# Patient Record
Sex: MALE | Race: WHITE | NOT HISPANIC OR LATINO | Employment: FULL TIME | URBAN - METROPOLITAN AREA
[De-identification: names, ages, dates, MRNs, and addresses within clinical notes are randomized per-mention and may not be internally consistent; named-entity substitution may affect disease eponyms.]

---

## 2018-12-12 ENCOUNTER — HOSPITAL ENCOUNTER (INPATIENT)
Facility: HOSPITAL | Age: 43
LOS: 2 days | Discharge: HOME | DRG: 418 | End: 2018-12-15
Attending: EMERGENCY MEDICINE | Admitting: INTERNAL MEDICINE
Payer: COMMERCIAL

## 2018-12-12 DIAGNOSIS — K85.90 ACUTE PANCREATITIS: ICD-10-CM

## 2018-12-12 DIAGNOSIS — K85.10 ACUTE BILIARY PANCREATITIS WITHOUT INFECTION OR NECROSIS: ICD-10-CM

## 2018-12-12 DIAGNOSIS — R10.13 ACUTE EPIGASTRIC PAIN: ICD-10-CM

## 2018-12-12 LAB
ALBUMIN SERPL BCP-MCNC: 3.8 G/DL
ALP SERPL-CCNC: 87 U/L
ALT SERPL W/O P-5'-P-CCNC: 177 U/L
ANION GAP SERPL CALC-SCNC: 7 MMOL/L
AST SERPL-CCNC: 117 U/L
BASOPHILS # BLD AUTO: 0 K/UL
BASOPHILS NFR BLD: 0.1 %
BILIRUB SERPL-MCNC: 0.9 MG/DL
BILIRUB UR QL STRIP: NEGATIVE
BUN SERPL-MCNC: 14 MG/DL
CALCIUM SERPL-MCNC: 9.5 MG/DL
CHLORIDE SERPL-SCNC: 107 MMOL/L
CLARITY UR: CLEAR
CO2 SERPL-SCNC: 27 MMOL/L
COLOR UR: YELLOW
CREAT SERPL-MCNC: 1 MG/DL
DIFFERENTIAL METHOD: ABNORMAL
EOSINOPHIL # BLD AUTO: 0.1 K/UL
EOSINOPHIL NFR BLD: 0.8 %
ERYTHROCYTE [DISTWIDTH] IN BLOOD BY AUTOMATED COUNT: 13.8 %
EST. GFR  (AFRICAN AMERICAN): >60 ML/MIN/1.73 M^2
EST. GFR  (NON AFRICAN AMERICAN): >60 ML/MIN/1.73 M^2
GLUCOSE SERPL-MCNC: 105 MG/DL
GLUCOSE UR QL STRIP: NEGATIVE
HCT VFR BLD AUTO: 48.7 %
HGB BLD-MCNC: 16.2 G/DL
HGB UR QL STRIP: NEGATIVE
KETONES UR QL STRIP: NEGATIVE
LEUKOCYTE ESTERASE UR QL STRIP: NEGATIVE
LIPASE SERPL-CCNC: 782 U/L
LYMPHOCYTES # BLD AUTO: 0.9 K/UL
LYMPHOCYTES NFR BLD: 5.5 %
MCH RBC QN AUTO: 26.8 PG
MCHC RBC AUTO-ENTMCNC: 33.2 G/DL
MCV RBC AUTO: 81 FL
MONOCYTES # BLD AUTO: 0.3 K/UL
MONOCYTES NFR BLD: 1.7 %
NEUTROPHILS # BLD AUTO: 15.1 K/UL
NEUTROPHILS NFR BLD: 91.9 %
NITRITE UR QL STRIP: NEGATIVE
PH UR STRIP: 6 [PH] (ref 5–8)
PLATELET # BLD AUTO: 377 K/UL
PMV BLD AUTO: 7.9 FL
POTASSIUM SERPL-SCNC: 3.5 MMOL/L
PROT SERPL-MCNC: 7.1 G/DL
PROT UR QL STRIP: NEGATIVE
RBC # BLD AUTO: 6.02 M/UL
SODIUM SERPL-SCNC: 141 MMOL/L
SP GR UR STRIP: >=1.03 (ref 1–1.03)
URN SPEC COLLECT METH UR: ABNORMAL
UROBILINOGEN UR STRIP-ACNC: NEGATIVE EU/DL
WBC # BLD AUTO: 16.5 K/UL

## 2018-12-12 PROCEDURE — 85025 COMPLETE CBC W/AUTO DIFF WBC: CPT

## 2018-12-12 PROCEDURE — 81003 URINALYSIS AUTO W/O SCOPE: CPT

## 2018-12-12 PROCEDURE — 25000003 PHARM REV CODE 250: Performed by: EMERGENCY MEDICINE

## 2018-12-12 PROCEDURE — 80053 COMPREHEN METABOLIC PANEL: CPT

## 2018-12-12 PROCEDURE — 93005 ELECTROCARDIOGRAM TRACING: CPT

## 2018-12-12 PROCEDURE — 96365 THER/PROPH/DIAG IV INF INIT: CPT

## 2018-12-12 PROCEDURE — 99285 EMERGENCY DEPT VISIT HI MDM: CPT | Mod: 25

## 2018-12-12 PROCEDURE — 36415 COLL VENOUS BLD VENIPUNCTURE: CPT

## 2018-12-12 PROCEDURE — 83690 ASSAY OF LIPASE: CPT

## 2018-12-12 PROCEDURE — 93010 ELECTROCARDIOGRAM REPORT: CPT | Mod: ,,, | Performed by: INTERNAL MEDICINE

## 2018-12-12 PROCEDURE — 63600175 PHARM REV CODE 636 W HCPCS: Performed by: EMERGENCY MEDICINE

## 2018-12-12 PROCEDURE — 96375 TX/PRO/DX INJ NEW DRUG ADDON: CPT

## 2018-12-12 RX ORDER — MORPHINE SULFATE 8 MG/ML
8 INJECTION INTRAMUSCULAR; INTRAVENOUS; SUBCUTANEOUS
Status: COMPLETED | OUTPATIENT
Start: 2018-12-12 | End: 2018-12-12

## 2018-12-12 RX ORDER — ONDANSETRON 2 MG/ML
4 INJECTION INTRAMUSCULAR; INTRAVENOUS
Status: COMPLETED | OUTPATIENT
Start: 2018-12-12 | End: 2018-12-12

## 2018-12-12 RX ORDER — IBUPROFEN 400 MG/1
800 TABLET ORAL
Status: DISCONTINUED | OUTPATIENT
Start: 2018-12-12 | End: 2018-12-12

## 2018-12-12 RX ORDER — LISINOPRIL 40 MG/1
40 TABLET ORAL DAILY
COMMUNITY
End: 2022-09-27 | Stop reason: SDUPTHER

## 2018-12-12 RX ORDER — DICYCLOMINE HYDROCHLORIDE 10 MG/1
20 CAPSULE ORAL
Status: COMPLETED | OUTPATIENT
Start: 2018-12-12 | End: 2018-12-12

## 2018-12-12 RX ORDER — ATORVASTATIN CALCIUM 40 MG/1
40 TABLET, FILM COATED ORAL NIGHTLY
COMMUNITY
End: 2020-03-16

## 2018-12-12 RX ADMIN — SODIUM CHLORIDE 1000 ML: 0.9 INJECTION, SOLUTION INTRAVENOUS at 08:12

## 2018-12-12 RX ADMIN — DICYCLOMINE HYDROCHLORIDE 20 MG: 10 CAPSULE ORAL at 08:12

## 2018-12-12 RX ADMIN — MORPHINE SULFATE 8 MG: 8 INJECTION INTRAVENOUS at 10:12

## 2018-12-12 RX ADMIN — PIPERACILLIN SODIUM AND TAZOBACTAM SODIUM 4.5 G: 4; .5 INJECTION, POWDER, LYOPHILIZED, FOR SOLUTION INTRAVENOUS at 10:12

## 2018-12-12 RX ADMIN — ONDANSETRON 4 MG: 2 INJECTION INTRAMUSCULAR; INTRAVENOUS at 08:12

## 2018-12-12 NOTE — LETTER
December 15, 2018         [address] [place] [id number]  Phone: [phone]       Patient: Josue Mir   YOB: 1975  Date of Visit: 12/15/2018    To Whom It May Concern:    Jaden Mir  was at [facility] from 12/12/2018 to 12/15/2018.  He may return to work on 12/17/2018. With light restrictions. (no heavy lifting).  If you have any questions or concerns, or if I can be of further assistance, please do not hesitate to contact me.    Sincerely,    Dr. Zamora (general surgery)  Sandra Quintero LPN

## 2018-12-13 PROBLEM — I48.0 PAROXYSMAL ATRIAL FIBRILLATION: Status: ACTIVE | Noted: 2018-12-13

## 2018-12-13 PROBLEM — I10 ESSENTIAL HYPERTENSION: Status: ACTIVE | Noted: 2018-12-13

## 2018-12-13 PROBLEM — K85.90 ACUTE PANCREATITIS: Status: ACTIVE | Noted: 2018-12-13

## 2018-12-13 PROBLEM — G47.33 OSA (OBSTRUCTIVE SLEEP APNEA): Status: ACTIVE | Noted: 2018-12-13

## 2018-12-13 LAB
ALBUMIN SERPL BCP-MCNC: 3.2 G/DL
ALP SERPL-CCNC: 69 U/L
ALT SERPL W/O P-5'-P-CCNC: 118 U/L
ANION GAP SERPL CALC-SCNC: 7 MMOL/L
AST SERPL-CCNC: 52 U/L
BASOPHILS # BLD AUTO: 0 K/UL
BASOPHILS NFR BLD: 0.1 %
BILIRUB SERPL-MCNC: 1.1 MG/DL
BUN SERPL-MCNC: 12 MG/DL
CALCIUM SERPL-MCNC: 8.9 MG/DL
CHLORIDE SERPL-SCNC: 105 MMOL/L
CHOLEST SERPL-MCNC: 100 MG/DL
CHOLEST/HDLC SERPL: 2.4 {RATIO}
CO2 SERPL-SCNC: 28 MMOL/L
CREAT SERPL-MCNC: 1 MG/DL
DIFFERENTIAL METHOD: ABNORMAL
EOSINOPHIL # BLD AUTO: 0.1 K/UL
EOSINOPHIL NFR BLD: 1 %
ERYTHROCYTE [DISTWIDTH] IN BLOOD BY AUTOMATED COUNT: 13.8 %
EST. GFR  (AFRICAN AMERICAN): >60 ML/MIN/1.73 M^2
EST. GFR  (NON AFRICAN AMERICAN): >60 ML/MIN/1.73 M^2
ESTIMATED AVG GLUCOSE: 94 MG/DL
GLUCOSE SERPL-MCNC: 100 MG/DL
HBA1C MFR BLD HPLC: 4.9 %
HCT VFR BLD AUTO: 43.7 %
HDLC SERPL-MCNC: 42 MG/DL
HDLC SERPL: 42 %
HGB BLD-MCNC: 14.7 G/DL
INR PPP: 1.1
LDLC SERPL CALC-MCNC: 47.2 MG/DL
LIPASE SERPL-CCNC: 355 U/L
LIPASE SERPL-CCNC: 412 U/L
LYMPHOCYTES # BLD AUTO: 1 K/UL
LYMPHOCYTES NFR BLD: 6.6 %
MAGNESIUM SERPL-MCNC: 1.8 MG/DL
MCH RBC QN AUTO: 27.2 PG
MCHC RBC AUTO-ENTMCNC: 33.7 G/DL
MCV RBC AUTO: 81 FL
MONOCYTES # BLD AUTO: 0.9 K/UL
MONOCYTES NFR BLD: 6.2 %
NEUTROPHILS # BLD AUTO: 13 K/UL
NEUTROPHILS NFR BLD: 86.1 %
NONHDLC SERPL-MCNC: 58 MG/DL
PHOSPHATE SERPL-MCNC: 2.6 MG/DL
PLATELET # BLD AUTO: 306 K/UL
PMV BLD AUTO: 8 FL
POTASSIUM SERPL-SCNC: 3.7 MMOL/L
PROT SERPL-MCNC: 6.3 G/DL
PROTHROMBIN TIME: 11.6 SEC
RBC # BLD AUTO: 5.41 M/UL
SODIUM SERPL-SCNC: 140 MMOL/L
TRIGL SERPL-MCNC: 54 MG/DL
WBC # BLD AUTO: 15.1 K/UL

## 2018-12-13 PROCEDURE — 80053 COMPREHEN METABOLIC PANEL: CPT

## 2018-12-13 PROCEDURE — 83690 ASSAY OF LIPASE: CPT | Mod: 91

## 2018-12-13 PROCEDURE — 80061 LIPID PANEL: CPT

## 2018-12-13 PROCEDURE — 84100 ASSAY OF PHOSPHORUS: CPT

## 2018-12-13 PROCEDURE — 99254 IP/OBS CNSLTJ NEW/EST MOD 60: CPT | Mod: ,,, | Performed by: SURGERY

## 2018-12-13 PROCEDURE — 94660 CPAP INITIATION&MGMT: CPT

## 2018-12-13 PROCEDURE — 63600175 PHARM REV CODE 636 W HCPCS: Performed by: NURSE PRACTITIONER

## 2018-12-13 PROCEDURE — 85025 COMPLETE CBC W/AUTO DIFF WBC: CPT

## 2018-12-13 PROCEDURE — 12000002 HC ACUTE/MED SURGE SEMI-PRIVATE ROOM

## 2018-12-13 PROCEDURE — 85610 PROTHROMBIN TIME: CPT

## 2018-12-13 PROCEDURE — 27000190 HC CPAP FULL FACE MASK W/VALVE

## 2018-12-13 PROCEDURE — 99223 1ST HOSP IP/OBS HIGH 75: CPT | Mod: ,,, | Performed by: INTERNAL MEDICINE

## 2018-12-13 PROCEDURE — 83735 ASSAY OF MAGNESIUM: CPT

## 2018-12-13 PROCEDURE — 25000003 PHARM REV CODE 250: Performed by: NURSE PRACTITIONER

## 2018-12-13 PROCEDURE — 83690 ASSAY OF LIPASE: CPT

## 2018-12-13 PROCEDURE — 94761 N-INVAS EAR/PLS OXIMETRY MLT: CPT

## 2018-12-13 PROCEDURE — 36415 COLL VENOUS BLD VENIPUNCTURE: CPT

## 2018-12-13 PROCEDURE — 83036 HEMOGLOBIN GLYCOSYLATED A1C: CPT

## 2018-12-13 RX ORDER — ATORVASTATIN CALCIUM 40 MG/1
40 TABLET, FILM COATED ORAL NIGHTLY
Status: DISCONTINUED | OUTPATIENT
Start: 2018-12-13 | End: 2018-12-13

## 2018-12-13 RX ORDER — MORPHINE SULFATE 8 MG/ML
4 INJECTION INTRAMUSCULAR; INTRAVENOUS; SUBCUTANEOUS EVERY 4 HOURS PRN
Status: DISCONTINUED | OUTPATIENT
Start: 2018-12-13 | End: 2018-12-15 | Stop reason: HOSPADM

## 2018-12-13 RX ORDER — SODIUM CHLORIDE 0.9 % (FLUSH) 0.9 %
5 SYRINGE (ML) INJECTION
Status: DISCONTINUED | OUTPATIENT
Start: 2018-12-13 | End: 2018-12-15 | Stop reason: HOSPADM

## 2018-12-13 RX ORDER — IBUPROFEN 200 MG
24 TABLET ORAL
Status: DISCONTINUED | OUTPATIENT
Start: 2018-12-13 | End: 2018-12-15 | Stop reason: HOSPADM

## 2018-12-13 RX ORDER — LISINOPRIL 40 MG/1
40 TABLET ORAL DAILY
Status: DISCONTINUED | OUTPATIENT
Start: 2018-12-13 | End: 2018-12-15

## 2018-12-13 RX ORDER — IBUPROFEN 200 MG
16 TABLET ORAL
Status: DISCONTINUED | OUTPATIENT
Start: 2018-12-13 | End: 2018-12-15 | Stop reason: HOSPADM

## 2018-12-13 RX ORDER — RAMELTEON 8 MG/1
8 TABLET ORAL NIGHTLY PRN
Status: DISCONTINUED | OUTPATIENT
Start: 2018-12-13 | End: 2018-12-15 | Stop reason: HOSPADM

## 2018-12-13 RX ORDER — FLUOXETINE 10 MG/1
10 CAPSULE ORAL DAILY
Status: DISCONTINUED | OUTPATIENT
Start: 2018-12-13 | End: 2018-12-15 | Stop reason: HOSPADM

## 2018-12-13 RX ORDER — ACETAMINOPHEN 325 MG/1
650 TABLET ORAL EVERY 8 HOURS PRN
Status: DISCONTINUED | OUTPATIENT
Start: 2018-12-13 | End: 2018-12-15 | Stop reason: HOSPADM

## 2018-12-13 RX ORDER — SODIUM CHLORIDE, SODIUM LACTATE, POTASSIUM CHLORIDE, CALCIUM CHLORIDE 600; 310; 30; 20 MG/100ML; MG/100ML; MG/100ML; MG/100ML
INJECTION, SOLUTION INTRAVENOUS CONTINUOUS
Status: DISCONTINUED | OUTPATIENT
Start: 2018-12-13 | End: 2018-12-15

## 2018-12-13 RX ORDER — AMLODIPINE BESYLATE 5 MG/1
10 TABLET ORAL DAILY
Status: DISCONTINUED | OUTPATIENT
Start: 2018-12-13 | End: 2018-12-15 | Stop reason: HOSPADM

## 2018-12-13 RX ORDER — GLUCAGON 1 MG
1 KIT INJECTION
Status: DISCONTINUED | OUTPATIENT
Start: 2018-12-13 | End: 2018-12-15 | Stop reason: HOSPADM

## 2018-12-13 RX ORDER — CARVEDILOL 6.25 MG/1
6.25 TABLET ORAL 2 TIMES DAILY WITH MEALS
Status: DISCONTINUED | OUTPATIENT
Start: 2018-12-13 | End: 2018-12-15

## 2018-12-13 RX ADMIN — PIPERACILLIN AND TAZOBACTAM 4.5 G: 4; .5 INJECTION, POWDER, LYOPHILIZED, FOR SOLUTION INTRAVENOUS; PARENTERAL at 10:12

## 2018-12-13 RX ADMIN — CARVEDILOL 6.25 MG: 6.25 TABLET, FILM COATED ORAL at 08:12

## 2018-12-13 RX ADMIN — DRONEDARONE 400 MG: 400 TABLET, FILM COATED ORAL at 05:12

## 2018-12-13 RX ADMIN — FLUOXETINE 10 MG: 10 CAPSULE ORAL at 08:12

## 2018-12-13 RX ADMIN — SODIUM CHLORIDE, SODIUM LACTATE, POTASSIUM CHLORIDE, AND CALCIUM CHLORIDE: .6; .31; .03; .02 INJECTION, SOLUTION INTRAVENOUS at 03:12

## 2018-12-13 RX ADMIN — AMLODIPINE BESYLATE 10 MG: 5 TABLET ORAL at 08:12

## 2018-12-13 RX ADMIN — PIPERACILLIN AND TAZOBACTAM 4.5 G: 4; .5 INJECTION, POWDER, LYOPHILIZED, FOR SOLUTION INTRAVENOUS; PARENTERAL at 03:12

## 2018-12-13 RX ADMIN — ATORVASTATIN CALCIUM 40 MG: 40 TABLET, FILM COATED ORAL at 04:12

## 2018-12-13 RX ADMIN — PIPERACILLIN AND TAZOBACTAM 4.5 G: 4; .5 INJECTION, POWDER, LYOPHILIZED, FOR SOLUTION INTRAVENOUS; PARENTERAL at 04:12

## 2018-12-13 RX ADMIN — DRONEDARONE 400 MG: 400 TABLET, FILM COATED ORAL at 08:12

## 2018-12-13 RX ADMIN — CARVEDILOL 6.25 MG: 6.25 TABLET, FILM COATED ORAL at 05:12

## 2018-12-13 RX ADMIN — SODIUM CHLORIDE, SODIUM LACTATE, POTASSIUM CHLORIDE, AND CALCIUM CHLORIDE: .6; .31; .03; .02 INJECTION, SOLUTION INTRAVENOUS at 04:12

## 2018-12-13 RX ADMIN — LISINOPRIL 40 MG: 40 TABLET ORAL at 08:12

## 2018-12-13 NOTE — ASSESSMENT & PLAN NOTE
Per MRCP, Cholelithiasis with peripancreatic fat stranding suggesting acute pancreatitis.  Gallbladder wall thickening. Lipase 782.  Consult GI, General surgery  Continue IV zosyn for now.  IV fluid hydration  Lipase levels daily  Follow electrolytes  NPO for now  Check triglyceride level  Antiemetic medication and pain control for comfort

## 2018-12-13 NOTE — PLAN OF CARE
Met with pt to complete his assessment.  Provided pt with the blue discharge folder and left the folder in the room.  Pt, who is employed, is independent with his self care, has a cpap, denies home health services and lives with his wife and 4 children.  Pt's PCP is Dr. West and insurance is [facility].  Pt's discharge disposition is home with no anticipated needs.       12/13/18 1410   Discharge Assessment   Assessment Type Discharge Planning Assessment   Confirmed/corrected address and phone number on facesheet? Yes   Assessment information obtained from? Patient   Prior to hospitilization cognitive status: Alert/Oriented   Prior to hospitalization functional status: Independent;Assistive Equipment   Current cognitive status: Alert/Oriented   Current Functional Status: Independent   Lives With spouse;child(silvio), dependent   Able to Return to Prior Arrangements yes   Is patient able to care for self after discharge? Yes   Who are your caregiver(s) and their phone number(s)? (spouse Mallorie Mir, [phone])   Patient's perception of discharge disposition home or selfcare   Readmission Within the Last 30 Days no previous admission in last 30 days   Patient currently being followed by outpatient case management? No   Patient currently receives any other outside agency services? No   Equipment Currently Used at Home CPAP   Do you have any problems affording any of your prescribed medications? No  (pharmacy is [facility] on [address])   Is the patient taking medications as prescribed? yes   Does the patient have transportation home? Yes   Transportation Anticipated family or friend will provide   Does the patient receive services at the [facility]? No   Discharge Plan A Home with family   Patient/Family in Agreement with Plan yes

## 2018-12-13 NOTE — HPI
43 yo male admitted with pancreatitis. US and MRCP confirm pancreatitis with non specific edema around the gallbladder as well. Lipase and transaminases were elevated. Pt feel a little better since admission. Denies significant alcohol use. Has history of CVA and atrial fibrillation. On Pradaxa which he took yesterday morning. No bleeding issues. Developed pain and N/V last night. Denies fever or chills WBC was elevated. No previous history of pancreatitis.

## 2018-12-13 NOTE — PROGRESS NOTES
12/13/18 0800   Patient Assessment/Suction   Level of Consciousness (AVPU) alert   PRE-TX-O2-ETCO2   O2 Device (Oxygen Therapy) room air   SpO2 (!) 94 %   Pulse Oximetry Type Intermittent   $ Pulse Oximetry - Multiple Charge Pulse Oximetry - Multiple   Pulse 78   Resp 16   Preset CPAP/BiPAP Settings   Mode Of Delivery other (see comments)  (patient states will have his home cpap if he stays overnight)

## 2018-12-13 NOTE — NURSING
Call placed for GI consult. nurse Camron said that it will be either Dr. Lenz or Dr. Carrington and she will get the consult to them in [place].     Call placed to Dr. Love's phone as well for General surgery consult.

## 2018-12-13 NOTE — ASSESSMENT & PLAN NOTE
1. Biliary pancreatitis.  2. On Pradaxa.  3. Continue NPO with IVF.  4. Pt will need cholecystectomy when pancreatitis stabilizes on this admission.  5. Hold Pradaxa. Potentially could do lap poornima tomorrow.  6. Discussed at length with patient.

## 2018-12-13 NOTE — ED PROVIDER NOTES
Encounter Date: 12/12/2018    SCRIBE #1 NOTE: Camille YBARRA am scribing for, and in the presence of, Dr. Palafox.       History     Chief Complaint   Patient presents with    Abdominal Pain     awoke with pain at 0200 and 1 episode of emesis at 10am; no diarrhea     12/12/2018  8:17 PM     The patient is a 42 y.o. male  who presents with abdominal pain. Patient c/o gradual onset of moderate sharp pain to the right upper abdomen which started at 2 AM. Pt's pain is non radiating and exacerbated when laying on the stomach. He has a decreased appetite due to nausea. Pt also reports episode of vomiting at 10 AM. His last normal bowel movement was this morning. He denies any diarrhea, fever, burning with urination or blood in urine. No hx of pancreas problems. No hx of reflux. Patient reports he had lasagne at a restaurant at 8 pm last night. Pt reports he consumes very little alcohol. PMHx of HTN, gout, A Fib, stroke, sleep apnea, pneumonia and memory deficit following cerebral infarction. SHx of nasal polyp excision, insertion of implantable loop recorder.        The history is provided by the patient.     Review of patient's allergies indicates:  No Known Allergies  Past Medical History:   Diagnosis Date    Atrial fibrillation     Gout     Gout, joint     Hypertension     Memory deficit following cerebral infarction     Pneumonia 04/2018    Sleep apnea with use of continuous positive airway pressure (CPAP)     Stroke 04/2018    Syncope and collapse      Past Surgical History:   Procedure Laterality Date    CARDIOVERSION OR DEFIBRILLATION N/A 10/25/2018    Performed by [facility] CATH    ECHOCARDIOGRAM,TRANSESOPHAGEAL N/A 10/25/2018    Per[facility] by [facility] at [facility] CATH    INSERTION OF IMPLANTABLE LOOP RECORDER  10/25/2018    Procedure: Insertion, Implantable Loop Recorder;  Surgeon: Justin Colby MD;  Location: [facility];  Service: Cardiology;;    Insertion, Implantable Loop  Recorder  10/25/2018    Performed by [facility] at [facility]    NASAL POLYP EXCISION      TREATMENT OF CARDIAC ARRHYTHMIA N/A 10/25/2018    Procedure: CARDIOVERSION OR DEFIBRILLATION;  Surgeon: Justin Colby MD;  Location: [facility];  Service: Cardiology;  Laterality: N/A;     Family History   Problem Relation Age of Onset    Sleep apnea Mother     Hypertension Mother     No Known Problems Brother     No Known Problems Brother     No Known Problems Brother      Social History     Tobacco Use    Smoking status: Never Smoker    Smokeless tobacco: Never Used   Substance Use Topics    Alcohol use: Yes     Comment: socially    Drug use: No     Review of Systems   Constitutional: Positive for appetite change (decreased). Negative for chills and fever.   HENT: Negative for congestion, rhinorrhea and sore throat.    Respiratory: Negative for cough and shortness of breath.    Cardiovascular: Negative for chest pain.   Gastrointestinal: Positive for abdominal pain, nausea and vomiting. Negative for diarrhea.   Genitourinary: Negative for dysuria.   Musculoskeletal: Negative for back pain and myalgias.   Skin: Negative for rash.   Neurological: Negative for weakness and numbness.   Hematological: Does not bruise/bleed easily.       Physical Exam     Initial Vitals [12/12/18 2001]   BP Pulse Resp Temp SpO2   (!) 170/114 89 16 99.1 °F (37.3 °C) 95 %      MAP       --         Physical Exam    Nursing note and vitals reviewed.  Constitutional: No distress.   HENT:   Head: Normocephalic and atraumatic.   Mouth/Throat: Mucous membranes are normal.   Eyes: EOM are normal. Pupils are equal, round, and reactive to light.   Neck: Normal range of motion.   Cardiovascular: Normal rate, regular rhythm, normal heart sounds and intact distal pulses. Exam reveals no gallop and no friction rub.    No murmur heard.  Pulmonary/Chest: Breath sounds normal. He has no wheezes. He has no rhonchi. He has no rales.   Abdominal:  Soft. He exhibits no distension. There is tenderness in the right upper quadrant and epigastric area. There is no rebound, no guarding, no CVA tenderness and negative Marcos's sign.   Non rigid. Non distended.   Musculoskeletal: Normal range of motion. He exhibits no edema.   Neurological: He is alert and oriented to person, place, and time. He has normal strength.   Skin: Skin is dry. No rash noted.   Psychiatric: He has a normal mood and affect.         ED Course   Procedures  Labs Reviewed   CBC W/ AUTO DIFFERENTIAL - Abnormal; Notable for the following components:       Result Value    WBC 16.50 (*)     MCV 81 (*)     MCH 26.8 (*)     Platelets 377 (*)     MPV 7.9 (*)     Gran # (ANC) 15.1 (*)     Lymph # 0.9 (*)     Gran% 91.9 (*)     Lymph% 5.5 (*)     Mono% 1.7 (*)     All other components within normal limits   COMPREHENSIVE METABOLIC PANEL - Abnormal; Notable for the following components:     (*)      (*)     Anion Gap 7 (*)     All other components within normal limits   LIPASE - Abnormal; Notable for the following components:    Lipase 782 (*)     All other components within normal limits   URINALYSIS     EKG Readings: (Independently Interpreted)   Normal sinus rhythm at a rate of 98. Normal axis. Normal intervals. No STEMI. Low voltage QRS.       Imaging Results          US Abdomen Limited (In process)                  Medical Decision Making:   History:   Old Medical Records: I decided to obtain old medical records.  Clinical Tests:   Lab Tests: Reviewed and Ordered  Radiological Study: Reviewed and Ordered  Medical Tests: Reviewed and Ordered  ED Management:  This patient was emergently received and assessed upon arrival.  Initial vital signs are stable.  He exhibits mild epigastric tenderness. Labs significant for evidence of lipase elevation to 800.  Ultrasound indicates stones and sludge in the gallbladder without overt findings consistent with acute cholecystitis.  These findings were  discussed with the on-call gastroenterologist and surgeon from [facility], as we do not have surgical coverage.  Recommendations are to receive an MRCP which was obtained and additionally does not indicate evidence of biliary obstruction or acute cholecystitis.  The patient had approximately 7 hrs of stable observation in the ER.  He was provided Zosyn for empiric coverage of potential infectious abdominal source.  Case was discussed with the accepting nurse practitioner who agreed to admit the patient.  The patient was in agreement with his need for further observation and was transferred to a telemetry bed in guarded condition.            Scribe Attestation:   Scribe #1: I performed the above scribed service and the documentation accurately describes the services I performed. I attest to the accuracy of the note.    Attending Attestation:         Attending Critical Care:   Critical Care Times:   Direct Patient Care (initial evaluation, reassessments, and time considering the case)................................................................25 minutes.   Additional History from reviewing old medical records or taking additional history from the family, EMS, PCP, etc.......................10 minutes.   Ordering, Reviewing, and Interpreting Diagnostic Studies...............................................................................................................5 minutes.   Documentation..................................................................................................................................................................................5 minutes.   Consultation with other Physicians. .................................................................................................................................................10 minutes.   Consultation with the patient's family directly relating to the patient's condition, care, and DNR status (when patient unable)......0  minutes.   Other..................................................................................................................................................................................................10 minutes.   ==============================================================  · Total Critical Care Time - exclusive of procedural time: 65 minutes.  ==============================================================  Critical care reasons: Acute pancreatitis.   The following critical care procedures were done by me (see procedure notes): pulse oximetry.   Critical care was time spent personally by me on the following activities: obtaining history from patient or relative, examination of patient, ordering lab, x-rays, and/or EKG, review of old charts, development of treatment plan with patient or relative, ordering and performing treatments and interventions, evaluation of patient's response to treatment, discussion with consultants and re-evaluation of patient's conition.   Critical Care Condition: potentially life-threatening       I, Dr. Anderson Palafox, personally performed the services described in this documentation. All medical record entries made by the scribe were at my direction and in my presence.  I have reviewed the chart and agree that the record reflects my personal performance and is accurate and complete. Anderson Palafox MD.  7:36 AM 12/13/2018             Clinical Impression:   Diagnoses of Acute epigastric pain, Acute pancreatitis, and Acute pancreatitis were pertinent to this visit.      Disposition:   Disposition: Admitted  Condition: Fair                        Anderson Palafox MD  12/13/18 0736       Anderson Palafox MD  12/13/18 0737

## 2018-12-13 NOTE — ASSESSMENT & PLAN NOTE
Atrial Fibrillation controlled currently with Multaq and Beta Blocker. [id number] score 3. Anticoagulation indicated currently. Anticoagulation done with Pradaxa.  Holding pradaxa at this time until further evaluation by GI and general surgery.  Last dose of Pradaxa 12/12/18 at 7am.   Monitor telemetry.

## 2018-12-13 NOTE — SUBJECTIVE & OBJECTIVE
"No current facility-administered medications on file prior to encounter.      Current Outpatient Medications on File Prior to Encounter   Medication Sig    allopurinol (ZYLOPRIM) 100 MG tablet Take 100 mg by mouth once daily.    amlodipine (NORVASC) 10 MG tablet Take 10 mg by mouth once daily.    atorvastatin (LIPITOR) 40 MG tablet Take 40 mg by mouth every evening.    carvedilol (COREG) 6.25 MG tablet Take 6.25 mg by mouth 2 (two) times daily with meals.    dabigatran etexilate (PRADAXA) 150 mg Cap Take 150 mg by mouth 2 (two) times daily. "Do NOT break, chew, or open capsules."    dronedarone (MULTAQ) 400 mg Tab Take 400 mg by mouth 2 (two) times daily with meals.    fenofibrate (TRICOR) 145 MG tablet Take 160 mg by mouth once daily.    FLUoxetine (PROZAC) 10 MG capsule Take 10 mg by mouth once daily.    lisinopril (PRINIVIL,ZESTRIL) 40 MG tablet Take 40 mg by mouth once daily.       Review of patient's allergies indicates:  No Known Allergies    Past Medical History:   Diagnosis Date    Anticoagulant long-term use     Atrial fibrillation     Gout     Gout, joint     Hypertension     Memory deficit following cerebral infarction     Pneumonia 04/2018    Sleep apnea with use of continuous positive airway pressure (CPAP)     Stroke 04/2018    Syncope and collapse      Past Surgical History:   Procedure Laterality Date    CARDIOVERSION OR DEFIBRILLATION N/A 10/25/2018    [facility]    ECHOCARDIOGRAM,TRANSESOPHAGEAL N/A 10/25/2018    [facility] at [facility]    INSERTION OF IMPLANTABLE LOOP RECORDER  10/25/2018    Procedure: Insertion, Implantable Loop Recorder;  Surgeon: Justin Colby MD;  Location: [facility];  Service: Cardiology;;    Insertion, Implantable Loop Recorder  10/25/2018    [facility] at [facility]    NASAL POLYP EXCISION      TREATMENT OF CARDIAC ARRHYTHMIA N/A 10/25/2018    Procedure: CARDIOVERSION OR " DEFIBRILLATION;  Surgeon: Justin Colby MD;  Location: [facility];  Service: Cardiology;  Laterality: N/A;     Family History     Problem Relation (Age of Onset)    Hypertension Mother    No Known Problems Brother, Brother, Brother    Sleep apnea Mother        Tobacco Use    Smoking status: Never Smoker    Smokeless tobacco: Never Used   Substance and Sexual Activity    Alcohol use: Yes     Comment: socially    Drug use: No    Sexual activity: Yes     Partners: Female     Review of Systems   Constitutional: Negative for chills, fatigue, fever and unexpected weight change.   HENT: Negative for congestion, sore throat, trouble swallowing and voice change.    Eyes: Negative for redness and visual disturbance.   Respiratory: Negative for cough, shortness of breath and wheezing.    Cardiovascular: Negative for chest pain and palpitations.   Gastrointestinal: Positive for abdominal pain, nausea and vomiting. Negative for blood in stool.   Genitourinary: Negative for dysuria, frequency, hematuria and urgency.   Musculoskeletal: Negative for arthralgias, myalgias and neck pain.   Skin: Negative for rash and wound.   Allergic/Immunologic: Negative.    Neurological: Negative for tremors, seizures, weakness and headaches.   Hematological: Does not bruise/bleed easily.   Psychiatric/Behavioral: Negative for confusion and dysphoric mood. The patient is not nervous/anxious.      Objective:     Vital Signs (Most Recent):  Temp: 98.6 °F (37 °C) (12/13/18 0735)  Pulse: 78 (12/13/18 0800)  Resp: 16 (12/13/18 0800)  BP: (!) 152/93 (12/13/18 0735)  SpO2: (!) 94 % (12/13/18 0800) Vital Signs (24h Range):  Temp:  [97.9 °F (36.6 °C)-99.1 °F (37.3 °C)] 98.6 °F (37 °C)  Pulse:  [61-89] 78  Resp:  [16] 16  SpO2:  [91 %-95 %] 94 %  BP: (125-170)/() 152/93     Weight: 106.6 kg (235 lb)  Body mass index is 34.7 kg/m².    Physical Exam   Constitutional: He is oriented to person, place, and time. He appears well-developed and  well-nourished. No distress.   HENT:   Head: Normocephalic and atraumatic.   Mouth/Throat: Oropharynx is clear and moist. No oropharyngeal exudate.   Eyes: Conjunctivae and EOM are normal. Pupils are equal, round, and reactive to light. No scleral icterus.   Neck: Normal range of motion. Neck supple. No thyromegaly present.   Cardiovascular: Normal rate, regular rhythm, normal heart sounds and intact distal pulses.   No murmur heard.  Pulmonary/Chest: Effort normal and breath sounds normal. No respiratory distress. He has no wheezes. He has no rales.   Abdominal: Soft. Bowel sounds are normal. He exhibits no distension. There is tenderness (Epigatsric  tenderness). There is no rebound and no guarding. No hernia.   Musculoskeletal: Normal range of motion. He exhibits no edema or tenderness.   Lymphadenopathy:     He has no cervical adenopathy.   Neurological: He is alert and oriented to person, place, and time. No cranial nerve deficit.   Skin: Skin is warm and dry. No rash noted. No erythema.   Psychiatric: He has a normal mood and affect. His behavior is normal. Judgment normal.       Significant Labs:  CBC:   Recent Labs   Lab 12/13/18  0920   WBC 15.10*   RBC 5.41   HGB 14.7   HCT 43.7      MCV 81*   MCH 27.2   MCHC 33.7     CMP:   Recent Labs   Lab 12/13/18  0920      CALCIUM 8.9   ALBUMIN 3.2*   PROT 6.3      K 3.7   CO2 28      BUN 12   CREATININE 1.0   ALKPHOS 69   *   AST 52*   BILITOT 1.1*       Significant Diagnostics:  MRI and US reports reviewed.

## 2018-12-13 NOTE — CONSULTS
"[facility]  General Surgery  Consult Note    Patient Name: Josue Mir  MRN: [id number]  Code Status: Full Code  Admission Date: 12/12/2018  Hospital Length of Stay: 0 days  Attending Physician: Marci Lemus MD  Primary Care Provider: Manan Roberts MD    Patient information was obtained from patient and ER records.     Inpatient consult to General Surgery  Consult performed by: Beck Love MD  Consult ordered by: KATIE Lassiter        Subjective:     Principal Problem: Acute pancreatitis    History of Present Illness: 43 yo male admitted with pancreatitis. US and MRCP confirm pancreatitis with non specific edema around the gallbladder as well. Lipase and transaminases were elevated. Pt feel a little better since admission. Denies significant alcohol use. Has history of CVA and atrial fibrillation. On Pradaxa which he took yesterday morning. No bleeding issues. Developed pain and N/V last night. Denies fever or chills WBC was elevated. No previous history of pancreatitis.    No current facility-administered medications on file prior to encounter.      Current Outpatient Medications on File Prior to Encounter   Medication Sig    allopurinol (ZYLOPRIM) 100 MG tablet Take 100 mg by mouth once daily.    amlodipine (NORVASC) 10 MG tablet Take 10 mg by mouth once daily.    atorvastatin (LIPITOR) 40 MG tablet Take 40 mg by mouth every evening.    carvedilol (COREG) 6.25 MG tablet Take 6.25 mg by mouth 2 (two) times daily with meals.    dabigatran etexilate (PRADAXA) 150 mg Cap Take 150 mg by mouth 2 (two) times daily. "Do NOT break, chew, or open capsules."    dronedarone (MULTAQ) 400 mg Tab Take 400 mg by mouth 2 (two) times daily with meals.    fenofibrate (TRICOR) 145 MG tablet Take 160 mg by mouth once daily.    FLUoxetine (PROZAC) 10 MG capsule Take 10 mg by mouth once daily.    lisinopril (PRINIVIL,ZESTRIL) 40 MG tablet Take 40 mg by mouth once daily.       Review " of patient's allergies indicates:  No Known Allergies    Past Medical History:   Diagnosis Date    Anticoagulant long-term use     Atrial fibrillation     Gout     Gout, joint     Hypertension     Memory deficit following cerebral infarction     Pneumonia 04/2018    Sleep apnea with use of continuous positive airway pressure (CPAP)     Stroke 04/2018    Syncope and collapse      Past Surgical History:   Procedure Laterality Date    CARDIOVERSION OR DEFIBRILLATION N/A 10/25/2018    Performed by [facility] CATH    ECHOCARDIOGRAM,TRANSESOPHAGEAL N/A 10/25/2018    Performed by [facility] at [facility] CATH    INSERTION OF IMPLANTABLE LOOP RECORDER  10/25/2018    Procedure: Insertion, Implantable Loop Recorder;  Surgeon: Justin Colby MD;  Location: [facility];  Service: Cardiology;;    Insertion, Implantable Loop Recorder  10/25/2018    Performed by [facility] at [facility]    NASAL POLYP EXCISION      TREATMENT OF CARDIAC ARRHYTHMIA N/A 10/25/2018    Procedure: CARDIOVERSION OR DEFIBRILLATION;  Surgeon: Justin Colby MD;  Location: [facility];  Service: Cardiology;  Laterality: N/A;     Family History     Problem Relation (Age of Onset)    Hypertension Mother    No Known Problems Brother, Brother, Brother    Sleep apnea Mother        Tobacco Use    Smoking status: Never Smoker    Smokeless tobacco: Never Used   Substance and Sexual Activity    Alcohol use: Yes     Comment: socially    Drug use: No    Sexual activity: Yes     Partners: Female     Review of Systems   Constitutional: Negative for chills, fatigue, fever and unexpected weight change.   HENT: Negative for congestion, sore throat, trouble swallowing and voice change.    Eyes: Negative for redness and visual disturbance.   Respiratory: Negative for cough, shortness of breath and wheezing.    Cardiovascular: Negative for chest pain and palpitations.   Gastrointestinal: Positive for abdominal pain, nausea and vomiting.  Negative for blood in stool.   Genitourinary: Negative for dysuria, frequency, hematuria and urgency.   Musculoskeletal: Negative for arthralgias, myalgias and neck pain.   Skin: Negative for rash and wound.   Allergic/Immunologic: Negative.    Neurological: Negative for tremors, seizures, weakness and headaches.   Hematological: Does not bruise/bleed easily.   Psychiatric/Behavioral: Negative for confusion and dysphoric mood. The patient is not nervous/anxious.      Objective:     Vital Signs (Most Recent):  Temp: 98.6 °F (37 °C) (12/13/18 0735)  Pulse: 78 (12/13/18 0800)  Resp: 16 (12/13/18 0800)  BP: (!) 152/93 (12/13/18 0735)  SpO2: (!) 94 % (12/13/18 0800) Vital Signs (24h Range):  Temp:  [97.9 °F (36.6 °C)-99.1 °F (37.3 °C)] 98.6 °F (37 °C)  Pulse:  [61-89] 78  Resp:  [16] 16  SpO2:  [91 %-95 %] 94 %  BP: (125-170)/() 152/93     Weight: 106.6 kg (235 lb)  Body mass index is 34.7 kg/m².    Physical Exam   Constitutional: He is oriented to person, place, and time. He appears well-developed and well-nourished. No distress.   HENT:   Head: Normocephalic and atraumatic.   Mouth/Throat: Oropharynx is clear and moist. No oropharyngeal exudate.   Eyes: Conjunctivae and EOM are normal. Pupils are equal, round, and reactive to light. No scleral icterus.   Neck: Normal range of motion. Neck supple. No thyromegaly present.   Cardiovascular: Normal rate, regular rhythm, normal heart sounds and intact distal pulses.   No murmur heard.  Pulmonary/Chest: Effort normal and breath sounds normal. No respiratory distress. He has no wheezes. He has no rales.   Abdominal: Soft. Bowel sounds are normal. He exhibits no distension. There is tenderness (Epigatsric  tenderness). There is no rebound and no guarding. No hernia.   Musculoskeletal: Normal range of motion. He exhibits no edema or tenderness.   Lymphadenopathy:     He has no cervical adenopathy.   Neurological: He is alert and oriented to person, place, and time. No  cranial nerve deficit.   Skin: Skin is warm and dry. No rash noted. No erythema.   Psychiatric: He has a normal mood and affect. His behavior is normal. Judgment normal.       Significant Labs:  CBC:   Recent Labs   Lab 12/13/18  0920   WBC 15.10*   RBC 5.41   HGB 14.7   HCT 43.7      MCV 81*   MCH 27.2   MCHC 33.7     CMP:   Recent Labs   Lab 12/13/18  0920      CALCIUM 8.9   ALBUMIN 3.2*   PROT 6.3      K 3.7   CO2 28      BUN 12   CREATININE 1.0   ALKPHOS 69   *   AST 52*   BILITOT 1.1*       Significant Diagnostics:  MRI and US reports reviewed.    Assessment/Plan:     * Acute pancreatitis    1. Biliary pancreatitis.  2. On Pradaxa.  3. Continue NPO with IVF.  4. Pt will need cholecystectomy when pancreatitis stabilizes on this admission.  5. Hold Pradaxa. Potentially could do lap poornima tomorrow.  6. Discussed at length with patient.       VTE Risk Mitigation (From admission, onward)        Ordered     IP VTE HIGH RISK PATIENT  Once      12/13/18 0347     Reason for No Pharmacological VTE Prophylaxis  Once      12/13/18 0347     Place ALEX hose  Until discontinued      12/13/18 0347     Place sequential compression device  Until discontinued      12/13/18 0347          Thank you for your consult. I will follow-up with patient. Please contact us if you have any additional questions.    Beck Love MD  General Surgery  [facility]

## 2018-12-13 NOTE — H&P
[facility] Hospital Medicine  History & Physical    Patient Name: Josue Mir  MRN: [id number]  Admission Date: 12/12/2018  Attending Physician: Marci Lemus MD   Primary Care Provider: Manan Roberts MD         Patient information was obtained from patient, past medical records and ER records.     Subjective:     Principal Problem:Acute pancreatitis    Chief Complaint:   Chief Complaint   Patient presents with    Abdominal Pain     awoke with pain at 0200 and 1 episode of emesis at 10am; no diarrhea        HPI: Josue Mir is a 42 y.o. Male with a PMHx of PAF, HTN, gout, JASPAL, and stroke who presents with abdominal pain. Patient c/o gradual onset of moderate sharp pain to the right upper abdomen which started at 2 AM on 2/12/18. Pt's pain is non radiating and exacerbated when laying on the stomach. He has a decreased appetite due to nausea. Pt also reports episode of vomiting at 10 AM. His last normal bowel movement was this morning. He denies chest pain, SOB, diarrhea, fever, burning with urination or blood in urine.  ED evaluation revealed an elevated lipase level and imaging studies which are consistent with acute pancreatitis.  Pt will be admitted to the service of hospital medicine for further treatment.        Past Medical History:   Diagnosis Date    Anticoagulant long-term use     Atrial fibrillation     Gout     Gout, joint     Hypertension     Memory deficit following cerebral infarction     Pneumonia 04/2018    Sleep apnea with use of continuous positive airway pressure (CPAP)     Stroke 04/2018    Syncope and collapse        Past Surgical History:   Procedure Laterality Date    CARDIOVERSION OR DEFIBRILLATION N/A 10/25/2018    Performed by [facility] at [facility] CATH    ECHOCARDIOGRAM,TRANSESOPHAGEAL N/A 10/25/2018    Performed by [facility] at [facility] CATH    INSERTION OF IMPLANTABLE LOOP RECORDER  10/25/2018    Procedure: Insertion, Implantable Loop  "Recorder;  Surgeon: Justin Colby MD;  Location: [facility];  Service: Cardiology;;    Insertion, Implantable Loop Recorder  10/25/2018    Performed by [facility] at [facility]    NASAL POLYP EXCISION      TREATMENT OF CARDIAC ARRHYTHMIA N/A 10/25/2018    Procedure: CARDIOVERSION OR DEFIBRILLATION;  Surgeon: Justin Colby MD;  Location: [facility];  Service: Cardiology;  Laterality: N/A;       Review of patient's allergies indicates:  No Known Allergies    No current facility-administered medications on file prior to encounter.      Current Outpatient Medications on File Prior to Encounter   Medication Sig    allopurinol (ZYLOPRIM) 100 MG tablet Take 100 mg by mouth once daily.    amlodipine (NORVASC) 10 MG tablet Take 10 mg by mouth once daily.    atorvastatin (LIPITOR) 40 MG tablet Take 40 mg by mouth every evening.    carvedilol (COREG) 6.25 MG tablet Take 6.25 mg by mouth 2 (two) times daily with meals.    dabigatran etexilate (PRADAXA) 150 mg Cap Take 150 mg by mouth 2 (two) times daily. "Do NOT break, chew, or open capsules."    dronedarone (MULTAQ) 400 mg Tab Take 400 mg by mouth 2 (two) times daily with meals.    fenofibrate (TRICOR) 145 MG tablet Take 160 mg by mouth once daily.    FLUoxetine (PROZAC) 10 MG capsule Take 10 mg by mouth once daily.    lisinopril (PRINIVIL,ZESTRIL) 40 MG tablet Take 40 mg by mouth once daily.     Family History     Problem Relation (Age of Onset)    Hypertension Mother    No Known Problems Brother, Brother, Brother    Sleep apnea Mother        Tobacco Use    Smoking status: Never Smoker    Smokeless tobacco: Never Used   Substance and Sexual Activity    Alcohol use: Yes     Comment: socially    Drug use: No    Sexual activity: Yes     Partners: Female     Review of Systems   Constitutional: Positive for appetite change. Negative for chills and fever.   HENT: Negative for sore throat and trouble swallowing.    Eyes: Negative for photophobia and visual " disturbance.   Respiratory: Negative for cough, shortness of breath and wheezing.    Cardiovascular: Negative for chest pain and palpitations.   Gastrointestinal: Positive for abdominal pain and nausea. Negative for diarrhea.   Endocrine: Negative for polyphagia and polyuria.   Genitourinary: Negative for dysuria and frequency.   Musculoskeletal: Negative for arthralgias and gait problem.   Skin: Negative for rash and wound.   Neurological: Negative for dizziness and weakness.   Hematological: Bruises/bleeds easily.   Psychiatric/Behavioral: Negative for agitation and confusion.   All other systems reviewed and are negative.    Objective:     Vital Signs (Most Recent):  Temp: 97.9 °F (36.6 °C) (12/13/18 0342)  Pulse: 64 (12/13/18 0342)  Resp: 16 (12/13/18 0342)  BP: 125/75 (12/13/18 0342)  SpO2: (!) 93 % (12/13/18 0349) Vital Signs (24h Range):  Temp:  [97.9 °F (36.6 °C)-99.1 °F (37.3 °C)] 97.9 °F (36.6 °C)  Pulse:  [64-89] 64  Resp:  [16] 16  SpO2:  [91 %-95 %] 93 %  BP: (125-170)/() 125/75     Weight: 106.6 kg (235 lb)  Body mass index is 34.7 kg/m².    Physical Exam   Constitutional: He is oriented to person, place, and time. He appears well-developed and well-nourished.   HENT:   Head: Normocephalic and atraumatic.   Eyes: Conjunctivae and EOM are normal. Pupils are equal, round, and reactive to light.   Neck: Normal range of motion. Neck supple. No JVD present.   Cardiovascular: Normal rate, regular rhythm, normal heart sounds and intact distal pulses.   Loop recorder L chest wall   Pulmonary/Chest: Effort normal and breath sounds normal. No respiratory distress.   Abdominal: Soft. Bowel sounds are normal. He exhibits no distension. There is tenderness (RUQ).   Genitourinary:   Genitourinary Comments: deferred   Musculoskeletal: Normal range of motion. He exhibits no edema.   Neurological: He is alert and oriented to person, place, and time.   Skin: Skin is warm and dry. Capillary refill takes less than 2  seconds.   Psychiatric: He has a normal mood and affect. His behavior is normal. Judgment and thought content normal.         CRANIAL NERVES     CN III, IV, VI   Pupils are equal, round, and reactive to light.  Extraocular motions are normal.        Significant Labs:   CBC:   Recent Labs   Lab 12/12/18 2039   WBC 16.50*   HGB 16.2   HCT 48.7   *     CMP:   Recent Labs   Lab 12/12/18 2039      K 3.5      CO2 27      BUN 14   CREATININE 1.0   CALCIUM 9.5   PROT 7.1   ALBUMIN 3.8   BILITOT 0.9   ALKPHOS 87   *   *   ANIONGAP 7*   EGFRNONAA >60     Lipase:   Recent Labs   Lab 12/12/18 2039   LIPASE 782*     Urine Studies:   Recent Labs   Lab 12/12/18 2210   COLORU Yellow   APPEARANCEUA Clear   PHUR 6.0   SPECGRAV >=1.030*   PROTEINUA Negative   GLUCUA Negative   KETONESU Negative   BILIRUBINUA Negative   OCCULTUA Negative   NITRITE Negative   UROBILINOGEN Negative   LEUKOCYTESUR Negative       Significant Imaging: US abdomen ([facility]):   Abnormal appearing thickwalled gallbladder with sludge and probable stones nearly filling the lumen.  No sonographic Marcos's sign to indicate acute cholecystitis. No biliary ductal dilatation. If clinically  indicated, nuclear medicine hepatobiliary scan may be beneficial in further evaluation.     MR Abdomen ([facility]):    1. Cholelithiasis with peripancreatic fat stranding suggesting acute pancreatitis. Gallstone pancreatitis  should be considered. Correlation with serum amylase and lipase is suggested.  2. No evidence of common duct dilatation at this time to suggest choledocholithiasis.  3. Small amount of ascites.     Assessment/Plan:     * Acute pancreatitis    Per MRCP, Cholelithiasis with peripancreatic fat stranding suggesting acute pancreatitis.  Gallbladder wall thickening. Lipase 782.  Consult GI, General surgery  Continue IV zosyn for now.  IV fluid hydration  Lipase levels daily  Follow electrolytes  NPO for now  Check triglyceride  level  Antiemetic medication and pain control for comfort         JASPAL (obstructive sleep apnea)    Chronic; controlled by CPAP.  Continue CPAP at home settings.       Essential hypertension    Chronic; stable for now.  Continue chronic antihypertensives..  Hypertension Medications             amlodipine (NORVASC) 10 MG tablet Take 10 mg by mouth once daily.    carvedilol (COREG) 6.25 MG tablet Take 6.25 mg by mouth 2 (two) times daily with meals.    lisinopril (PRINIVIL,ZESTRIL) 40 MG tablet Take 40 mg by mouth once daily.      Hospital Medications             amLODIPine tablet 10 mg Take 2 tablets (10 mg total) by mouth once daily.    carvedilol tablet 6.25 mg Take 1 tablet (6.25 mg total) by mouth 2 (two) times daily with meals.    lisinopril tablet 40 mg Take 1 tablet (40 mg total) by mouth once daily.             Paroxysmal atrial fibrillation    Atrial Fibrillation controlled currently with Multaq and Beta Blocker. [id number] score 3. Anticoagulation indicated currently. Anticoagulation done with Pradaxa.  Holding pradaxa at this time until further evaluation by GI and general surgery.  Last dose of Pradaxa 12/12/18 at 7am.   Monitor telemetry.       VTE Risk Mitigation (From admission, onward)        Ordered     IP VTE HIGH RISK PATIENT  Once      12/13/18 0347     Reason for No Pharmacological VTE Prophylaxis  Once      12/13/18 0347     Place ALEX hose  Until discontinued      12/13/18 0347     Place sequential compression device  Until discontinued      12/13/18 0347      Time spent seeing patient( greater than 1/2 spent in direct contact) : 60 minutes       KATIE Allen  Department of Hospital Medicine   [facility]

## 2018-12-13 NOTE — PLAN OF CARE
Problem: Adult Inpatient Plan of Care  Goal: Plan of Care Review  Outcome: Ongoing (interventions implemented as appropriate)  Vitals stable. POC/Meds reviewed, pt verbalized understanding. IV fluids intiated, IVPB abx administered per order. Tele monitored Sinus ledy. Consult for G.I. This Am. NPO maintained. SCDs on. Denies nausea at this time, pain tolerable. Urinal at bedside if needed or instructed to call to go to BR. Call light in easy reach.

## 2018-12-13 NOTE — HPI
Josue Mir is a 42 y.o. Male with a PMHx of PAF, HTN, gout, JASPAL, and stroke who presents with abdominal pain. Patient c/o gradual onset of moderate sharp pain to the right upper abdomen which started at 2 AM on 2/12/18. Pt's pain is non radiating and exacerbated when laying on the stomach. He has a decreased appetite due to nausea. Pt also reports episode of vomiting at 10 AM. His last normal bowel movement was this morning. He denies chest pain, SOB, diarrhea, fever, burning with urination or blood in urine.  ED evaluation revealed an elevated lipase level and imaging studies which are consistent with acute pancreatitis.  Pt will be admitted to the service of hospital medicine for further treatment.

## 2018-12-13 NOTE — SUBJECTIVE & OBJECTIVE
"Past Medical History:   Diagnosis Date    Anticoagulant long-term use     Atrial fibrillation     Gout     Gout, joint     Hypertension     Memory deficit following cerebral infarction     Pneumonia 04/2018    Sleep apnea with use of continuous positive airway pressure (CPAP)     Stroke 04/2018    Syncope and collapse        Past Surgical History:   Procedure Laterality Date    CARDIOVERSION OR DEFIBRILLATION N/A 10/25/2018    Performed by Justin Colby MD at [facility]    ECHOCARDIOGRAM,TRANSESOPHAGEAL N/A 10/25/2018    Performed by Justin [facility] at [facility]    INSERTION OF IMPLANTABLE LOOP RECORDER  10/25/2018    Procedure: Insertion, Implantable Loop Recorder;  Surgeon: Justin Colby MD;  Location: [facility];  Service: Cardiology;;    Insertion, Implantable Loop Recorder  10/25/2018    Performed by Justin [facility]by MD at [facility]    NASAL POLYP EXCISION      TREATMENT OF CARDIAC ARRHYTHMIA N/A 10/25/2018    Procedure: CARDIOVERSION OR DEFIBRILLATION;  Surgeon: Justin Colby MD;  Location: [facility];  Service: Cardiology;  Laterality: N/A;       Review of patient's allergies indicates:  No Known Allergies    No current facility-administered medications on file prior to encounter.      Current Outpatient Medications on File Prior to Encounter   Medication Sig    allopurinol (ZYLOPRIM) 100 MG tablet Take 100 mg by mouth once daily.    amlodipine (NORVASC) 10 MG tablet Take 10 mg by mouth once daily.    atorvastatin (LIPITOR) 40 MG tablet Take 40 mg by mouth every evening.    carvedilol (COREG) 6.25 MG tablet Take 6.25 mg by mouth 2 (two) times daily with meals.    dabigatran etexilate (PRADAXA) 150 mg Cap Take 150 mg by mouth 2 (two) times daily. "Do NOT break, chew, or open capsules."    dronedarone (MULTAQ) 400 mg Tab Take 400 mg by mouth 2 (two) times daily with meals.    fenofibrate (TRICOR) 145 MG tablet Take 160 mg by mouth once daily.    FLUoxetine (PROZAC) 10 MG capsule Take 10 " mg by mouth once daily.    lisinopril (PRINIVIL,ZESTRIL) 40 MG tablet Take 40 mg by mouth once daily.     Family History     Problem Relation (Age of Onset)    Hypertension Mother    No Known Problems Brother, Brother, Brother    Sleep apnea Mother        Tobacco Use    Smoking status: Never Smoker    Smokeless tobacco: Never Used   Substance and Sexual Activity    Alcohol use: Yes     Comment: socially    Drug use: No    Sexual activity: Yes     Partners: Female     Review of Systems   Constitutional: Positive for appetite change. Negative for chills and fever.   HENT: Negative for sore throat and trouble swallowing.    Eyes: Negative for photophobia and visual disturbance.   Respiratory: Negative for cough, shortness of breath and wheezing.    Cardiovascular: Negative for chest pain and palpitations.   Gastrointestinal: Positive for abdominal pain and nausea. Negative for diarrhea.   Endocrine: Negative for polyphagia and polyuria.   Genitourinary: Negative for dysuria and frequency.   Musculoskeletal: Negative for arthralgias and gait problem.   Skin: Negative for rash and wound.   Neurological: Negative for dizziness and weakness.   Hematological: Bruises/bleeds easily.   Psychiatric/Behavioral: Negative for agitation and confusion.   All other systems reviewed and are negative.    Objective:     Vital Signs (Most Recent):  Temp: 97.9 °F (36.6 °C) (12/13/18 0342)  Pulse: 64 (12/13/18 0342)  Resp: 16 (12/13/18 0342)  BP: 125/75 (12/13/18 0342)  SpO2: (!) 93 % (12/13/18 0349) Vital Signs (24h Range):  Temp:  [97.9 °F (36.6 °C)-99.1 °F (37.3 °C)] 97.9 °F (36.6 °C)  Pulse:  [64-89] 64  Resp:  [16] 16  SpO2:  [91 %-95 %] 93 %  BP: (125-170)/() 125/75     Weight: 106.6 kg (235 lb)  Body mass index is 34.7 kg/m².    Physical Exam   Constitutional: He is oriented to person, place, and time. He appears well-developed and well-nourished.   HENT:   Head: Normocephalic and atraumatic.   Eyes: Conjunctivae and  EOM are normal. Pupils are equal, round, and reactive to light.   Neck: Normal range of motion. Neck supple. No JVD present.   Cardiovascular: Normal rate, regular rhythm, normal heart sounds and intact distal pulses.   Loop recorder L chest wall   Pulmonary/Chest: Effort normal and breath sounds normal. No respiratory distress.   Abdominal: Soft. Bowel sounds are normal. He exhibits no distension. There is tenderness (RUQ).   Genitourinary:   Genitourinary Comments: deferred   Musculoskeletal: Normal range of motion. He exhibits no edema.   Neurological: He is alert and oriented to person, place, and time.   Skin: Skin is warm and dry. Capillary refill takes less than 2 seconds.   Psychiatric: He has a normal mood and affect. His behavior is normal. Judgment and thought content normal.         CRANIAL NERVES     CN III, IV, VI   Pupils are equal, round, and reactive to light.  Extraocular motions are normal.        Significant Labs:   CBC:   Recent Labs   Lab 12/12/18 2039   WBC 16.50*   HGB 16.2   HCT 48.7   *     CMP:   Recent Labs   Lab 12/12/18 2039      K 3.5      CO2 27      BUN 14   CREATININE 1.0   CALCIUM 9.5   PROT 7.1   ALBUMIN 3.8   BILITOT 0.9   ALKPHOS 87   *   *   ANIONGAP 7*   EGFRNONAA >60     Lipase:   Recent Labs   Lab 12/12/18 2039   LIPASE 782*     Urine Studies:   Recent Labs   Lab 12/12/18 2210   COLORU Yellow   APPEARANCEUA Clear   PHUR 6.0   SPECGRAV >=1.030*   PROTEINUA Negative   GLUCUA Negative   KETONESU Negative   BILIRUBINUA Negative   OCCULTUA Negative   NITRITE Negative   UROBILINOGEN Negative   LEUKOCYTESUR Negative       Significant Imaging: US abdomen ([facility]):   Abnormal appearing thickwalled gallbladder with sludge and probable stones nearly filling the lumen.  No sonographic Macros's sign to indicate acute cholecystitis. No biliary ductal dilatation. If clinically  indicated, nuclear medicine hepatobiliary scan may be beneficial in  further evaluation.    MR Abdomen ([facility]):    1. Cholelithiasis with peripancreatic fat stranding suggesting acute pancreatitis. Gallstone pancreatitis  should be considered. Correlation with serum amylase and lipase is suggested.  2. No evidence of common duct dilatation at this time to suggest choledocholithiasis.  3. Small amount of ascites.

## 2018-12-13 NOTE — ASSESSMENT & PLAN NOTE
Chronic; stable for now.  Continue chronic antihypertensives..  Hypertension Medications             amlodipine (NORVASC) 10 MG tablet Take 10 mg by mouth once daily.    carvedilol (COREG) 6.25 MG tablet Take 6.25 mg by mouth 2 (two) times daily with meals.    lisinopril (PRINIVIL,ZESTRIL) 40 MG tablet Take 40 mg by mouth once daily.      Hospital Medications             amLODIPine tablet 10 mg Take 2 tablets (10 mg total) by mouth once daily.    carvedilol tablet 6.25 mg Take 1 tablet (6.25 mg total) by mouth 2 (two) times daily with meals.    lisinopril tablet 40 mg Take 1 tablet (40 mg total) by mouth once daily.

## 2018-12-14 ENCOUNTER — ANESTHESIA (OUTPATIENT)
Dept: SURGERY | Facility: HOSPITAL | Age: 43
DRG: 418 | End: 2018-12-14
Payer: COMMERCIAL

## 2018-12-14 ENCOUNTER — ANESTHESIA EVENT (OUTPATIENT)
Dept: SURGERY | Facility: HOSPITAL | Age: 43
DRG: 418 | End: 2018-12-14
Payer: COMMERCIAL

## 2018-12-14 PROBLEM — R10.13 ACUTE EPIGASTRIC PAIN: Status: ACTIVE | Noted: 2018-12-14

## 2018-12-14 LAB
ALBUMIN SERPL BCP-MCNC: 2.8 G/DL
ALP SERPL-CCNC: 61 U/L
ALT SERPL W/O P-5'-P-CCNC: 70 U/L
ANION GAP SERPL CALC-SCNC: 8 MMOL/L
AST SERPL-CCNC: 22 U/L
BASOPHILS # BLD AUTO: 0.1 K/UL
BASOPHILS NFR BLD: 0.5 %
BILIRUB SERPL-MCNC: 1.1 MG/DL
BUN SERPL-MCNC: 9 MG/DL
CALCIUM SERPL-MCNC: 8.7 MG/DL
CHLORIDE SERPL-SCNC: 103 MMOL/L
CO2 SERPL-SCNC: 27 MMOL/L
CREAT SERPL-MCNC: 0.9 MG/DL
DIFFERENTIAL METHOD: ABNORMAL
EOSINOPHIL # BLD AUTO: 0.4 K/UL
EOSINOPHIL NFR BLD: 3.2 %
ERYTHROCYTE [DISTWIDTH] IN BLOOD BY AUTOMATED COUNT: 13.6 %
EST. GFR  (AFRICAN AMERICAN): >60 ML/MIN/1.73 M^2
EST. GFR  (NON AFRICAN AMERICAN): >60 ML/MIN/1.73 M^2
GLUCOSE SERPL-MCNC: 99 MG/DL
HCT VFR BLD AUTO: 39.7 %
HGB BLD-MCNC: 13.3 G/DL
LIPASE SERPL-CCNC: 81 U/L
LYMPHOCYTES # BLD AUTO: 1.1 K/UL
LYMPHOCYTES NFR BLD: 8.9 %
MAGNESIUM SERPL-MCNC: 1.7 MG/DL
MCH RBC QN AUTO: 27.1 PG
MCHC RBC AUTO-ENTMCNC: 33.5 G/DL
MCV RBC AUTO: 81 FL
MONOCYTES # BLD AUTO: 1 K/UL
MONOCYTES NFR BLD: 7.8 %
NEUTROPHILS # BLD AUTO: 10.1 K/UL
NEUTROPHILS NFR BLD: 79.6 %
PHOSPHATE SERPL-MCNC: 2.2 MG/DL
PLATELET # BLD AUTO: 273 K/UL
PMV BLD AUTO: 7.7 FL
POTASSIUM SERPL-SCNC: 3.1 MMOL/L
PROT SERPL-MCNC: 6 G/DL
RBC # BLD AUTO: 4.91 M/UL
SODIUM SERPL-SCNC: 138 MMOL/L
WBC # BLD AUTO: 12.8 K/UL

## 2018-12-14 PROCEDURE — 94660 CPAP INITIATION&MGMT: CPT

## 2018-12-14 PROCEDURE — 25000003 PHARM REV CODE 250: Performed by: ANESTHESIOLOGY

## 2018-12-14 PROCEDURE — 83735 ASSAY OF MAGNESIUM: CPT

## 2018-12-14 PROCEDURE — 0FT44ZZ RESECTION OF GALLBLADDER, PERCUTANEOUS ENDOSCOPIC APPROACH: ICD-10-PCS | Performed by: SURGERY

## 2018-12-14 PROCEDURE — 63600175 PHARM REV CODE 636 W HCPCS: Performed by: NURSE PRACTITIONER

## 2018-12-14 PROCEDURE — 36415 COLL VENOUS BLD VENIPUNCTURE: CPT

## 2018-12-14 PROCEDURE — 25000003 PHARM REV CODE 250: Performed by: SURGERY

## 2018-12-14 PROCEDURE — 71000039 HC RECOVERY, EACH ADD'L HOUR: Performed by: SURGERY

## 2018-12-14 PROCEDURE — 25000003 PHARM REV CODE 250: Performed by: NURSE PRACTITIONER

## 2018-12-14 PROCEDURE — 36000709 HC OR TIME LEV III EA ADD 15 MIN: Performed by: SURGERY

## 2018-12-14 PROCEDURE — 25000003 PHARM REV CODE 250: Performed by: NURSE ANESTHETIST, CERTIFIED REGISTERED

## 2018-12-14 PROCEDURE — 27000221 HC OXYGEN, UP TO 24 HOURS

## 2018-12-14 PROCEDURE — 27201423 OPTIME MED/SURG SUP & DEVICES STERILE SUPPLY: Performed by: SURGERY

## 2018-12-14 PROCEDURE — 88304 TISSUE EXAM BY PATHOLOGIST: CPT | Performed by: PATHOLOGY

## 2018-12-14 PROCEDURE — 63600175 PHARM REV CODE 636 W HCPCS: Performed by: NURSE ANESTHETIST, CERTIFIED REGISTERED

## 2018-12-14 PROCEDURE — 12000002 HC ACUTE/MED SURGE SEMI-PRIVATE ROOM

## 2018-12-14 PROCEDURE — 37000008 HC ANESTHESIA 1ST 15 MINUTES: Performed by: SURGERY

## 2018-12-14 PROCEDURE — 85025 COMPLETE CBC W/AUTO DIFF WBC: CPT

## 2018-12-14 PROCEDURE — 36000708 HC OR TIME LEV III 1ST 15 MIN: Performed by: SURGERY

## 2018-12-14 PROCEDURE — D9220A PRA ANESTHESIA: Mod: ANES,,, | Performed by: ANESTHESIOLOGY

## 2018-12-14 PROCEDURE — 83690 ASSAY OF LIPASE: CPT

## 2018-12-14 PROCEDURE — 47562 LAPAROSCOPIC CHOLECYSTECTOMY: CPT | Mod: ,,, | Performed by: SURGERY

## 2018-12-14 PROCEDURE — 71000033 HC RECOVERY, INTIAL HOUR: Performed by: SURGERY

## 2018-12-14 PROCEDURE — 80053 COMPREHEN METABOLIC PANEL: CPT

## 2018-12-14 PROCEDURE — 99900035 HC TECH TIME PER 15 MIN (STAT)

## 2018-12-14 PROCEDURE — 84100 ASSAY OF PHOSPHORUS: CPT

## 2018-12-14 PROCEDURE — 99900103 DSU ONLY-NO CHARGE-INITIAL HR (STAT): Performed by: SURGERY

## 2018-12-14 PROCEDURE — D9220A PRA ANESTHESIA: Mod: CRNA,,, | Performed by: NURSE ANESTHETIST, CERTIFIED REGISTERED

## 2018-12-14 PROCEDURE — 37000009 HC ANESTHESIA EA ADD 15 MINS: Performed by: SURGERY

## 2018-12-14 RX ORDER — DEXAMETHASONE SODIUM PHOSPHATE 4 MG/ML
INJECTION, SOLUTION INTRA-ARTICULAR; INTRALESIONAL; INTRAMUSCULAR; INTRAVENOUS; SOFT TISSUE
Status: DISCONTINUED | OUTPATIENT
Start: 2018-12-14 | End: 2018-12-14

## 2018-12-14 RX ORDER — FENTANYL CITRATE 50 UG/ML
25 INJECTION, SOLUTION INTRAMUSCULAR; INTRAVENOUS EVERY 5 MIN PRN
Status: DISCONTINUED | OUTPATIENT
Start: 2018-12-14 | End: 2018-12-14 | Stop reason: HOSPADM

## 2018-12-14 RX ORDER — ROCURONIUM BROMIDE 10 MG/ML
INJECTION, SOLUTION INTRAVENOUS
Status: DISCONTINUED | OUTPATIENT
Start: 2018-12-14 | End: 2018-12-14

## 2018-12-14 RX ORDER — PROPOFOL 10 MG/ML
VIAL (ML) INTRAVENOUS
Status: DISCONTINUED | OUTPATIENT
Start: 2018-12-14 | End: 2018-12-14

## 2018-12-14 RX ORDER — SUCCINYLCHOLINE CHLORIDE 20 MG/ML
INJECTION INTRAMUSCULAR; INTRAVENOUS
Status: DISCONTINUED | OUTPATIENT
Start: 2018-12-14 | End: 2018-12-14

## 2018-12-14 RX ORDER — OXYCODONE HYDROCHLORIDE 5 MG/1
5 TABLET ORAL ONCE AS NEEDED
Status: COMPLETED | OUTPATIENT
Start: 2018-12-14 | End: 2018-12-14

## 2018-12-14 RX ORDER — KETOROLAC TROMETHAMINE 30 MG/ML
INJECTION, SOLUTION INTRAMUSCULAR; INTRAVENOUS
Status: DISCONTINUED | OUTPATIENT
Start: 2018-12-14 | End: 2018-12-14

## 2018-12-14 RX ORDER — GLYCOPYRROLATE 0.2 MG/ML
INJECTION INTRAMUSCULAR; INTRAVENOUS
Status: DISCONTINUED | OUTPATIENT
Start: 2018-12-14 | End: 2018-12-14

## 2018-12-14 RX ORDER — FENTANYL CITRATE 50 UG/ML
INJECTION, SOLUTION INTRAMUSCULAR; INTRAVENOUS
Status: DISCONTINUED | OUTPATIENT
Start: 2018-12-14 | End: 2018-12-14

## 2018-12-14 RX ORDER — ONDANSETRON 2 MG/ML
INJECTION INTRAMUSCULAR; INTRAVENOUS
Status: DISCONTINUED | OUTPATIENT
Start: 2018-12-14 | End: 2018-12-14

## 2018-12-14 RX ORDER — HYDROMORPHONE HYDROCHLORIDE 2 MG/ML
0.2 INJECTION, SOLUTION INTRAMUSCULAR; INTRAVENOUS; SUBCUTANEOUS EVERY 5 MIN PRN
Status: DISCONTINUED | OUTPATIENT
Start: 2018-12-14 | End: 2018-12-14 | Stop reason: HOSPADM

## 2018-12-14 RX ORDER — ENOXAPARIN SODIUM 100 MG/ML
40 INJECTION SUBCUTANEOUS EVERY 24 HOURS
Status: DISCONTINUED | OUTPATIENT
Start: 2018-12-15 | End: 2018-12-15

## 2018-12-14 RX ORDER — NEOSTIGMINE METHYLSULFATE 1 MG/ML
INJECTION, SOLUTION INTRAVENOUS
Status: DISCONTINUED | OUTPATIENT
Start: 2018-12-14 | End: 2018-12-14

## 2018-12-14 RX ORDER — BUPIVACAINE HYDROCHLORIDE AND EPINEPHRINE 2.5; 5 MG/ML; UG/ML
INJECTION, SOLUTION EPIDURAL; INFILTRATION; INTRACAUDAL; PERINEURAL
Status: DISCONTINUED | OUTPATIENT
Start: 2018-12-14 | End: 2018-12-14 | Stop reason: HOSPADM

## 2018-12-14 RX ORDER — MIDAZOLAM HYDROCHLORIDE 1 MG/ML
INJECTION, SOLUTION INTRAMUSCULAR; INTRAVENOUS
Status: DISCONTINUED | OUTPATIENT
Start: 2018-12-14 | End: 2018-12-14

## 2018-12-14 RX ADMIN — DEXAMETHASONE SODIUM PHOSPHATE 8 MG: 4 INJECTION, SOLUTION INTRAMUSCULAR; INTRAVENOUS at 01:12

## 2018-12-14 RX ADMIN — OXYCODONE HYDROCHLORIDE 5 MG: 5 TABLET ORAL at 03:12

## 2018-12-14 RX ADMIN — GLYCOPYRROLATE 0.4 MG: 0.2 INJECTION, SOLUTION INTRAMUSCULAR; INTRAVENOUS at 02:12

## 2018-12-14 RX ADMIN — PIPERACILLIN AND TAZOBACTAM 4.5 G: 4; .5 INJECTION, POWDER, LYOPHILIZED, FOR SOLUTION INTRAVENOUS; PARENTERAL at 10:12

## 2018-12-14 RX ADMIN — SODIUM CHLORIDE, SODIUM LACTATE, POTASSIUM CHLORIDE, AND CALCIUM CHLORIDE: .6; .31; .03; .02 INJECTION, SOLUTION INTRAVENOUS at 02:12

## 2018-12-14 RX ADMIN — NEOSTIGMINE METHYLSULFATE 5 MG: 1 INJECTION INTRAVENOUS at 02:12

## 2018-12-14 RX ADMIN — FENTANYL CITRATE 50 MCG: 50 INJECTION, SOLUTION INTRAMUSCULAR; INTRAVENOUS at 01:12

## 2018-12-14 RX ADMIN — CARVEDILOL 6.25 MG: 6.25 TABLET, FILM COATED ORAL at 08:12

## 2018-12-14 RX ADMIN — KETOROLAC TROMETHAMINE 30 MG: 30 INJECTION, SOLUTION INTRAMUSCULAR; INTRAVENOUS at 02:12

## 2018-12-14 RX ADMIN — PROPOFOL 200 MG: 10 INJECTION, EMULSION INTRAVENOUS at 01:12

## 2018-12-14 RX ADMIN — AMLODIPINE BESYLATE 10 MG: 5 TABLET ORAL at 08:12

## 2018-12-14 RX ADMIN — FLUOXETINE 10 MG: 10 CAPSULE ORAL at 08:12

## 2018-12-14 RX ADMIN — ROCURONIUM BROMIDE 10 MG: 10 INJECTION, SOLUTION INTRAVENOUS at 01:12

## 2018-12-14 RX ADMIN — PIPERACILLIN AND TAZOBACTAM 4.5 G: 4; .5 INJECTION, POWDER, LYOPHILIZED, FOR SOLUTION INTRAVENOUS; PARENTERAL at 05:12

## 2018-12-14 RX ADMIN — GLYCOPYRROLATE 0.2 MG: 0.2 INJECTION, SOLUTION INTRAMUSCULAR; INTRAVENOUS at 01:12

## 2018-12-14 RX ADMIN — ONDANSETRON 4 MG: 2 INJECTION, SOLUTION INTRAMUSCULAR; INTRAVENOUS at 01:12

## 2018-12-14 RX ADMIN — SUCCINYLCHOLINE CHLORIDE 140 MG: 20 INJECTION, SOLUTION INTRAMUSCULAR; INTRAVENOUS at 01:12

## 2018-12-14 RX ADMIN — DRONEDARONE 400 MG: 400 TABLET, FILM COATED ORAL at 08:12

## 2018-12-14 RX ADMIN — DRONEDARONE 400 MG: 400 TABLET, FILM COATED ORAL at 05:12

## 2018-12-14 RX ADMIN — CARVEDILOL 6.25 MG: 6.25 TABLET, FILM COATED ORAL at 05:12

## 2018-12-14 RX ADMIN — MIDAZOLAM 2 MG: 1 INJECTION INTRAMUSCULAR; INTRAVENOUS at 01:12

## 2018-12-14 RX ADMIN — SODIUM CHLORIDE, SODIUM LACTATE, POTASSIUM CHLORIDE, AND CALCIUM CHLORIDE: .6; .31; .03; .02 INJECTION, SOLUTION INTRAVENOUS at 04:12

## 2018-12-14 RX ADMIN — LISINOPRIL 40 MG: 40 TABLET ORAL at 08:12

## 2018-12-14 NOTE — PROGRESS NOTES
Bedside report received from Maranda. Patient denies any pain at this time. VS stable. Wife at bedside.

## 2018-12-14 NOTE — OP NOTE
PATIENT NAME:  Josue Mir     DATE OF PROCEDURE: 12/14/2018    PROCEDURE: Laparoscopic Cholecystectomy    PREOPERATIVE DIAGNOSIS: Cholelithiasis / Cholecystitis, Pancreatitis   POSTOPERATIVE DIAGNOSIS: Cholelithiasis / Cholecystitis, Pancreatitis     SURGEON: Beck Almodovar Jr., M.D.  ASSISTANT: None     DESCRIPTION OF PROCEDURE: After appropriate consent was obtained, consent forms   signed and questions answered, the patient was taken to the Operating Room and   placed on the operating room table where general endotracheal anesthesia was   induced without difficulty. Preoperative antibiotics were administered. SCDs were in   place. A Timeout procedure was performed. The abdomen was prepped and draped in the usual sterile fashion. A midline incision was made beneath the umbilicus. Dissection was performed down to the fascia, which was incised. Stay sutures were placed on the fascia and the Jaye trocar was inserted. The abdomen was insufflated. Under direct   vision and after injection with local anesthetic, a 5 mm trocar was placed in   the upper midline. A second 5 mm trocar was placed between these two. The   gallbladder was identified, grasped, and retracted. There was some mild to moderate  inflammation. The cystic duct was identified and carefully dissected. Three clips   were placed on the common duct side, 2 on the gallbladder side, and the duct was   transected. The artery was identified, dissected, clipped and cut as well. The   gallbladder was then dissected free of the liver bed with electrocautery. It was   placed in a retrieval bag and removed through the umbilical port site. No bile   was spilled. The gallbladder fossa was examined and found to be hemostatic. The trocars were then removed under direct vision.The abdomen was desufflated. The fascia at the umbilicus was closed with interrupted 0 Vicryl suture and additional local was injected. The skin was then closed with 4-0 Monocryl  subcuticular stitches. Steri-Strips were then applied to all incisions. The patient was awakened, extubated and transferred to the Recovery Room in good condition. The patient tolerated the procedure without complication. Lap and instrument counts were reported as correct at the termination of the procedure.    EBL: 30 cc  SPECIMEN: gallbladder  COMPLICATIONS: none  ANESTHESIA: General

## 2018-12-14 NOTE — PLAN OF CARE
"Problem: Adult Inpatient Plan of Care  Goal: Plan of Care Review  Outcome: Ongoing (interventions implemented as appropriate)  Plan of care review with pt, pt states "understanding," IVF infusing without difficulty, no redness/swelling noted to IV site, hypoactive BS to all 4 quads with tenderness voice to abdomen, will continue to monitor, observe and note any changes, safety maintain        "

## 2018-12-14 NOTE — CONSULTS
41 yo male admitted with acute pancreatitis. Pt presented with acute onset epigastric pain. No radiation. No vomiting. No fever or jaundice. Appetite and weight prior to illness stable. No ETOH. No family history pancreatitis. No bleeding. No diarrhea or constipation. Lipase elevated at 782, mild elevations in liver enzymes with normal bili and AP. U/S notable for gallstones, GB wall thickening, non-dilated CBD. MRCP notable for gallstones with GB wall edema, mild israel-cholecystic fluid, israel-pancreatic edema c/w pancreatitis, normal CBD without ductal stone. Pt on IVF and antibiotic. Pt feeling better, pain improved, lying comfortably in bed. Of note, pt on pradaxa for A fib/prior CVA, last dose Wed AM 12/12. Pt evaluated by gen surg Dr Love, recommended cholecystectomy, timing to be determined.    Past Medical History:   Diagnosis Date    Anticoagulant long-term use     Atrial fibrillation     Gout     Gout, joint     Hypertension     Memory deficit following cerebral infarction     Pneumonia 04/2018    Sleep apnea with use of continuous positive airway pressure (CPAP)     Stroke 04/2018    Syncope and collapse      Past Surgical History:   Procedure Laterality Date    CARDIOVERSION OR DEFIBRILLATION N/A 10/25/2018    Performed by [facility] at [facility]    ECHOCARDIOGRAM,TRANSESOPHAGEAL N/A 10/25/2018    Performed by Justin [facility] at [facility]    INSERTION OF IMPLANTABLE LOOP RECORDER  10/25/2018    Procedure: Insertion, Implantable Loop Recorder;  Surgeon: Justin Colby MD;  Location: [facility];  Service: Cardiology;;    Insertion, Implantable Loop Recorder  10/25/2018    Performed by Justin [facility]by MD at [facility]    NASAL POLYP EXCISION      TREATMENT OF CARDIAC ARRHYTHMIA N/A 10/25/2018    Procedure: CARDIOVERSION OR DEFIBRILLATION;  Surgeon: Justin Colby MD;  Location: [facility];  Service: Cardiology;  Laterality: N/A;     No current facility-administered medications on file  "prior to encounter.      Current Outpatient Medications on File Prior to Encounter   Medication Sig Dispense Refill    allopurinol (ZYLOPRIM) 100 MG tablet Take 100 mg by mouth once daily.      amlodipine (NORVASC) 10 MG tablet Take 10 mg by mouth once daily.      atorvastatin (LIPITOR) 40 MG tablet Take 40 mg by mouth every evening.      carvedilol (COREG) 6.25 MG tablet Take 6.25 mg by mouth 2 (two) times daily with meals.      dabigatran etexilate (PRADAXA) 150 mg Cap Take 150 mg by mouth 2 (two) times daily. "Do NOT break, chew, or open capsules."      dronedarone (MULTAQ) 400 mg Tab Take 400 mg by mouth 2 (two) times daily with meals.      fenofibrate (TRICOR) 145 MG tablet Take 160 mg by mouth once daily.      FLUoxetine (PROZAC) 10 MG capsule Take 10 mg by mouth once daily.      lisinopril (PRINIVIL,ZESTRIL) 40 MG tablet Take 40 mg by mouth once daily.       Review of patient's allergies indicates:  No Known Allergies  Social History     Socioeconomic History    Marital status:      Spouse name: Not on file    Number of children: Not on file    Years of education: Not on file    Highest education level: Not on file   Social Needs    Financial resource strain: Not on file    Food insecurity - worry: Not on file    Food insecurity - inability: Not on file    Transportation needs - medical: Not on file    Transportation needs - non-medical: Not on file   Occupational History    Not on file   Tobacco Use    Smoking status: Never Smoker    Smokeless tobacco: Never Used   Substance and Sexual Activity    Alcohol use: Yes     Comment: socially    Drug use: No    Sexual activity: Yes     Partners: Female   Other Topics Concern    Not on file   Social History Narrative    Not on file     Family History   Problem Relation Age of Onset    Sleep apnea Mother     Hypertension Mother     No Known Problems Brother     No Known Problems Brother     No Known Problems Brother      REVIEW " OF SYSTEMS:   Constitutional: Negative for fever, appetite change and unexpected weight change.  HENT: Negative for sore throat and trouble swallowing.  Eyes: Negative for visual disturbance.  Respiratory: Negative for chest tightness, shortness of breath and wheezing.  Cardiovascular: Negative for chest pain.  Gastrointestinal:  as per HPI  Genitourinary: Negative for dysuria, frequency and hematuria.  Musculoskeletal: Negative for myalgias, joint swelling and arthralgias.  Skin: Negative for color change and rash.   Neurological: Negative for syncope, weakness and headaches.  Psychiatric/Behavioral: Negative for confusion.    PHYSICAL EXAMINATION:                                                        GENERAL:  Comfortable, in no acute distress.      SKIN: Non-jaundiced.                             HEENT EXAM:  Nonicteric.  No adenopathy.  Oropharynx is clear.               NECK:  Supple.                                                               LUNGS:  Clear.                                                               CARDIAC:  Regular rate and rhythm.  S1, S2.  No murmur.                      ABDOMEN:  Soft, positive bowel sounds, nontender.  No hepatosplenomegaly or masses.  No rebound or guarding.                             EXTREMITIES:  No edema.     NEURO: CN II-XII intact; motor/sensory non-focal.    LABS: Reviewed by me (Bili 1.1, AP normal, AST 52, , Lipase 355, WBC 15, H/H 14/43, TG 54).    RADIOLOGY: Reviewed by me (U/S and MRCP as described above).    IMP: 1. Acute pancreatitis - suspect biliary etiology (passed stone).          2. Cholelithiasis          3. Abnormal gallbladder U/S and MRCP          4. Elevated LFT's - improved.          5. Anti-coagulation therapy (pradaxa on hold)          6. Hx a fib/CVA    REC: 1. IVF hydration and antibiotic            2. NPO except ice chips/meds            3. Daily labs            4. Empiric protonix            5. Lap poornima as per gen surg

## 2018-12-14 NOTE — ASSESSMENT & PLAN NOTE
Atrial Fibrillation controlled currently with Multaq and Beta Blocker. Holding pradaxa at this time.  Last dose of Pradaxa 12/12/18 at 7am.   Monitor on telemetry.

## 2018-12-14 NOTE — PLAN OF CARE
Problem: Adult Inpatient Plan of Care  Goal: Plan of Care Review  Outcome: Ongoing (interventions implemented as appropriate)  Patient AAO, VSS. IVF and abx infusing as ordered. Pt remains NPO.  No complaints of nausea or pain.  Up independently. Pt verbalized understanding of POC. Q2hr rounding done during shift to promote patient safety. Patient free from falls and injury during shift.  Bed in lowest position, brakes locked, and call light within reach.  Will continue to monitor.

## 2018-12-14 NOTE — TRANSFER OF CARE
"Anesthesia Transfer of Care Note    Patient: Josue Mir    Procedure(s) Performed: Procedure(s) (LRB):  CHOLECYSTECTOMY, LAPAROSCOPIC (N/A)    Patient location: PACU    Anesthesia Type: general    Transport from OR: Transported from OR on 2-3 L/min O2 by NC with adequate spontaneous ventilation    Post pain: adequate analgesia    Post assessment: tolerated procedure well and no apparent anesthetic complications    Post vital signs: stable    Level of consciousness: sedated    Nausea/Vomiting: no nausea/vomiting    Complications: none          Last vitals:   Visit Vitals  BP (!) 157/83 (BP Location: Left arm, Patient Position: Lying)   Pulse 70   Temp 37.2 °C (99 °F) (Temporal)   Resp 17   Ht 5' 9" (1.753 m)   Wt 114.7 kg (252 lb 13.9 oz)   SpO2 (!) 94%   BMI 37.34 kg/m²     "

## 2018-12-14 NOTE — TRANSFER OF CARE
"Anesthesia Transfer of Care Note    Patient: Josue Mir    Procedure(s) Performed: Procedure(s) (LRB):  CHOLECYSTECTOMY, LAPAROSCOPIC (N/A)    Patient location: PACU    Anesthesia Type: general    Transport from OR: Transported from OR on 2-3 L/min O2 by NC with adequate spontaneous ventilation    Post pain: adequate analgesia    Post assessment: tolerated procedure well and no apparent anesthetic complications    Post vital signs: stable    Level of consciousness: sedated    Nausea/Vomiting: no nausea/vomiting    Complications: none    Transfer of care protocol was followed      Last vitals:   Visit Vitals  BP (!) 157/83 (BP Location: Left arm, Patient Position: Lying)   Pulse 70   Temp 37.2 °C (99 °F) (Temporal)   Resp 17   Ht 5' 9" (1.753 m)   Wt 114.7 kg (252 lb 13.9 oz)   SpO2 (!) 94%   BMI 37.34 kg/m²     "

## 2018-12-14 NOTE — PROGRESS NOTES
[facility] Hospital Medicine  Progress Note    Patient Name: Josue Mir  MRN: [id number]  Patient Class: IP- Inpatient   Admission Date: 12/12/2018  Length of Stay: 1 days  Attending Physician: Marci Lemus MD  Primary Care Provider: Manan Roberts MD        Subjective:     Principal Problem:Acute pancreatitis    HPI:  Josue Mir is a 42 y.o. Male with a PMHx of PAF, HTN, gout, JASPAL, and stroke who presents with abdominal pain. Patient c/o gradual onset of moderate sharp pain to the right upper abdomen which started at 2 AM on 2/12/18. Pt's pain is non radiating and exacerbated when laying on the stomach. He has a decreased appetite due to nausea. Pt also reports episode of vomiting at 10 AM. His last normal bowel movement was this morning. He denies chest pain, SOB, diarrhea, fever, burning with urination or blood in urine.  ED evaluation revealed an elevated lipase level and imaging studies which are consistent with acute pancreatitis.  Pt will be admitted to the service of hospital medicine for further treatment.        Hospital Course:  43 y/o with gallstone pancreatitis is improving and will have sx today or tomorrow  No notes on file    Interval History: The pt feels much better. He denies abdominal pain or nausea    Review of Systems   Constitutional: Positive for appetite change. Negative for chills and fever.   HENT: Negative for sore throat and trouble swallowing.    Eyes: Negative for photophobia and visual disturbance.   Respiratory: Negative for cough, shortness of breath and wheezing.    Cardiovascular: Negative for chest pain and palpitations.   Gastrointestinal: Negative for abdominal pain, diarrhea and nausea.   Endocrine: Negative for polyphagia and polyuria.   Genitourinary: Negative for dysuria, frequency and hematuria.   Musculoskeletal: Negative for arthralgias and gait problem.   Skin: Negative for color change, pallor, rash and wound.   Neurological: Negative for  dizziness, tremors and weakness.   Hematological: Bruises/bleeds easily.   Psychiatric/Behavioral: Negative for agitation, behavioral problems and confusion.   All other systems reviewed and are negative.    Objective:     Vital Signs (Most Recent):  Temp: 96.6 °F (35.9 °C) (12/14/18 0748)  Pulse: 61 (12/14/18 0748)  Resp: 16 (12/14/18 0748)  BP: 134/84 (12/14/18 0748)  SpO2: 97 % (12/14/18 0748) Vital Signs (24h Range):  Temp:  [96.6 °F (35.9 °C)-100.3 °F (37.9 °C)] 96.6 °F (35.9 °C)  Pulse:  [61-84] 61  Resp:  [16-20] 16  SpO2:  [93 %-97 %] 97 %  BP: (134-153)/(82-87) 134/84     Weight: 114.7 kg (252 lb 12.8 oz)  Body mass index is 37.33 kg/m².    Intake/Output Summary (Last 24 hours) at 12/14/2018 1046  Last data filed at 12/14/2018 0600  Gross per 24 hour   Intake 1737.5 ml   Output 150 ml   Net 1587.5 ml      Physical Exam   Constitutional: He is oriented to person, place, and time.   Neck: Normal range of motion. Neck supple. No JVD present.   Cardiovascular: Normal rate, regular rhythm, normal heart sounds and intact distal pulses.   Loop recorder L chest wall   Pulmonary/Chest: Effort normal and breath sounds normal. No respiratory distress.   Abdominal: Soft. Bowel sounds are normal. He exhibits no distension. There is no tenderness (RUQ).   Genitourinary:   Genitourinary Comments: deferred   Musculoskeletal: Normal range of motion. He exhibits no tenderness.   Neurological: He is alert and oriented to person, place, and time. No cranial nerve deficit.   Skin: Skin is warm and dry. Capillary refill takes less than 2 seconds. No rash noted. No erythema.   Psychiatric: He has a normal mood and affect. His behavior is normal. Judgment and thought content normal.       Significant Labs:   BMP:   Recent Labs   Lab 12/14/18  0603   GLU 99      K 3.1*      CO2 27   BUN 9   CREATININE 0.9   CALCIUM 8.7   MG 1.7     CBC:   Recent Labs   Lab 12/12/18  2039 12/13/18  0920 12/14/18  0603   WBC 16.50* 15.10*  12.80*   HGB 16.2 14.7 13.3*   HCT 48.7 43.7 39.7*   * 306 273       Significant Imaging: I have reviewed all pertinent imaging results/findings within the past 24 hours.    Assessment/Plan:      * Acute pancreatitis    2/2 cholelithiasis  Clinically improving   Will have poornima today or tomorrow         JASPAL (obstructive sleep apnea)    Chronic; controlled by CPAP.  Continue CPAP at home settings.       Essential hypertension    Chronic; stable for now.  Continue chronic antihypertensives..  Hypertension Medications             amlodipine (NORVASC) 10 MG tablet Take 10 mg by mouth once daily.    carvedilol (COREG) 6.25 MG tablet Take 6.25 mg by mouth 2 (two) times daily with meals.    lisinopril (PRINIVIL,ZESTRIL) 40 MG tablet Take 40 mg by mouth once daily.      Hospital Medications             amLODIPine tablet 10 mg Take 2 tablets (10 mg total) by mouth once daily.    carvedilol tablet 6.25 mg Take 1 tablet (6.25 mg total) by mouth 2 (two) times daily with meals.    lisinopril tablet 40 mg Take 1 tablet (40 mg total) by mouth once daily.             Paroxysmal atrial fibrillation    Atrial Fibrillation controlled currently with Multaq and Beta Blocker. Holding pradaxa at this time.  Last dose of Pradaxa 12/12/18 at 7am.   Monitor on telemetry.       VTE Risk Mitigation (From admission, onward)        Ordered     IP VTE HIGH RISK PATIENT  Once      12/13/18 0347     Reason for No Pharmacological VTE Prophylaxis  Once      12/13/18 0347     Place ALEX hose  Until discontinued      12/13/18 0347     Place sequential compression device  Until discontinued      12/13/18 0347              Marci Lemus MD  Department of Hospital Medicine   [facility]

## 2018-12-14 NOTE — PLAN OF CARE
1610:  Patient transferred to room 421.  Report to GIOVANY Fry.  Released from Anesthesia. Pain tolerable at 1/10.  Dressings dry x 3.  Wife at bedside.  NAD noted.  Denies nausea.

## 2018-12-14 NOTE — PLAN OF CARE
Problem: Adult Inpatient Plan of Care  Goal: Plan of Care Review  Outcome: Ongoing (interventions implemented as appropriate)  PT AAO X4. PT able to verbalize needs/want.Plan of care reviewed with patient at the beginning of the shift. Patient verbalized understanding. IV fluids infusing as ordered. IV antibiotics infused as ordered. Lap sites X3 intact , scant drainage. No complaints of pain at this time. Patient ambulates with stand by assist.  socks worn when out of bed. Patient has remained free from fall/injury. Side rails up X2, bed in lowest, locked position, needs attended to ,  call light within reach will continue to monitor.

## 2018-12-14 NOTE — ANESTHESIA PREPROCEDURE EVALUATION
12/14/2018  Josue Mir is a 42 y.o., male.    Anesthesia Evaluation      I have reviewed the Medications.     Review of Systems  Anesthesia Hx:  No problems with previous Anesthesia   Social:  Non-Smoker, No Alcohol Use    Cardiovascular:   Hypertension Dysrhythmias atrial fibrillation Loop recorder   Pulmonary:   Sleep Apnea    Renal/:  Renal/ Normal     Hepatic/GI:  Hepatic/GI Normal    Neurological:   CVA    Endocrine:  Endocrine Normal        Physical Exam  General:  Obesity    Airway/Jaw/Neck:  Airway Findings: Mouth Opening: Normal Tongue: Normal  General Airway Assessment: Adult, Average  Mallampati: II  Jaw/Neck Findings:  Neck ROM: Normal ROM       Chest/Lungs:  Chest/Lungs Findings: Clear to auscultation, Normal Respiratory Rate     Heart/Vascular:  Heart Findings: Rate: Normal  Rhythm: Regular Rhythm  Sounds: Normal  Heart murmur: negative       Mental Status:  Mental Status Findings:  Cooperative, Alert and Oriented         Anesthesia Plan  Type of Anesthesia, risks & benefits discussed:  Anesthesia Type:  general  Patient's Preference:   Intra-op Monitoring Plan:   Intra-op Monitoring Plan Comments:   Post Op Pain Control Plan:   Post Op Pain Control Plan Comments:   Induction:   IV  Beta Blocker:  Patient is not currently on a Beta-Blocker (No further documentation required).       Informed Consent: Patient understands risks and agrees with Anesthesia plan.  Questions answered. Anesthesia consent signed with patient.  ASA Score: 3     Day of Surgery Review of History & Physical:            Ready For Surgery From Anesthesia Perspective.

## 2018-12-14 NOTE — OR NURSING
Pt. Is AAOx4, calm and cooperative.  VS wnl, denies nausea and pain at this time.  Breathing unlabored and even on room air.  RN reviewed plan of care with pt. And spouse who both voiced understanding.  Questions answered, comfort assured.  Pt. Prepared for surgery.

## 2018-12-14 NOTE — ANESTHESIA POSTPROCEDURE EVALUATION
"Anesthesia Post Evaluation    Patient: Josue Mir    Procedure(s) Performed: Procedure(s) (LRB):  CHOLECYSTECTOMY, LAPAROSCOPIC (N/A)    Final Anesthesia Type: general  Patient location during evaluation: PACU  Patient participation: Yes- Able to Participate  Level of consciousness: awake and alert  Post-procedure vital signs: reviewed and stable  Pain management: adequate  Airway patency: patent  PONV status at discharge: No PONV  Anesthetic complications: no      Cardiovascular status: hemodynamically stable and blood pressure returned to baseline  Respiratory status: unassisted, spontaneous ventilation and room air  Hydration status: euvolemic  Follow-up not needed.        Visit Vitals  BP (!) 120/57 (BP Location: Left arm, Patient Position: Sitting)   Pulse 61   Temp 36.7 °C (98 °F) (Tympanic)   Resp 16   Ht 5' 9" (1.753 m)   Wt 114.7 kg (252 lb 13.9 oz)   SpO2 (!) 94%   BMI 37.34 kg/m²       Pain/Marah Score: Pain Rating Prior to Med Admin: 0 (12/14/2018  3:16 PM)  Marah Score: 10 (12/14/2018  3:30 PM)        "

## 2018-12-14 NOTE — PROGRESS NOTES
"[facility]  Adult Nutrition  Progress Note    SUMMARY     Recommendations  1. Once medically able, recommend clear liquid diet w/ Boost breeze TID.  2. ADAT to GI soft/low fat.      Goals: Pt to be advanced to diet by RD f/u   Nutrition Goal Status: new  Communication of MIRIAM Recs: other (comment)(POC)    Reason for Assessment  Reason For Assessment: identified at risk by screening criteria(MST >3)  Diagnosis: gastrointestinal disease(acute pancreatitis)  Relevant Medical History: HTN, gout, stroke, sleep apnea, memory deficit, anticoag long term use   Interdisciplinary Rounds: did not attend    General Information Comments: MIRIAM moran. Pt admitted d/t acute pancreatitis. It was noted that appetite was decreased d/t nausea but wt and appetite were stable before onset of illness. Pt currently NPO so no PO intake record is available.     Nutrition Discharge Planning: d/c on cardiac diet     Nutrition Risk Screen  Nutrition Risk Screen: no indicators present    Nutrition/Diet History  Patient Reported Diet/Restrictions/Preferences: (WENDY)  Typical Food/Fluid Intake: (WENDY)  Spiritual, Cultural Beliefs, [employer]al Practices, Values that Affect Care: no  Factors Affecting Nutritional Intake: NPO, decreased appetite, nausea/vomiting    Anthropometrics  Temp: 99 °F (37.2 °C)  Height Method: Stated  Height: 5' 9" (175.3 cm)  Height (inches): 69 in  Weight Method: Bed Scale  Weight: 114.7 kg (252 lb 13.9 oz)  Weight (lb): 252.87 lb  Ideal Body Weight (IBW), Male: 160 lb  % Ideal Body Weight, Male (lb): 158.04 lb  BMI (Calculated): 37.4    Lab/Procedures/Meds  Pertinent Labs Reviewed: reviewed  Pertinent Labs Comments: Phos 2.6, Alb 3.2, tbili 1.1, AST 52, , lipase 412   Pertinent Medications Reviewed: reviewed  Pertinent Medications Comments: amlodipine, carvedilol, dronedarone, fluoxetine, lisinopril     Physical Findings/Assessment  Skin: WDL   Mouth/ teeth: WDL      Estimated/Assessed " Needs  Weight Used For Calorie Calculations: 114.7 kg (252 lb 13.9 oz)  Energy Calorie Requirements (kcal): 2457 kcal/d  Energy Need Method: [place]-St Jeor(1.2 PAL)  Fluid Requirements (mL): 1 ml/kcal or per MD  Estimated Fluid Requirement Method: RDA Method  RDA Method (mL): 2457     Nutrition Prescription Ordered  Current Diet Order: NPO     Evaluation of Received Nutrient/Fluid Intake  Comments: LBM 12/12  % Intake of Estimated Energy Needs: 0 - 25 %  % Meal Intake: NPO    Nutrition Risk  High       Assessment and Plan  Nutrition Problem  altered GI function     Related to (etiology):   Acute pancreatitis     Signs and Symptoms (as evidenced by):   N/V upon admission, decreased appetite, biochemical lab values (AST 52, , lipase 412)    Interventions/Recommendations (treatment strategy):  Modified fat diet (decrease in fat intake)    Nutrition Diagnosis Status:   New    Monitor and Evaluation  Food and Nutrient Intake: energy intake, food and beverage intake  Food and Nutrient Adminstration: diet order  Knowledge/Beliefs/Attitudes: food and nutrition knowledge/skill  Anthropometric Measurements: weight, weight change  Biochemical Data, Medical Tests and Procedures: electrolyte and renal panel, gastrointestinal profile, lipid profile, glucose/endocrine profile, inflammatory profile  Nutrition-Focused Physical Findings: overall appearance     Malnutrition Assessment   Based on current evidence, pt does not meet malnutrition criteria. Onsite RD to perform NFPE at f/u to further assess for malnutrition       Nutrition Follow-Up  RD Follow-up?: Yes

## 2018-12-14 NOTE — PROGRESS NOTES
12/13/18 4731   Patient Assessment/Suction   Level of Consciousness (AVPU) alert   PRE-TX-O2-ETCO2   O2 Device (Oxygen Therapy) room air   SpO2 95 %   Pulse Oximetry Type Intermittent   Preset CPAP/BiPAP Settings   Mode Of Delivery CPAP   $ CPAP/BiPAP Daily Charge BiPAP/CPAP Daily   $ Initial CPAP/BiPAP Setup? Yes   $ Is patient using? Other (see comments)  (standby)   Size of Mask Medium/Large   Sized Appropriately? Yes   Equipment Type DeVilbiss   Airway Device Type medium full face mask   CPAP (cm H2O) 10

## 2018-12-14 NOTE — SUBJECTIVE & OBJECTIVE
Interval History: The pt feels much better. He denies abdominal pain or nausea    Review of Systems   Constitutional: Positive for appetite change. Negative for chills and fever.   HENT: Negative for sore throat and trouble swallowing.    Eyes: Negative for photophobia and visual disturbance.   Respiratory: Negative for cough, shortness of breath and wheezing.    Cardiovascular: Negative for chest pain and palpitations.   Gastrointestinal: Negative for abdominal pain, diarrhea and nausea.   Endocrine: Negative for polyphagia and polyuria.   Genitourinary: Negative for dysuria, frequency and hematuria.   Musculoskeletal: Negative for arthralgias and gait problem.   Skin: Negative for color change, pallor, rash and wound.   Neurological: Negative for dizziness, tremors and weakness.   Hematological: Bruises/bleeds easily.   Psychiatric/Behavioral: Negative for agitation, behavioral problems and confusion.   All other systems reviewed and are negative.    Objective:     Vital Signs (Most Recent):  Temp: 96.6 °F (35.9 °C) (12/14/18 0748)  Pulse: 61 (12/14/18 0748)  Resp: 16 (12/14/18 0748)  BP: 134/84 (12/14/18 0748)  SpO2: 97 % (12/14/18 0748) Vital Signs (24h Range):  Temp:  [96.6 °F (35.9 °C)-100.3 °F (37.9 °C)] 96.6 °F (35.9 °C)  Pulse:  [61-84] 61  Resp:  [16-20] 16  SpO2:  [93 %-97 %] 97 %  BP: (134-153)/(82-87) 134/84     Weight: 114.7 kg (252 lb 12.8 oz)  Body mass index is 37.33 kg/m².    Intake/Output Summary (Last 24 hours) at 12/14/2018 1046  Last data filed at 12/14/2018 0600  Gross per 24 hour   Intake 1737.5 ml   Output 150 ml   Net 1587.5 ml      Physical Exam   Constitutional: He is oriented to person, place, and time.   Neck: Normal range of motion. Neck supple. No JVD present.   Cardiovascular: Normal rate, regular rhythm, normal heart sounds and intact distal pulses.   Loop recorder L chest wall   Pulmonary/Chest: Effort normal and breath sounds normal. No respiratory distress.   Abdominal: Soft.  Bowel sounds are normal. He exhibits no distension. There is no tenderness (RUQ).   Genitourinary:   Genitourinary Comments: deferred   Musculoskeletal: Normal range of motion. He exhibits no tenderness.   Neurological: He is alert and oriented to person, place, and time. No cranial nerve deficit.   Skin: Skin is warm and dry. Capillary refill takes less than 2 seconds. No rash noted. No erythema.   Psychiatric: He has a normal mood and affect. His behavior is normal. Judgment and thought content normal.       Significant Labs:   BMP:   Recent Labs   Lab 12/14/18  0603   GLU 99      K 3.1*      CO2 27   BUN 9   CREATININE 0.9   CALCIUM 8.7   MG 1.7     CBC:   Recent Labs   Lab 12/12/18  2039 12/13/18  0920 12/14/18  0603   WBC 16.50* 15.10* 12.80*   HGB 16.2 14.7 13.3*   HCT 48.7 43.7 39.7*   * 306 273       Significant Imaging: I have reviewed all pertinent imaging results/findings within the past 24 hours.

## 2018-12-14 NOTE — PLAN OF CARE
Problem: Adult Inpatient Plan of Care  Goal: Plan of Care Review  Outcome: Ongoing (interventions implemented as appropriate)  Recommendations  1. Once medically able, recommend clear liquid diet w/ Boost breeze TID.  2. ADAT to GI soft/low fat.      Goals: Pt to be advanced to diet by RD f/u   Nutrition Goal Status: new    Assessment and Plan  Nutrition Problem  altered GI function     Related to (etiology):   Acute pancreatitis     Signs and Symptoms (as evidenced by):   N/V upon admission, decreased appetite, biochemical lab values (AST 52, , lipase 412)    Interventions/Recommendations (treatment strategy):  Modified fat diet (decrease in fat intake)    Nutrition Diagnosis Status:   New

## 2018-12-15 VITALS
DIASTOLIC BLOOD PRESSURE: 74 MMHG | HEIGHT: 69 IN | OXYGEN SATURATION: 93 % | RESPIRATION RATE: 18 BRPM | HEART RATE: 57 BPM | BODY MASS INDEX: 37.45 KG/M2 | WEIGHT: 252.88 LBS | TEMPERATURE: 98 F | SYSTOLIC BLOOD PRESSURE: 137 MMHG

## 2018-12-15 LAB
ALBUMIN SERPL BCP-MCNC: 2.6 G/DL
ALBUMIN SERPL BCP-MCNC: 2.6 G/DL
ALP SERPL-CCNC: 53 U/L
ALP SERPL-CCNC: 54 U/L
ALT SERPL W/O P-5'-P-CCNC: 60 U/L
ALT SERPL W/O P-5'-P-CCNC: 61 U/L
ANION GAP SERPL CALC-SCNC: 8 MMOL/L
ANION GAP SERPL CALC-SCNC: 9 MMOL/L
AST SERPL-CCNC: 28 U/L
AST SERPL-CCNC: 28 U/L
BASOPHILS # BLD AUTO: 0 K/UL
BASOPHILS # BLD AUTO: 0 K/UL
BASOPHILS NFR BLD: 0 %
BASOPHILS NFR BLD: 0 %
BILIRUB SERPL-MCNC: 0.5 MG/DL
BILIRUB SERPL-MCNC: 0.5 MG/DL
BUN SERPL-MCNC: 12 MG/DL
BUN SERPL-MCNC: 12 MG/DL
CALCIUM SERPL-MCNC: 8.8 MG/DL
CALCIUM SERPL-MCNC: 8.8 MG/DL
CHLORIDE SERPL-SCNC: 103 MMOL/L
CHLORIDE SERPL-SCNC: 104 MMOL/L
CO2 SERPL-SCNC: 25 MMOL/L
CO2 SERPL-SCNC: 25 MMOL/L
CREAT SERPL-MCNC: 0.8 MG/DL
CREAT SERPL-MCNC: 0.8 MG/DL
DIFFERENTIAL METHOD: ABNORMAL
DIFFERENTIAL METHOD: ABNORMAL
EOSINOPHIL # BLD AUTO: 0 K/UL
EOSINOPHIL # BLD AUTO: 0 K/UL
EOSINOPHIL NFR BLD: 0 %
EOSINOPHIL NFR BLD: 0 %
ERYTHROCYTE [DISTWIDTH] IN BLOOD BY AUTOMATED COUNT: 13.6 %
ERYTHROCYTE [DISTWIDTH] IN BLOOD BY AUTOMATED COUNT: 13.7 %
EST. GFR  (AFRICAN AMERICAN): >60 ML/MIN/1.73 M^2
EST. GFR  (AFRICAN AMERICAN): >60 ML/MIN/1.73 M^2
EST. GFR  (NON AFRICAN AMERICAN): >60 ML/MIN/1.73 M^2
EST. GFR  (NON AFRICAN AMERICAN): >60 ML/MIN/1.73 M^2
GLUCOSE SERPL-MCNC: 133 MG/DL
GLUCOSE SERPL-MCNC: 133 MG/DL
HCT VFR BLD AUTO: 35.8 %
HCT VFR BLD AUTO: 36.2 %
HGB BLD-MCNC: 12.1 G/DL
HGB BLD-MCNC: 12.1 G/DL
LIPASE SERPL-CCNC: 15 U/L
LIPASE SERPL-CCNC: 16 U/L
LYMPHOCYTES # BLD AUTO: 0.5 K/UL
LYMPHOCYTES # BLD AUTO: 0.5 K/UL
LYMPHOCYTES NFR BLD: 4.7 %
LYMPHOCYTES NFR BLD: 4.7 %
MAGNESIUM SERPL-MCNC: 1.8 MG/DL
MCH RBC QN AUTO: 27.1 PG
MCH RBC QN AUTO: 27.3 PG
MCHC RBC AUTO-ENTMCNC: 33.6 G/DL
MCHC RBC AUTO-ENTMCNC: 33.8 G/DL
MCV RBC AUTO: 80 FL
MCV RBC AUTO: 81 FL
MONOCYTES # BLD AUTO: 0.3 K/UL
MONOCYTES # BLD AUTO: 0.4 K/UL
MONOCYTES NFR BLD: 3.3 %
MONOCYTES NFR BLD: 3.7 %
NEUTROPHILS # BLD AUTO: 9.4 K/UL
NEUTROPHILS # BLD AUTO: 9.5 K/UL
NEUTROPHILS NFR BLD: 91.6 %
NEUTROPHILS NFR BLD: 92 %
PHOSPHATE SERPL-MCNC: 2.5 MG/DL
PLATELET # BLD AUTO: 290 K/UL
PLATELET # BLD AUTO: 293 K/UL
PMV BLD AUTO: 8.4 FL
PMV BLD AUTO: 8.6 FL
POTASSIUM SERPL-SCNC: 3.6 MMOL/L
POTASSIUM SERPL-SCNC: 3.6 MMOL/L
PROT SERPL-MCNC: 6 G/DL
PROT SERPL-MCNC: 6 G/DL
RBC # BLD AUTO: 4.44 M/UL
RBC # BLD AUTO: 4.45 M/UL
SODIUM SERPL-SCNC: 137 MMOL/L
SODIUM SERPL-SCNC: 137 MMOL/L
WBC # BLD AUTO: 10.3 K/UL
WBC # BLD AUTO: 10.3 K/UL

## 2018-12-15 PROCEDURE — 83690 ASSAY OF LIPASE: CPT | Mod: 91

## 2018-12-15 PROCEDURE — 94660 CPAP INITIATION&MGMT: CPT

## 2018-12-15 PROCEDURE — 25000003 PHARM REV CODE 250: Performed by: INTERNAL MEDICINE

## 2018-12-15 PROCEDURE — 94761 N-INVAS EAR/PLS OXIMETRY MLT: CPT

## 2018-12-15 PROCEDURE — 84100 ASSAY OF PHOSPHORUS: CPT

## 2018-12-15 PROCEDURE — 83735 ASSAY OF MAGNESIUM: CPT

## 2018-12-15 PROCEDURE — 80053 COMPREHEN METABOLIC PANEL: CPT

## 2018-12-15 PROCEDURE — 99900035 HC TECH TIME PER 15 MIN (STAT)

## 2018-12-15 PROCEDURE — 63600175 PHARM REV CODE 636 W HCPCS: Performed by: NURSE PRACTITIONER

## 2018-12-15 PROCEDURE — 85025 COMPLETE CBC W/AUTO DIFF WBC: CPT

## 2018-12-15 PROCEDURE — 36415 COLL VENOUS BLD VENIPUNCTURE: CPT

## 2018-12-15 PROCEDURE — 25000003 PHARM REV CODE 250: Performed by: NURSE PRACTITIONER

## 2018-12-15 RX ORDER — DABIGATRAN ETEXILATE 75 MG/1
150 CAPSULE ORAL 2 TIMES DAILY
Status: DISCONTINUED | OUTPATIENT
Start: 2018-12-15 | End: 2018-12-15 | Stop reason: HOSPADM

## 2018-12-15 RX ADMIN — SODIUM CHLORIDE, SODIUM LACTATE, POTASSIUM CHLORIDE, AND CALCIUM CHLORIDE: .6; .31; .03; .02 INJECTION, SOLUTION INTRAVENOUS at 12:12

## 2018-12-15 RX ADMIN — DABIGATRAN ETEXILATE MESYLATE 150 MG: 75 CAPSULE ORAL at 12:12

## 2018-12-15 RX ADMIN — PIPERACILLIN AND TAZOBACTAM 4.5 G: 4; .5 INJECTION, POWDER, LYOPHILIZED, FOR SOLUTION INTRAVENOUS; PARENTERAL at 05:12

## 2018-12-15 RX ADMIN — DRONEDARONE 400 MG: 400 TABLET, FILM COATED ORAL at 09:12

## 2018-12-15 RX ADMIN — AMLODIPINE BESYLATE 10 MG: 5 TABLET ORAL at 09:12

## 2018-12-15 RX ADMIN — SODIUM CHLORIDE, SODIUM LACTATE, POTASSIUM CHLORIDE, AND CALCIUM CHLORIDE: .6; .31; .03; .02 INJECTION, SOLUTION INTRAVENOUS at 08:12

## 2018-12-15 RX ADMIN — FLUOXETINE 10 MG: 10 CAPSULE ORAL at 08:12

## 2018-12-15 NOTE — PLAN OF CARE
Problem: Adult Inpatient Plan of Care  Goal: Plan of Care Review  Outcome: Ongoing (interventions implemented as appropriate)  Cpap 10 for HS tolerates well

## 2018-12-15 NOTE — PLAN OF CARE
Problem: Adult Inpatient Plan of Care  Goal: Plan of Care Review  Outcome: Ongoing (interventions implemented as appropriate)  Patient awake/alert. No c/o pain.Remains afebrile.Noted this shift.Voided without difficulty.Cpap in use during the night.  Free from falls. Plan of care continued.

## 2018-12-15 NOTE — PLAN OF CARE
12/15/18 1228   Final Note   Assessment Type Final Discharge Note   Anticipated Discharge Disposition Home

## 2018-12-15 NOTE — DISCHARGE SUMMARY
[facility] Hospital Medicine  Discharge Summary      Patient Name: Josue Mir  MRN: [id number]  Admission Date: 12/12/2018  Hospital Length of Stay: 2 days  Discharge Date and Time:  12/15/2018 12:23 PM  Attending Physician: Marci Lemus MD   Discharging Provider: Marci Lemus MD  Primary Care Provider: Manan Roberts MD      HPI:   Josue Mir is a 42 y.o. Male with a PMHx of PAF, HTN, gout, JASPAL, and stroke who presents with abdominal pain. Patient c/o gradual onset of moderate sharp pain to the right upper abdomen which started at 2 AM on 2/12/18. Pt's pain is non radiating and exacerbated when laying on the stomach. He has a decreased appetite due to nausea. Pt also reports episode of vomiting at 10 AM. His last normal bowel movement was this morning. He denies chest pain, SOB, diarrhea, fever, burning with urination or blood in urine.  ED evaluation revealed an elevated lipase level and imaging studies which are consistent with acute pancreatitis.  Pt will be admitted to the service of hospital medicine for further treatment.        Procedure(s) (LRB):  CHOLECYSTECTOMY, LAPAROSCOPIC (N/A)      Hospital Course:   43 y/o WM admitted with gallstone pancreatitis.  He was treated with IV fluids and bowel rest.    He was seen by GI and sx. Dr. Love did a lap poornima 12-14.    An MRCP was obtained that did not show choledocholithiasis.    The pt is doing well and will be discharged home today or tomorrow if ok with sx.    He will resume all home meds including pradaxa.     Consults:   Consults (From admission, onward)        Status Ordering Provider     Inpatient consult to Gastroenterology  Once     Provider:  (Not yet assigned)    Completed CHRISTINA GAFFNEY     Inpatient consult to General Surgery  Once     Provider:  Beck Love MD    Completed CHRISTINA GAFFNEY          No new Assessment & Plan notes have been filed under this hospital service since the last note  was generated.  Service: Hospital Medicine    Final Active Diagnoses:    Diagnosis Date Noted POA    PRINCIPAL PROBLEM:  Acute pancreatitis [K85.90] 12/13/2018 Yes    Acute epigastric pain [R10.13] 12/14/2018 Yes    Paroxysmal atrial fibrillation [I48.0] 12/13/2018 Unknown    Essential hypertension [I10] 12/13/2018 Unknown    JASPAL (obstructive sleep apnea) [G47.33] 12/13/2018 Unknown      Problems Resolved During this Admission:       Discharged Condition: stable    Disposition: Home or Self Care    Follow Up:  Follow-up Information     Beck Love MD In 1 week.    Specialties:  General Surgery, Surgery  Contact information:  [address]  [address]  [place] [address]  [phone]                 Patient Instructions:      Diet diabetic     Activity as tolerated       Significant Diagnostic Studies: Labs:   CMP   Recent Labs   Lab 12/14/18  0603 12/15/18  0531    137  137   K 3.1* 3.6  3.6    104  103   CO2 27 25  25   GLU 99 133*  133*   BUN 9 12  12   CREATININE 0.9 0.8  0.8   CALCIUM 8.7 8.8  8.8   PROT 6.0 6.0  6.0   ALBUMIN 2.8* 2.6*  2.6*   BILITOT 1.1* 0.5  0.5   ALKPHOS 61 54*  53*   AST 22 28  28   ALT 70* 60*  61*   ANIONGAP 8 8  9   ESTGFRAFRICA >60 >60  >60   EGFRNONAA >60 >60  >60    and CBC   Recent Labs   Lab 12/14/18  0603 12/15/18  0531   WBC 12.80* 10.30  10.30   HGB 13.3* 12.1*  12.1*   HCT 39.7* 36.2*  35.8*    293  290       Pending Diagnostic Studies:     None         Medications:  Reconciled Home Medications:      Medication List      CONTINUE taking these medications    allopurinol 100 MG tablet  Commonly known as:  ZYLOPRIM  Take 100 mg by mouth once daily.     amLODIPine 10 MG tablet  Commonly known as:  NORVASC  Take 10 mg by mouth once daily.     atorvastatin 40 MG tablet  Commonly known as:  LIPITOR  Take 40 mg by mouth every evening.     carvedilol 6.25 MG tablet  Commonly known as:  COREG  Take 6.25 mg by mouth 2 (two) times daily  "with meals.     dronedarone 400 mg Tab  Commonly known as:  MULTAQ  Take 400 mg by mouth 2 (two) times daily with meals.     fenofibrate 145 MG tablet  Commonly known as:  TRICOR  Take 160 mg by mouth once daily.     FLUoxetine 10 MG capsule  Take 10 mg by mouth once daily.     lisinopril 40 MG tablet  Commonly known as:  PRINIVIL,ZESTRIL  Take 40 mg by mouth once daily.     PRADAXA 150 mg Cap  Generic drug:  dabigatran etexilate  Take 150 mg by mouth 2 (two) times daily. "Do NOT break, chew, or open capsules."            Indwelling Lines/Drains at time of discharge:   Lines/Drains/Airways          None          Time spent on the discharge of patient: 30 minutes  Patient was seen and examined on the date of discharge and determined to be suitable for discharge.         Marci Lemus MD  Department of Hospital Medicine  [facility]  "

## 2018-12-15 NOTE — SUBJECTIVE & OBJECTIVE
Interval History: The pt is doing well post-op. He is tolerating liquids. No gas or BM yet. No abdominal pain    Review of Systems   Constitutional: Negative for appetite change, chills and fever.   HENT: Negative for sore throat and trouble swallowing.    Eyes: Negative for photophobia and visual disturbance.   Respiratory: Negative for cough, shortness of breath and wheezing.    Cardiovascular: Negative for chest pain and palpitations.   Gastrointestinal: Negative for abdominal pain, diarrhea and nausea.   Endocrine: Negative for polyphagia and polyuria.   Genitourinary: Negative for dysuria, frequency and hematuria.   Musculoskeletal: Negative for arthralgias and gait problem.   Skin: Negative for color change, pallor, rash and wound.   Neurological: Negative for dizziness, tremors and weakness.   Hematological: Does not bruise/bleed easily.   Psychiatric/Behavioral: Negative for agitation, behavioral problems and confusion.   All other systems reviewed and are negative.    Objective:     Vital Signs (Most Recent):  Temp: 97.6 °F (36.4 °C) (12/15/18 0754)  Pulse: (!) 47 (12/15/18 0754)  Resp: 20 (12/15/18 0754)  BP: 122/75 (12/15/18 0754)  SpO2: 95 % (12/15/18 0754) Vital Signs (24h Range):  Temp:  [97 °F (36.1 °C)-99 °F (37.2 °C)] 97.6 °F (36.4 °C)  Pulse:  [44-73] 47  Resp:  [16-20] 20  SpO2:  [94 %-98 %] 95 %  BP: (108-157)/(55-86) 122/75     Weight: 114.7 kg (252 lb 13.9 oz)  Body mass index is 37.34 kg/m².    Intake/Output Summary (Last 24 hours) at 12/15/2018 1045  Last data filed at 12/15/2018 0903  Gross per 24 hour   Intake 4251.67 ml   Output 550 ml   Net 3701.67 ml      Physical Exam   Constitutional: He is oriented to person, place, and time.   Neck: Normal range of motion. Neck supple. No JVD present.   Cardiovascular: Normal rate, regular rhythm, normal heart sounds and intact distal pulses.   Loop recorder L chest wall   Pulmonary/Chest: Effort normal and breath sounds normal. No respiratory  distress.   Abdominal: Soft. Bowel sounds are normal. He exhibits no distension. There is no tenderness (RUQ).   Genitourinary:   Genitourinary Comments: deferred   Musculoskeletal: Normal range of motion. He exhibits no tenderness.   Neurological: He is alert and oriented to person, place, and time. No cranial nerve deficit.   Skin: Skin is warm and dry. Capillary refill takes less than 2 seconds. No rash noted. No erythema.   Psychiatric: He has a normal mood and affect. His behavior is normal. Judgment and thought content normal.       Significant Labs:   CBC:   Recent Labs   Lab 12/14/18  0603 12/15/18  0531   WBC 12.80* 10.30  10.30   HGB 13.3* 12.1*  12.1*   HCT 39.7* 36.2*  35.8*    293  290     CMP:   Recent Labs   Lab 12/14/18  0603 12/15/18  0531    137  137   K 3.1* 3.6  3.6    104  103   CO2 27 25  25   GLU 99 133*  133*   BUN 9 12  12   CREATININE 0.9 0.8  0.8   CALCIUM 8.7 8.8  8.8   PROT 6.0 6.0  6.0   ALBUMIN 2.8* 2.6*  2.6*   BILITOT 1.1* 0.5  0.5   ALKPHOS 61 54*  53*   AST 22 28  28   ALT 70* 60*  61*   ANIONGAP 8 8  9   EGFRNONAA >60 >60  >60       Significant Imaging: I have reviewed all pertinent imaging results/findings within the past 24 hours.

## 2018-12-15 NOTE — NURSING
Situation Principle Problem:  Acute pancreatitis, s/p lap chol pod 1 for Dr. Love      Reason for Calling:heart rate staying in the 40's, with a loop recorder    Provider Calling:Cheri   Background Vitals:    12/14/18 1953 12/14/18 2016 12/15/18 0005 12/15/18 0423   BP:  131/72 128/68 118/69   BP Location:  Right arm  Right arm   Patient Position:  Lying  Lying   Pulse: 68 60 (!) 51 (!) 46   Resp: 16 18 18 18   Temp:  97.6 °F (36.4 °C) 97.2 °F (36.2 °C) 97 °F (36.1 °C)   TempSrc:  Oral     SpO2: (!) 94% (!) 94% 95% (!) 94%   Weight:       Height:         Past Medical History:   Diagnosis Date    Anticoagulant long-term use     Atrial fibrillation     Gout     Gout, joint     Hypertension     Memory deficit following cerebral infarction     Pneumonia 04/2018    Sleep apnea with use of continuous positive airway pressure (CPAP)     Stroke 04/2018    Syncope and collapse        Past Surgical History:   Procedure Laterality Date    CARDIOVERSION OR DEFIBRILLATION N/A 10/25/2018    Performed by [facility]by [facility] at [facility]    ECHOCARDIOGRAM,TRANSESOPHAGEAL N/A 10/25/2018    Performed by [facility]by [facility] at [facility]    INSERTION OF IMPLANTABLE LOOP RECORDER  10/25/2018    Procedure: Insertion, Implantable Loop Recorder;  Surgeon: Justin Colby MD;  Location: [facility];  Service: Cardiology;;    Insertion, Implantable Loop Recorder  10/25/2018    Performed by Justin Colby MD at [facility]    NASAL POLYP EXCISION      TREATMENT OF CARDIAC ARRHYTHMIA N/A 10/25/2018    Procedure: CARDIOVERSION OR DEFIBRILLATION;  Surgeon: Justin Colby MD;  Location: [facility];  Service: Cardiology;  Laterality: N/A;               Current Facility-Administered Medications   Medication Dose Route Frequency Provider Last Rate Last Dose    acetaminophen tablet 650 mg  650 mg Oral Q8H PRN KATIE Lassiter        amLODIPine tablet 10 mg  10 mg Oral Daily KATIE Lassiter   10 mg at 12/14/18 0837     carvedilol tablet 6.25 mg  6.25 mg Oral BID WM Kesha May, FNP   6.25 mg at 12/14/18 1739    dextrose 50% injection 12.5 g  12.5 g Intravenous PRN Kesha May, FNP        dextrose 50% injection 25 g  25 g Intravenous PRN Kesha May, FNP        dronedarone tablet 400 mg  400 mg Oral BID WM Kesha May, FNP   400 mg at 12/14/18 1739    enoxaparin injection 40 mg  40 mg Subcutaneous Daily Beck Love MD        FLUoxetine capsule 10 mg  10 mg Oral Daily Kesha May, FNP   10 mg at 12/14/18 0837    glucagon (human recombinant) injection 1 mg  1 mg Intramuscular PRN Kesha May, JOCELYNP        glucose chewable tablet 16 g  16 g Oral PRN Kesha May, FNP        glucose chewable tablet 24 g  24 g Oral PRN Kesha May, FNP        lactated ringers infusion   Intravenous Continuous Kesha May, JOCELYNP 125 mL/hr at 12/15/18 0017      lisinopril tablet 40 mg  40 mg Oral Daily Kesha May, FNP   40 mg at 12/14/18 0837    morphine injection 4 mg  4 mg Intravenous Q4H PRN Marci Lemus MD        piperacillin-tazobactam 4.5 g in dextrose 5 % 100 mL IVPB (ready to mix system)  4.5 g Intravenous Q6H Kesha May, FNP 25 mL/hr at 12/14/18 2258 4.5 g at 12/14/18 2258    ramelteon tablet 8 mg  8 mg Oral Nightly PRN Kesha May, FNP        sodium chloride 0.9% flush 5 mL  5 mL Intravenous PRN Kesha May, JOCELYNP       patient on CPAP    Review of patient's allergies indicates:  No Known Allergies    No results found for: POCTGLUCOSE    Intake/Output:    Intake/Output Summary (Last 24 hours) at 12/15/2018 0424  Last data filed at 12/14/2018 1900  Gross per 24 hour   Intake 3747.92 ml   Output 150 ml   Net 3597.92 ml        Assessment What is happening: patient awake, hr remains in 40's on telemetry. AAOX3. No complaints voiced. Receiving IV fluids   Response Provider Response: hold am coreg

## 2018-12-15 NOTE — PROGRESS NOTES
[facility] Hospital Medicine  Progress Note    Patient Name: Josue Mir  MRN: [id number]  Patient Class: IP- Inpatient   Admission Date: 12/12/2018  Length of Stay: 2 days  Attending Physician: Marci Lemus MD  Primary Care Provider: Manan Roberts MD        Subjective:     Principal Problem:Acute pancreatitis    HPI:  Josue Mir is a 42 y.o. Male with a PMHx of PAF, HTN, gout, JASPAL, and stroke who presents with abdominal pain. Patient c/o gradual onset of moderate sharp pain to the right upper abdomen which started at 2 AM on 2/12/18. Pt's pain is non radiating and exacerbated when laying on the stomach. He has a decreased appetite due to nausea. Pt also reports episode of vomiting at 10 AM. His last normal bowel movement was this morning. He denies chest pain, SOB, diarrhea, fever, burning with urination or blood in urine.  ED evaluation revealed an elevated lipase level and imaging studies which are consistent with acute pancreatitis.  Pt will be admitted to the service of hospital medicine for further treatment.        Hospital Course:    No notes on file   43 y/o with gallstone pancreatitis. S/P lap poornima 12-14        Interval History: The pt is doing well post-op. He is tolerating liquids. No gas or BM yet. No abdominal pain    Review of Systems   Constitutional: Negative for appetite change, chills and fever.   HENT: Negative for sore throat and trouble swallowing.    Eyes: Negative for photophobia and visual disturbance.   Respiratory: Negative for cough, shortness of breath and wheezing.    Cardiovascular: Negative for chest pain and palpitations.   Gastrointestinal: Negative for abdominal pain, diarrhea and nausea.   Endocrine: Negative for polyphagia and polyuria.   Genitourinary: Negative for dysuria, frequency and hematuria.   Musculoskeletal: Negative for arthralgias and gait problem.   Skin: Negative for color change, pallor, rash and wound.   Neurological: Negative for  dizziness, tremors and weakness.   Hematological: Does not bruise/bleed easily.   Psychiatric/Behavioral: Negative for agitation, behavioral problems and confusion.   All other systems reviewed and are negative.    Objective:     Vital Signs (Most Recent):  Temp: 97.6 °F (36.4 °C) (12/15/18 0754)  Pulse: (!) 47 (12/15/18 0754)  Resp: 20 (12/15/18 0754)  BP: 122/75 (12/15/18 0754)  SpO2: 95 % (12/15/18 0754) Vital Signs (24h Range):  Temp:  [97 °F (36.1 °C)-99 °F (37.2 °C)] 97.6 °F (36.4 °C)  Pulse:  [44-73] 47  Resp:  [16-20] 20  SpO2:  [94 %-98 %] 95 %  BP: (108-157)/(55-86) 122/75     Weight: 114.7 kg (252 lb 13.9 oz)  Body mass index is 37.34 kg/m².    Intake/Output Summary (Last 24 hours) at 12/15/2018 1045  Last data filed at 12/15/2018 0903  Gross per 24 hour   Intake 4251.67 ml   Output 550 ml   Net 3701.67 ml      Physical Exam   Constitutional: He is oriented to person, place, and time.   Neck: Normal range of motion. Neck supple. No JVD present.   Cardiovascular: Normal rate, regular rhythm, normal heart sounds and intact distal pulses.   Loop recorder L chest wall   Pulmonary/Chest: Effort normal and breath sounds normal. No respiratory distress.   Abdominal: Soft. Bowel sounds are normal. He exhibits no distension. There is no tenderness (RUQ).   Genitourinary:   Genitourinary Comments: deferred   Musculoskeletal: Normal range of motion. He exhibits no tenderness.   Neurological: He is alert and oriented to person, place, and time. No cranial nerve deficit.   Skin: Skin is warm and dry. Capillary refill takes less than 2 seconds. No rash noted. No erythema.   Psychiatric: He has a normal mood and affect. His behavior is normal. Judgment and thought content normal.       Significant Labs:   CBC:   Recent Labs   Lab 12/14/18  0603 12/15/18  0531   WBC 12.80* 10.30  10.30   HGB 13.3* 12.1*  12.1*   HCT 39.7* 36.2*  35.8*    293  290     CMP:   Recent Labs   Lab 12/14/18  0603 12/15/18  0531   NA  138 137  137   K 3.1* 3.6  3.6    104  103   CO2 27 25  25   GLU 99 133*  133*   BUN 9 12  12   CREATININE 0.9 0.8  0.8   CALCIUM 8.7 8.8  8.8   PROT 6.0 6.0  6.0   ALBUMIN 2.8* 2.6*  2.6*   BILITOT 1.1* 0.5  0.5   ALKPHOS 61 54*  53*   AST 22 28  28   ALT 70* 60*  61*   ANIONGAP 8 8  9   EGFRNONAA >60 >60  >60       Significant Imaging: I have reviewed all pertinent imaging results/findings within the past 24 hours.    Assessment/Plan:      * Acute pancreatitis    2/2 cholelithiasis  Clinically improving   S/P lap poornima           JASPAL (obstructive sleep apnea)    Chronic; controlled by CPAP.  Continue CPAP at home settings.       Essential hypertension    Chronic; stable for now.  Continue chronic antihypertensives..  Hypertension Medications             amlodipine (NORVASC) 10 MG tablet Take 10 mg by mouth once daily.    carvedilol (COREG) 6.25 MG tablet Take 6.25 mg by mouth 2 (two) times daily with meals.    lisinopril (PRINIVIL,ZESTRIL) 40 MG tablet Take 40 mg by mouth once daily.      Hospital Medications             amLODIPine tablet 10 mg Take 2 tablets (10 mg total) by mouth once daily.    carvedilol tablet 6.25 mg Take 1 tablet (6.25 mg total) by mouth 2 (two) times daily with meals.    lisinopril tablet 40 mg Take 1 tablet (40 mg total) by mouth once daily.             Paroxysmal atrial fibrillation    Atrial Fibrillation controlled currently with Multaq and Beta Blocker.   Resume pradaxa.  Monitor on telemetry.       VTE Risk Mitigation (From admission, onward)        Ordered     enoxaparin injection 40 mg  Daily      12/14/18 1534     IP VTE HIGH RISK PATIENT  Once      12/13/18 0347     Reason for No Pharmacological VTE Prophylaxis  Once      12/13/18 0347     Place ALEX hose  Until discontinued      12/13/18 0347     Place sequential compression device  Until discontinued      12/13/18 0347              Marci Lemus MD  Department of Hospital Medicine   [facility]

## 2018-12-15 NOTE — HOSPITAL COURSE
43 y/o WM admitted with gallstone pancreatitis.  He was treated with IV fluids and bowel rest.    He was seen by GI and sx. Dr. Almodovar did a lap poornima 12-14.    An MRCP was obtained that did not show choledocholithiasis.    The pt is doing well and will be discharged home if ok with sx.    He will resume all home meds including pradaxa.

## 2018-12-15 NOTE — NURSING
Discharge instructions given to pt and pt VU of DC instructions. Work excuse given to return on Monday with light duty and no heavy lifting. Post op instructions reviewed with pt and he VU of instructions. PIV removed and dressing applied CDI. NAD noted. Telemetry removed. Pt encouraged to f/u with cardiologist, surgeon, and PCP in 1 week. Informed him to hold lisinopril and carvedilol until f/u with cardiologist or PCP. Encouraged to monitor bp and heart rate daily and to notify md with any abnormal readings.

## 2018-12-15 NOTE — ASSESSMENT & PLAN NOTE
Atrial Fibrillation controlled currently with Multaq and Beta Blocker.   Resume pradaxa.  Monitor on telemetry.

## 2018-12-15 NOTE — PROGRESS NOTES
12/14/18 1953   Patient Assessment/Suction   Level of Consciousness (AVPU) alert   Respiratory Effort Normal;Unlabored   PRE-TX-O2-ETCO2   O2 Device (Oxygen Therapy) nasal cannula   $ Is the patient on Low Flow Oxygen? Yes   Flow (L/min) 3   Oxygen Concentration (%) 32   SpO2 (!) 94 %   Pulse Oximetry Type Intermittent   Pulse 68   Resp 16   Ready to Wean/Extubation Screen   FIO2<=50 (chart decimal) 0.32   Preset CPAP/BiPAP Settings   Mode Of Delivery CPAP;Standby   $ CPAP/BiPAP Daily Charge BiPAP/CPAP Daily

## 2018-12-15 NOTE — PROGRESS NOTES
Spoke with Dr. Zamora about discharge approval and he said ok to discharge if tolerating diet, having a bm, and pain controlled. Patient tolerated reg diet and has had a bm. Pain is also controlled.

## 2018-12-17 ENCOUNTER — TELEPHONE (OUTPATIENT)
Dept: MEDSURG UNIT | Facility: HOSPITAL | Age: 43
End: 2018-12-17

## 2018-12-26 NOTE — PHYSICIAN QUERY
PT Name: Josue Mir  MR #: [id number]    Physician Query Form - Pathology Findings Clarification     CDS/: Meghna Griffith    RN CCDS           Contact information:[email or web address]  This form is a permanent document in the medical record.     Query Date: December 26, 2018      By submitting this query, we are merely seeking further clarification of documentation.  Please utilize your independent clinical judgment when addressing the question(s) below.      The medical record contains the following:     Findings Supporting Clinical Information Location in Medical Record   Pathological report of gallbladder FINAL PATHOLOGIC DIAGNOSIS  GALLBLADDER, CHOLECYSTECTOMY:  - Chronic cholecystitis with mild activity.  - Adenomyoma/adenomyomatosis nodule.  - Cholelithiasis.  - Cholesterolosis.  - No evidence of dysplasia or malignancy. Path report resulted 12/23     Please document the clinical significance of the Pathologists findings of __Chronic cholecystitis with mild activity.  Adenomyoma/adenomyomatosis nodule. Cholelithiasis. Cholesterolosis. No evidence of dysplasia or malignancy._____________________.    [ x  ] I agree with the Pathology Findings   [   ] I do not agree with the Pathology Findings   [   ] Other/Clarification of Findings:   [   ] Clinically Insignificant   [  ] Clinically Undetermined       Please document in your progress notes daily for the duration of treatment until resolved and include in your discharge summary.

## 2019-07-02 ENCOUNTER — HOSPITAL ENCOUNTER (EMERGENCY)
Facility: HOSPITAL | Age: 44
Discharge: HOME | End: 2019-07-02
Attending: EMERGENCY MEDICINE
Payer: COMMERCIAL

## 2019-07-02 VITALS
BODY MASS INDEX: 35.55 KG/M2 | DIASTOLIC BLOOD PRESSURE: 107 MMHG | HEART RATE: 50 BPM | WEIGHT: 240 LBS | OXYGEN SATURATION: 98 % | TEMPERATURE: 98 F | HEIGHT: 69 IN | RESPIRATION RATE: 16 BRPM | SYSTOLIC BLOOD PRESSURE: 179 MMHG

## 2019-07-02 DIAGNOSIS — R22.9 SOFT TISSUE SWELLING: Primary | ICD-10-CM

## 2019-07-02 PROCEDURE — 99282 EMERGENCY DEPT VISIT SF MDM: CPT

## 2019-07-02 NOTE — ED PROVIDER NOTES
Encounter Date: 7/2/2019       History     Chief Complaint   Patient presents with    Facial Swelling     started yesterday      Patient is a 43 year old male who presents with facial swelling for one day. He has PMH significant for atrial fibrillation, gout, hypertension, CVA and sleep apnea. He reports mild swelling to the forehead which has increased in size over the last 24 hours. He denied any pain, redness or warmth to the area. He denied any oral swelling, lip swelling or tongue swelling. He denied any recent changes in his medication. He denied trauma but reports there is a possibility he could have been bitten by something. He denied any headache, visual changes or other symptoms. He denied taking any medications to help with the symptoms. He denied any shortness of breath.    The history is provided by the patient.     Review of patient's allergies indicates:  No Known Allergies  Past Medical History:   Diagnosis Date    Anticoagulant long-term use     Atrial fibrillation     Gout     Gout, joint     Hypertension     Memory deficit following cerebral infarction     Pneumonia 04/2018    Sleep apnea with use of continuous positive airway pressure (CPAP)     Stroke 04/2018    Syncope and collapse      Past Surgical History:   Procedure Laterality Date    CARDIOVERSION OR DEFIBRILLATION N/A 10/25/2018    [facility] by [facility] at [facility] CATH    CHOLECYSTECTOMY, LAPAROSCOPIC N/A 12/14/2018    [facility]    ECHOCARDIOGRAM,TRANSESOPHAGEAL N/A 10/25/2018    [facility] at [facility]    Insertion, Implantable Loop Recorder  10/25/2018    [facility] at [facility]    NASAL POLYP EXCISION       Family History   Problem Relation Age of Onset    Sleep apnea Mother     Hypertension Mother     No Known Problems Brother     No Known Problems Brother     No Known Problems Brother      Social History     Tobacco Use     Smoking status: Never Smoker    Smokeless tobacco: Never Used   Substance Use Topics    Alcohol use: Yes     Comment: socially    Drug use: No     Review of Systems   Constitutional: Negative for chills and fever.   HENT: Positive for facial swelling, postnasal drip and rhinorrhea. Negative for trouble swallowing and voice change.    Eyes: Negative for visual disturbance.   Skin: Negative for color change, rash and wound.   Neurological: Negative for headaches.       Physical Exam     Initial Vitals [07/02/19 0831]   BP Pulse Resp Temp SpO2   (!) 179/107 (!) 50 16 98 °F (36.7 °C) 98 %      MAP       --         Physical Exam    Nursing note and vitals reviewed.  Constitutional: He appears well-developed and well-nourished. He is cooperative.  Non-toxic appearance. He does not have a sickly appearance.   HENT:   Head: Normocephalic and atraumatic.       Right Ear: External ear normal.   Left Ear: External ear normal.   Nose: Nose normal.   Mouth/Throat: Oropharynx is clear and moist.   3 cm area of soft tissue swelling with no erythema, warmth, tenderness or fluctuance. No oral swelling. No trismus. Normal phonation.    Eyes: Conjunctivae and lids are normal. Pupils are equal, round, and reactive to light.   Neck: Normal range of motion and full passive range of motion without pain. Neck supple.   Cardiovascular: Normal rate, regular rhythm and normal heart sounds. Exam reveals no gallop and no friction rub.    No murmur heard.  Pulmonary/Chest: Breath sounds normal. He has no wheezes. He has no rhonchi. He has no rales.   Abdominal: Soft. Normal appearance. There is no tenderness. There is no rigidity, no rebound and no guarding.   Neurological: He is alert.   Skin: Skin is warm, dry and intact. No rash noted.         ED Course   Procedures  Labs Reviewed - No data to display       Imaging Results    None          Medical Decision Making:   History:   Old Medical Records: I decided to obtain old medical  records.       APC / Resident Notes:   Urgent evaluation of a 43-year-old male who presents with swelling to the forehead.  He denies any tenderness, erythema or warmth.  He has no oral swelling. No sign of angioedema.  Bedside ultrasound shows soft tissue with no sign of abscess. Doubt acute infectious process. Return precautions given. Based on my clinical evaluation, I do not appreciate any immediate, emergent, or life threatening condition or etiology that warrants additional workup today and feel that the patient can be discharged with close follow up care.  Patient is to follow up with their primary care provider. Case was discussed with Dr. Douglas who is in agreement with the plan of care. All questions answered.                    Clinical Impression:       ICD-10-CM ICD-9-CM   1. Soft tissue swelling R22.9 782.2                                Andreia Bernal PA-C  07/02/19 8710

## 2019-07-02 NOTE — DISCHARGE INSTRUCTIONS
See attached list of local primary care provider.  Follow up with one of them or dermatology for further evaluation.  For worsening symptoms, chest pain, shortness of breath, increased abdominal pain, high grade fever, stroke or stroke like symptoms, immediately go to the nearest Emergency Room or call 911 as soon as possible.

## 2020-03-13 ENCOUNTER — TELEPHONE (OUTPATIENT)
Dept: FAMILY MEDICINE | Facility: CLINIC | Age: 45
End: 2020-03-13

## 2020-03-16 ENCOUNTER — LAB VISIT (OUTPATIENT)
Dept: LAB | Facility: HOSPITAL | Age: 45
End: 2020-03-16
Attending: FAMILY MEDICINE
Payer: COMMERCIAL

## 2020-03-16 ENCOUNTER — TELEPHONE (OUTPATIENT)
Dept: NEUROLOGY | Facility: CLINIC | Age: 45
End: 2020-03-16

## 2020-03-16 ENCOUNTER — OFFICE VISIT (OUTPATIENT)
Dept: FAMILY MEDICINE | Facility: CLINIC | Age: 45
End: 2020-03-16
Payer: COMMERCIAL

## 2020-03-16 VITALS
DIASTOLIC BLOOD PRESSURE: 118 MMHG | SYSTOLIC BLOOD PRESSURE: 178 MMHG | WEIGHT: 247.38 LBS | TEMPERATURE: 98 F | HEIGHT: 69 IN | HEART RATE: 88 BPM | OXYGEN SATURATION: 95 % | BODY MASS INDEX: 36.64 KG/M2

## 2020-03-16 DIAGNOSIS — Z86.73 HISTORY OF CVA (CEREBROVASCULAR ACCIDENT): ICD-10-CM

## 2020-03-16 DIAGNOSIS — E66.01 CLASS 2 SEVERE OBESITY WITH SERIOUS COMORBIDITY AND BODY MASS INDEX (BMI) OF 36.0 TO 36.9 IN ADULT, UNSPECIFIED OBESITY TYPE: ICD-10-CM

## 2020-03-16 DIAGNOSIS — I10 ESSENTIAL HYPERTENSION: Primary | ICD-10-CM

## 2020-03-16 DIAGNOSIS — G47.33 OSA (OBSTRUCTIVE SLEEP APNEA): ICD-10-CM

## 2020-03-16 DIAGNOSIS — I10 ESSENTIAL HYPERTENSION: ICD-10-CM

## 2020-03-16 DIAGNOSIS — G81.94 LEFT HEMIPARESIS: ICD-10-CM

## 2020-03-16 DIAGNOSIS — I48.0 PAROXYSMAL ATRIAL FIBRILLATION: ICD-10-CM

## 2020-03-16 LAB
ALBUMIN SERPL BCP-MCNC: 3.4 G/DL (ref 3.5–5.2)
ALP SERPL-CCNC: 67 U/L (ref 55–135)
ALT SERPL W/O P-5'-P-CCNC: 31 U/L (ref 10–44)
ANION GAP SERPL CALC-SCNC: 8 MMOL/L (ref 8–16)
AST SERPL-CCNC: 20 U/L (ref 10–40)
BASOPHILS # BLD AUTO: 0.1 K/UL (ref 0–0.2)
BASOPHILS NFR BLD: 1.2 % (ref 0–1.9)
BILIRUB SERPL-MCNC: 0.4 MG/DL (ref 0.1–1)
BUN SERPL-MCNC: 12 MG/DL (ref 6–20)
CALCIUM SERPL-MCNC: 9.7 MG/DL (ref 8.7–10.5)
CHLORIDE SERPL-SCNC: 108 MMOL/L (ref 95–110)
CHOLEST SERPL-MCNC: 193 MG/DL (ref 120–199)
CHOLEST/HDLC SERPL: 4.7 {RATIO} (ref 2–5)
CO2 SERPL-SCNC: 27 MMOL/L (ref 23–29)
CREAT SERPL-MCNC: 1.1 MG/DL (ref 0.5–1.4)
DIFFERENTIAL METHOD: ABNORMAL
EOSINOPHIL # BLD AUTO: 0.3 K/UL (ref 0–0.5)
EOSINOPHIL NFR BLD: 3.7 % (ref 0–8)
ERYTHROCYTE [DISTWIDTH] IN BLOOD BY AUTOMATED COUNT: 13.7 % (ref 11.5–14.5)
EST. GFR  (AFRICAN AMERICAN): >60 ML/MIN/1.73 M^2
EST. GFR  (NON AFRICAN AMERICAN): >60 ML/MIN/1.73 M^2
ESTIMATED AVG GLUCOSE: 108 MG/DL (ref 68–131)
GLUCOSE SERPL-MCNC: 89 MG/DL (ref 70–110)
HBA1C MFR BLD HPLC: 5.4 % (ref 4–5.6)
HCT VFR BLD AUTO: 51 % (ref 40–54)
HDLC SERPL-MCNC: 41 MG/DL (ref 40–75)
HDLC SERPL: 21.2 % (ref 20–50)
HGB BLD-MCNC: 15.4 G/DL (ref 14–18)
IMM GRANULOCYTES # BLD AUTO: 0.04 K/UL (ref 0–0.04)
IMM GRANULOCYTES NFR BLD AUTO: 0.5 % (ref 0–0.5)
LDLC SERPL CALC-MCNC: 132.4 MG/DL (ref 63–159)
LYMPHOCYTES # BLD AUTO: 1.8 K/UL (ref 1–4.8)
LYMPHOCYTES NFR BLD: 22.5 % (ref 18–48)
MCH RBC QN AUTO: 25.5 PG (ref 27–31)
MCHC RBC AUTO-ENTMCNC: 30.2 G/DL (ref 32–36)
MCV RBC AUTO: 84 FL (ref 82–98)
MONOCYTES # BLD AUTO: 0.6 K/UL (ref 0.3–1)
MONOCYTES NFR BLD: 7.2 % (ref 4–15)
NEUTROPHILS # BLD AUTO: 5.2 K/UL (ref 1.8–7.7)
NEUTROPHILS NFR BLD: 64.9 % (ref 38–73)
NONHDLC SERPL-MCNC: 152 MG/DL
NRBC BLD-RTO: 0 /100 WBC
PLATELET # BLD AUTO: 443 K/UL (ref 150–350)
PMV BLD AUTO: 10.1 FL (ref 9.2–12.9)
POTASSIUM SERPL-SCNC: 4.3 MMOL/L (ref 3.5–5.1)
PROT SERPL-MCNC: 7.8 G/DL (ref 6–8.4)
RBC # BLD AUTO: 6.05 M/UL (ref 4.6–6.2)
SODIUM SERPL-SCNC: 143 MMOL/L (ref 136–145)
T4 FREE SERPL-MCNC: 1.33 NG/DL (ref 0.71–1.51)
TRIGL SERPL-MCNC: 98 MG/DL (ref 30–150)
TSH SERPL DL<=0.005 MIU/L-ACNC: 0.22 UIU/ML (ref 0.4–4)
WBC # BLD AUTO: 8.03 K/UL (ref 3.9–12.7)

## 2020-03-16 PROCEDURE — 3077F PR MOST RECENT SYSTOLIC BLOOD PRESSURE >= 140 MM HG: ICD-10-PCS | Mod: CPTII,S$GLB,, | Performed by: FAMILY MEDICINE

## 2020-03-16 PROCEDURE — 83036 HEMOGLOBIN GLYCOSYLATED A1C: CPT

## 2020-03-16 PROCEDURE — 3080F DIAST BP >= 90 MM HG: CPT | Mod: CPTII,S$GLB,, | Performed by: FAMILY MEDICINE

## 2020-03-16 PROCEDURE — 99999 PR PBB SHADOW E&M-EST. PATIENT-LVL III: CPT | Mod: PBBFAC,,, | Performed by: FAMILY MEDICINE

## 2020-03-16 PROCEDURE — 99999 PR PBB SHADOW E&M-EST. PATIENT-LVL III: ICD-10-PCS | Mod: PBBFAC,,, | Performed by: FAMILY MEDICINE

## 2020-03-16 PROCEDURE — 80053 COMPREHEN METABOLIC PANEL: CPT

## 2020-03-16 PROCEDURE — 99204 OFFICE O/P NEW MOD 45 MIN: CPT | Mod: S$GLB,,, | Performed by: FAMILY MEDICINE

## 2020-03-16 PROCEDURE — 3008F PR BODY MASS INDEX (BMI) DOCUMENTED: ICD-10-PCS | Mod: CPTII,S$GLB,, | Performed by: FAMILY MEDICINE

## 2020-03-16 PROCEDURE — 84439 ASSAY OF FREE THYROXINE: CPT

## 2020-03-16 PROCEDURE — 84443 ASSAY THYROID STIM HORMONE: CPT

## 2020-03-16 PROCEDURE — 3077F SYST BP >= 140 MM HG: CPT | Mod: CPTII,S$GLB,, | Performed by: FAMILY MEDICINE

## 2020-03-16 PROCEDURE — 36415 COLL VENOUS BLD VENIPUNCTURE: CPT | Mod: PO

## 2020-03-16 PROCEDURE — 99204 PR OFFICE/OUTPT VISIT, NEW, LEVL IV, 45-59 MIN: ICD-10-PCS | Mod: S$GLB,,, | Performed by: FAMILY MEDICINE

## 2020-03-16 PROCEDURE — 3080F PR MOST RECENT DIASTOLIC BLOOD PRESSURE >= 90 MM HG: ICD-10-PCS | Mod: CPTII,S$GLB,, | Performed by: FAMILY MEDICINE

## 2020-03-16 PROCEDURE — 80061 LIPID PANEL: CPT

## 2020-03-16 PROCEDURE — 85025 COMPLETE CBC W/AUTO DIFF WBC: CPT

## 2020-03-16 PROCEDURE — 3008F BODY MASS INDEX DOCD: CPT | Mod: CPTII,S$GLB,, | Performed by: FAMILY MEDICINE

## 2020-03-16 RX ORDER — ATORVASTATIN CALCIUM 40 MG/1
40 TABLET, FILM COATED ORAL NIGHTLY
Qty: 90 TABLET | Refills: 2 | Status: SHIPPED | OUTPATIENT
Start: 2020-03-16 | End: 2022-09-27

## 2020-03-16 RX ORDER — AMLODIPINE BESYLATE 10 MG/1
10 TABLET ORAL DAILY
Qty: 90 TABLET | Refills: 2 | Status: SHIPPED | OUTPATIENT
Start: 2020-03-16 | End: 2021-03-30

## 2020-03-16 RX ORDER — CARVEDILOL 6.25 MG/1
6.25 TABLET ORAL 2 TIMES DAILY WITH MEALS
Qty: 90 TABLET | Refills: 2 | Status: SHIPPED | OUTPATIENT
Start: 2020-03-16 | End: 2021-03-04

## 2020-03-16 NOTE — TELEPHONE ENCOUNTER
Returned call to pt; explained that we will not have availability until the June or July time frame; offered ph number to [facility] and [facility]; pt stated he wanted to be placed on our waiting list; pt verbalized understanding.

## 2020-03-16 NOTE — PATIENT INSTRUCTIONS
Taking Your Blood Pressure  Blood pressure is the force of blood against the artery wall as it moves from the heart through the blood vessels. You can take your own blood pressure reading using a digital monitor. Take your readings the same each time, using the same arm. Take readings as often as your healthcare provider instructs.  About blood pressure monitors  Blood pressure monitors are designed for certain ages and cases. You can find monitors for older adults, for pregnant women, and for children. Make sure the one you choose is the right one for your age and situation.  The American Heart Association recommends an automatic cuff monitor that fits on your upper arm (bicep). The cuff should fit your arm size. A cuff thats too large or too small will not give an accurate reading. Measure around your upper arm to find your size.  Monitors that attach to your finger or wrist are not as accurate as monitors for your upper arm.  Ask your healthcare provider for help in choosing a monitor. Bring your monitor to your next provider visit if you need help in using it the correct way.  The steps below are general instructions for using an automatic digital monitor.  Step 1. Relax    · Take your blood pressure at the same time every day, such as in the morning or evening, or at the time your healthcare provider recommends.  · Wait at least a half-hour after smoking, eating, or exercising. Don't drink coffee, tea, soda, or other caffeinated beverages before checking your blood pressure.  · Sit comfortably at a table with both feet on the floor. Do not cross your legs or feet. Place the monitor near you.  · Rest for a few minutes before you begin.  Step 2. Wrap the cuff    · Place your arm on the table, palm up. Your arm should be at the level of your heart. Wrap the cuff around your upper arm, just above your elbow. Its best done on bare skin, not over clothing. Most cuffs will indicate where the brachial artery (the  blood vessel in the middle of the arm at the inner side of the elbow) should line up with the cuff. Look in your monitor's instruction booklet for an illustration. You can also bring your cuff to your healthcare provider and have them show you how to correctly place the cuff.  Step 3. Inflate the cuff    · Push the button that starts the pump.  · The cuff will tighten, then loosen.  · The numbers will change. When they stop changing, your blood pressure reading will appear.  · Take 2 or 3 readings one minute apart.  Step 4. Write down the results of each reading    · Write down your blood pressure numbers for each reading. Note the date and time. Keep your results in one place, such as a notebook. Even if your monitor has a built-in memory, keep a hard copy of the readings.  · Remove the cuff from your arm. Turn off the machine.  · Bring your blood pressure records with your healthcare providers at each visit.  · If you start a new blood pressure medicine, note the day you started the new medicine. Also note the day if you change the dose of your medicine. This information goes on your blood pressure recording sheet. This will help your healthcare provider monitor how well the medicine changes are working.  · Ask your healthcare provider what numbers should prompt you to call him or her. Also ask what numbers should prompt you to get help right away.  Date Last Reviewed: 11/1/2016  © 6938-0522 [employer]. [address] [place] [address], [place] [address]. All rights reserved. This information is not intended as a substitute for professional medical care. Always follow your healthcare professional's instructions.

## 2020-03-16 NOTE — TELEPHONE ENCOUNTER
----- Message from Krys Urbina sent at 3/16/2020  8:48 AM CDT -----  [phone]- pt needs appt History of CVA- please call to schedule thanks

## 2020-03-16 NOTE — PROGRESS NOTES
"Subjective:       Patient ID: Josue Mir is a 44 y.o. male.    Chief Complaint: Establish Care    Patient here to establish care.  He has a past medical history of uncontrolled hypertension with CVA approximately 2 years ago.  This resulted in left-sided hemiparesis that eventually improved and he has almost full function of his left side now.  He notes that he stopped taking most of his hypertension meds recently.  When asked why he cannot give a clear response simply stating that he stopped caring."  He also states he is not taking his Pradaxa despite having a history of paroxysmal AFib.    Patient talks very little during history and exam.  Seems to have a very apathetic attitude towards his healthcare.  He also seems know very little about his history.  He does note that his wife had requested he have a referral to neurologist.  He was previously followed by 1 in [place].     Review of Systems   Constitutional: Negative for activity change, appetite change, chills and fever.   HENT: Negative for congestion, ear discharge, ear pain and sinus pressure.    Eyes: Negative for pain and itching.   Respiratory: Negative for chest tightness and shortness of breath.    Cardiovascular: Negative for chest pain and palpitations.   Gastrointestinal: Negative for abdominal pain, constipation, nausea and vomiting.   Endocrine: Negative for cold intolerance and heat intolerance.   Musculoskeletal: Negative for arthralgias, joint swelling and myalgias.   Skin: Negative for color change and rash.   Allergic/Immunologic: Negative for immunocompromised state.   Neurological: Negative for dizziness, weakness and headaches.   Psychiatric/Behavioral: Negative for agitation, behavioral problems and confusion.       Objective:      Physical Exam   Constitutional: He is oriented to person, place, and time. He appears well-developed and well-nourished.   HENT:   Head: Normocephalic and atraumatic.   Eyes: Pupils are equal, round, and " reactive to light. EOM are normal.   Neck: Normal range of motion. Neck supple.   Cardiovascular: Normal rate and regular rhythm.   No murmur heard.  Pulmonary/Chest: Effort normal and breath sounds normal. No respiratory distress. He has no wheezes. He has no rales.   Abdominal: Soft. He exhibits no distension. There is no tenderness. There is no guarding.   Musculoskeletal: Normal range of motion.   Neurological: He is alert and oriented to person, place, and time. He displays normal reflexes.   Skin: Skin is warm and dry.   Psychiatric: He has a normal mood and affect. His behavior is normal. Judgment and thought content normal.   Nursing note and vitals reviewed.      Assessment:       1. Essential hypertension    2. Paroxysmal atrial fibrillation    3. Left hemiparesis    4. JASPAL (obstructive sleep apnea)    5. Class 2 severe obesity with serious comorbidity and body mass index (BMI) of 36.0 to 36.9 in adult, unspecified obesity type    6. History of CVA (cerebrovascular accident)        Plan:         Orders Placed This Encounter   Procedures    CBC auto differential     Standing Status:   Future     Standing Expiration Date:   4/16/2020    Comprehensive metabolic panel     Standing Status:   Future     Standing Expiration Date:   4/16/2020    Lipid panel     Standing Status:   Future     Standing Expiration Date:   4/16/2020    TSH     Standing Status:   Future     Standing Expiration Date:   4/16/2020    Hemoglobin A1c     Standing Status:   Future     Standing Expiration Date:   4/16/2020    Ambulatory referral/consult to Neurology     Standing Status:   Future     Standing Expiration Date:   4/16/2021     Referral Priority:   Routine     Referral Type:   Consultation     Referral Reason:   Specialty Services Required     Requested Specialty:   Neurology     Number of Visits Requested:   1       -stressed to patient that does not take his hypertension meds as well as his blood thinner he is very likely  to have another stroke  -will get fasting labs today  -referral to Neurology place  -will return in 1 month for nurse blood pressure check

## 2020-04-13 ENCOUNTER — CLINICAL SUPPORT (OUTPATIENT)
Dept: FAMILY MEDICINE | Facility: CLINIC | Age: 45
End: 2020-04-13
Payer: COMMERCIAL

## 2020-04-13 ENCOUNTER — TELEPHONE (OUTPATIENT)
Dept: FAMILY MEDICINE | Facility: CLINIC | Age: 45
End: 2020-04-13

## 2020-04-13 VITALS — SYSTOLIC BLOOD PRESSURE: 142 MMHG | DIASTOLIC BLOOD PRESSURE: 100 MMHG | HEART RATE: 64 BPM

## 2020-04-13 DIAGNOSIS — I10 HYPERTENSION, UNSPECIFIED TYPE: Primary | ICD-10-CM

## 2020-04-13 PROCEDURE — 99999 PR PBB SHADOW E&M-EST. PATIENT-LVL I: ICD-10-PCS | Mod: PBBFAC,,,

## 2020-04-13 PROCEDURE — 99999 PR PBB SHADOW E&M-EST. PATIENT-LVL I: CPT | Mod: PBBFAC,,,

## 2020-05-05 ENCOUNTER — PATIENT MESSAGE (OUTPATIENT)
Dept: ADMINISTRATIVE | Facility: HOSPITAL | Age: 45
End: 2020-05-05

## 2020-06-18 ENCOUNTER — PATIENT OUTREACH (OUTPATIENT)
Dept: ADMINISTRATIVE | Facility: HOSPITAL | Age: 45
End: 2020-06-18

## 2020-07-02 ENCOUNTER — OFFICE VISIT (OUTPATIENT)
Dept: FAMILY MEDICINE | Facility: CLINIC | Age: 45
End: 2020-07-02
Payer: COMMERCIAL

## 2020-07-02 VITALS
BODY MASS INDEX: 35.98 KG/M2 | SYSTOLIC BLOOD PRESSURE: 132 MMHG | HEIGHT: 69 IN | TEMPERATURE: 98 F | OXYGEN SATURATION: 97 % | DIASTOLIC BLOOD PRESSURE: 88 MMHG | HEART RATE: 61 BPM | WEIGHT: 242.94 LBS

## 2020-07-02 DIAGNOSIS — I48.0 PAROXYSMAL ATRIAL FIBRILLATION: ICD-10-CM

## 2020-07-02 DIAGNOSIS — G47.33 OSA (OBSTRUCTIVE SLEEP APNEA): ICD-10-CM

## 2020-07-02 DIAGNOSIS — I10 ESSENTIAL HYPERTENSION: Primary | ICD-10-CM

## 2020-07-02 DIAGNOSIS — G81.94 LEFT HEMIPARESIS: ICD-10-CM

## 2020-07-02 DIAGNOSIS — I63.9 CEREBROVASCULAR ACCIDENT (CVA), UNSPECIFIED MECHANISM: ICD-10-CM

## 2020-07-02 PROCEDURE — 99999 PR PBB SHADOW E&M-EST. PATIENT-LVL III: CPT | Mod: PBBFAC,,, | Performed by: FAMILY MEDICINE

## 2020-07-02 PROCEDURE — 3075F PR MOST RECENT SYSTOLIC BLOOD PRESS GE 130-139MM HG: ICD-10-PCS | Mod: CPTII,S$GLB,, | Performed by: FAMILY MEDICINE

## 2020-07-02 PROCEDURE — 3079F PR MOST RECENT DIASTOLIC BLOOD PRESSURE 80-89 MM HG: ICD-10-PCS | Mod: CPTII,S$GLB,, | Performed by: FAMILY MEDICINE

## 2020-07-02 PROCEDURE — 3079F DIAST BP 80-89 MM HG: CPT | Mod: CPTII,S$GLB,, | Performed by: FAMILY MEDICINE

## 2020-07-02 PROCEDURE — 99213 OFFICE O/P EST LOW 20 MIN: CPT | Mod: S$GLB,,, | Performed by: FAMILY MEDICINE

## 2020-07-02 PROCEDURE — 3008F BODY MASS INDEX DOCD: CPT | Mod: CPTII,S$GLB,, | Performed by: FAMILY MEDICINE

## 2020-07-02 PROCEDURE — 99213 PR OFFICE/OUTPT VISIT, EST, LEVL III, 20-29 MIN: ICD-10-PCS | Mod: S$GLB,,, | Performed by: FAMILY MEDICINE

## 2020-07-02 PROCEDURE — 99999 PR PBB SHADOW E&M-EST. PATIENT-LVL III: ICD-10-PCS | Mod: PBBFAC,,, | Performed by: FAMILY MEDICINE

## 2020-07-02 PROCEDURE — 3075F SYST BP GE 130 - 139MM HG: CPT | Mod: CPTII,S$GLB,, | Performed by: FAMILY MEDICINE

## 2020-07-02 PROCEDURE — 3008F PR BODY MASS INDEX (BMI) DOCUMENTED: ICD-10-PCS | Mod: CPTII,S$GLB,, | Performed by: FAMILY MEDICINE

## 2020-07-02 RX ORDER — ALLOPURINOL 100 MG/1
100 TABLET ORAL DAILY
Qty: 90 TABLET | Refills: 3 | Status: SHIPPED | OUTPATIENT
Start: 2020-07-02 | End: 2020-10-06

## 2020-07-02 NOTE — PATIENT INSTRUCTIONS
Established High Blood Pressure    High blood pressure (hypertension) is a chronic disease. Often, healthcare providers dont know what causes it. But it can be caused by certain health conditions and medicines.  If you have high blood pressure, you may not have any symptoms. If you do have symptoms, they may include headache, dizziness, changes in your vision, chest pain, and shortness of breath. But even without symptoms, high blood pressure thats not treated raises your risk for heart attack and stroke. High blood pressure is a serious health risk and shouldnt be ignored.  A blood pressure reading is made up of two numbers: a higher number over a lower number. The top number is the systolic pressure. The bottom number is the diastolic pressure. A normal blood pressure is a systolic pressure of  less than 120 over a diastolic pressure of less than 80. You will see your blood pressure readings written together. For example, a person with a systolic pressure of 188 and a diastolic pressure of 78 will have 118/78 written in the medical record.  High blood pressure is when either the top number is 140 or higher, or the bottom number is 90 or higher. This must be the result when taking your blood pressure a number of times. The blood pressures between normal and high are called prehypertension.  Home care  If you have high blood pressure, you should do what is listed below to lower your blood pressure. If you are taking medicines for high blood pressure, these methods may reduce or end your need for medicines in the future.  · Begin a weight-loss program if you are overweight.  · Cut back on how much salt you get in your diet. Heres how to do this:  ¨ Dont eat foods that have a lot of salt. These include olives, pickles, smoked meats, and salted potato chips.  ¨ Dont add salt to your food at the table.  ¨ Use only small amounts of salt when cooking.  · Start an exercise program. Talk with your healthcare  provider about the type of exercise program that would be best for you. It doesn't have to be hard. Even brisk walking for 20 minutes 3 times a week is a good form of exercise.  · Dont take medicines that stimulate the heart. This includes many over-the-counter cold and sinus decongestant pills and sprays, as well as diet pills. Check the warnings about hypertension on the label. Before buying any over-the-counter medicines or supplements, always ask the pharmacist about the product's potential interaction with your high blood pressure and your high blood pressure medicines.  · Stimulants such as amphetamine or cocaine could be deadly for someone with high blood pressure. Never take these.  · Limit how much caffeine you get in your diet. Switch to caffeine-free products.  · Stop smoking. If you are a long-time smoker, this can be hard. Talk to your healthcare provider about medicines and nicotine replacement options to help you. Also, enroll in a stop-smoking program to make it more likely that you will quit for good.  · Learn how to handle stress. This is an important part of any program to lower blood pressure. Learn about relaxation methods like meditation, yoga, or biofeedback.  · If your provider prescribed medicines, take them exactly as directed. Missing doses may cause your blood pressure get out of control.  · If you miss a dose or doses, check with your healthcare provider or pharmacist about what to do.  · Consider buying an automatic blood pressure machine. Ask your provider for a recommendation. You can get one of these at most pharmacies.     The American Heart Association recommends the following guidelines for home blood pressure monitoring:  · Don't smoke or drink coffee for 30 minutes before taking your blood pressure.  · Go to the bathroom before the test.  · Relax for 5 minutes before taking the measurement.  · Sit with your back supported (don't sit on a couch or soft chair); keep your feet on  the floor uncrossed. Place your arm on a solid flat surface (like a table) with the upper part of the arm at heart level. Place the middle of the cuff directly above the eye of the elbow. Check the monitor's instruction manual for an illustration.  · Take multiple readings. When you measure, take 2 to 3 readings one minute apart and record all of the results.  · Take your blood pressure at the same time every day, or as your healthcare provider recommends.  · Record the date, time, and blood pressure reading.  · Take the record with you to your next medical appointment. If your blood pressure monitor has a built-in memory, simply take the monitor with you to your next appointment.  · Call your provider if you have several high readings. Don't be frightened by a single high blood pressure reading, but if you get several high readings, check in with your healthcare provider.  · Note: When blood pressure reaches a systolic (top number) of 180 or higher OR diastolic (bottom number) of 110 or higher, seek emergency medical treatment.  Follow-up care  You will need to see your healthcare provider regularly. This is to check your blood pressure and to make changes to your medicines. Make a follow-up appointment as directed. Bring the record of your home blood pressure readings to the appointment.  When to seek medical advice  Call your healthcare provider right away if any of these occur:  · Blood pressure reaches a systolic (upper number) of 180 or higher OR a diastolic (bottom number) of 110 or higher  · Chest pain or shortness of breath  · Severe headache  · Throbbing or rushing sound in the ears  · Nosebleed  · Sudden severe pain in your belly (abdomen)  · Extreme drowsiness, confusion, or fainting  · Dizziness or spinning sensation (vertigo)  · Weakness of an arm or leg or one side of the face  · You have problems speaking or seeing   Date Last Reviewed: 12/1/2016  © 1485-1748 [employer]. [address] [place] [address]  [place] [address]. All rights reserved. This information is not intended as a substitute for professional medical care. Always follow your healthcare professional's instructions.

## 2020-07-02 NOTE — PROGRESS NOTES
Subjective:       Patient ID: Josue Mir is a 44 y.o. male.    Chief Complaint: Follow-up (3month)    Patient here today for follow-up of blood pressure control.  At previous visit his blood pressure was not at goal.  He reports being compliant with all blood pressure medications today.  His blood pressure is 132/88 which is at goal for him.  He reports no other complaints other than wanting a refill on his allopurinol.    Review of Systems   Constitutional: Negative for activity change, appetite change, chills and fever.   HENT: Negative for nasal congestion, ear discharge, ear pain and sinus pressure/congestion.    Eyes: Negative for pain and itching.   Respiratory: Negative for chest tightness and shortness of breath.    Cardiovascular: Negative for chest pain and palpitations.   Gastrointestinal: Negative for abdominal pain, constipation, nausea and vomiting.   Endocrine: Negative for cold intolerance and heat intolerance.   Musculoskeletal: Negative for arthralgias, joint swelling and myalgias.   Integumentary:  Negative for color change and rash.   Neurological: Negative for dizziness, weakness and headaches.   Psychiatric/Behavioral: Negative for agitation, behavioral problems and confusion.         Objective:      Physical Exam  Vitals signs and nursing note reviewed.   Constitutional:       Appearance: He is well-developed.   HENT:      Head: Normocephalic and atraumatic.   Eyes:      Pupils: Pupils are equal, round, and reactive to light.   Neck:      Musculoskeletal: Normal range of motion and neck supple.   Cardiovascular:      Rate and Rhythm: Normal rate and regular rhythm.      Heart sounds: No murmur.   Pulmonary:      Effort: Pulmonary effort is normal. No respiratory distress.      Breath sounds: Normal breath sounds. No wheezing or rales.   Abdominal:      General: There is no distension.      Palpations: Abdomen is soft.      Tenderness: There is no abdominal tenderness. There is no guarding.    Musculoskeletal: Normal range of motion.   Skin:     General: Skin is warm and dry.   Neurological:      Mental Status: He is alert and oriented to person, place, and time.      Deep Tendon Reflexes: Reflexes normal.   Psychiatric:         Behavior: Behavior normal.         Thought Content: Thought content normal.         Judgment: Judgment normal.         Assessment:       1. Left hemiparesis    2. Cerebrovascular accident (CVA), unspecified mechanism    3. Paroxysmal atrial fibrillation    4. Essential hypertension    5. JASPAL (obstructive sleep apnea)        Plan:       -refill appear in all as requested  -encouragement given to patient for compliance a blood pressure medication having blood pressure to control.  Discussed the possibility of using diet and exercise to further aid in blood pressure control as well weight management.  Exercises is medicine handouts given to him on how to safely exercise with his medical conditions.  -will have patient follow-up in three months for recheck and repeat blood work

## 2020-07-30 ENCOUNTER — OFFICE VISIT (OUTPATIENT)
Dept: NEUROLOGY | Facility: CLINIC | Age: 45
End: 2020-07-30
Payer: COMMERCIAL

## 2020-07-30 VITALS
BODY MASS INDEX: 35.92 KG/M2 | SYSTOLIC BLOOD PRESSURE: 151 MMHG | HEART RATE: 61 BPM | DIASTOLIC BLOOD PRESSURE: 108 MMHG | WEIGHT: 242.5 LBS | HEIGHT: 69 IN

## 2020-07-30 DIAGNOSIS — Z79.01 LONG TERM (CURRENT) USE OF ANTICOAGULANTS: ICD-10-CM

## 2020-07-30 DIAGNOSIS — I10 ESSENTIAL HYPERTENSION: ICD-10-CM

## 2020-07-30 DIAGNOSIS — Z86.79 HISTORY OF CEREBRAL HEMORRHAGE: Primary | ICD-10-CM

## 2020-07-30 DIAGNOSIS — I48.0 PAROXYSMAL ATRIAL FIBRILLATION: ICD-10-CM

## 2020-07-30 PROCEDURE — 99204 OFFICE O/P NEW MOD 45 MIN: CPT | Mod: S$GLB,,, | Performed by: PSYCHIATRY & NEUROLOGY

## 2020-07-30 PROCEDURE — 3008F BODY MASS INDEX DOCD: CPT | Mod: CPTII,S$GLB,, | Performed by: PSYCHIATRY & NEUROLOGY

## 2020-07-30 PROCEDURE — 3080F PR MOST RECENT DIASTOLIC BLOOD PRESSURE >= 90 MM HG: ICD-10-PCS | Mod: CPTII,S$GLB,, | Performed by: PSYCHIATRY & NEUROLOGY

## 2020-07-30 PROCEDURE — 3008F PR BODY MASS INDEX (BMI) DOCUMENTED: ICD-10-PCS | Mod: CPTII,S$GLB,, | Performed by: PSYCHIATRY & NEUROLOGY

## 2020-07-30 PROCEDURE — 99204 PR OFFICE/OUTPT VISIT, NEW, LEVL IV, 45-59 MIN: ICD-10-PCS | Mod: S$GLB,,, | Performed by: PSYCHIATRY & NEUROLOGY

## 2020-07-30 PROCEDURE — 99999 PR PBB SHADOW E&M-EST. PATIENT-LVL III: CPT | Mod: PBBFAC,,, | Performed by: PSYCHIATRY & NEUROLOGY

## 2020-07-30 PROCEDURE — 3080F DIAST BP >= 90 MM HG: CPT | Mod: CPTII,S$GLB,, | Performed by: PSYCHIATRY & NEUROLOGY

## 2020-07-30 PROCEDURE — 3077F PR MOST RECENT SYSTOLIC BLOOD PRESSURE >= 140 MM HG: ICD-10-PCS | Mod: CPTII,S$GLB,, | Performed by: PSYCHIATRY & NEUROLOGY

## 2020-07-30 PROCEDURE — 99999 PR PBB SHADOW E&M-EST. PATIENT-LVL III: ICD-10-PCS | Mod: PBBFAC,,, | Performed by: PSYCHIATRY & NEUROLOGY

## 2020-07-30 PROCEDURE — 3077F SYST BP >= 140 MM HG: CPT | Mod: CPTII,S$GLB,, | Performed by: PSYCHIATRY & NEUROLOGY

## 2020-07-30 NOTE — ASSESSMENT & PLAN NOTE
No access to physical scans, only reports. Hard to tell whether the intracerebral hemorrhage was deep or lobar but given the Eliezer and the best I can surmise from the reports is that it was a deep hemorrhage secondary to hypertension. The fact that he was diagnosed with atrial fibrillation not long after the hemorrhage is less likely to represent the underlying etiology of the event (ischemic embolic stroke w/ hemorrhagic transformation) but more perhaps a result of neurocardiogenic effects from the intracerebral pathology itself.     In any event, he has been placed on dabigatran and is doing well and should continue this medication.    BP goals < 130/80 mmHg, janelle w/ anticoagulation on board, and he needs to start checking his BP regularly at home to see if he is at goal. BP today is elevated.    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

## 2020-07-30 NOTE — PROGRESS NOTES
Vascular Neurology  Clinic Note    ___________________  ASSESSMENT & PLAN    Problem List Items Addressed This Visit        1 - High    History of cerebral hemorrhage - Primary    Current Assessment & Plan     No access to physical scans, only reports. Hard to tell whether the intracerebral hemorrhage was deep or lobar but given the Eliezer and the best I can surmise from the reports is that it was a deep hemorrhage secondary to hypertension. The fact that he was diagnosed with atrial fibrillation not long after the hemorrhage is less likely to represent the underlying etiology of the event (ischemic embolic stroke w/ hemorrhagic transformation) but more perhaps a result of neurocardiogenic effects from the intracerebral pathology itself.     In any event, he has been placed on dabigatran and is doing well and should continue this medication.    BP goals < 130/80 mmHg, janelle w/ anticoagulation on board, and he needs to start checking his BP regularly at home to see if he is at goal. BP today is elevated.    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         Relevant Orders    Ambulatory referral/consult to Neuropsychology       Unprioritized    Paroxysmal atrial fibrillation    Essential hypertension    Long term (current) use of anticoagulants          Reason For Visit (Chief Complaint): Hx of R frontal non-lobar intracerebral hemorrhage w/ Eliezer (no imaging available)    HPI: 44 y.o. right handed male with hx of R fontal intracerebral hemorrhage w/ Eliezer in 2018.    In the interim he was diagnosed with a.fib and has been started on dabigatran as well.    Does not check his BP at home regularly and we discussed the importance of this given direct relationship to intracerebral hemorrhage and also that he is on an an anticoagulant now.     Memory difficulties persist since the event but he cannot be any more specific about what he is  having problems with or provide examples of what he means by memory problems.    He is mostly concerned about his decreased drive, decreased ambition, and has been less outgoing and less caring about his health since the hemorrhage as well.        Brain Imaging:  CT Head - 4/6/18 -   IMPRESSION:   There is a large acute deep right frontal intracerebral hematoma with intraventricular bleed, to the lower 4th ventricle. Minor differential would include lateral ventricular striate hypertensive hemorrhage versus intra-axial rupture of right middle cerebral artery trifurcation aneurysm.  Vessel Imaging:  CTA Head 4/7/2018 - no large artery atherosclerosis or occlusion  Cardiac Evaluation:  TTE 4/2018  CONCLUSIONS  -----------  1. Technically difficult study.  2. Mildly decreased left ventricular systolic function.  LVEF of 40-45%.  3. The right ventricular systolic function is normal.  4. The right ventricle is mildly enlarged in the apical view.  5. An insufficent tricuspid   Other:     Relevant Labwork:  Recent Labs   Lab 03/16/20  0926   Hemoglobin A1C 5.4   LDL Cholesterol 132.4   HDL 41   Triglycerides 98   Cholesterol 193       I independently viewed the above imaging studies in addition to reviewing the report.  I reviewed the above labwork.    Review of Systems  Msk: negative for muscle pain  Skin: negative for pruritis  Neuro: negative for headache  All others negative    Past Medical History  Past Medical History:   Diagnosis Date    Anticoagulant long-term use     Atrial fibrillation     Gout     Gout, joint     Hypertension     Memory deficit following cerebral infarction     Pneumonia 04/2018    Sleep apnea with use of continuous positive airway pressure (CPAP)     Stroke 04/2018    Syncope and collapse      Family History  No relevant history   Social History  Never Smoker     Medication List with Changes/Refills   Current Medications    ALLOPURINOL (ZYLOPRIM) 100 MG TABLET    Take 1 tablet (100 mg  "total) by mouth once daily.    AMLODIPINE (NORVASC) 10 MG TABLET    Take 1 tablet (10 mg total) by mouth once daily.    ATORVASTATIN (LIPITOR) 40 MG TABLET    Take 1 tablet (40 mg total) by mouth every evening.    CARVEDILOL (COREG) 6.25 MG TABLET    Take 1 tablet (6.25 mg total) by mouth 2 (two) times daily with meals.    DABIGATRAN ETEXILATE (PRADAXA) 150 MG CAP    Take 150 mg by mouth 2 (two) times daily. "Do NOT break, chew, or open capsules."    DRONEDARONE (MULTAQ) 400 MG TAB    Take 1 tablet (400 mg total) by mouth 2 (two) times daily with meals.    FENOFIBRATE (TRICOR) 145 MG TABLET    Take 145 mg by mouth once daily.     LISINOPRIL (PRINIVIL,ZESTRIL) 40 MG TABLET    Take 40 mg by mouth once daily.       EXAM  Vital Signs:  Vitals - 1 value per visit 12/14/2018 12/15/2018 7/2/2019 3/16/2020 4/13/2020 7/2/2020 7/30/2020   SYSTOLIC - 137 179 178 142 132 151   DIASTOLIC - 74 107 118 100 88 108   PULSE - 57 50 88 64 61 61   TEMPERATURE - 97.7 98 97.6 - 97.9 -   RESPIRATIONS - 18 16 - - - -   SPO2 - 93 98 95 - 97 -   Weight (lb) 252.87 - 240 247.36 - 242.95 242.51   Weight (kg) 114.7 - 108.863 112.2 - 110.2 110   HEIGHT 5' 9" - 5' 9" 5' 9" - 5' 9" 5' 9"   BODY MASS INDEX 37.34 - 35.44 36.53 - 35.88 35.81   VISIT REPORT - - - - - - -   Pain Score  - - - 0 - 0 0       General: well appearing without discomfort   Mental Status:alert, oriented to person - place - age - month   Language: able to name, repeat, comprehend commands   Cranial Nerves: EOMI, PERRL, no facial asymmetry, tongue to midline, palate midline  Motor: 5/5 power in all extremities, normal tone  Sensory: intact light touch bilaterally, intact proprioception bilaterally  Coordination: no ataxia on finger-to-nose or heel-to-shin testing; no truncal ataxia  Gait & Stance: normal    Stroke Scales      MD Wilbur  Vascular Neurology  Office [phone]  Fax [phone]    "

## 2020-07-30 NOTE — LETTER
July 30, 2020      Hernan Phan MD  [address] [place] [address]           [facility]  [address]  [facility] [address]  Phone: [phone]          Patient: Josue Mir   MR Number: [id number]   YOB: 1975   Date of Visit: 7/30/2020       Dear Dr. Hernan Phan:    Thank you for referring Josue Mir to me for evaluation. Attached you will find relevant portions of my assessment and plan of care.    If you have questions, please do not hesitate to call me. I look forward to following Josue Mir along with you.    Sincerely,    Chris Chew MD    Enclosure  CC:  No Recipients    If you would like to receive this communication electronically, please contact [email or web address] or [phone] to request more information on [employer] access.    For providers and/or their staff who would like to refer a patient to [facility], please contact us through our one-stop-shop provider referral line, [facility], at [phone].    If you feel you have received this communication in error or would no longer like to receive these types of communications, please e-mail [email or web address]

## 2020-09-05 ENCOUNTER — PATIENT MESSAGE (OUTPATIENT)
Dept: NEUROLOGY | Facility: CLINIC | Age: 45
End: 2020-09-05

## 2020-09-28 ENCOUNTER — TELEPHONE (OUTPATIENT)
Dept: NEUROLOGY | Facility: CLINIC | Age: 45
End: 2020-09-28

## 2020-09-28 NOTE — TELEPHONE ENCOUNTER
Patient was called for our 8:30 AM appointment today. He answered the phone and stated that he needed to cancel this appointment. Informed that we would call to get him rescheduled.

## 2020-10-05 ENCOUNTER — LAB VISIT (OUTPATIENT)
Dept: LAB | Facility: HOSPITAL | Age: 45
End: 2020-10-05
Attending: STUDENT IN AN ORGANIZED HEALTH CARE EDUCATION/TRAINING PROGRAM
Payer: COMMERCIAL

## 2020-10-05 ENCOUNTER — OFFICE VISIT (OUTPATIENT)
Dept: FAMILY MEDICINE | Facility: CLINIC | Age: 45
End: 2020-10-05
Payer: COMMERCIAL

## 2020-10-05 VITALS
WEIGHT: 245.81 LBS | DIASTOLIC BLOOD PRESSURE: 88 MMHG | BODY MASS INDEX: 36.41 KG/M2 | SYSTOLIC BLOOD PRESSURE: 156 MMHG | TEMPERATURE: 97 F | OXYGEN SATURATION: 96 % | HEART RATE: 67 BPM | HEIGHT: 69 IN

## 2020-10-05 DIAGNOSIS — M10.9 GOUT, UNSPECIFIED CAUSE, UNSPECIFIED CHRONICITY, UNSPECIFIED SITE: Primary | ICD-10-CM

## 2020-10-05 DIAGNOSIS — M10.9 GOUT, UNSPECIFIED CAUSE, UNSPECIFIED CHRONICITY, UNSPECIFIED SITE: ICD-10-CM

## 2020-10-05 DIAGNOSIS — I10 HYPERTENSION, UNSPECIFIED TYPE: ICD-10-CM

## 2020-10-05 DIAGNOSIS — R41.9 MEDICATION NONCOMPLIANCE DUE TO COGNITIVE IMPAIRMENT: ICD-10-CM

## 2020-10-05 DIAGNOSIS — I48.0 PAROXYSMAL ATRIAL FIBRILLATION: ICD-10-CM

## 2020-10-05 DIAGNOSIS — Z91.148 MEDICATION NONCOMPLIANCE DUE TO COGNITIVE IMPAIRMENT: ICD-10-CM

## 2020-10-05 DIAGNOSIS — G47.30 SLEEP APNEA, UNSPECIFIED TYPE: ICD-10-CM

## 2020-10-05 DIAGNOSIS — Z86.73 HISTORY OF CVA (CEREBROVASCULAR ACCIDENT): ICD-10-CM

## 2020-10-05 PROCEDURE — 80048 BASIC METABOLIC PNL TOTAL CA: CPT

## 2020-10-05 PROCEDURE — 36415 COLL VENOUS BLD VENIPUNCTURE: CPT | Mod: PO

## 2020-10-05 PROCEDURE — 3077F SYST BP >= 140 MM HG: CPT | Mod: CPTII,S$GLB,, | Performed by: STUDENT IN AN ORGANIZED HEALTH CARE EDUCATION/TRAINING PROGRAM

## 2020-10-05 PROCEDURE — 3077F PR MOST RECENT SYSTOLIC BLOOD PRESSURE >= 140 MM HG: ICD-10-PCS | Mod: CPTII,S$GLB,, | Performed by: STUDENT IN AN ORGANIZED HEALTH CARE EDUCATION/TRAINING PROGRAM

## 2020-10-05 PROCEDURE — 99999 PR PBB SHADOW E&M-EST. PATIENT-LVL IV: ICD-10-PCS | Mod: PBBFAC,,, | Performed by: STUDENT IN AN ORGANIZED HEALTH CARE EDUCATION/TRAINING PROGRAM

## 2020-10-05 PROCEDURE — 99214 PR OFFICE/OUTPT VISIT, EST, LEVL IV, 30-39 MIN: ICD-10-PCS | Mod: S$GLB,,, | Performed by: STUDENT IN AN ORGANIZED HEALTH CARE EDUCATION/TRAINING PROGRAM

## 2020-10-05 PROCEDURE — 99214 OFFICE O/P EST MOD 30 MIN: CPT | Mod: S$GLB,,, | Performed by: STUDENT IN AN ORGANIZED HEALTH CARE EDUCATION/TRAINING PROGRAM

## 2020-10-05 PROCEDURE — 84550 ASSAY OF BLOOD/URIC ACID: CPT

## 2020-10-05 PROCEDURE — 3008F PR BODY MASS INDEX (BMI) DOCUMENTED: ICD-10-PCS | Mod: CPTII,S$GLB,, | Performed by: STUDENT IN AN ORGANIZED HEALTH CARE EDUCATION/TRAINING PROGRAM

## 2020-10-05 PROCEDURE — 99999 PR PBB SHADOW E&M-EST. PATIENT-LVL IV: CPT | Mod: PBBFAC,,, | Performed by: STUDENT IN AN ORGANIZED HEALTH CARE EDUCATION/TRAINING PROGRAM

## 2020-10-05 PROCEDURE — 3079F DIAST BP 80-89 MM HG: CPT | Mod: CPTII,S$GLB,, | Performed by: STUDENT IN AN ORGANIZED HEALTH CARE EDUCATION/TRAINING PROGRAM

## 2020-10-05 PROCEDURE — 3008F BODY MASS INDEX DOCD: CPT | Mod: CPTII,S$GLB,, | Performed by: STUDENT IN AN ORGANIZED HEALTH CARE EDUCATION/TRAINING PROGRAM

## 2020-10-05 PROCEDURE — 3079F PR MOST RECENT DIASTOLIC BLOOD PRESSURE 80-89 MM HG: ICD-10-PCS | Mod: CPTII,S$GLB,, | Performed by: STUDENT IN AN ORGANIZED HEALTH CARE EDUCATION/TRAINING PROGRAM

## 2020-10-05 RX ORDER — DABIGATRAN ETEXILATE 150 MG/1
150 CAPSULE ORAL 2 TIMES DAILY
Qty: 120 CAPSULE | Refills: 3 | Status: SHIPPED | OUTPATIENT
Start: 2020-10-05 | End: 2022-09-27

## 2020-10-05 RX ORDER — INDOMETHACIN 50 MG/1
50 CAPSULE ORAL
Qty: 30 CAPSULE | Refills: 2 | Status: SHIPPED | OUTPATIENT
Start: 2020-10-05 | End: 2021-09-22 | Stop reason: SDUPTHER

## 2020-10-05 NOTE — PROGRESS NOTES
[facility] MEDICINE CLINIC NOTE    Patient Name: Josue Mir  YOB: 1975    PRESENTING HISTORY   Chief Complaint:   Chief Complaint   Patient presents with    Follow-up      New patient to me  History of Present Illness:  Mr. Josue Mir is a 44 y.o. male with hx early CVA in 2018. Deficits are primarily related to memory, fatigue, apathy. Seen by outside cardiology for atrial fibrillation. Loop recorder in place.     Chronic problems:  1. Gout flares 3-4 times/year.    Has taken colchicine in past.    Also tried indomethacin.    On allopurinol 100. Hasn't had uric acid level recently.    Doesn't take anything when has gout attack.    Currently having some right knee pain which he thinks might be related.     2.+sleep apnea  Doesn't use CPAP regularly.     3. Weight always had issue.   Interested in losing weight.   Lost 55 lbs in 10 weeks at [employer] prior to CVA    4.HTN- on amlodipine, lisinopril. Was on Coreg.    Not taking medications approrpiately.   No NSAIDs (yet), no EtOH to explain.    Untreated JASPAL.    He cannot explain why he is not taking meds. Likely related to CVA sequelae. No issues with cost or dosing frequency.     5. 2 years ago CVA.   Deficits- left side affected. A little weakness  Mainly fatigue  Following with Vascular Neuro  Referred to Neuro Psych for evaluation. Visit pending.      +Guilt  + Sadness  Can always eat. Can always sleep  Focus impacted since stroke.     Works at [employer] at . Did not lose job in pandemic      Review of Systems   Constitutional: Negative for chills and fever.   HENT: Negative for hearing loss and sinus pain.    Eyes: Positive for blurred vision (close vision). Negative for double vision.   Respiratory: Negative for cough and shortness of breath.    Cardiovascular: Negative for chest pain and palpitations.   Gastrointestinal: Negative for abdominal pain, diarrhea and heartburn.   Genitourinary: Negative for dysuria,  frequency and urgency.   Musculoskeletal: Positive for joint pain (right knee).   Skin: Negative for itching and rash.   Neurological: Negative for dizziness, focal weakness and headaches.   Psychiatric/Behavioral: Positive for depression and memory loss. The patient does not have insomnia.          PAST HISTORY:     Past Medical History:   Diagnosis Date    Anticoagulant long-term use     Atrial fibrillation     Gout     Gout, joint     Hypertension     Memory deficit following cerebral infarction     Pneumonia 04/2018    Sleep apnea with use of continuous positive airway pressure (CPAP)     Stroke 04/2018    Syncope and collapse        Past Surgical History:   Procedure Laterality Date    INSERTION OF IMPLANTABLE LOOP RECORDER  10/25/2018    Procedure: Insertion, Implantable Loop Recorder;  Surgeon: Justin Colby MD;  Location: [facility] CATH;  Service: Cardiology;;    LAPAROSCOPIC CHOLECYSTECTOMY N/A 12/14/2018    Procedure: CHOLECYSTECTOMY, LAPAROSCOPIC;  Surgeon: Beck Love MD;  Location: [facility];  Service: General;  Laterality: N/A;  Dr. Love requested case to start at 1:00pm    NASAL POLYP EXCISION      TREATMENT OF CARDIAC ARRHYTHMIA N/A 10/25/2018    Procedure: CARDIOVERSION OR DEFIBRILLATION;  Surgeon: Justin Colby MD;  Location: [facility];  Service: Cardiology;  Laterality: N/A;       Family History   Problem Relation Age of Onset    Sleep apnea Mother     Hypertension Mother     No Known Problems Brother     No Known Problems Brother     No Known Problems Brother        Social History     Socioeconomic History    Marital status:      Spouse name: Not on file    Number of children: Not on file    Years of education: Not on file    Highest education level: Not on file   Occupational History    Not on file   Social Needs    Financial resource strain: Not on file    Food insecurity     Worry: Not on file     Inability: Not on file    Transportation needs      "Medical: Not on file     Non-medical: Not on file   Tobacco Use    Smoking status: Never Smoker    Smokeless tobacco: Never Used   Substance and Sexual Activity    Alcohol use: Yes     Comment: socially    Drug use: No    Sexual activity: Yes     Partners: Female   Lifestyle    Physical activity     Days per week: Not on file     Minutes per session: Not on file    Stress: Not on file   Relationships    Social connections     Talks on phone: Not on file     Gets together: Not on file     Attends Yazidism service: Not on file     Active member of club or organization: Not on file     Attends meetings of clubs or organizations: Not on file     Relationship status: Not on file   Other Topics Concern    Not on file   Social History Narrative    Not on file       MEDICATIONS & ALLERGIES:     Current Outpatient Medications on File Prior to Visit   Medication Sig    allopurinoL (ZYLOPRIM) 100 MG tablet Take 1 tablet (100 mg total) by mouth once daily.    amLODIPine (NORVASC) 10 MG tablet Take 1 tablet (10 mg total) by mouth once daily.    atorvastatin (LIPITOR) 40 MG tablet Take 1 tablet (40 mg total) by mouth every evening.    carvediloL (COREG) 6.25 MG tablet Take 1 tablet (6.25 mg total) by mouth 2 (two) times daily with meals.    dabigatran etexilate (PRADAXA) 150 mg Cap Take 150 mg by mouth 2 (two) times daily. "Do NOT break, chew, or open capsules."    dronedarone (MULTAQ) 400 mg Tab Take 1 tablet (400 mg total) by mouth 2 (two) times daily with meals.    fenofibrate (TRICOR) 145 MG tablet Take 145 mg by mouth once daily.     lisinopril (PRINIVIL,ZESTRIL) 40 MG tablet Take 40 mg by mouth once daily.     No current facility-administered medications on file prior to visit.        Review of patient's allergies indicates:  No Known Allergies    OBJECTIVE:   Vital Signs:  Vitals:    10/05/20 0857 10/05/20 0901   BP: (!) 148/88 (!) 156/88   Pulse: 67    Temp: 97.2 °F (36.2 °C)    TempSrc: Temporal  " "  SpO2: 96%    Weight: 111.5 kg (245 lb 13 oz)    Height: 5' 9" (1.753 m)        No results found for this or any previous visit (from the past 24 hour(s)).      Physical Exam   Constitutional: He is oriented to person, place, and time and well-developed, well-nourished, and in no distress.   HENT:   Head: Normocephalic and atraumatic.   Right Ear: External ear normal.   Left Ear: External ear normal.   Eyes: Pupils are equal, round, and reactive to light. Conjunctivae and EOM are normal. No scleral icterus.   Neck: Normal range of motion. Neck supple. No thyromegaly present.   Cardiovascular: Normal rate, regular rhythm and normal heart sounds.   No murmur heard.  Pulmonary/Chest: Effort normal and breath sounds normal. No respiratory distress. He has no wheezes. He has no rales.   Abdominal: Soft. Bowel sounds are normal. He exhibits no distension. There is no abdominal tenderness.   Musculoskeletal: Normal range of motion.         General: No tenderness, deformity or edema.   Lymphadenopathy:     He has no cervical adenopathy.   Neurological: He is alert and oriented to person, place, and time. GCS score is 15.   Skin: Skin is warm and dry. No rash noted. He is not diaphoretic. No erythema.   Psychiatric: Mood, memory and affect normal.   Judgement impaired.    Nursing note and vitals reviewed.      ASSESSMENT & PLAN:     44 M here for f/u.     High risk individual. Hx CVA with subtle cognitive impairments. Neuropsych eval in process. Unfortunately not homebound and ineligible for home health at this time.     Likely 2/2 HTN from JASPAL. NOt taking meds    Needs to continue f/u and compliance with medications.     Possibly some comorbid depression but will wait for neuropsych testing prior to adding any diagnoses.     Gout, unspecified cause, unspecified chronicity, unspecified site  -     Uric acid; Future; Expected date: 10/05/2020  -     indomethacin (INDOCIN) 50 MG capsule; Take 1 capsule (50 mg total) by mouth " "3 (three) times daily with meals.  Dispense: 30 capsule; Refill: 2  Given plan for acute flairs, diet recommendations.     Hypertension, unspecified type  -     Basic Metabolic Panel; Future; Expected date: 10/05/2020  Needs to take meds, wear CPAP.     History of CVA (cerebrovascular accident)  -     dabigatran etexilate (PRADAXA) 150 mg Cap; Take 1 capsule (150 mg total) by mouth 2 (two) times daily. "Do NOT break, chew, or open capsules."  Dispense: 120 capsule; Refill: 3  Needs to take meds for secondary risk reduction.   Continue f/u with Dr. Chew    Sleep apnea, unspecified type  Weight loss, CPAP    Paroxysmal atrial fibrillation  Continue meds and f/u with Cards    Medication noncompliance due to cognitive impairment  As above    RTC 3months. Continue frequent monitoring to ensure taking meds, going to appointments.     David Quach MD         "

## 2020-10-05 NOTE — PATIENT INSTRUCTIONS
For gout flares:  Take indomethacin 50mg three times a day until the pain stops.   When not having gout flare, don't take.       Wear sleep apnea mask at night.     Gout Diet  Gout is a painful condition caused by an excess of uric acid, a waste product made by the body. Uric acid forms crystals that collect in the joints. The immune response to these crystals brings on symptoms of joint pain and swelling. This is called a gout attack. Often, medications and diet changes are combined to manage gout. Below are some guidelines for changing your diet to help you manage gout and prevent attacks. Your health care provider will help you determine the best eating plan for you.     Eating to manage gout  Weight loss for those who are overweight may help reduce gout attacks.  Eat less of these foods  Eating too many foods containing purines may raise the levels of uric acid in your body. This raises your risk for a gout attack. Try to limit these foods and drinks:  · Alcohol, such as beer and red wine. You may be told to avoid alcohol completely.  · Soft drinks that contain sugar or high fructose corn syrup  · Certain fish, including anchovies, sardines, fish eggs, and herring  · Shellfish  · Certain meats, such as red meat, hot dogs, luncheon meats, and turkey  · Organ meats, such as liver, kidneys, and sweetbreads  · Legumes, such as dried beans and peas  · Other high fat foods such as gravy, whole milk, and high fat cheeses  · Vegetables such as asparagus, cauliflower, spinach, and mushrooms used to be thought to contribute to an increased risk for a gout attack, but recent studies show that high purine vegetables don't increase the risk for a gout attack.  Eat more of these foods  Other foods may be helpful for people with gout. Add some of these foods to your diet:  · Cherries contain chemicals that may lower uric acid.  · Omega fatty acids. These are found in some fatty fish such as salmon, certain oils (flax, olive,  or nut), and nuts themselves. Omega fatty acids may help prevent inflammation due to gout.  · Dairy products that are low-fat or fat-free, such as cheese and yogurt  · Complex carbohydrate foods, including whole grains, brown rice, oats, and beans  · Coffee, in moderation  · Water, approximately 64 ounces per day  Follow-up care  Follow up with your healthcare provider as advised.  When to seek medical advice  Call your healthcare provider right away if any of these occur:  · Return of gout symptoms, usually at night:  · Severe pain, swelling, and heat in a joint, especially the base of the big toe  · Affected joint is hard to move  · Skin of the affected joint is purple or red  · Fever of 100.4°F (38°C) or higher  · Pain that doesn't get better even with prescribed medicine   Date Last Reviewed: 1/12/2016  © 1735-1798 [employer]. [address] [place] [address], [place] [address]. All rights reserved. This information is not intended as a substitute for professional medical care. Always follow your healthcare professional's instructions.

## 2020-10-06 ENCOUNTER — PATIENT MESSAGE (OUTPATIENT)
Dept: FAMILY MEDICINE | Facility: CLINIC | Age: 45
End: 2020-10-06

## 2020-10-06 DIAGNOSIS — M10.9 GOUT, UNSPECIFIED CAUSE, UNSPECIFIED CHRONICITY, UNSPECIFIED SITE: Primary | ICD-10-CM

## 2020-10-06 LAB
ANION GAP SERPL CALC-SCNC: 10 MMOL/L (ref 8–16)
BUN SERPL-MCNC: 11 MG/DL (ref 6–20)
CALCIUM SERPL-MCNC: 9.2 MG/DL (ref 8.7–10.5)
CHLORIDE SERPL-SCNC: 105 MMOL/L (ref 95–110)
CO2 SERPL-SCNC: 28 MMOL/L (ref 23–29)
CREAT SERPL-MCNC: 0.9 MG/DL (ref 0.5–1.4)
EST. GFR  (AFRICAN AMERICAN): >60 ML/MIN/1.73 M^2
EST. GFR  (NON AFRICAN AMERICAN): >60 ML/MIN/1.73 M^2
GLUCOSE SERPL-MCNC: 78 MG/DL (ref 70–110)
POTASSIUM SERPL-SCNC: 3.8 MMOL/L (ref 3.5–5.1)
SODIUM SERPL-SCNC: 143 MMOL/L (ref 136–145)
URATE SERPL-MCNC: 8.1 MG/DL (ref 3.4–7)

## 2020-10-06 RX ORDER — ALLOPURINOL 300 MG/1
300 TABLET ORAL DAILY
Qty: 30 TABLET | Refills: 3 | Status: SHIPPED | OUTPATIENT
Start: 2020-10-06 | End: 2022-09-27

## 2020-10-19 ENCOUNTER — CLINICAL SUPPORT (OUTPATIENT)
Dept: FAMILY MEDICINE | Facility: CLINIC | Age: 45
End: 2020-10-19
Payer: COMMERCIAL

## 2020-10-19 ENCOUNTER — TELEPHONE (OUTPATIENT)
Dept: FAMILY MEDICINE | Facility: CLINIC | Age: 45
End: 2020-10-19

## 2020-10-19 VITALS — SYSTOLIC BLOOD PRESSURE: 126 MMHG | DIASTOLIC BLOOD PRESSURE: 78 MMHG | HEART RATE: 62 BPM

## 2020-10-19 DIAGNOSIS — Z01.30 BLOOD PRESSURE CHECK: Primary | ICD-10-CM

## 2020-10-19 PROCEDURE — 99999 PR PBB SHADOW E&M-EST. PATIENT-LVL II: CPT | Mod: PBBFAC,,,

## 2020-10-19 PROCEDURE — 99999 PR PBB SHADOW E&M-EST. PATIENT-LVL II: ICD-10-PCS | Mod: PBBFAC,,,

## 2020-10-19 NOTE — TELEPHONE ENCOUNTER
Patient returned to clinic today for BP and P check.      Right arm 124/76  Left arm 126/78  P 62    Please advise.

## 2020-10-26 NOTE — PROGRESS NOTES
NEUROPSYCHOLOGICAL EVALUATION - CONFIDENTIAL    Referring Provider: Chris Chew MD   Medical Necessity: Evaluate cognitive functioning, participate in treatment planning/management, and provide supportive therapy in the setting of memory problems, apathy, and reported history of intracerebral hemorrhage.   Date Conducted:  10/27/2020  Present At Visit: Patient  [id number]/[id number] = 55 minutes  Referral Diagnoses: Z86.79 (ICD-10-CM) - History of cerebral hemorrhage  Consent: The patient expressed an understanding of the purpose of the evaluation and consented to all procedures, including providing consent for Wero Ibrahim, PhD, a clinical neuropsychology fellow under the direct supervision of Dr. Anrdee Davis, to be involved in her care. We discussed the limits of confidentiality and discussed an emergency plan.     Telemedicine Details:   The patient location is: at work  The chief complaint leading to consultation is: decreased motivation and cognitive problems since stroke in 2018  Visit type: Virtual visit with synchronous audio and video  Total time spent with patient: 55 minutes  Each patient to whom he or she provides medical services by telemedicine is: (1) informed of the relationship between the physician and patient and the respective role of any other health care provider with respect to management of the patient; and (2) notified that he or she may decline to receive medical services by telemedicine and may withdraw from such care at any time.  Notes: See below.     Note: connectivity problems experienced partway through telemedicine interview resulted in remainder of intake being conducted over the phone.     ASSESSMENT & PLAN:     Mr. Josue Mir is an 44 y.o., male with 16 years of education and a PMH remarkable for right frontal intracerebral hemorrhage who was referred for a neuropsychological evaluation in the setting of memory difficulties and apathy.       Problem List Items  "Addressed This Visit        Neuro    Cognitive complaints    Current Assessment & Plan     Scheduled to complete cognitive testing. Full report to follow.          CVA (cerebral vascular accident)    History of cerebral hemorrhage - Primary      Thank you for allowing us to assist in Mr. Josue Mir's care. If you have any questions, please contact us at [phone].    Wero Ibrahim, PhD  Clinical Neuropsychology Fellow  [facility] - Department of Neurology      Andree Davis, PhD  Licensed Clinical Neuropsychologist  [facility]- Department of Neurology    CLINICAL INTERVIEW & RECORD REVIEW     Cognitive Functioning   Previous evaluation(s): None  Onset & course of difficulty: Cognitively, Mr. Mir endorsed some increased difficulties with multitasking and a slight decrease in mental speed persistent since experiencing a R frontal hemorrhage in 2018. Severity has stayed the same since onset. While these changes were broadly characterized as mild to moderate, he stated that cognitive problems since his stroke were probably partially responsible for him being let go from his previous job (i.e., due to under-performance of job expectations). He denied noticing any other cognitive changes.   Fluctuations: None  Severity of changes: "Definitely not severe, mild to moderate"  Examples:   Attention/Working Memory/Executive Functioning: Multitasking is more challenging now (e.g., must complete one task before beginning a new task; "has not always been that way"); He reported these problems are most evident when working with computers and performing technical tasks.   Processing Speed: Reduced speed ("probably not as quick as it was before"); does not have specific examples  Language: No problems  Visuospatial: No problems  Learning & Memory: No problems  Exacerbating factors: None  Medication for cognition: None   Attempts to stay cognitively active: None    Wife's name: Mallorie -- consented " "for us to talk to her [phone] (not sure about good time, she's not working). Have tired to contact several times.     Psychiatric/Neuropsychiatric Symptoms  Onset & course of difficulty: Emotional changes emerged after 2018 stroke and have remained stable since onset.   Mood: Current mood: "pretty easy, no stress right now"  Typical mood: Same as above  Depression: Some low mood related to "letting family down" by lacking motivation to do household chores, yardwork, etc.   Apathy: Decreased drive, decreased ambition, less outgoing, less caring about health since CVA.   Anxiety: Endorsed worrying about family "putting extra stress on wife and kids" because of things that he might not be doing.  Stress: "Not right now;" but endorsed financial stress during pandemic (wife not working currently)  Neurovegetative Sxs:  Appetite: WNL, no changes  Sleep: JASPAL (uses CPAP approx. 2 days a week); denies noticing improvements from CPAP; increased sleeping since CVA.   Energy: Fatigue  Hallucinations: None  Delusional/Paranoid Thinking: None  Impulsive/Compulsive Behaviors: None  Disinhibition: None  Irritability/Agitation: "More impatient" than before   Aggression: None    Physical Functioning  Motor/Gait: Left hemiparesis (has improved); denied ongoing issues ("I didn't even really notice that")  Sensory: No change in hearing or vision; no contact or glasses  Pain: Not on a daily basis, just gout flare-ups (has medicine he takes daily which keeps it at bay)  Physical Exercise Routine: No    Daily Functioning (I/ADLs)  ADLs: Independent and without difficulty  IADLs:  Finances: Some assist (25-50%) -- Wife has taken over more of the financial management duties since 2018 CVA, though he feels he could manage independently if needed  Medication Mgmt: Independent but misses doses of blood pressure sometimes due to expense of medication   Driving: Independent and without difficulty  Household Mgmt: Independent, but less " "initiative to take care of things when   Cooking/Meal Preparation: Does not participate in this activity (wife always done this)  Appointment Mgmt: Independent and without difficulty  Employment: Independent and without difficulty (cog and emotional changes have not interfered) - "maybe a little bit of problem with initiative/lack of drive"    RELEVANT HISTORY  This patient has a past medical history of Anticoagulant long-term use, Atrial fibrillation, Gout, Gout, joint, Hypertension, Memory deficit following cerebral infarction, Pneumonia (04/2018), Sleep apnea with use of continuous positive airway pressure (CPAP), Stroke (04/2018), and Syncope and collapse.    Past Surgical History:   Procedure Laterality Date    INSERTION OF IMPLANTABLE LOOP RECORDER  10/25/2018    Procedure: Insertion, Implantable Loop Recorder;  Surgeon: Justin Colby MD;  Location: [facility] CATH;  Service: Cardiology;;    LAPAROSCOPIC CHOLECYSTECTOMY N/A 12/14/2018    Procedure: CHOLECYSTECTOMY, LAPAROSCOPIC;  Surgeon: Beck Love MD;  Location: [facility] OR;  Service: General;  Laterality: N/A;  Dr. Love requested case to start at 1:00pm    NASAL POLYP EXCISION      TREATMENT OF CARDIAC ARRHYTHMIA N/A 10/25/2018    Procedure: CARDIOVERSION OR DEFIBRILLATION;  Surgeon: Justin Colby MD;  [facility]ATH;  Service: Cardiology;  Laterality: N/A;     Recent ED visits: None  History of UTIs: None in records    Neurological History   Headaches/Migraines: None  TBI: Hit head and brief LOC around 2015, at work and passed out and fell and hit back of head, taken to hospital, lost sense of smell after this incident. Unsure if diagnosed with anything.  Seizures: No  Stroke: "Large R frontal intracerebral hematoma in April 2018 affecting the caudate with an intraventricular bleed concerning for an aneurysm" - 11/7/18 Cardiology Note  Tumor: None  Previous Episodes of Delirium: None  Movement Disorder: None  CNS Infection: " "None    Neurodiagnostics  CT Head - 4/6/18:  IMPRESSION:   There is a large acute deep right frontal intracerebral hematoma with intraventricular bleed, to the lower 4th ventricle. Minor differential would include lateral ventricular striate hypertensive hemorrhage versus intra-axial rupture of right middle cerebral artery trifurcation aneurysm.    CTA Head 4/7/2018 - No large artery atherosclerosis or occlusion    Pertinent Lab Work  No results found for: [id number]  No results found for: RPR  No results found for: FOLATE  Lab Results   Component Value Date    TSH 0.223 (L) 03/16/2020     Lab Results   Component Value Date    HGBA1C 5.4 03/16/2020     No results found for: HIV1X2, [id number]      Medications    Current Outpatient Medications:     allopurinoL (ZYLOPRIM) 300 MG tablet, Take 1 tablet (300 mg total) by mouth once daily., Disp: 30 tablet, Rfl: 3    amLODIPine (NORVASC) 10 MG tablet, Take 1 tablet (10 mg total) by mouth once daily., Disp: 90 tablet, Rfl: 2    atorvastatin (LIPITOR) 40 MG tablet, Take 1 tablet (40 mg total) by mouth every evening., Disp: 90 tablet, Rfl: 2    carvediloL (COREG) 6.25 MG tablet, Take 1 tablet (6.25 mg total) by mouth 2 (two) times daily with meals., Disp: 90 tablet, Rfl: 2    dabigatran etexilate (PRADAXA) 150 mg Cap, Take 1 capsule (150 mg total) by mouth 2 (two) times daily. "Do NOT break, chew, or open capsules.", Disp: 120 capsule, Rfl: 3    dronedarone (MULTAQ) 400 mg Tab, Take 1 tablet (400 mg total) by mouth 2 (two) times daily with meals., Disp: 90 tablet, Rfl: 2    indomethacin (INDOCIN) 50 MG capsule, Take 1 capsule (50 mg total) by mouth 3 (three) times daily with meals., Disp: 30 capsule, Rfl: 2    lisinopril (PRINIVIL,ZESTRIL) 40 MG tablet, Take 40 mg by mouth once daily., Disp: , Rfl:       Psychiatric History  Prior Diagnoses: No  History of Trauma/Abuse: No  History of Suicide Attempts: No  Current Ideation, Intention, or Plan: No  Homicidal Ideation: " "No  Medication(s): No  Hospitalization(s): No  Psychotherapy/Counseling: No    Substance Use History  Social History     Tobacco Use    Smoking status: Never Smoker    Smokeless tobacco: Never Used   Substance and Sexual Activity    Alcohol use: Yes     Comment: socially    Drug use: No    Sexual activity: Yes     Partners: Female       ETOH: Very rarely  History of abuse/overuse: No  Drug use: No    Family Neurological & Psychiatric History    Family History   Problem Relation Age of Onset    Sleep apnea Mother     Hypertension Mother     No Known Problems Brother     No Known Problems Brother     No Known Problems Brother        Neurologic: Negative for heritable risk factors.   Psychiatric: Negative for heritable risk factors.    Development   Prenatal and  development: WNL  Developmental milestones: Met on time    Education  Level Attained: Bachelor's in hospitality management  Learning/Attention/Behavior Difficulties: No  Repeated Grade(s): No  Typical Grades: As and Bs (grew up in [place])    Occupation   Service: No  Occupational Status: Works at [employer];  since 2019; previously worked for one year as  and  at another [employer] (was let go for performance issues). Performance issues came about after stroke (went back to work in July and was let go in ). Thinks it might have been related to cog and emotional issues related to stroke. This was the first time he was let go from a job.   Primary Occupation: .    Social  Family Status: , 4 children; was  once before (2 youngest kids from current marriage, other kids are step kids)  Support System: Endorsed good support from family; "doesn't have social network anymore, most of friends and rest of family are in [place] still" (got degree in [place] and stayed afterward)  Hobbies/Activities: Does not have a lot of " "hobbies right now; used to like to go to gym (before CVA would go to spin classes 5x/week)  Current Living Situation: Lives with 3 children and wife (1 child in college)  Typical Day: Spending time with family and working    OBJECTIVE:     MENTAL STATUS AND OBSERVATIONS:   Appearance: Casually dressed and adequate grooming/hygiene.   Alertness: Attentive and alert.   Orientation: O x 4  Gait: Unable to assess  Motor movements/mannerisms: Unable to assess  Vision & Hearing: Adequate for session  Speech/language: Normal in rate, rhythm, tone, and volume. No significant word finding difficulty observed. Comprehension was normal.  Mood/Affect: The patients stated mood was "pretty easy." Affect was congruent with stated mood.   Interpersonal Behavior: Rapport was quickly and easily established   Suicidality/Homicidality: Denied  Hallucinations/Delusions: None evidenced or endorsed  Thought Content & Processes:Thoughts seemed logical and goal-directed.   Insight & Judgment: Appropriate  Participation in Clinical Interview: Full    PROCEDURES/TESTS ADMINISTERED: Performed a review of pertinent medical records, reviewed limits to confidentiality, conducted a clinical interview, and explained procedures.                                 "

## 2020-10-27 ENCOUNTER — OFFICE VISIT (OUTPATIENT)
Dept: NEUROLOGY | Facility: CLINIC | Age: 45
End: 2020-10-27
Payer: COMMERCIAL

## 2020-10-27 DIAGNOSIS — Z86.79 HISTORY OF CEREBRAL HEMORRHAGE: ICD-10-CM

## 2020-10-27 DIAGNOSIS — I63.9 CEREBROVASCULAR ACCIDENT (CVA), UNSPECIFIED MECHANISM: ICD-10-CM

## 2020-10-27 DIAGNOSIS — R41.9 COGNITIVE COMPLAINTS: Primary | ICD-10-CM

## 2020-10-27 PROCEDURE — 99499 UNLISTED E&M SERVICE: CPT | Mod: 95,,, | Performed by: CLINICAL NEUROPSYCHOLOGIST

## 2020-10-27 PROCEDURE — 96116 NUBHVL XM PHYS/QHP 1ST HR: CPT | Mod: 95,,, | Performed by: CLINICAL NEUROPSYCHOLOGIST

## 2020-10-27 PROCEDURE — 99499 NO LOS: ICD-10-PCS | Mod: 95,,, | Performed by: CLINICAL NEUROPSYCHOLOGIST

## 2020-10-27 PROCEDURE — 96116 PR NEUROBEHAVIORAL STATUS EXAM BY PSYCH/PHYS: ICD-10-PCS | Mod: 95,,, | Performed by: CLINICAL NEUROPSYCHOLOGIST

## 2020-11-02 ENCOUNTER — TELEPHONE (OUTPATIENT)
Dept: NEUROLOGY | Facility: CLINIC | Age: 45
End: 2020-11-02

## 2020-11-02 NOTE — TELEPHONE ENCOUNTER
Called pt's wife for collateral interview at number given by pt. No answer. Unable to leave message.

## 2020-11-03 ENCOUNTER — TELEPHONE (OUTPATIENT)
Dept: NEUROLOGY | Facility: CLINIC | Age: 45
End: 2020-11-03

## 2020-11-03 ENCOUNTER — INITIAL CONSULT (OUTPATIENT)
Dept: NEUROLOGY | Facility: CLINIC | Age: 45
End: 2020-11-03
Payer: COMMERCIAL

## 2020-11-03 DIAGNOSIS — I63.9 CEREBROVASCULAR ACCIDENT (CVA), UNSPECIFIED MECHANISM: ICD-10-CM

## 2020-11-03 DIAGNOSIS — R45.3 APATHY: ICD-10-CM

## 2020-11-03 DIAGNOSIS — F43.21 ADJUSTMENT DISORDER WITH DEPRESSED MOOD: ICD-10-CM

## 2020-11-03 DIAGNOSIS — Z86.79 HISTORY OF CEREBRAL HEMORRHAGE: ICD-10-CM

## 2020-11-03 DIAGNOSIS — R41.9 COGNITIVE COMPLAINTS: Primary | ICD-10-CM

## 2020-11-03 PROCEDURE — 96132 NRPSYC TST EVAL PHYS/QHP 1ST: CPT | Mod: S$GLB,,, | Performed by: CLINICAL NEUROPSYCHOLOGIST

## 2020-11-03 PROCEDURE — 99999 PR PBB SHADOW E&M-EST. PATIENT-LVL I: ICD-10-PCS | Mod: PBBFAC,,, | Performed by: CLINICAL NEUROPSYCHOLOGIST

## 2020-11-03 PROCEDURE — 96133 PR NEUROPSYCHOLOGIC TEST EVAL SVCS, EA ADDTL HR: ICD-10-PCS | Mod: S$GLB,,, | Performed by: CLINICAL NEUROPSYCHOLOGIST

## 2020-11-03 PROCEDURE — 96133 NRPSYC TST EVAL PHYS/QHP EA: CPT | Mod: S$GLB,,, | Performed by: CLINICAL NEUROPSYCHOLOGIST

## 2020-11-03 PROCEDURE — 96132 PR NEUROPSYCHOLOGIC TEST EVAL SVCS, 1ST HR: ICD-10-PCS | Mod: S$GLB,,, | Performed by: CLINICAL NEUROPSYCHOLOGIST

## 2020-11-03 PROCEDURE — 96139 PSYCL/NRPSYC TST TECH EA: CPT | Mod: S$GLB,,, | Performed by: CLINICAL NEUROPSYCHOLOGIST

## 2020-11-03 PROCEDURE — 96138 PSYCL/NRPSYC TECH 1ST: CPT | Mod: S$GLB,,, | Performed by: CLINICAL NEUROPSYCHOLOGIST

## 2020-11-03 PROCEDURE — 99999 PR PBB SHADOW E&M-EST. PATIENT-LVL I: CPT | Mod: PBBFAC,,, | Performed by: CLINICAL NEUROPSYCHOLOGIST

## 2020-11-03 PROCEDURE — 96139 PR PSYCH/NEUROPSYCH TEST ADMIN/SCORING, BY TECH, 2+ TESTS, EA ADDTL 30 MIN: ICD-10-PCS | Mod: S$GLB,,, | Performed by: CLINICAL NEUROPSYCHOLOGIST

## 2020-11-03 PROCEDURE — 99499 NO LOS: ICD-10-PCS | Mod: S$GLB,,, | Performed by: CLINICAL NEUROPSYCHOLOGIST

## 2020-11-03 PROCEDURE — 99499 UNLISTED E&M SERVICE: CPT | Mod: S$GLB,,, | Performed by: CLINICAL NEUROPSYCHOLOGIST

## 2020-11-03 PROCEDURE — 96138 PR PSYCH/NEUROPSYCH TEST ADMIN/SCORING, BY TECH, 2+ TESTS, 1ST 30 MIN: ICD-10-PCS | Mod: S$GLB,,, | Performed by: CLINICAL NEUROPSYCHOLOGIST

## 2020-11-06 NOTE — PROGRESS NOTES
NEUROPSYCHOLOGICAL EVALUATION - CONFIDENTIAL    Referring Provider: Chris Chew MD   Medical Necessity: Evaluate cognitive functioning, participate in treatment planning/management, and provide supportive therapy in the setting of memory problems, apathy, and reported history of intracerebral hemorrhage.   Date Conducted:  10/27/2020 & 11/3/2020  Present At Visit: Patient  Billing: See table at end of report   Referral Diagnoses: Z86.79 (ICD-10-CM) - History of cerebral hemorrhage  Consent: The patient expressed an understanding of the purpose of the evaluation and consented to all procedures, including providing consent for Wero Ibrahim, PhD, a clinical neuropsychology fellow under the direct supervision of Dr. Andree Davis, to be involved in his care. We discussed the limits of confidentiality and discussed an emergency plan.     ASSESSMENT & PLAN:     Mr. Josue Mir is an 44 y.o., male with 16 years of education and a PMH remarkable for right frontal intracerebral hemorrhage who was referred for a neuropsychological evaluation in the setting of memory difficulties and apathy.       Problem List Items Addressed This Visit        Neuro    Cognitive complaints - Primary    Current Assessment & Plan     Results from cognitive testing revealed reduced processing speed and slightly reduced executive functioning (including slightly reduced learning, verbal fluency, and novel problem-solving). 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 Specifically regarding his psychological distress, the patient is reporting a significant increase in apathy since the hemorrhage as well as an increase in depressive symptomology. While some degree of apathy could be resulting organically, meaning from the neurological insult itself, it may be also be  related to his untreated depression. Thus, it's possible that with adequate treatment of psychological symptoms (and more consistent treatment of sleep apnea), his cognitive difficulties could improve. A neurocognitive diagnosis is therefore deferred at the present time and the patient should return for re-evaluation once depression is better controlled. The following recommendations are offered:     Mood Management - This patient may benefit from beginning psychotherapy in order to address his symptoms of decreased drive/motivation and related low mood. He is also encouraged to discuss with his prescribing physicians the possibility of starting a trial of an SSRI to help manage mood symptoms.      Fighting Apathy -  Apathy can have several negative consequences, including reduced community and social participation, daily functioning, cognitive activity, treatment compliance, and general health. It can also increase stress and burden in spouses and caregivers. Remaining active is critical to fight against apathy. A handout will be provided to the patient with specifics details of how to combat apathy.     CPAP Compliance - Untreated or partially treated sleep apnea can impact cognition. Thus, it is important that this patient maintain as close to 100% compliance with his CPAP usage. We recommend returning to sleep medicine to see if any adjustments to his machine need to be made to improve its functioning.     Sleep Hygiene - The patient is encouraged to follow these guidelines in an effort to help consolidate his sleep:   · Establish a quiet, relaxing bedtime routine to help you wind down before going to bed  · Go to bed at the same time every night  · Wake up at the same time every morning, even on the weekends and holidays  · Put clocks in the bedroom out of sight, as clock-watching causes stress and makes it harder to get back to sleep upon waking up during the night  · Avoid caffeine, nicotine, and alcohol  before bedtime, as these substances can interfere with sleep  · Avoid exercising too close to bedtime, as this raises body temperature which may interfere with sleep  · Stretching and meditation before sleep may help with sleep  · Avoid eating heavy meals prior to sleep to allow time for digestion, but also avoid going to bed hungry as this can also interrupt sleep  · Avoid napping for more than 30 minutes, as this may throw off the sleep cycle  · Get out of bed if cannot fall asleep within 15-20 minutes of waking up and reading in another room or engaging in some other activity during this time until feeling sleepy  · Watching TV and using computers during this time is discouraged as it may unintentionally lead to staying up past the point of feeling tired. Likewise, using blue-light emitting devices (e.g., TV, computer) before bedtime can negatively affect sleep due to disruption of circadian rhythms, which causes reduced nighttime sleepiness, increased time to fall asleep, and reduced next-morning alertness.    Exercise regularly - Exercise has many known benefits, and it appears that regular physical activity benefits the brain. Multiple research studies show that people who are physically active are less likely to experience a decline in their mental function and have a lower risk of developing Alzheimers disease. We believe these benefits are a result of increased blood flow to your brain during exercise. It also tends to counter some of the natural reduction in brain connections that occur during aging, in effect reversing some of the problems. Aim to exercise several times per week for 30-60 minutes. You can walk, swim, play tennis or any other moderate aerobic activity that increases your heart rate.    Stay mentally active - Your brain is similar to a muscle -- you need to use it or you lose it. There are many things that you can do to keep your brain in shape, such as doing crossword puzzles or Sudoku,  reading, playing cards or putting together a jigsaw puzzle. Consider it cross-training your brain. So incorporate different activities to increase the effectiveness.     Stay socially involved - Social interaction helps ahmadi off depression and stress, both of which can contribute to memory loss. Look for opportunities to connect with loved ones, friends and others, especially if you live alone. There is research that links solitary confinement to brain atrophy, so remaining socially active may have the opposite effect and strengthen the health of your brain.    Eat a Mediterranean diet - Your diet plays a large role in your brain health. Consider following a Mediterranean diet, which emphasizes plant-based foods, whole grains, fish and healthy fats, such as olive oil. It incorporates much less red meat and salt than a typical American diet. Studies show people who closely follow a Mediterranean diet are less likely to have Alzheimer's disease than people who don't follow the diet. Omega fatty acids found in extra-virgin olive oil and other healthy fats are vital for your cells to function correctly, appear to decrease your risk of coronary artery disease, and increase mental focus and slow cognitive decline in older adults.       Strategies to Enhance Processing Speed  Medically, a few things are important:   Get adequate sleep each night (review sleep handout if that is appropriate)   Speak with your doctor to make sure you are not on medication that can slow your thinking down.   Get adequate exercise and nutrition as you are able.   Optimally manage any pain that you have. If it is applicable, then speak with your doctor about non-pharmacological methods of reducing pain (e.g., physical therapy, acupuncture, psychotherapy) as pain medications can slow your thinking.   Many neurological disorders impact our thinking speed  Cognitively, we recommend the following:   One central strategy in managing fatigue is  the idea of pacing activities and taking planned breaks for rest, even if you dont feel you need them. Taking just 15-20 minutes to break can be helpful in improving or maintaining cognitive speed.   Further, pacing (How long can I keep going?) is heavily dependent on the individual, particular task (e.g., typing for a long time versus reading a book), symptom variation (e.g., Am I in a lot of pain now?), and time of day.   o Being aware of this will help to set realistic deadlines, adequately communicate with faculty or supervisors about your needs, and help you to see that not all tasks are created equal as you will be able to do some quicker than others.     Get organized and group tasks together in a logical order.   o Set up a daily to-do list and prioritize tasks. This can help you remain focused and minimize feeling distracted, which can drain your speed.  o Break tasks down into smaller parts. If you start a task without planning ahead, then you might take longer because you are having to redo certain tasks/steps.   Get a timer and allow yourself only so much time on a task. Having specific amount of time to do something can provide a great deal of focus and improve speed.     Strategies to Enhance Memory Functioning  Attention: Remember that inattention and lack of focus are major culprits to forgetting information so be sure and practice paying attention for adequate learning of information. Patients will rely on passive attention to remembering something (e.g., francy, uh-huh approach) and find they cannot recall it earlier. We recommend the following to improve attention, which may aid in later recall:   · Look at the person as they are speaking to you.   · Paraphrase as they are speaking  · Write down important pieces of information   · Ask them to repeat if you zone out.   · Have them simplify and reduce information that you need to attend to during conversation.   · Have visual cues to  remind you if you need to do something later.    Absorbing Information: Use the below strategies to help you enhance the initial aspects of learning something for later recall.  · Simplify - Use easier words and shorter sentences. Break down things into steps.  · Restate - Put information into your own words.  Does it make sense? This allows you and others to test for understanding.  · Link - If possible, associate new information with something you already know.  · Organize - Group items into meaningful categories.  You can organize by time, location, color, shape, size, function, and even age.  Be creative.   · Break it up - Dont try to take in too much at one time. Concentrate for a few minutes, then move on to something else. You may learn more in short sessions than one long one.  · Mnemonics (pronounced noo-mon-ics) are strategies whereby you form acronyms ( = Cereal, Oranges, Pizza) that stand for something. This may be useful at times when you need a cue or prompt to help you remember something. Word associations can also be helpful (Sandra works in the [facility]).    Storing Information: Use the below strategies to help you further enhance how information is stored.  · Rehearse - Immediately after seeing/hearing something, try to recall it.  Wait a few minutes, then check again.  Gradually lengthen the intervals between rehearsals.  · Repetition of learned material is critical to ensure storage of information to be learned. self-test at home to ensure learning. Just because something was remembered once or initially doesnt mean you will remember it after you have learned something new in the same study session. So, periodically re-quiz multiple times in a study session.  · Write down as you are being spoken to so as to improve your attention and focus along with having something to look back on when you need to recall it  · Make sure the person doesnt rattle off, but presents in a clear,  logical, and unhurried manner. If this accommodation is too much for a boss or professor, record them for later listening.    Recalling Information: Use the below strategies to help you enhance your recall.  · Jog your memory - Lose something?  Think back to when you last had it.  What did you do next?  And after that?  Mentally walk yourself through each activity that followed.  Prodding your memory this way may enable you to recall the location of the missing item.  · Use a cue -Memos, timers, calendar notes, etc. can help to trigger your memory --keep them in visible, appropriate places.  · Get organized - Have fixed locations for all important papers, key phone numbers, medications, keys, wallet, glasses, tools, etc. This reduces cognitive demands for remembering routine things and allows for putting forth more cognitive energy when needing to learn more complex/difficult things.  · Develop routines to allow for more automatic recall (e.g., routines rely less on short-term memory) of important tasks. For example, have a regimented routine of when you take your medications and follow that each day until it becomes a routine or habit.    Re-evaluation - A re-evaluation in 12 months to reassess cognitive functioning. He is welcome to return at a sooner time if he or his providers would find it beneficial.          CVA (cerebral vascular accident)    History of cerebral hemorrhage       Psychiatric    Adjustment disorder with depressed mood    Apathy      Thank you for allowing us to assist in Mr. Josue Mir's care. If you have any questions, please contact us at [phone].    Wero Ibrahim, PhD  Clinical Neuropsychology Fellow  [facility] - Department of Neurology      Andree Davis, PhD  Licensed Clinical Neuropsychologist  [facility]- Department of Neurology    CLINICAL INTERVIEW & RECORD REVIEW     Cognitive Functioning   Previous evaluation(s): None  Onset & course of difficulty:  "Cognitively, Mr. Mir endorsed some increased difficulties with multitasking and a slight decrease in mental speed persistent since experiencing a R frontal hemorrhage in 2018. Severity has stayed the same since onset. While these changes were broadly characterized as mild to moderate, he stated that cognitive problems since his stroke were probably partially responsible for him being let go from his previous job (i.e., due to under-performance of job expectations). Several unsuccessful attempts were made to contact the patient's wife, Mallorie for collateral report ([phone]).   Fluctuations: None  Severity of changes: "Definitely not severe, mild to moderate"  Examples:   Attention/Working Memory/Executive Functioning: Multitasking is more challenging now (e.g., must complete one task before beginning a new task; "has not always been that way"); he reported these problems are most evident when working with computers and performing technical tasks.   Processing Speed: Reduced speed ("probably not as quick as it was before"); does not have specific examples  Language: No problems  Visuospatial: No problems  Learning & Memory: No problems  Exacerbating factors: None  Medication for cognition: None   Attempts to stay cognitively active: None    Psychiatric/Neuropsychiatric Symptoms  Onset & course of difficulty: Emotional changes emerged after 2018 stroke and have remained stable since onset.   Mood: Current mood: "pretty easy, no stress right now"  Typical mood: Same as above  Depression: Some low mood related to "letting family down" by lacking motivation to do household chores, yardwork, etc.   Apathy: Decreased drive, decreased ambition, less outgoing, less caring about health since CVA.   Anxiety: Endorsed worrying about family "putting extra stress on wife and kids" because of things that he might not be doing.  Stress: "Not right now;" but endorsed financial stress during pandemic (wife not working " "currently)  Neurovegetative Sxs:  Appetite: WNL, no changes  Sleep: JASPAL (uses CPAP approx. 2 days a week); denies noticing improvements from CPAP; increased sleeping since CVA.   Energy: Fatigue  Hallucinations: None  Delusional/Paranoid Thinking: None  Impulsive/Compulsive Behaviors: None  Disinhibition: None  Irritability/Agitation: "More impatient" than before   Aggression: None    Physical Functioning  Motor/Gait: Left hemiparesis (has improved); denied ongoing issues ("I didn't even really notice that")  Sensory: No change in hearing or vision; no contact or glasses  Pain: Not on a daily basis, just gout flare-ups (has medicine he takes daily which keeps it at bay)  Physical Exercise Routine: No    Daily Functioning (I/ADLs)  ADLs: Independent and without difficulty  IADLs:  Finances: Some assist (25-50%) - Wife has taken over more of the financial management duties since 2018 CVA, though he feels he could manage independently if needed  Medication Mgmt: Independent but misses doses of blood pressure sometimes due to expense of medication   Driving: Independent and without difficulty  Household Mgmt: Independent, but less initiative to take care of things when   Cooking/Meal Preparation: Does not participate in this activity (wife always done this)  Appointment Mgmt: Independent and without difficulty  Employment: Independent and without difficulty (cognitive and emotional changes have not interfered) - "maybe a little bit of problem with initiative/lack of drive"    RELEVANT HISTORY  This patient has a past medical history of Anticoagulant long-term use, Atrial fibrillation, Gout, Gout, joint, Hypertension, Memory deficit following cerebral infarction, Pneumonia (04/2018), Sleep apnea with use of continuous positive airway pressure (CPAP), Stroke (04/2018), and Syncope and collapse.    Past Surgical History:   Procedure Laterality Date    INSERTION OF IMPLANTABLE LOOP RECORDER  10/25/2018    Procedure: " "Insertion, Implantable Loop Recorder;  Surgeon: Justin Colby MD;  Location: [facility] CATH;  Service: Cardiology;;    LAPAROSCOPIC CHOLECYSTECTOMY N/A 12/14/2018    Procedure: CHOLECYSTECTOMY, LAPAROSCOPIC;  Surgeon: Beck Love MD;  Location: [facility] OR;  Service: General;  Laterality: N/A;  Dr. Love requested case to start at 1:00pm    NASAL POLYP EXCISION      TREATMENT OF CARDIAC ARRHYTHMIA N/A 10/25/2018    Procedure: CARDIOVERSION OR DEFIBRILLATION;  Surgeon: Justin Colby MD;  Location: [facility] CATH;  Service: Cardiology;  Laterality: N/A;       Recent ED visits: None  History of UTIs: None in records    Neurological History   Headaches/Migraines: None  TBI: Hit head and brief LOC around 2015, at work and passed out and fell and hit back of head, taken to hospital, lost sense of smell after this incident. Unsure if diagnosed with anything. No cognitive sequelae  Seizures: No  Stroke: "Large R frontal intracerebral hematoma in April 2018 affecting the caudate with an intraventricular bleed concerning for an aneurysm" - 11/7/18 Cardiology Note  Tumor: None  Previous Episodes of Delirium: None  Movement Disorder: None  CNS Infection: None    Neurodiagnostics  CT Head - 4/6/18:  IMPRESSION:   There is a large acute deep right frontal intracerebral hematoma with intraventricular bleed, to the lower 4th ventricle. Minor differential would include lateral ventricular striate hypertensive hemorrhage versus intra-axial rupture of right middle cerebral artery trifurcation aneurysm.    CTA Head 4/7/2018 - No large artery atherosclerosis or occlusion    Pertinent Lab Work  No results found for: [id number]  No results found for: RPR  No results found for: FOLATE  Lab Results   Component Value Date    TSH 0.223 (L) 03/16/2020     Lab Results   Component Value Date    HGBA1C 5.4 03/16/2020     No results found for: HIV1X2, [id number]      Medications    Current Outpatient Medications:     allopurinoL (ZYLOPRIM) 300 MG " "tablet, Take 1 tablet (300 mg total) by mouth once daily., Disp: 30 tablet, Rfl: 3    amLODIPine (NORVASC) 10 MG tablet, Take 1 tablet (10 mg total) by mouth once daily., Disp: 90 tablet, Rfl: 2    atorvastatin (LIPITOR) 40 MG tablet, Take 1 tablet (40 mg total) by mouth every evening., Disp: 90 tablet, Rfl: 2    carvediloL (COREG) 6.25 MG tablet, Take 1 tablet (6.25 mg total) by mouth 2 (two) times daily with meals., Disp: 90 tablet, Rfl: 2    dabigatran etexilate (PRADAXA) 150 mg Cap, Take 1 capsule (150 mg total) by mouth 2 (two) times daily. "Do NOT break, chew, or open capsules.", Disp: 120 capsule, Rfl: 3    dronedarone (MULTAQ) 400 mg Tab, Take 1 tablet (400 mg total) by mouth 2 (two) times daily with meals., Disp: 90 tablet, Rfl: 2    indomethacin (INDOCIN) 50 MG capsule, Take 1 capsule (50 mg total) by mouth 3 (three) times daily with meals., Disp: 30 capsule, Rfl: 2    lisinopril (PRINIVIL,ZESTRIL) 40 MG tablet, Take 40 mg by mouth once daily., Disp: , Rfl:       Psychiatric History  Prior Diagnoses: No  History of Trauma/Abuse: No  History of Suicide Attempts: No  Current Ideation, Intention, or Plan: No  Homicidal Ideation: No  Medication(s): No  Hospitalization(s): No  Psychotherapy/Counseling: No    Substance Use History  Social History     Tobacco Use    Smoking status: Never Smoker    Smokeless tobacco: Never Used   Substance and Sexual Activity    Alcohol use: Yes     Comment: socially    Drug use: No    Sexual activity: Yes     Partners: Female       ETOH: Very rarely  History of abuse/overuse: No    Family Neurological & Psychiatric History    Family History   Problem Relation Age of Onset    Sleep apnea Mother     Hypertension Mother     No Known Problems Brother     No Known Problems Brother     No Known Problems Brother        Neurologic: Negative for heritable risk factors.   Psychiatric: Negative for heritable risk factors.    Development   Prenatal and  development: " "WNL  Developmental milestones: Met on time    Education  Level Attained: Bachelor's in hospitality management  Learning/Attention/Behavior Difficulties: No  Repeated Grade(s): No  Typical Grades: As and Bs (grew up in [place])    Occupation   Service: No  Occupational Status: Works at [employer];  since March of 2019; previously worked for one year as  and  at another [employer] (was let go for performance issues). Performance issues came about after stroke (went back to work in July and was let go in Feb of 2019). Thinks it might have been related to cog and emotional issues related to stroke. This was the first time he was let go from a job.   Primary Occupation: .    Social  Family Status: , 4 children; was  once before (2 youngest kids from current marriage, other kids are step kids)  Support System: Endorsed good support from family; "doesn't have social network anymore, most of friends and rest of family are in [place] still" (got degree in [place] and stayed afterward)  Hobbies/Activities: Does not have a lot of hobbies right now; used to like to go to gym (before CVA would go to spin classes 5x/week)  Current Living Situation: Lives with 3 children and wife (1 child in college)  Typical Day: Spending time with family and working    OBJECTIVE:     MENTAL STATUS AND OBSERVATIONS:   Appearance: Casually dressed and adequate grooming/hygiene.   Alertness: Attentive and alert.   Orientation: O x 4  Gait: No abnormalities observed  Motor movements/mannerisms: No abnormalities observed  Handedness: Left   Vision & Hearing: Adequate for session  Speech/language: Normal in rate, rhythm, tone, and volume. No significant word finding difficulty observed. Comprehension was normal.  Mood/Affect: Mood was euthymic with congruent affect.  Interpersonal Behavior: Rapport was quickly and easily established "   Suicidality/Homicidality: Denied  Hallucinations/Delusions: None evidenced or endorsed  Thought Content & Processes:Thoughts seemed logical and goal-directed.   Insight & Judgment: Appropriate  Participation in Clinical Interview: Full    PROCEDURES/TESTS ADMINISTERED: In addition to performing a review of pertinent medical records, reviewing limits to confidentiality, conducting a clinical interview, and explaining procedures, the following measures were administered: Performance Validity Measures; Test of Premorbid Functioning (TOPF); Orientation Questions; Wechsler Adult Intelligence Scale, Fourth Edition (WAIS-IV) [Digit Span, Symbol Search, Coding subtests]; Wechsler Memory Scale, Fourth Edition (WMS-IV), selected subtests; Brief Visuospatial Memory Test-Revised (BVMT-R, form 1); Neuropsychological Assessment Battery (NAB), [Naming subtest]; Verbal fluency tests (FAS & animal naming; John et al., 2004 norms); Juarez Verbal Learning Test, Revised (HVLT-R; Form 1); Edenilson Complex Figure Test - Copy Trial (RCFT-Copy); Trail Making Test, parts A and B (John et al., 2004 norms); [place] Cart Sorting Test-64 (WCST-64); Grooved Pegboard Test (GPT); Frontal Systems Behavior Scale - Self-Rating Form (FrSBe); Lockwood Depression Inventory, Second Edition (BDI-2); Generalized Anxiety Disorder-7 (EDGARD-7). Manual norms were used unless otherwise indicated.    TEST TAKING BEHAVIOR AND VALIDITY: The patient worked at an average pace. He was fully cooperative with the examiner and appeared to put forth his best effort consistently throughout testing. Scores on stand-alone and embedded performance validity measures were within normal limits. Therefore, the current results are considered to be an accurate reflection of his current neurocognitive functioning.     TEST RESULTS            Raw Score Type of Standardized Score Standardized Score Percentile/CP Descriptor   MSVT  - - - -   MSVT  - - - -   MSVT Cons 100 - -  - -   ACS LM II Rec 27 - - - -   ACS RDS 11 - - - -   HVLT-R Recognition Discrimination 10 - - - -   BVMT-R Recognition Discrimination 6 - - - -   PREMORBID FUNCTIONING Raw Score Type of Standardized Score Standardized Score Percentile/CP Descriptor   TOPF simple dem. eFSIQ -  77 High Average   TOPF pred. eFSIQ -  75 High Average   TOPF simple + pred. eFSIQ -  77 High Average   LANGUAGE FUNCTIONING Raw Score Type of Standardized Score Standardized Score Percentile/CP Descriptor   TOPF Word Reading 56  81 High Average   NAB Naming 30 Tscore 51 54 Average   FAS 33 Tscore 37 9 Low Average   Animal Naming 18 Tscore 39 14 Low Average   VISUOSPATIAL FUNCTIONING Raw Score Type of Standardized Score Standardized Score Percentile/CP Descriptor   WAIS-IV Block Design 39 ss 9 37 Average   RCFT Copy 29.5 - - <1 Extremely Low   RCFT Time to Copy 179 - - >16 WNL   BVMT-R Copy 12 - - - -   LEARNING & MEMORY Raw Score Type of Standardized Score Standardized Score Percentile/CP Descriptor   HVLT-R         Total Immediate 21 Tscore 32 4 Below Average   Delayed Recall 6 Tscore 30 2 Below Average   Retention % 86 Tscore 46 34 Average   Hits 10 - - - -   False Positives 0 - - - -   Discrimination  10 Tscore 39 14 Low Average   WMS-IV Subtests         LM I 30 ss 12 75 High Average   LM II 25 ss 11 63 Average   LM Recognition 27 - - >75 High Average    BVMT-R         IR 22 Tscore 43 24 Low Average   DR 11 Tscore 58 79 High Average   Discrimination Index 6 - - >16 WNL   ATTENTION/WORKING MEMORY Raw Score Type of Standardized Score Standardized Score Percentile/CP Descriptor   WAIS-IV Digit Span 28 ss 10 50 Average         DS Forward 12 ss 12 75 High Average         DS Backward 10 ss 11 63 Average         DS Sequence 6 ss 7 16 Low Average         Longest Digit Forward 8 - - - -         Longest Digit Backward 5 - - - -         Longest Digit Sequence 4 - - - -   MENTAL PROCESSING SPEED Raw Score Type of Standardized  Score Standardized Score Percentile/CP Descriptor   WAIS-IV PSI - SS 89 23 Low Average   WAIS-IV Symbol Search 24 ss 7 16 Low Average   WAIS-IV Coding 65 ss 9 37 Average   TMT A  35 Tscore 36 8 Below Average   TMT A errors 0 - - - -   EXECUTIVE FUNCTIONING Raw Score Type of Standardized Score Standardized Score Percentile/CP Descriptor   TMT B 55 Tscore 51 54 Average   TMT B errors 0 - - - -   WCST-64         Total Correct 47 SS - - -   Total Errors 17 SS 90 25 Average   Perseverative Resp. 17 SS 74 4 Below Average   Perseverative Err. 12 SS 78 7 Below Average   Nonperseverative Err. 5 SS 98 45 Average   Concept. Level Response 41 SS 84 14 Low Average   Categories Completed 4 - - >16 WNL   FMS 10 - - >16 WNL   Learning to Learn -13.64 - - 6-10 Below Average   FrSBE: Before         Total  112 Tscore 86 99 Exceptionally High   Apathy 32 Tscore 72 99 Exceptionally High   Disinhibition 35 Tscore 73 99 Exceptionally High   Executive Dysfunction 45 Tscore 86 99 Exceptionally High   FrSBE: After         Total  127 Tscore 102 99 Exceptionally High   Apathy 45 Tscore 103 99 Exceptionally High   Disinhibition 32 Tscore 66 95 Above Average   Executive Dysfunction 50 Tscore 96 99 Exceptionally High   FRONTOMOTOR  Raw Score Type of Standardized Score Standardized Score Percentile/CP Descriptor   GPT DH 64 Tscore 48 42 Average   GPD NDH 81 Tscore 40 16 Low Average   MOOD & PERSONALITY Raw Score Type of Standardized Score Standardized Score Percentile/CP Descriptor   BDI-2 17 - - - Mild   EDGARD-7 2 - - - WNL   ss = scaled score (mean = 10, SD = 3); SS = standard score (mean = 100, SD = 15); Tscore mean = 50, SD = 10; zscore (mean = 0.00, SD = 1)             BILLING  Service Description CPT Code Minutes Units   Psychiatric diagnostic evaluation by physician [id number] (previously billed)   Neurobehavioral status exam by physician [id number] (previously billed)   Each additional hour by physician [id number] (previously billed)   Test Evaluation  Services --  --   Neuropsychological testing evaluation services by physician [id number] [phone]   Each additional hour by physician [id number]  2   Test Administration and Scoring --  --   Psychological or neuropsychological test administration and scoring by physician [id number]  0   Each additional 30 minutes by physician [id number]  0   Psychological or neuropsychological test administration and scoring by technician [id number] [phone]   Each additional 30 minutes by technician [id number]  5

## 2020-11-10 PROBLEM — R45.3 APATHY: Status: ACTIVE | Noted: 2020-11-10

## 2020-11-10 PROBLEM — F43.21 ADJUSTMENT DISORDER WITH DEPRESSED MOOD: Status: ACTIVE | Noted: 2020-11-10

## 2020-11-10 NOTE — ASSESSMENT & PLAN NOTE
Results from cognitive testing revealed reduced processing speed and slightly reduced executive functioning (including slightly reduced learning, verbal fluency, and novel problem-solving). 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 Specifically regarding his psychological distress, the patient is reporting a significant increase in apathy since the hemorrhage as well as an increase in depressive symptomology. While some degree of apathy could be resulting organically, meaning from the neurological insult itself, it may be also be related to his untreated depression. Thus, it's possible that with adequate treatment of psychological symptoms (and more consistent treatment of sleep apnea), his cognitive difficulties could improve. A neurocognitive diagnosis is therefore deferred at the present time and the patient should return for re-evaluation once depression is better controlled. The following recommendations are offered:     Mood Management - This patient may benefit from beginning psychotherapy in order to address his symptoms of decreased drive/motivation and related low mood. He is also encouraged to discuss with his prescribing physicians the possibility of starting a trial of an SSRI to help manage mood symptoms.      Fighting Apathy -  Apathy can have several negative consequences, including reduced community and social participation, daily functioning, cognitive activity, treatment compliance, and general health. It can also increase stress and burden in spouses and caregivers. Remaining active is critical to fight against apathy. A handout will be provided to the patient with specifics details of how to combat apathy.     CPAP Compliance - Untreated or partially treated sleep apnea can impact cognition. Thus, it is important that this  patient maintain as close to 100% compliance with his CPAP usage. We recommend returning to sleep medicine to see if any adjustments to his machine need to be made to improve its functioning.     Sleep Hygiene - The patient is encouraged to follow these guidelines in an effort to help consolidate his sleep:   · Establish a quiet, relaxing bedtime routine to help you wind down before going to bed  · Go to bed at the same time every night  · Wake up at the same time every morning, even on the weekends and holidays  · Put clocks in the bedroom out of sight, as clock-watching causes stress and makes it harder to get back to sleep upon waking up during the night  · Avoid caffeine, nicotine, and alcohol before bedtime, as these substances can interfere with sleep  · Avoid exercising too close to bedtime, as this raises body temperature which may interfere with sleep  · Stretching and meditation before sleep may help with sleep  · Avoid eating heavy meals prior to sleep to allow time for digestion, but also avoid going to bed hungry as this can also interrupt sleep  · Avoid napping for more than 30 minutes, as this may throw off the sleep cycle  · Get out of bed if cannot fall asleep within 15-20 minutes of waking up and reading in another room or engaging in some other activity during this time until feeling sleepy  · Watching TV and using computers during this time is discouraged as it may unintentionally lead to staying up past the point of feeling tired. Likewise, using blue-light emitting devices (e.g., TV, computer) before bedtime can negatively affect sleep due to disruption of circadian rhythms, which causes reduced nighttime sleepiness, increased time to fall asleep, and reduced next-morning alertness.    Exercise regularly - Exercise has many known benefits, and it appears that regular physical activity benefits the brain. Multiple research studies show that people who are physically active are less likely to  experience a decline in their mental function and have a lower risk of developing Alzheimers disease. We believe these benefits are a result of increased blood flow to your brain during exercise. It also tends to counter some of the natural reduction in brain connections that occur during aging, in effect reversing some of the problems. Aim to exercise several times per week for 30-60 minutes. You can walk, swim, play tennis or any other moderate aerobic activity that increases your heart rate.    Stay mentally active - Your brain is similar to a muscle -- you need to use it or you lose it. There are many things that you can do to keep your brain in shape, such as doing crossword puzzles or Sudoku, reading, playing cards or putting together a jigsaw puzzle. Consider it cross-training your brain. So incorporate different activities to increase the effectiveness.     Stay socially involved - Social interaction helps ahmadi off depression and stress, both of which can contribute to memory loss. Look for opportunities to connect with loved ones, friends and others, especially if you live alone. There is research that links solitary confinement to brain atrophy, so remaining socially active may have the opposite effect and strengthen the health of your brain.    Eat a Mediterranean diet - Your diet plays a large role in your brain health. Consider following a Mediterranean diet, which emphasizes plant-based foods, whole grains, fish and healthy fats, such as olive oil. It incorporates much less red meat and salt than a typical American diet. Studies show people who closely follow a Mediterranean diet are less likely to have Alzheimer's disease than people who don't follow the diet. Omega fatty acids found in extra-virgin olive oil and other healthy fats are vital for your cells to function correctly, appear to decrease your risk of coronary artery disease, and increase mental focus and slow cognitive decline in older  adults.       Strategies to Enhance Processing Speed  Medically, a few things are important:   Get adequate sleep each night (review sleep handout if that is appropriate)   Speak with your doctor to make sure you are not on medication that can slow your thinking down.   Get adequate exercise and nutrition as you are able.   Optimally manage any pain that you have. If it is applicable, then speak with your doctor about non-pharmacological methods of reducing pain (e.g., physical therapy, acupuncture, psychotherapy) as pain medications can slow your thinking.   Many neurological disorders impact our thinking speed  Cognitively, we recommend the following:   One central strategy in managing fatigue is the idea of pacing activities and taking planned breaks for rest, even if you dont feel you need them. Taking just 15-20 minutes to break can be helpful in improving or maintaining cognitive speed.   Further, pacing (How long can I keep going?) is heavily dependent on the individual, particular task (e.g., typing for a long time versus reading a book), symptom variation (e.g., Am I in a lot of pain now?), and time of day.   o Being aware of this will help to set realistic deadlines, adequately communicate with faculty or supervisors about your needs, and help you to see that not all tasks are created equal as you will be able to do some quicker than others.     Get organized and group tasks together in a logical order.   o Set up a daily to-do list and prioritize tasks. This can help you remain focused and minimize feeling distracted, which can drain your speed.  o Break tasks down into smaller parts. If you start a task without planning ahead, then you might take longer because you are having to redo certain tasks/steps.   Get a timer and allow yourself only so much time on a task. Having specific amount of time to do something can provide a great deal of focus and improve speed.     Strategies to Enhance  Memory Functioning  Attention: Remember that inattention and lack of focus are major culprits to forgetting information so be sure and practice paying attention for adequate learning of information. Patients will rely on passive attention to remembering something (e.g., francy, anders-huh approach) and find they cannot recall it earlier. We recommend the following to improve attention, which may aid in later recall:   · Look at the person as they are speaking to you.   · Paraphrase as they are speaking  · Write down important pieces of information   · Ask them to repeat if you zone out.   · Have them simplify and reduce information that you need to attend to during conversation.   · Have visual cues to remind you if you need to do something later.    Absorbing Information: Use the below strategies to help you enhance the initial aspects of learning something for later recall.  · Simplify - Use easier words and shorter sentences. Break down things into steps.  · Restate - Put information into your own words.  Does it make sense? This allows you and others to test for understanding.  · Link - If possible, associate new information with something you already know.  · Organize - Group items into meaningful categories.  You can organize by time, location, color, shape, size, function, and even age.  Be creative.   · Break it up - Dont try to take in too much at one time. Concentrate for a few minutes, then move on to something else. You may learn more in short sessions than one long one.  · Mnemonics (pronounced noo-mon-ics) are strategies whereby you form acronyms ( = Cereal, Oranges, Pizza) that stand for something. This may be useful at times when you need a cue or prompt to help you remember something. Word associations can also be helpful (Sandra works in the [facility]).    Storing Information: Use the below strategies to help you further enhance how information is stored.  · Rehearse - Immediately  after seeing/hearing something, try to recall it.  Wait a few minutes, then check again.  Gradually lengthen the intervals between rehearsals.  · Repetition of learned material is critical to ensure storage of information to be learned. self-test at home to ensure learning. Just because something was remembered once or initially doesnt mean you will remember it after you have learned something new in the same study session. So, periodically re-quiz multiple times in a study session.  · Write down as you are being spoken to so as to improve your attention and focus along with having something to look back on when you need to recall it  · Make sure the person doesnt rattle off, but presents in a clear, logical, and unhurried manner. If this accommodation is too much for a boss or professor, record them for later listening.    Recalling Information: Use the below strategies to help you enhance your recall.  · Jog your memory - Lose something?  Think back to when you last had it.  What did you do next?  And after that?  Mentally walk yourself through each activity that followed.  Prodding your memory this way may enable you to recall the location of the missing item.  · Use a cue -Memos, timers, calendar notes, etc. can help to trigger your memory --keep them in visible, appropriate places.  · Get organized - Have fixed locations for all important papers, key phone numbers, medications, keys, wallet, glasses, tools, etc. This reduces cognitive demands for remembering routine things and allows for putting forth more cognitive energy when needing to learn more complex/difficult things.  · Develop routines to allow for more automatic recall (e.g., routines rely less on short-term memory) of important tasks. For example, have a regimented routine of when you take your medications and follow that each day until it becomes a routine or habit.    Re-evaluation - A re-evaluation in 12 months to reassess cognitive functioning.  He is welcome to return at a sooner time if he or his providers would find it beneficial.

## 2020-11-19 ENCOUNTER — OFFICE VISIT (OUTPATIENT)
Dept: NEUROLOGY | Facility: CLINIC | Age: 45
End: 2020-11-19
Payer: COMMERCIAL

## 2020-11-19 DIAGNOSIS — R45.3 APATHY: ICD-10-CM

## 2020-11-19 DIAGNOSIS — R41.9 COGNITIVE COMPLAINTS: ICD-10-CM

## 2020-11-19 DIAGNOSIS — F43.21 ADJUSTMENT DISORDER WITH DEPRESSED MOOD: ICD-10-CM

## 2020-11-19 PROCEDURE — 99499 UNLISTED E&M SERVICE: CPT | Mod: GT,95,, | Performed by: CLINICAL NEUROPSYCHOLOGIST

## 2020-11-19 PROCEDURE — 99499 NO LOS: ICD-10-PCS | Mod: 95,,, | Performed by: CLINICAL NEUROPSYCHOLOGIST

## 2020-11-20 NOTE — PROGRESS NOTES
NEUROPSYCHOLOGICAL EVALUATION FEEDBACK    TELEMEDICINE DETAILS:   The patient location is: home  The chief complaint leading to consultation is: feedback regarding neuropsychological test results  Visit type: Virtual visit with synchronous audio and video  Total time spent with patient: 60 minutes  Each patient to whom he or she provides medical services by telemedicine is: (1) informed of the relationship between the physician and patient and the respective role of any other health care provider with respect to management of the patient; and (2) notified that he or she may decline to receive medical services by telemedicine and may withdraw from such care at any time.  Notes: See below.    Josue Mir attended a feedback session today and was accompanied by his wife. We discussed the results of the neuropsychological evaluation and I gave time to discuss questions and concerns. For full evaluation details, please see the note from this provider dated 11/3/2020. A copy of the report was provided via [employer].     Wero Ibrahim, PhD  Clinical Neuropsychology Fellow  [facility] - Department of Neurology    Andree Davis, PhD  Licensed Clinical Neuropsychologist  [facility] - Department of Neurology

## 2021-01-05 ENCOUNTER — OFFICE VISIT (OUTPATIENT)
Dept: FAMILY MEDICINE | Facility: CLINIC | Age: 46
End: 2021-01-05
Payer: COMMERCIAL

## 2021-01-05 VITALS
SYSTOLIC BLOOD PRESSURE: 166 MMHG | BODY MASS INDEX: 37.42 KG/M2 | WEIGHT: 252.63 LBS | TEMPERATURE: 97 F | DIASTOLIC BLOOD PRESSURE: 110 MMHG | HEIGHT: 69 IN | HEART RATE: 56 BPM | OXYGEN SATURATION: 97 %

## 2021-01-05 DIAGNOSIS — R41.9 MEDICATION NONCOMPLIANCE DUE TO COGNITIVE IMPAIRMENT: ICD-10-CM

## 2021-01-05 DIAGNOSIS — I10 ESSENTIAL HYPERTENSION: Primary | ICD-10-CM

## 2021-01-05 DIAGNOSIS — R41.9 COGNITIVE COMPLAINTS: ICD-10-CM

## 2021-01-05 DIAGNOSIS — Z91.148 MEDICATION NONCOMPLIANCE DUE TO COGNITIVE IMPAIRMENT: ICD-10-CM

## 2021-01-05 DIAGNOSIS — G47.33 OSA (OBSTRUCTIVE SLEEP APNEA): ICD-10-CM

## 2021-01-05 DIAGNOSIS — F43.21 ADJUSTMENT DISORDER WITH DEPRESSED MOOD: ICD-10-CM

## 2021-01-05 PROCEDURE — 1126F AMNT PAIN NOTED NONE PRSNT: CPT | Mod: S$GLB,,, | Performed by: STUDENT IN AN ORGANIZED HEALTH CARE EDUCATION/TRAINING PROGRAM

## 2021-01-05 PROCEDURE — 99214 PR OFFICE/OUTPT VISIT, EST, LEVL IV, 30-39 MIN: ICD-10-PCS | Mod: S$GLB,,, | Performed by: STUDENT IN AN ORGANIZED HEALTH CARE EDUCATION/TRAINING PROGRAM

## 2021-01-05 PROCEDURE — 99999 PR PBB SHADOW E&M-EST. PATIENT-LVL IV: CPT | Mod: PBBFAC,,, | Performed by: STUDENT IN AN ORGANIZED HEALTH CARE EDUCATION/TRAINING PROGRAM

## 2021-01-05 PROCEDURE — 3080F PR MOST RECENT DIASTOLIC BLOOD PRESSURE >= 90 MM HG: ICD-10-PCS | Mod: CPTII,S$GLB,, | Performed by: STUDENT IN AN ORGANIZED HEALTH CARE EDUCATION/TRAINING PROGRAM

## 2021-01-05 PROCEDURE — 3077F SYST BP >= 140 MM HG: CPT | Mod: CPTII,S$GLB,, | Performed by: STUDENT IN AN ORGANIZED HEALTH CARE EDUCATION/TRAINING PROGRAM

## 2021-01-05 PROCEDURE — 99999 PR PBB SHADOW E&M-EST. PATIENT-LVL IV: ICD-10-PCS | Mod: PBBFAC,,, | Performed by: STUDENT IN AN ORGANIZED HEALTH CARE EDUCATION/TRAINING PROGRAM

## 2021-01-05 PROCEDURE — 3080F DIAST BP >= 90 MM HG: CPT | Mod: CPTII,S$GLB,, | Performed by: STUDENT IN AN ORGANIZED HEALTH CARE EDUCATION/TRAINING PROGRAM

## 2021-01-05 PROCEDURE — 3008F PR BODY MASS INDEX (BMI) DOCUMENTED: ICD-10-PCS | Mod: CPTII,S$GLB,, | Performed by: STUDENT IN AN ORGANIZED HEALTH CARE EDUCATION/TRAINING PROGRAM

## 2021-01-05 PROCEDURE — 3077F PR MOST RECENT SYSTOLIC BLOOD PRESSURE >= 140 MM HG: ICD-10-PCS | Mod: CPTII,S$GLB,, | Performed by: STUDENT IN AN ORGANIZED HEALTH CARE EDUCATION/TRAINING PROGRAM

## 2021-01-05 PROCEDURE — 3008F BODY MASS INDEX DOCD: CPT | Mod: CPTII,S$GLB,, | Performed by: STUDENT IN AN ORGANIZED HEALTH CARE EDUCATION/TRAINING PROGRAM

## 2021-01-05 PROCEDURE — 99214 OFFICE O/P EST MOD 30 MIN: CPT | Mod: S$GLB,,, | Performed by: STUDENT IN AN ORGANIZED HEALTH CARE EDUCATION/TRAINING PROGRAM

## 2021-01-05 PROCEDURE — 1126F PR PAIN SEVERITY QUANTIFIED, NO PAIN PRESENT: ICD-10-PCS | Mod: S$GLB,,, | Performed by: STUDENT IN AN ORGANIZED HEALTH CARE EDUCATION/TRAINING PROGRAM

## 2021-01-12 ENCOUNTER — TELEPHONE (OUTPATIENT)
Dept: FAMILY MEDICINE | Facility: CLINIC | Age: 46
End: 2021-01-12

## 2021-01-12 ENCOUNTER — CLINICAL SUPPORT (OUTPATIENT)
Dept: FAMILY MEDICINE | Facility: CLINIC | Age: 46
End: 2021-01-12
Payer: COMMERCIAL

## 2021-01-12 VITALS — HEART RATE: 67 BPM | OXYGEN SATURATION: 96 % | DIASTOLIC BLOOD PRESSURE: 80 MMHG | SYSTOLIC BLOOD PRESSURE: 130 MMHG

## 2021-01-12 DIAGNOSIS — Z01.30 BP CHECK: Primary | ICD-10-CM

## 2021-01-12 PROCEDURE — 99999 PR PBB SHADOW E&M-EST. PATIENT-LVL I: ICD-10-PCS | Mod: PBBFAC,,,

## 2021-01-12 PROCEDURE — 99999 PR PBB SHADOW E&M-EST. PATIENT-LVL I: CPT | Mod: PBBFAC,,,

## 2021-03-11 ENCOUNTER — IMMUNIZATION (OUTPATIENT)
Dept: PRIMARY CARE CLINIC | Facility: CLINIC | Age: 46
End: 2021-03-11
Payer: COMMERCIAL

## 2021-03-11 DIAGNOSIS — Z23 NEED FOR VACCINATION: Primary | ICD-10-CM

## 2021-03-11 PROCEDURE — 0001A COVID-19, MRNA, LNP-S, PF, 30 MCG/0.3 ML DOSE VACCINE: ICD-10-PCS | Mod: CV19,S$GLB,, | Performed by: FAMILY MEDICINE

## 2021-03-11 PROCEDURE — 0001A COVID-19, MRNA, LNP-S, PF, 30 MCG/0.3 ML DOSE VACCINE: CPT | Mod: CV19,S$GLB,, | Performed by: FAMILY MEDICINE

## 2021-03-11 PROCEDURE — 91300 COVID-19, MRNA, LNP-S, PF, 30 MCG/0.3 ML DOSE VACCINE: CPT | Mod: S$GLB,,, | Performed by: FAMILY MEDICINE

## 2021-03-11 PROCEDURE — 91300 COVID-19, MRNA, LNP-S, PF, 30 MCG/0.3 ML DOSE VACCINE: ICD-10-PCS | Mod: S$GLB,,, | Performed by: FAMILY MEDICINE

## 2021-04-01 ENCOUNTER — IMMUNIZATION (OUTPATIENT)
Dept: PRIMARY CARE CLINIC | Facility: CLINIC | Age: 46
End: 2021-04-01
Payer: COMMERCIAL

## 2021-04-01 DIAGNOSIS — Z23 NEED FOR VACCINATION: Primary | ICD-10-CM

## 2021-04-01 PROCEDURE — 91300 COVID-19, MRNA, LNP-S, PF, 30 MCG/0.3 ML DOSE VACCINE: ICD-10-PCS | Mod: S$GLB,,, | Performed by: FAMILY MEDICINE

## 2021-04-01 PROCEDURE — 0002A COVID-19, MRNA, LNP-S, PF, 30 MCG/0.3 ML DOSE VACCINE: CPT | Mod: CV19,S$GLB,, | Performed by: FAMILY MEDICINE

## 2021-04-01 PROCEDURE — 91300 COVID-19, MRNA, LNP-S, PF, 30 MCG/0.3 ML DOSE VACCINE: CPT | Mod: S$GLB,,, | Performed by: FAMILY MEDICINE

## 2021-04-01 PROCEDURE — 0002A COVID-19, MRNA, LNP-S, PF, 30 MCG/0.3 ML DOSE VACCINE: ICD-10-PCS | Mod: CV19,S$GLB,, | Performed by: FAMILY MEDICINE

## 2021-07-28 DIAGNOSIS — F43.21 ADJUSTMENT DISORDER WITH DEPRESSED MOOD: Primary | ICD-10-CM

## 2021-08-10 DIAGNOSIS — Z01.818 PRE-OP TESTING: ICD-10-CM

## 2021-08-13 ENCOUNTER — LAB VISIT (OUTPATIENT)
Dept: PRIMARY CARE CLINIC | Facility: CLINIC | Age: 46
End: 2021-08-13
Payer: COMMERCIAL

## 2021-08-13 DIAGNOSIS — Z01.818 PRE-OP TESTING: ICD-10-CM

## 2021-08-13 PROCEDURE — U0003 INFECTIOUS AGENT DETECTION BY NUCLEIC ACID (DNA OR RNA); SEVERE ACUTE RESPIRATORY SYNDROME CORONAVIRUS 2 (SARS-COV-2) (CORONAVIRUS DISEASE [COVID-19]), AMPLIFIED PROBE TECHNIQUE, MAKING USE OF HIGH THROUGHPUT TECHNOLOGIES AS DESCRIBED BY CMS-2020-01-R: HCPCS | Performed by: OTOLARYNGOLOGY

## 2021-08-13 PROCEDURE — U0005 INFEC AGEN DETEC AMPLI PROBE: HCPCS | Performed by: OTOLARYNGOLOGY

## 2021-08-14 LAB
SARS-COV-2 RNA RESP QL NAA+PROBE: NOT DETECTED
SARS-COV-2- CYCLE NUMBER: -1

## 2021-08-16 ENCOUNTER — OFFICE VISIT (OUTPATIENT)
Dept: OTOLARYNGOLOGY | Facility: CLINIC | Age: 46
End: 2021-08-16
Payer: COMMERCIAL

## 2021-08-16 VITALS
SYSTOLIC BLOOD PRESSURE: 170 MMHG | DIASTOLIC BLOOD PRESSURE: 106 MMHG | TEMPERATURE: 98 F | WEIGHT: 251 LBS | BODY MASS INDEX: 37.07 KG/M2 | HEART RATE: 64 BPM

## 2021-08-16 DIAGNOSIS — G47.33 OSA (OBSTRUCTIVE SLEEP APNEA): Primary | ICD-10-CM

## 2021-08-16 DIAGNOSIS — J30.9 ALLERGIC RHINITIS, UNSPECIFIED SEASONALITY, UNSPECIFIED TRIGGER: ICD-10-CM

## 2021-08-16 PROCEDURE — 3008F BODY MASS INDEX DOCD: CPT | Mod: CPTII,S$GLB,, | Performed by: OTOLARYNGOLOGY

## 2021-08-16 PROCEDURE — 3080F PR MOST RECENT DIASTOLIC BLOOD PRESSURE >= 90 MM HG: ICD-10-PCS | Mod: CPTII,S$GLB,, | Performed by: OTOLARYNGOLOGY

## 2021-08-16 PROCEDURE — 3077F SYST BP >= 140 MM HG: CPT | Mod: CPTII,S$GLB,, | Performed by: OTOLARYNGOLOGY

## 2021-08-16 PROCEDURE — 1160F PR REVIEW ALL MEDS BY PRESCRIBER/CLIN PHARMACIST DOCUMENTED: ICD-10-PCS | Mod: CPTII,S$GLB,, | Performed by: OTOLARYNGOLOGY

## 2021-08-16 PROCEDURE — 1159F PR MEDICATION LIST DOCUMENTED IN MEDICAL RECORD: ICD-10-PCS | Mod: CPTII,S$GLB,, | Performed by: OTOLARYNGOLOGY

## 2021-08-16 PROCEDURE — 3008F PR BODY MASS INDEX (BMI) DOCUMENTED: ICD-10-PCS | Mod: CPTII,S$GLB,, | Performed by: OTOLARYNGOLOGY

## 2021-08-16 PROCEDURE — 99204 OFFICE O/P NEW MOD 45 MIN: CPT | Mod: S$GLB,,, | Performed by: OTOLARYNGOLOGY

## 2021-08-16 PROCEDURE — 3080F DIAST BP >= 90 MM HG: CPT | Mod: CPTII,S$GLB,, | Performed by: OTOLARYNGOLOGY

## 2021-08-16 PROCEDURE — 1160F RVW MEDS BY RX/DR IN RCRD: CPT | Mod: CPTII,S$GLB,, | Performed by: OTOLARYNGOLOGY

## 2021-08-16 PROCEDURE — 1159F MED LIST DOCD IN RCRD: CPT | Mod: CPTII,S$GLB,, | Performed by: OTOLARYNGOLOGY

## 2021-08-16 PROCEDURE — 1126F PR PAIN SEVERITY QUANTIFIED, NO PAIN PRESENT: ICD-10-PCS | Mod: CPTII,S$GLB,, | Performed by: OTOLARYNGOLOGY

## 2021-08-16 PROCEDURE — 1126F AMNT PAIN NOTED NONE PRSNT: CPT | Mod: CPTII,S$GLB,, | Performed by: OTOLARYNGOLOGY

## 2021-08-16 PROCEDURE — 3077F PR MOST RECENT SYSTOLIC BLOOD PRESSURE >= 140 MM HG: ICD-10-PCS | Mod: CPTII,S$GLB,, | Performed by: OTOLARYNGOLOGY

## 2021-08-16 PROCEDURE — 99204 PR OFFICE/OUTPT VISIT, NEW, LEVL IV, 45-59 MIN: ICD-10-PCS | Mod: S$GLB,,, | Performed by: OTOLARYNGOLOGY

## 2021-08-19 ENCOUNTER — OFFICE VISIT (OUTPATIENT)
Dept: PSYCHIATRY | Facility: CLINIC | Age: 46
End: 2021-08-19
Payer: COMMERCIAL

## 2021-08-19 DIAGNOSIS — F43.21 ADJUSTMENT DISORDER WITH DEPRESSED MOOD: ICD-10-CM

## 2021-08-19 DIAGNOSIS — F33.1 MODERATE EPISODE OF RECURRENT MAJOR DEPRESSIVE DISORDER: Primary | ICD-10-CM

## 2021-08-19 PROCEDURE — 90791 PR PSYCHIATRIC DIAGNOSTIC EVALUATION: ICD-10-PCS | Mod: S$GLB,,, | Performed by: SOCIAL WORKER

## 2021-08-19 PROCEDURE — 1159F MED LIST DOCD IN RCRD: CPT | Mod: CPTII,S$GLB,, | Performed by: SOCIAL WORKER

## 2021-08-19 PROCEDURE — 90791 PSYCH DIAGNOSTIC EVALUATION: CPT | Mod: S$GLB,,, | Performed by: SOCIAL WORKER

## 2021-08-19 PROCEDURE — 1159F PR MEDICATION LIST DOCUMENTED IN MEDICAL RECORD: ICD-10-PCS | Mod: CPTII,S$GLB,, | Performed by: SOCIAL WORKER

## 2021-08-19 PROCEDURE — 99999 PR PBB SHADOW E&M-EST. PATIENT-LVL II: ICD-10-PCS | Mod: PBBFAC,,, | Performed by: SOCIAL WORKER

## 2021-08-19 PROCEDURE — 99999 PR PBB SHADOW E&M-EST. PATIENT-LVL II: CPT | Mod: PBBFAC,,, | Performed by: SOCIAL WORKER

## 2021-09-02 ENCOUNTER — HOSPITAL ENCOUNTER (EMERGENCY)
Facility: HOSPITAL | Age: 46
Discharge: HOME | End: 2021-09-02
Attending: EMERGENCY MEDICINE
Payer: COMMERCIAL

## 2021-09-02 VITALS
OXYGEN SATURATION: 97 % | SYSTOLIC BLOOD PRESSURE: 173 MMHG | BODY MASS INDEX: 37.06 KG/M2 | HEIGHT: 69 IN | TEMPERATURE: 99 F | DIASTOLIC BLOOD PRESSURE: 109 MMHG | RESPIRATION RATE: 18 BRPM | HEART RATE: 96 BPM | WEIGHT: 250.25 LBS

## 2021-09-02 DIAGNOSIS — M10.9 ACUTE GOUT INVOLVING TOE OF LEFT FOOT, UNSPECIFIED CAUSE: Primary | ICD-10-CM

## 2021-09-02 PROCEDURE — 25000003 PHARM REV CODE 250: Performed by: EMERGENCY MEDICINE

## 2021-09-02 PROCEDURE — 63600175 PHARM REV CODE 636 W HCPCS: Performed by: EMERGENCY MEDICINE

## 2021-09-02 PROCEDURE — 99284 EMERGENCY DEPT VISIT MOD MDM: CPT

## 2021-09-02 RX ORDER — HYDROCODONE BITARTRATE AND ACETAMINOPHEN 5; 325 MG/1; MG/1
1 TABLET ORAL EVERY 6 HOURS PRN
Qty: 8 TABLET | Refills: 0 | Status: SHIPPED | OUTPATIENT
Start: 2021-09-02 | End: 2022-09-27

## 2021-09-02 RX ORDER — PREDNISONE 20 MG/1
40 TABLET ORAL
Status: COMPLETED | OUTPATIENT
Start: 2021-09-02 | End: 2021-09-02

## 2021-09-02 RX ORDER — COLCHICINE 0.6 MG/1
TABLET ORAL
Qty: 5 TABLET | Refills: 0 | Status: SHIPPED | OUTPATIENT
Start: 2021-09-02 | End: 2022-09-27

## 2021-09-02 RX ORDER — PREDNISONE 20 MG/1
40 TABLET ORAL DAILY
Qty: 8 TABLET | Refills: 0 | Status: SHIPPED | OUTPATIENT
Start: 2021-09-03 | End: 2021-09-07

## 2021-09-02 RX ORDER — COLCHICINE 0.6 MG/1
1.2 TABLET, FILM COATED ORAL
Status: COMPLETED | OUTPATIENT
Start: 2021-09-02 | End: 2021-09-02

## 2021-09-02 RX ADMIN — COLCHICINE 1.2 MG: 0.6 TABLET, FILM COATED ORAL at 02:09

## 2021-09-02 RX ADMIN — PREDNISONE 40 MG: 20 TABLET ORAL at 02:09

## 2021-09-22 ENCOUNTER — HOSPITAL ENCOUNTER (EMERGENCY)
Facility: HOSPITAL | Age: 46
Discharge: HOME | End: 2021-09-22
Attending: EMERGENCY MEDICINE
Payer: COMMERCIAL

## 2021-09-22 VITALS
BODY MASS INDEX: 37.06 KG/M2 | WEIGHT: 250.25 LBS | DIASTOLIC BLOOD PRESSURE: 128 MMHG | HEIGHT: 69 IN | HEART RATE: 88 BPM | RESPIRATION RATE: 18 BRPM | TEMPERATURE: 99 F | SYSTOLIC BLOOD PRESSURE: 199 MMHG | OXYGEN SATURATION: 96 %

## 2021-09-22 DIAGNOSIS — M10.9 GOUT, UNSPECIFIED CAUSE, UNSPECIFIED CHRONICITY, UNSPECIFIED SITE: ICD-10-CM

## 2021-09-22 DIAGNOSIS — M10.9 ACUTE GOUTY ARTHRITIS: Primary | ICD-10-CM

## 2021-09-22 PROCEDURE — 99284 EMERGENCY DEPT VISIT MOD MDM: CPT

## 2021-09-22 PROCEDURE — 25000003 PHARM REV CODE 250: Performed by: EMERGENCY MEDICINE

## 2021-09-22 RX ORDER — INDOMETHACIN 50 MG/1
50 CAPSULE ORAL 3 TIMES DAILY PRN
Qty: 30 CAPSULE | Refills: 1 | OUTPATIENT
Start: 2021-09-22 | End: 2022-07-11

## 2021-09-22 RX ORDER — COLCHICINE 0.6 MG/1
TABLET ORAL
Qty: 30 TABLET | Refills: 0 | Status: SHIPPED | OUTPATIENT
Start: 2021-09-22 | End: 2022-09-27

## 2021-09-22 RX ORDER — INDOMETHACIN 25 MG/1
50 CAPSULE ORAL
Status: COMPLETED | OUTPATIENT
Start: 2021-09-22 | End: 2021-09-22

## 2021-09-22 RX ORDER — COLCHICINE 0.6 MG/1
1.2 TABLET, FILM COATED ORAL
Status: COMPLETED | OUTPATIENT
Start: 2021-09-22 | End: 2021-09-22

## 2021-09-22 RX ADMIN — COLCHICINE 1.2 MG: 0.6 TABLET, FILM COATED ORAL at 08:09

## 2021-09-22 RX ADMIN — INDOMETHACIN 50 MG: 25 CAPSULE ORAL at 08:09

## 2021-09-29 ENCOUNTER — PATIENT OUTREACH (OUTPATIENT)
Dept: ADMINISTRATIVE | Facility: OTHER | Age: 46
End: 2021-09-29

## 2022-07-11 ENCOUNTER — HOSPITAL ENCOUNTER (EMERGENCY)
Facility: HOSPITAL | Age: 47
Discharge: HOME | End: 2022-07-11
Attending: EMERGENCY MEDICINE
Payer: COMMERCIAL

## 2022-07-11 VITALS
OXYGEN SATURATION: 96 % | WEIGHT: 250 LBS | RESPIRATION RATE: 20 BRPM | DIASTOLIC BLOOD PRESSURE: 112 MMHG | HEIGHT: 69 IN | TEMPERATURE: 98 F | BODY MASS INDEX: 37.03 KG/M2 | SYSTOLIC BLOOD PRESSURE: 152 MMHG | HEART RATE: 105 BPM

## 2022-07-11 DIAGNOSIS — M10.9 ACUTE GOUT OF LEFT ANKLE, UNSPECIFIED CAUSE: Primary | ICD-10-CM

## 2022-07-11 PROCEDURE — 36415 COLL VENOUS BLD VENIPUNCTURE: CPT | Performed by: EMERGENCY MEDICINE

## 2022-07-11 PROCEDURE — 86803 HEPATITIS C AB TEST: CPT | Performed by: EMERGENCY MEDICINE

## 2022-07-11 PROCEDURE — 96372 THER/PROPH/DIAG INJ SC/IM: CPT | Performed by: EMERGENCY MEDICINE

## 2022-07-11 PROCEDURE — 99284 EMERGENCY DEPT VISIT MOD MDM: CPT

## 2022-07-11 PROCEDURE — 87389 HIV-1 AG W/HIV-1&-2 AB AG IA: CPT | Performed by: EMERGENCY MEDICINE

## 2022-07-11 PROCEDURE — 63600175 PHARM REV CODE 636 W HCPCS: Performed by: EMERGENCY MEDICINE

## 2022-07-11 RX ORDER — INDOMETHACIN 50 MG/1
50 CAPSULE ORAL
Qty: 15 CAPSULE | Refills: 0 | Status: SHIPPED | OUTPATIENT
Start: 2022-07-11 | End: 2022-09-27 | Stop reason: SDUPTHER

## 2022-07-11 RX ORDER — KETOROLAC TROMETHAMINE 30 MG/ML
15 INJECTION, SOLUTION INTRAMUSCULAR; INTRAVENOUS
Status: COMPLETED | OUTPATIENT
Start: 2022-07-11 | End: 2022-07-11

## 2022-07-11 RX ADMIN — KETOROLAC TROMETHAMINE 15 MG: 30 INJECTION, SOLUTION INTRAMUSCULAR; INTRAVENOUS at 12:07

## 2022-07-11 NOTE — ED PROVIDER NOTES
Encounter Date: 7/11/2022       History     Chief Complaint   Patient presents with    Gout     LEFT FOOT      46-year-old male with a past medical history of gout, hypertension, and atrial fibrillation presents with chief complaint left ankle pain.  The patient reports that it started acutely 3 days ago and has been persistent since that time.  He reports that it is associated with swelling to the joint.  He denies any associated trauma, falls, fever/chills, nausea/vomiting, chest pain, shortness of breath, or abdominal pain.  The patient reports that this is his usual gout flare.  He reports compliance with his home allopurinol.  There are no alleviating factors.  He has not taken anything for pain relief at home.        Review of patient's allergies indicates:  No Known Allergies  Past Medical History:   Diagnosis Date    Anticoagulant long-term use     Atrial fibrillation     Gout     Gout, joint     Hypertension     Memory deficit following cerebral infarction     Pneumonia 04/2018    Sleep apnea with use of continuous positive airway pressure (CPAP)     Stroke 04/2018    Syncope and collapse      Past Surgical History:   Procedure Laterality Date    INSERTION OF IMPLANTABLE LOOP RECORDER  10/25/2018    Procedure: Insertion, Implantable Loop Recorder;  Surgeon: Justin Colby MD;  Location: [facility] CATH;  Service: Cardiology;;    LAPAROSCOPIC CHOLECYSTECTOMY N/A 12/14/2018    Procedure: CHOLECYSTECTOMY, LAPAROSCOPIC;  Surgeon: Beck Love MD;  Location: [facility] OR;  Service: General;  Laterality: N/A;  Dr. Love requested case to start at 1:00pm    NASAL POLYP EXCISION      TREATMENT OF CARDIAC ARRHYTHMIA N/A 10/25/2018    Procedure: CARDIOVERSION OR DEFIBRILLATION;  Surgeon: Justin Colby MD;  Location: [facility] CATH;  Service: Cardiology;  Laterality: N/A;     Family History   Problem Relation Age of Onset    Sleep apnea Mother     Hypertension Mother     No Known Problems Brother     No  Known Problems Brother     No Known Problems Brother      Social History     Tobacco Use    Smoking status: Never Smoker    Smokeless tobacco: Never Used   Substance Use Topics    Alcohol use: Yes     Comment: socially    Drug use: No     Review of Systems   Constitutional: Negative for chills, diaphoresis, fatigue and fever.   HENT: Negative for congestion and rhinorrhea.    Respiratory: Negative for cough and shortness of breath.    Cardiovascular: Negative for chest pain.   Gastrointestinal: Negative for abdominal pain, diarrhea, nausea and vomiting.   Genitourinary: Negative for dysuria, frequency and testicular pain.   Musculoskeletal: Positive for arthralgias and joint swelling. Negative for gait problem.   Skin: Negative for color change.   Neurological: Negative for dizziness and numbness.   Psychiatric/Behavioral: Negative for agitation and confusion.       Physical Exam     Initial Vitals [07/11/22 1141]   BP Pulse Resp Temp SpO2   (!) 152/112 105 20 98.3 °F (36.8 °C) 96 %      MAP       --         Physical Exam    Constitutional: He appears well-developed and well-nourished. He is not diaphoretic. No distress.   HENT:   Head: Normocephalic and atraumatic.   Right Ear: External ear normal.   Left Ear: External ear normal.   Neck: Neck supple.   Normal range of motion.  Musculoskeletal:         General: Tenderness and edema present.      Cervical back: Normal range of motion and neck supple.      Comments: Tenderness and edema noted to the left ankle region.  No ecchymosis, skin color changes, or skin lesions noted.     Neurological: He is alert and oriented to person, place, and time. He has normal strength. No cranial nerve deficit. GCS score is 15. GCS eye subscore is 4. GCS verbal subscore is 5. GCS motor subscore is 6.   Skin: Skin is warm and dry. Capillary refill takes less than 2 seconds. No rash noted. No erythema.   Psychiatric: He has a normal mood and affect. His behavior is normal.  Judgment and thought content normal.         ED Course   Procedures  Labs Reviewed   HIV 1 / 2 ANTIBODY   HEPATITIS C ANTIBODY          Imaging Results    None          Medications   ketorolac injection 15 mg (15 mg Intramuscular Given 7/11/22 1223)     Medical Decision Making:   Initial Assessment:   46-year-old male presents with ankle pain.  Differential Diagnosis:   Initial differential diagnosis included but not limited to gout, strain, and sprain.  ED Management:  The patient was emergently evaluated in the emergency department, his evaluation was significant for a middle-age male with edema and tenderness noted to the left ankle region.  The patient reports that this is his usual gout flare.  The patient's symptoms are consistent with a gout flare.  He was treated with a dose of parental Toradol here in the emergency department.  He is stable for discharge to home.  He will be discharged home with a refill of his home indomethacin.  He is referred to primary care for follow-up.                      Clinical Impression:   Final diagnoses:  [M10.9] Acute gout of left ankle, unspecified cause (Primary)          ED Disposition Condition    Discharge Stable        ED Prescriptions     Medication Sig Dispense Start Date End Date Auth. Provider    indomethacin (INDOCIN) 50 MG capsule Take 1 capsule (50 mg total) by mouth 3 (three) times daily with meals. 15 capsule 7/11/2022  Galindo Leiva MD        Follow-up Information     Follow up With Specialties Details Why Contact Info    [address] [address]  [phone]             Galindo Leiva MD  07/11/22 3775

## 2022-07-11 NOTE — FIRST PROVIDER EVALUATION
Emergency Department TeleTriage Encounter Note      CHIEF COMPLAINT    Chief Complaint   Patient presents with    Gout     LEFT FOOT        VITAL SIGNS   Initial Vitals [07/11/22 1141]   BP Pulse Resp Temp SpO2   (!) 152/112 105 20 98.3 °F (36.8 °C) 96 %      MAP       --            ALLERGIES    Review of patient's allergies indicates:  No Known Allergies    PROVIDER TRIAGE NOTE  This is a teletriage evaluation of a 46 y.o. male presenting to the ED complaining of gout flare in left foot since Friday. Denies trauma and fever.     Initial orders will be placed and care will be transferred to an alternate provider when patient is roomed for a full evaluation. Any additional orders and the final disposition will be determined by that provider.           ORDERS  Labs Reviewed   HIV 1 / 2 ANTIBODY   HEPATITIS C ANTIBODY       ED Orders (720h ago, onward)    Start Ordered     Status Ordering Provider    07/11/22 1144 07/11/22 1143  HIV 1/2 Ag/Ab (4th Gen)  STAT         Pending Collection DUANE KHALIL    07/11/22 1144 07/11/22 1143  Hepatitis C Antibody  STAT         Pending Collection DUANE KHALIL            Virtual Visit Note: The provider triage portion of this emergency department evaluation and documentation was performed via [employer], a HIPAA-compliant telemedicine application, in concert with a tele-presenter in the room. A face to face patient evaluation with one of my colleagues will occur once the patient is placed in an emergency department room.      DISCLAIMER: This note was prepared with [employer] voice recognition transcription software. Garbled syntax, mangled pronouns, and other bizarre constructions may be attributed to that software system.

## 2022-07-12 LAB
HCV AB SERPL QL IA: NEGATIVE
HIV 1+2 AB+HIV1 P24 AG SERPL QL IA: NEGATIVE

## 2022-09-27 ENCOUNTER — OFFICE VISIT (OUTPATIENT)
Dept: FAMILY MEDICINE | Facility: CLINIC | Age: 47
End: 2022-09-27
Payer: COMMERCIAL

## 2022-09-27 VITALS
BODY MASS INDEX: 37.45 KG/M2 | WEIGHT: 252.88 LBS | HEART RATE: 89 BPM | HEIGHT: 69 IN | OXYGEN SATURATION: 96 % | RESPIRATION RATE: 18 BRPM | DIASTOLIC BLOOD PRESSURE: 118 MMHG | SYSTOLIC BLOOD PRESSURE: 170 MMHG | TEMPERATURE: 98 F

## 2022-09-27 DIAGNOSIS — I48.0 PAROXYSMAL ATRIAL FIBRILLATION: ICD-10-CM

## 2022-09-27 DIAGNOSIS — I10 ESSENTIAL HYPERTENSION: Primary | ICD-10-CM

## 2022-09-27 DIAGNOSIS — M10.9 GOUT, UNSPECIFIED CAUSE, UNSPECIFIED CHRONICITY, UNSPECIFIED SITE: ICD-10-CM

## 2022-09-27 PROCEDURE — 99999 PR PBB SHADOW E&M-EST. PATIENT-LVL IV: CPT | Mod: PBBFAC,,, | Performed by: FAMILY MEDICINE

## 2022-09-27 PROCEDURE — 3080F PR MOST RECENT DIASTOLIC BLOOD PRESSURE >= 90 MM HG: ICD-10-PCS | Mod: CPTII,S$GLB,, | Performed by: FAMILY MEDICINE

## 2022-09-27 PROCEDURE — 4010F ACE/ARB THERAPY RXD/TAKEN: CPT | Mod: CPTII,S$GLB,, | Performed by: FAMILY MEDICINE

## 2022-09-27 PROCEDURE — 3077F PR MOST RECENT SYSTOLIC BLOOD PRESSURE >= 140 MM HG: ICD-10-PCS | Mod: CPTII,S$GLB,, | Performed by: FAMILY MEDICINE

## 2022-09-27 PROCEDURE — 4010F PR ACE/ARB THEARPY RXD/TAKEN: ICD-10-PCS | Mod: CPTII,S$GLB,, | Performed by: FAMILY MEDICINE

## 2022-09-27 PROCEDURE — 99214 PR OFFICE/OUTPT VISIT, EST, LEVL IV, 30-39 MIN: ICD-10-PCS | Mod: S$GLB,,, | Performed by: FAMILY MEDICINE

## 2022-09-27 PROCEDURE — 3077F SYST BP >= 140 MM HG: CPT | Mod: CPTII,S$GLB,, | Performed by: FAMILY MEDICINE

## 2022-09-27 PROCEDURE — 1159F MED LIST DOCD IN RCRD: CPT | Mod: CPTII,S$GLB,, | Performed by: FAMILY MEDICINE

## 2022-09-27 PROCEDURE — 3008F BODY MASS INDEX DOCD: CPT | Mod: CPTII,S$GLB,, | Performed by: FAMILY MEDICINE

## 2022-09-27 PROCEDURE — 99214 OFFICE O/P EST MOD 30 MIN: CPT | Mod: S$GLB,,, | Performed by: FAMILY MEDICINE

## 2022-09-27 PROCEDURE — 99999 PR PBB SHADOW E&M-EST. PATIENT-LVL IV: ICD-10-PCS | Mod: PBBFAC,,, | Performed by: FAMILY MEDICINE

## 2022-09-27 PROCEDURE — 1159F PR MEDICATION LIST DOCUMENTED IN MEDICAL RECORD: ICD-10-PCS | Mod: CPTII,S$GLB,, | Performed by: FAMILY MEDICINE

## 2022-09-27 PROCEDURE — 3080F DIAST BP >= 90 MM HG: CPT | Mod: CPTII,S$GLB,, | Performed by: FAMILY MEDICINE

## 2022-09-27 PROCEDURE — 3008F PR BODY MASS INDEX (BMI) DOCUMENTED: ICD-10-PCS | Mod: CPTII,S$GLB,, | Performed by: FAMILY MEDICINE

## 2022-09-27 RX ORDER — LISINOPRIL 40 MG/1
40 TABLET ORAL DAILY
Qty: 30 TABLET | Refills: 0 | Status: SHIPPED | OUTPATIENT
Start: 2022-09-27 | End: 2023-01-04 | Stop reason: SDUPTHER

## 2022-09-27 RX ORDER — INDOMETHACIN 50 MG/1
50 CAPSULE ORAL 3 TIMES DAILY PRN
Qty: 15 CAPSULE | Refills: 0 | Status: ON HOLD | OUTPATIENT
Start: 2022-09-27 | End: 2023-03-26 | Stop reason: HOSPADM

## 2022-09-27 NOTE — PATIENT INSTRUCTIONS
"Camilo Echeverriak,     If you are due for any health screening(s) below please notify me so we can arrange them to be ordered and scheduled to maintain your health. Most healthy patients complete it. Don't lose out on improving your health.     Tests to Keep You Healthy    Last Blood Pressure <= 139/89 (9/27/2022): NO                          Patient Education       Checking Your Blood Pressure at Home   The Basics   Written by the doctors and editors at [facility]   How is blood pressure measured? -- Blood pressure is usually measured with a device that goes around your upper arm. This is often done in a doctor's office. But some people also check their blood pressure themselves, at home or at work.  Blood pressure is explained with 2 numbers. For instance, your blood pressure might be "140 over 90." The first (top) number is the pressure inside your arteries when your heart is jay. The second (bottom) number is the pressure inside your arteries when your heart is relaxed. The table shows how doctors and nurses define high and normal blood pressure (table 1).  If your blood pressure gets too high, it puts you at risk for heart attack, stroke, and kidney disease. High blood pressure does not usually cause symptoms. But it can be serious.  What is a home blood pressure meter? -- A home blood pressure meter (or "monitor") is a device you can use to check your blood pressure yourself. It has a cuff that goes around your upper arm (figure 1). Some devices have a cuff that goes around your wrist instead. But doctors aren't sure if these work as well. The meter also has a small screen, or dial, that shows your blood pressure numbers.  There are also special meters you can wear for a day or 2. These are different because they automatically check your blood pressure throughout the day and night, even while you are sleeping. If your doctor thinks you should use one of these devices, they will talk to you about how to wear " it.  Why do I need to check my blood pressure at home? -- If your doctor knows or suspects that you have high blood pressure, they might want you to check it at home. There are a few reasons for this. Your doctor might want to look at:  Whether your blood pressure measures the same at home as it did in the doctor's office  How well your blood pressure medicines are working  Changes in your blood pressure, for example, if it goes up and down  People who check their own blood pressure at home usually do better at keeping it low.  How do I choose a home blood pressure meter? -- When choosing a home blood pressure meter, you will probably want to think about:  Cost - Some devices cost more than others. You should also check to see if your insurance will help pay for your device.  Size - It's important to make sure the cuff fits your arm comfortably. Your doctor or nurse can help you with this.  How easy it is to use - You should make sure you understand how to use the device. You also need to be able to read the numbers on the screen.  You do not need a prescription to buy a home blood pressure meter. You can buy them at most pharmacies or over the internet. Your doctor or nurse can help you choose the right device for you.  How do I check my blood pressure at home? -- Once you have a home blood pressure meter, your doctor or nurse should check it to make sure it fits you and works correctly.  When it's time to check your blood pressure:  Go to the bathroom and empty your bladder first. Having a full bladder can temporarily increase your blood pressure, making the results inaccurate.  Sit in a chair with your feet flat on the ground.  Try to breathe normally and stay calm.  Attach the cuff to your arm. Place the cuff directly on your skin, not over your clothing. The cuff should be tight enough to not slip down, but not uncomfortably tight.  Sit and relax for about 3 to 5 minutes with the cuff on.  Follow the directions  that came with your device to start measuring your blood pressure. This might involve squeezing the bulb at the end of the tube to inflate the cuff (fill it with air). With some monitors, you just need to press a button to inflate the cuff. When the cuff fills with air, it feels like someone is squeezing your arm, but it should not hurt. Then you will slowly deflate the cuff (let the air out of it), or it will deflate by itself. The screen or dial will show your blood pressure numbers.  Stay seated and relax for 1 minute, then measure your blood pressure again.  How often should I check my blood pressure? -- It depends. Different people need to follow different schedules. Your doctor or nurse will tell you how often to check your blood pressure, and when. Some people need to check their blood pressure twice a day, in the morning and evening.  Your doctor or nurse will probably tell you to keep track of your blood pressure for at least a few days (table 2). Then they will look at the numbers. The reason for this is that it's normal for your blood pressure to change a bit from day to day. For example, the numbers might change depending on whether you recently had caffeine, just exercised, or feel stressed. Checking your blood pressure over several days - or longer - will give your doctor or nurse a better idea of what is average for you.  How should I keep track of my blood pressure? -- Some blood pressure meters will record your numbers for you, or send them to your computer or smartphone. If yours does not do this, you will need to write them down. Your doctor or nurse can help you figure out the best way to keep track of the numbers.  What if my blood pressure is high? -- Your doctor or nurse will tell you what to do if your blood pressure is high when you check it at home. If you get a number that is higher than normal, measure it again to see if it is still high. If it is very high (above a certain number, which  "your doctor or nurse will tell you to watch out for), you should call your doctor right away.  If your blood pressure is only a little high, your doctor or nurse might tell you to keep checking it for a few more days or weeks, and then call if it does not go back down. Then they can help you decide what to do next.  All topics are updated as new evidence becomes available and our peer review process is complete.  This topic retrieved from [employer] on: Sep 21, 2021.  Topic 415563 Version 4.0  Release: 29.4.2 - C29.263  © 2021 UpToDate, Inc. and/or its affiliates. All rights reserved.  table 1: Definition of normal and high blood pressure  Level  Top number  Bottom number    High 130 or above 80 or above   Elevated 120 to 129 79 or below   Normal 119 or below 79 or below   These definitions are from the American College of Cardiology/American Heart Association. Other expert groups might use slightly different definitions.  "Elevated blood pressure" is a term doctor or nurses use as a warning. It means you do not yet have high blood pressure, but your blood pressure is not as low as it should be for good health.  Graphic 21782 Version 6.0  figure 1: Using a home blood pressure meter     This is an example of a person using a home blood pressure meter.  Graphic 882805 Version 1.0    table 2: 7-day diary for checking blood pressure at home  Day 1  Day 2  Day 3  Day 4  Day 5  Day 6  Day 7    Morning  1st read Morning  1st read Morning  1st read Morning  1st read Morning  1st read Morning  1st read Morning  1st read   Systolic: __________ Systolic: __________ Systolic: __________ Systolic: __________ Systolic: __________ Systolic: __________ Systolic: __________   Diastolic: __________ Diastolic: __________ Diastolic: __________ Diastolic: __________ Diastolic: __________ Diastolic: __________ Diastolic: __________   Pulse: __________ Pulse: __________ Pulse: __________ Pulse: __________ Pulse: __________ Pulse: __________ " Pulse: __________   Morning  2nd read Morning  2nd read Morning  2nd read Morning  2nd read Morning  2nd read Morning  2nd read Morning  2nd read   Systolic: __________ Systolic: __________ Systolic: __________ Systolic: __________ Systolic: __________ Systolic: __________ Systolic: __________   Diastolic: __________ Diastolic: __________ Diastolic: __________ Diastolic: __________ Diastolic: __________ Diastolic: __________ Diastolic: __________   Pulse: __________ Pulse: __________ Pulse: __________ Pulse: __________ Pulse: __________ Pulse: __________ Pulse: __________   Evening  1st read Evening  1st read Evening  1st read Evening  1st read Evening  1st read Evening  1st read Evening  1st read   Systolic: __________ Systolic: __________ Systolic: __________ Systolic: __________ Systolic: __________ Systolic: __________ Systolic: __________   Diastolic: __________ Diastolic: __________ Diastolic: __________ Diastolic: __________ Diastolic: __________ Diastolic: __________ Diastolic: __________   Pulse: __________ Pulse: __________ Pulse: __________ Pulse: __________ Pulse: __________ Pulse: __________ Pulse: __________   Evening  2nd read Evening  2nd read Evening  2nd read Evening  2nd read Evening  2nd read Evening  2nd read Evening  2nd read   Systolic: __________ Systolic: __________ Systolic: __________ Systolic: __________ Systolic: __________ Systolic: __________ Systolic: __________   Diastolic: __________ Diastolic: __________ Diastolic: __________ Diastolic: __________ Diastolic: __________ Diastolic: __________ Diastolic: __________   Pulse: __________ Pulse: __________ Pulse: __________ Pulse: __________ Pulse: __________ Pulse: __________ Pulse: __________   Notes    Notes    Notes    Notes    Notes    Notes    Notes      ____________________ ____________________ ____________________ ____________________ ____________________ ____________________ ____________________   ____________________  ____________________ ____________________ ____________________ ____________________ ____________________ ____________________   ____________________ ____________________ ____________________ ____________________ ____________________ ____________________ ____________________   Patient name: ______________________________     Patient ID: ________________________________    Primary care provider: _______________________    Average BP: _______________________________    [facility] [id number] Version 1.0  Consumer Information Use and Disclaimer   This information is not specific medical advice and does not replace information you receive from your health care provider. This is only a brief summary of general information. It does NOT include all information about conditions, illnesses, injuries, tests, procedures, treatments, therapies, discharge instructions or life-style choices that may apply to you. You must talk with your health care provider for complete information about your health and treatment options. This information should not be used to decide whether or not to accept your health care provider's advice, instructions or recommendations. Only your health care provider has the knowledge and training to provide advice that is right for you. The use of this information is governed by the [employer] End User License Agreement, available at https://www.[employer]/en/solutions/[employer]/about/deana.The use of [employer] content is governed by the [employer] Terms of Use. ©2021 UpToDate, Inc. All rights reserved.  Copyright   © 2021 UpToDate, Inc. and/or its affiliates. All rights reserved.

## 2022-09-27 NOTE — PROGRESS NOTES
Subjective:   Patient ID: Josue Mir is a 46 y.o. male     Chief Complaint: Routine Checkup      Here for checkup and med refill. Denies chest pain, sob, headaches. States misses taking BP medication around 10 days out of the month. Otherwise notes gout flares when he consumes alcohol    Review of Systems   Respiratory:  Negative for shortness of breath.    Cardiovascular:  Negative for chest pain.   Gastrointestinal:  Negative for abdominal pain.   Genitourinary:  Negative for dysuria.   Past Medical History:   Diagnosis Date    Anticoagulant long-term use     Atrial fibrillation     Gout     Gout, joint     Hypertension     Memory deficit following cerebral infarction     Pneumonia 04/2018    Sleep apnea with use of continuous positive airway pressure (CPAP)     Stroke 04/2018    Syncope and collapse      Past Surgical History:   Procedure Laterality Date    INSERTION OF IMPLANTABLE LOOP RECORDER  10/25/2018    Procedure: Insertion, Implantable Loop Recorder;  Surgeon: Justin Colby MD;  Location: [facility];  Service: Cardiology;;    LAPAROSCOPIC CHOLECYSTECTOMY N/A 12/14/2018    Procedure: CHOLECYSTECTOMY, LAPAROSCOPIC;  Surgeon: Beck Love MD;  Location: [facility];  Service: General;  Laterality: N/A;  Dr. Love requested case to start at 1:00pm    NASAL POLYP EXCISION      TREATMENT OF CARDIAC ARRHYTHMIA N/A 10/25/2018    Procedure: CARDIOVERSION OR DEFIBRILLATION;  Surgeon: Justin Colby MD;  Location: [facility];  Service: Cardiology;  Laterality: N/A;     Objective:     Vitals:    09/27/22 1306   BP: (!) 170/118   Pulse: 89   Resp: 18   Temp: 98.1 °F (36.7 °C)     Body mass index is 37.34 kg/m².  Physical Exam  Vitals and nursing note reviewed.   Constitutional:       Appearance: He is well-developed.   HENT:      Head: Normocephalic and atraumatic.   Eyes:      General: No scleral icterus.     Conjunctiva/sclera: Conjunctivae normal.   Cardiovascular:      Heart sounds: No murmur  heard.  Pulmonary:      Effort: Pulmonary effort is normal. No respiratory distress.   Musculoskeletal:         General: No deformity. Normal range of motion.      Cervical back: Normal range of motion and neck supple.   Skin:     Coloration: Skin is not pale.      Findings: No rash.   Neurological:      Mental Status: He is alert and oriented to person, place, and time.   Psychiatric:         Behavior: Behavior normal.         Thought Content: Thought content normal.         Judgment: Judgment normal.     Assessment:     1. Essential hypertension    2. Gout, unspecified cause, unspecified chronicity, unspecified site    3. Paroxysmal atrial fibrillation      Plan:   Essential hypertension  -     lisinopriL (PRINIVIL,ZESTRIL) 40 MG tablet; Take 1 tablet (40 mg total) by mouth once daily.  Dispense: 30 tablet; Refill: 0  -     Hemoglobin; Future; Expected date: 09/27/2022  -     Platelet Count; Future; Expected date: 09/27/2022  -     Comprehensive Metabolic Panel; Future; Expected date: 09/27/2022  -     Lipid Panel; Future; Expected date: 09/27/2022  -     Hemoglobin A1C; Future; Expected date: 09/27/2022  -     TSH; Future; Expected date: 09/27/2022  Pt endorses noncompliance with medication. Misses about 10 days a month. Also endorses alcohol consumption. Counseled on risks of this associated with elevated blood pressure and heart disease. Counseled on close f/u with PCP office within 4 weeks to ensure BP improving. Advised has no signs of end organ damage at this time but if develop CP/SOB/HA/vision changes to present to ED immediately    Gout, unspecified cause, unspecified chronicity, unspecified site  Counseled on diet and treatment plan including prevantive therapy and abortive thearpy. On allopurinol and indomethacin  -     indomethacin (INDOCIN) 50 MG capsule; Take 1 capsule (50 mg total) by mouth 3 (three) times daily as needed (pain).  Dispense: 15 capsule; Refill: 0  Paroxysmal atrial  fibrillation  Strongly advised cardiology follow up. Patient declined    COUNSELED ON IMPORTANCE OF ROUTINE FOLLOW UP WITH PCP. PT ESTABLISHED WITH DR CARLISLE. ADVISED CLOSE F/U AND PROVIDED REFILLS AND ADDRESSED ROUTINE HEALTH MAINTENANCE.    Total time spent of Less than 30 minutes minutes on the day of the visit.This includes face to face time and preparing to see the patient, obtaining and reviewing separately obtained history, documenting clinical information in the electronic or other health record, independently interpreting results and communicating results to the patient/family/caregiver, or care coordinator.      Chris Herman MD  09/27/2022    Portions of this note have been dictated with PARISH Obando

## 2022-09-30 ENCOUNTER — PATIENT MESSAGE (OUTPATIENT)
Dept: FAMILY MEDICINE | Facility: CLINIC | Age: 47
End: 2022-09-30
Payer: COMMERCIAL

## 2022-10-04 ENCOUNTER — LAB VISIT (OUTPATIENT)
Dept: LAB | Facility: HOSPITAL | Age: 47
End: 2022-10-04
Attending: FAMILY MEDICINE
Payer: COMMERCIAL

## 2022-10-04 DIAGNOSIS — I10 ESSENTIAL HYPERTENSION: ICD-10-CM

## 2022-10-04 LAB
ALBUMIN SERPL BCP-MCNC: 3.9 G/DL (ref 3.5–5.2)
ALP SERPL-CCNC: 69 U/L (ref 55–135)
ALT SERPL W/O P-5'-P-CCNC: 16 U/L (ref 10–44)
ANION GAP SERPL CALC-SCNC: 10 MMOL/L (ref 8–16)
AST SERPL-CCNC: 12 U/L (ref 10–40)
BILIRUB SERPL-MCNC: 0.4 MG/DL (ref 0.1–1)
BUN SERPL-MCNC: 11 MG/DL (ref 6–20)
CALCIUM SERPL-MCNC: 9.5 MG/DL (ref 8.7–10.5)
CHLORIDE SERPL-SCNC: 108 MMOL/L (ref 95–110)
CHOLEST SERPL-MCNC: 191 MG/DL (ref 120–199)
CHOLEST/HDLC SERPL: 4.4 {RATIO} (ref 2–5)
CO2 SERPL-SCNC: 26 MMOL/L (ref 23–29)
CREAT SERPL-MCNC: 1 MG/DL (ref 0.5–1.4)
EST. GFR  (NO RACE VARIABLE): >60 ML/MIN/1.73 M^2
ESTIMATED AVG GLUCOSE: 108 MG/DL (ref 68–131)
GLUCOSE SERPL-MCNC: 93 MG/DL (ref 70–110)
HBA1C MFR BLD: 5.4 % (ref 4–5.6)
HDLC SERPL-MCNC: 43 MG/DL (ref 40–75)
HDLC SERPL: 22.5 % (ref 20–50)
HGB BLD-MCNC: 15.6 G/DL (ref 14–18)
LDLC SERPL CALC-MCNC: 133 MG/DL (ref 63–159)
NONHDLC SERPL-MCNC: 148 MG/DL
PLATELET # BLD AUTO: 392 K/UL (ref 150–450)
PMV BLD AUTO: 10.4 FL (ref 9.2–12.9)
POTASSIUM SERPL-SCNC: 4.2 MMOL/L (ref 3.5–5.1)
PROT SERPL-MCNC: 7.1 G/DL (ref 6–8.4)
SODIUM SERPL-SCNC: 144 MMOL/L (ref 136–145)
TRIGL SERPL-MCNC: 75 MG/DL (ref 30–150)
TSH SERPL DL<=0.005 MIU/L-ACNC: 2.39 UIU/ML (ref 0.4–4)

## 2022-10-04 PROCEDURE — 85049 AUTOMATED PLATELET COUNT: CPT | Performed by: FAMILY MEDICINE

## 2022-10-04 PROCEDURE — 83036 HEMOGLOBIN GLYCOSYLATED A1C: CPT | Performed by: FAMILY MEDICINE

## 2022-10-04 PROCEDURE — 80053 COMPREHEN METABOLIC PANEL: CPT | Performed by: FAMILY MEDICINE

## 2022-10-04 PROCEDURE — 84443 ASSAY THYROID STIM HORMONE: CPT | Performed by: FAMILY MEDICINE

## 2022-10-04 PROCEDURE — 80061 LIPID PANEL: CPT | Performed by: FAMILY MEDICINE

## 2022-10-04 PROCEDURE — 36415 COLL VENOUS BLD VENIPUNCTURE: CPT | Mod: PO | Performed by: FAMILY MEDICINE

## 2022-10-04 PROCEDURE — 85018 HEMOGLOBIN: CPT | Performed by: FAMILY MEDICINE

## 2022-10-23 ENCOUNTER — HOSPITAL ENCOUNTER (EMERGENCY)
Facility: HOSPITAL | Age: 47
Discharge: HOME | End: 2022-10-23
Attending: EMERGENCY MEDICINE
Payer: COMMERCIAL

## 2022-10-23 VITALS
RESPIRATION RATE: 18 BRPM | DIASTOLIC BLOOD PRESSURE: 92 MMHG | WEIGHT: 255 LBS | OXYGEN SATURATION: 95 % | HEIGHT: 69 IN | BODY MASS INDEX: 37.77 KG/M2 | HEART RATE: 73 BPM | TEMPERATURE: 98 F | SYSTOLIC BLOOD PRESSURE: 169 MMHG

## 2022-10-23 DIAGNOSIS — Z91.199 MEDICAL NON-COMPLIANCE: ICD-10-CM

## 2022-10-23 DIAGNOSIS — I10 UNCONTROLLED HYPERTENSION: Primary | ICD-10-CM

## 2022-10-23 LAB
ALBUMIN SERPL BCP-MCNC: 3.8 G/DL (ref 3.5–5.2)
ALP SERPL-CCNC: 76 U/L (ref 55–135)
ALT SERPL W/O P-5'-P-CCNC: 15 U/L (ref 10–44)
ANION GAP SERPL CALC-SCNC: 11 MMOL/L (ref 8–16)
AST SERPL-CCNC: 14 U/L (ref 10–40)
BASOPHILS # BLD AUTO: 0.06 K/UL (ref 0–0.2)
BASOPHILS NFR BLD: 0.7 % (ref 0–1.9)
BILIRUB SERPL-MCNC: 0.3 MG/DL (ref 0.1–1)
BUN SERPL-MCNC: 16 MG/DL (ref 6–20)
CALCIUM SERPL-MCNC: 9.5 MG/DL (ref 8.7–10.5)
CHLORIDE SERPL-SCNC: 107 MMOL/L (ref 95–110)
CO2 SERPL-SCNC: 26 MMOL/L (ref 23–29)
CREAT SERPL-MCNC: 1.1 MG/DL (ref 0.5–1.4)
DIFFERENTIAL METHOD: ABNORMAL
EOSINOPHIL # BLD AUTO: 0.4 K/UL (ref 0–0.5)
EOSINOPHIL NFR BLD: 4.3 % (ref 0–8)
ERYTHROCYTE [DISTWIDTH] IN BLOOD BY AUTOMATED COUNT: 13.8 % (ref 11.5–14.5)
EST. GFR  (NO RACE VARIABLE): >60 ML/MIN/1.73 M^2
GLUCOSE SERPL-MCNC: 141 MG/DL (ref 70–110)
HCT VFR BLD AUTO: 45.8 % (ref 40–54)
HGB BLD-MCNC: 15.6 G/DL (ref 14–18)
IMM GRANULOCYTES # BLD AUTO: 0.02 K/UL (ref 0–0.04)
IMM GRANULOCYTES NFR BLD AUTO: 0.2 % (ref 0–0.5)
LYMPHOCYTES # BLD AUTO: 2 K/UL (ref 1–4.8)
LYMPHOCYTES NFR BLD: 22.3 % (ref 18–48)
MCH RBC QN AUTO: 27.1 PG (ref 27–31)
MCHC RBC AUTO-ENTMCNC: 34.1 G/DL (ref 32–36)
MCV RBC AUTO: 80 FL (ref 82–98)
MONOCYTES # BLD AUTO: 0.6 K/UL (ref 0.3–1)
MONOCYTES NFR BLD: 7 % (ref 4–15)
NEUTROPHILS # BLD AUTO: 5.8 K/UL (ref 1.8–7.7)
NEUTROPHILS NFR BLD: 65.5 % (ref 38–73)
NRBC BLD-RTO: 0 /100 WBC
PLATELET # BLD AUTO: 312 K/UL (ref 150–450)
PMV BLD AUTO: 10.1 FL (ref 9.2–12.9)
POTASSIUM SERPL-SCNC: 3.9 MMOL/L (ref 3.5–5.1)
PROT SERPL-MCNC: 7.6 G/DL (ref 6–8.4)
RBC # BLD AUTO: 5.75 M/UL (ref 4.6–6.2)
SODIUM SERPL-SCNC: 144 MMOL/L (ref 136–145)
WBC # BLD AUTO: 8.84 K/UL (ref 3.9–12.7)

## 2022-10-23 PROCEDURE — 85025 COMPLETE CBC W/AUTO DIFF WBC: CPT | Performed by: EMERGENCY MEDICINE

## 2022-10-23 PROCEDURE — 25000003 PHARM REV CODE 250: Performed by: EMERGENCY MEDICINE

## 2022-10-23 PROCEDURE — 36415 COLL VENOUS BLD VENIPUNCTURE: CPT | Performed by: EMERGENCY MEDICINE

## 2022-10-23 PROCEDURE — 99283 EMERGENCY DEPT VISIT LOW MDM: CPT

## 2022-10-23 PROCEDURE — 80053 COMPREHEN METABOLIC PANEL: CPT | Performed by: EMERGENCY MEDICINE

## 2022-10-23 RX ORDER — CLONIDINE HYDROCHLORIDE 0.1 MG/1
0.1 TABLET ORAL
Status: COMPLETED | OUTPATIENT
Start: 2022-10-23 | End: 2022-10-23

## 2022-10-23 RX ADMIN — CLONIDINE HYDROCHLORIDE 0.1 MG: 0.1 TABLET ORAL at 01:10

## 2022-10-23 NOTE — ED PROVIDER NOTES
Encounter Date: 10/23/2022       History     Chief Complaint   Patient presents with    Hypertension     Took lisinopril 40mg and amlodipine 10 mg an hour before coming        46-year-old male with a past medical history of a CVA 4 years ago presents the emergency room for evaluation of hypertension.  Patient states he is noncompliant with his medications and last took them 2-3 days ago.  He does not take it daily.  He is asymptomatic.  He checked his blood pressure tonight and after he had a hypertensive reading his wife encouraged him to go to the emergency room.  He took his amlodipine 10 mg dose as well as his lisinopril    Review of patient's allergies indicates:  No Known Allergies  Past Medical History:   Diagnosis Date    Anticoagulant long-term use     Atrial fibrillation     Gout     Gout, joint     Hypertension     Memory deficit following cerebral infarction     Pneumonia 04/2018    Sleep apnea with use of continuous positive airway pressure (CPAP)     Stroke 04/2018    Syncope and collapse      Past Surgical History:   Procedure Laterality Date    INSERTION OF IMPLANTABLE LOOP RECORDER  10/25/2018    Procedure: Insertion, Implantable Loop Recorder;  Surgeon: Justin Colby MD;  Location: [facility] CATH;  Service: Cardiology;;    LAPAROSCOPIC CHOLECYSTECTOMY N/A 12/14/2018    Procedure: CHOLECYSTECTOMY, LAPAROSCOPIC;  Surgeon: Beck Love MD;  Location: [facility];  Service: General;  Laterality: N/A;  Dr. Love requested case to start at 1:00pm    NASAL POLYP EXCISION      TREATMENT OF CARDIAC ARRHYTHMIA N/A 10/25/2018    Procedure: CARDIOVERSION OR DEFIBRILLATION;  Surgeon: Justin Colby MD;  Location: [facility] CATH;  Service: Cardiology;  Laterality: N/A;     Family History   Problem Relation Age of Onset    Sleep apnea Mother     Hypertension Mother     No Known Problems Brother     No Known Problems Brother     No Known Problems Brother      Social History     Tobacco Use    Smoking status: Never     Smokeless tobacco: Never   Substance Use Topics    Alcohol use: Yes     Comment: socially    Drug use: No     Review of Systems   Constitutional:  Negative for appetite change, diaphoresis, fatigue and fever.   HENT:  Negative for congestion, ear pain, facial swelling, rhinorrhea and sore throat.    Eyes:  Negative for photophobia, pain and visual disturbance.   Respiratory:  Negative for cough and shortness of breath.    Cardiovascular:  Negative for chest pain.   Gastrointestinal:  Negative for abdominal pain, diarrhea, nausea and vomiting.   Genitourinary:  Negative for decreased urine volume and difficulty urinating.   Musculoskeletal:  Negative for arthralgias.   Skin:  Negative for rash.   Neurological:  Negative for dizziness, tremors, seizures, syncope, facial asymmetry, speech difficulty, weakness, light-headedness, numbness and headaches.   All other systems reviewed and are negative.    Physical Exam     Initial Vitals [10/23/22 0028]   BP Pulse Resp Temp SpO2   (!) 195/112 73 20 98 °F (36.7 °C) 97 %      MAP       --         Physical Exam    Nursing note and vitals reviewed.  Constitutional: He appears well-developed and well-nourished. He is diaphoretic. No distress.   HENT:   Head: Normocephalic and atraumatic.   Mouth/Throat: Oropharynx is clear and moist.   Eyes: Conjunctivae and EOM are normal. Pupils are equal, round, and reactive to light.   Neck: Neck supple.   Normal range of motion.  Cardiovascular:  Normal rate, regular rhythm, normal heart sounds and intact distal pulses.     Exam reveals no gallop and no friction rub.       No murmur heard.  Pulmonary/Chest: Breath sounds normal. He has no wheezes. He has no rhonchi. He has no rales.   Abdominal: Abdomen is soft. Bowel sounds are normal. There is no abdominal tenderness.   Musculoskeletal:         General: Normal range of motion.      Cervical back: Normal range of motion and neck supple.     Neurological: He is alert and oriented to person,  place, and time. He has normal strength. No cranial nerve deficit. GCS score is 15. GCS eye subscore is 4. GCS verbal subscore is 5. GCS motor subscore is 6.   Skin: Skin is warm.   Psychiatric: He has a normal mood and affect.       ED Course   Procedures  Labs Reviewed   COMPREHENSIVE METABOLIC PANEL - Abnormal; Notable for the following components:       Result Value    Glucose 141 (*)     All other components within normal limits   CBC W/ AUTO DIFFERENTIAL - Abnormal; Notable for the following components:    MCV 80 (*)     All other components within normal limits          Imaging Results    None          Medications   cloNIDine tablet 0.1 mg (0.1 mg Oral Given 10/23/22 0144)     Medical Decision Making:   Patient has asymptomatic hypertension.  I gave him a clonidine given that his diastolic was greater than 120 however he did take 2 was medications 1 hour prior to arrival.  I had a lengthy conversation with him about medication compliance, keeping a blood pressure diary and following up with primary care.  Blood work was ordered at triage is pending.                        Clinical Impression:   Final diagnoses:  [I10] Uncontrolled hypertension (Primary)  [Z91.199] Medical non-compliance      ED Disposition Condition    Discharge Stable          ED Prescriptions    None       Follow-up Information       Follow up With Specialties Details Why Contact Info    David Quach MD Family Medicine   [address] [facility] [address]  [phone]               Umberto Lopes MD  10/24/22 8312

## 2022-10-23 NOTE — DISCHARGE INSTRUCTIONS
Keep a blood pressure diary with your heart rate and blood pressure 2 to 3 times a day.  Make sure you do this while you are compliant with your medication.  Make sure to follow-up with your regular doctor in the next few days.

## 2022-10-26 ENCOUNTER — HOSPITAL ENCOUNTER (EMERGENCY)
Facility: HOSPITAL | Age: 47
Discharge: HOME | End: 2022-10-26
Attending: EMERGENCY MEDICINE
Payer: COMMERCIAL

## 2022-10-26 VITALS
OXYGEN SATURATION: 90 % | HEIGHT: 69 IN | WEIGHT: 250 LBS | DIASTOLIC BLOOD PRESSURE: 75 MMHG | RESPIRATION RATE: 25 BRPM | TEMPERATURE: 99 F | HEART RATE: 107 BPM | BODY MASS INDEX: 37.03 KG/M2 | SYSTOLIC BLOOD PRESSURE: 123 MMHG

## 2022-10-26 DIAGNOSIS — J11.1 INFLUENZA: Primary | ICD-10-CM

## 2022-10-26 DIAGNOSIS — I10 HYPERTENSION, UNSPECIFIED TYPE: ICD-10-CM

## 2022-10-26 LAB
GROUP A STREP, MOLECULAR: NEGATIVE
INFLUENZA A, MOLECULAR: POSITIVE
INFLUENZA B, MOLECULAR: NEGATIVE
SARS-COV-2 RDRP RESP QL NAA+PROBE: NEGATIVE
SPECIMEN SOURCE: ABNORMAL

## 2022-10-26 PROCEDURE — 99283 EMERGENCY DEPT VISIT LOW MDM: CPT

## 2022-10-26 PROCEDURE — 87502 INFLUENZA DNA AMP PROBE: CPT | Performed by: EMERGENCY MEDICINE

## 2022-10-26 PROCEDURE — 87651 STREP A DNA AMP PROBE: CPT | Performed by: EMERGENCY MEDICINE

## 2022-10-26 PROCEDURE — 25000003 PHARM REV CODE 250: Performed by: EMERGENCY MEDICINE

## 2022-10-26 PROCEDURE — U0002 COVID-19 LAB TEST NON-CDC: HCPCS | Performed by: EMERGENCY MEDICINE

## 2022-10-26 RX ORDER — OSELTAMIVIR PHOSPHATE 75 MG/1
75 CAPSULE ORAL 2 TIMES DAILY
Qty: 10 CAPSULE | Refills: 0 | Status: SHIPPED | OUTPATIENT
Start: 2022-10-26 | End: 2022-10-26 | Stop reason: SDUPTHER

## 2022-10-26 RX ORDER — OSELTAMIVIR PHOSPHATE 75 MG/1
75 CAPSULE ORAL 2 TIMES DAILY
Qty: 10 CAPSULE | Refills: 0 | Status: SHIPPED | OUTPATIENT
Start: 2022-10-26 | End: 2022-10-31

## 2022-10-26 RX ORDER — ACETAMINOPHEN 500 MG
1000 TABLET ORAL
Status: COMPLETED | OUTPATIENT
Start: 2022-10-26 | End: 2022-10-26

## 2022-10-26 RX ORDER — IBUPROFEN 400 MG/1
800 TABLET ORAL
Status: COMPLETED | OUTPATIENT
Start: 2022-10-26 | End: 2022-10-26

## 2022-10-26 RX ADMIN — ACETAMINOPHEN 1000 MG: 500 TABLET, FILM COATED ORAL at 07:10

## 2022-10-26 RX ADMIN — IBUPROFEN 800 MG: 400 TABLET, FILM COATED ORAL at 07:10

## 2022-10-26 NOTE — ED PROVIDER NOTES
Encounter Date: 10/26/2022       History     Chief Complaint   Patient presents with    Sore Throat     X3 days w/ cough. No fever noted from home.      46-year-old male has a history of AFib, gout, hypertension, sleep apnea and prior stroke, with complaints of having fever that began yesterday.  Patient has had some complaint of sore throat.  He has had some nonproductive cough the last 3 days.  No chest pain.  He has had some slight shortness of breath.  No complaint of any abdominal pain nausea vomiting or diarrhea.  No trouble urinating.  The patient has been eating normally.  He has not had influenza vaccine.    Review of patient's allergies indicates:  No Known Allergies  Past Medical History:   Diagnosis Date    Anticoagulant long-term use     Atrial fibrillation     Gout     Gout, joint     Hypertension     Memory deficit following cerebral infarction     Pneumonia 04/2018    Sleep apnea with use of continuous positive airway pressure (CPAP)     Stroke 04/2018    Syncope and collapse      Past Surgical History:   Procedure Laterality Date    INSERTION OF IMPLANTABLE LOOP RECORDER  10/25/2018    Procedure: Insertion, Implantable Loop Recorder;  Surgeon: Justin Colby MD;  Location: [facility] CATH;  Service: Cardiology;;    LAPAROSCOPIC CHOLECYSTECTOMY N/A 12/14/2018    Procedure: CHOLECYSTECTOMY, LAPAROSCOPIC;  Surgeon: Beck Love MD;  Location: [facility];  Service: General;  Laterality: N/A;  Dr. Love requested case to start at 1:00pm    NASAL POLYP EXCISION      TREATMENT OF CARDIAC ARRHYTHMIA N/A 10/25/2018    Procedure: CARDIOVERSION OR DEFIBRILLATION;  Surgeon: Justin Colby MD;  Location: [facility] CATH;  Service: Cardiology;  Laterality: N/A;     Family History   Problem Relation Age of Onset    Sleep apnea Mother     Hypertension Mother     No Known Problems Brother     No Known Problems Brother     No Known Problems Brother      Social History     Tobacco Use    Smoking status: Never    Smokeless  tobacco: Never   Substance Use Topics    Alcohol use: Yes     Comment: socially    Drug use: No     Review of Systems   Constitutional:  Positive for fever. Negative for appetite change and diaphoresis.   HENT:  Positive for sore throat. Negative for congestion and trouble swallowing.    Eyes:  Negative for pain.   Respiratory:  Positive for cough and shortness of breath.    Cardiovascular:  Negative for chest pain.   Gastrointestinal:  Negative for abdominal pain, constipation, diarrhea, nausea and vomiting.   Genitourinary:  Negative for difficulty urinating.   Musculoskeletal:  Positive for myalgias.   Skin:  Negative for pallor, rash and wound.   Neurological:  Negative for headaches.   All other systems reviewed and are negative.    Physical Exam     Initial Vitals [10/26/22 0619]   BP Pulse Resp Temp SpO2   (!) 180/131 (!) 130 20 (!) 101.2 °F (38.4 °C) (!) 92 %      MAP       --         Physical Exam    Vitals reviewed.  Constitutional: He appears well-developed and well-nourished. He is not diaphoretic. No distress.   HENT:   Head: Normocephalic and atraumatic.   Right Ear: External ear normal.   Left Ear: External ear normal.   Nose: Nose normal.   Mouth/Throat: Oropharynx is clear and moist.   Eyes: Conjunctivae and EOM are normal. Pupils are equal, round, and reactive to light.   Neck: Neck supple. No JVD present.   Normal range of motion.  Cardiovascular:  Normal rate, regular rhythm, normal heart sounds and intact distal pulses.     Exam reveals no gallop and no friction rub.       No murmur heard.  Pulmonary/Chest: Breath sounds normal. No respiratory distress. He has no wheezes. He has no rhonchi. He has no rales. He exhibits no tenderness.   Abdominal: Abdomen is soft. Bowel sounds are normal. He exhibits no distension. There is no abdominal tenderness. There is no rebound and no guarding.   Musculoskeletal:         General: No tenderness or edema. Normal range of motion.      Cervical back: Normal  range of motion and neck supple.     Lymphadenopathy:     He has no cervical adenopathy.   Neurological: He is alert and oriented to person, place, and time. He has normal strength. GCS score is 15. GCS eye subscore is 4. GCS verbal subscore is 5. GCS motor subscore is 6.   Skin: Skin is dry. Capillary refill takes less than 2 seconds. No rash noted. No erythema. No pallor.   Skin is hot   Psychiatric: He has a normal mood and affect. His behavior is normal. Judgment and thought content normal.       ED Course   Procedures  Labs Reviewed   INFLUENZA A AND B ANTIGEN - Abnormal; Notable for the following components:       Result Value    Influenza A, Molecular Positive (*)     All other components within normal limits    Narrative:      FLU A POS  critical result(s) called and verbal readback obtained   from Taylor Colindres RN-ED by KOLBY 10/26/2022 08:31   GROUP A STREP, MOLECULAR   SARS-COV-2 RNA AMPLIFICATION, QUAL   POCT INFLUENZA A/B MOLECULAR          Imaging Results    None          Medications   acetaminophen tablet 1,000 mg (1,000 mg Oral Given 10/26/22 0722)   ibuprofen tablet 800 mg (800 mg Oral Given 10/26/22 0722)                Attending Attestation:             Attending ED Notes:   This 46-year-old male who presented with complaints of fever in some sore throat, has a positive influenza screen.  COVID screen and strep screen are both negative.  The patient's blood pressure was noticeably elevated upon presentation but improved not requiring any further antihypertensive medication.  Patient is counseled that because influenza is a viral infection antibiotics would be of little utility.  He will be given a prescription for Tamiflu.  He is to encourage fluids and take Tylenol every 4 hours ibuprofen every 6 hours for fever over 100.8°.  Delsym cough syrup is suggested twice daily.                 Clinical Impression:   Final diagnoses:  [J11.1] Influenza (Primary)  [I10] Hypertension, unspecified type      ED  Disposition Condition    Discharge Stable          ED Prescriptions       Medication Sig Dispense Start Date End Date Auth. Provider    oseltamivir (TAMIFLU) 75 MG capsule Take 1 capsule (75 mg total) by mouth 2 (two) times daily. for 5 days 10 capsule 10/26/2022 10/31/2022 Galo Decker Jr., MD          Follow-up Information       Follow up With Specialties Details Why Contact Info    David Quach MD Family Medicine Schedule an appointment as soon as possible for a visit  As needed [address] [facility] [address]  [phone]               Galo Decker Jr., MD  10/26/22 0838

## 2022-10-26 NOTE — DISCHARGE INSTRUCTIONS
Bed rest, encourage oral fluids.  Tylenol every 4 hours and ibuprofen every 6 hours for fever over 100.8°.  Take Tamiflu twice daily as directed.  Delsym cough syrup twice daily.  Return to the ER as needed.  Make sure you take your blood pressure medication daily without fail.  Monitor your blood pressure frequently and keep a log of your pressure for your follow-up visit with your primary physician.  Take the blood pressure in the same position same arm each day.

## 2022-11-15 ENCOUNTER — OFFICE VISIT (OUTPATIENT)
Dept: PSYCHIATRY | Facility: CLINIC | Age: 47
End: 2022-11-15
Payer: COMMERCIAL

## 2022-11-15 DIAGNOSIS — F43.21 ADJUSTMENT DISORDER WITH DEPRESSED MOOD: Primary | ICD-10-CM

## 2022-11-15 DIAGNOSIS — F19.20 ADDICTION: ICD-10-CM

## 2022-11-15 PROCEDURE — 90837 PSYTX W PT 60 MINUTES: CPT | Mod: S$GLB,,, | Performed by: SOCIAL WORKER

## 2022-11-15 PROCEDURE — 1159F PR MEDICATION LIST DOCUMENTED IN MEDICAL RECORD: ICD-10-PCS | Mod: CPTII,S$GLB,, | Performed by: SOCIAL WORKER

## 2022-11-15 PROCEDURE — 99999 PR PBB SHADOW E&M-EST. PATIENT-LVL II: ICD-10-PCS | Mod: PBBFAC,,, | Performed by: SOCIAL WORKER

## 2022-11-15 PROCEDURE — 1159F MED LIST DOCD IN RCRD: CPT | Mod: CPTII,S$GLB,, | Performed by: SOCIAL WORKER

## 2022-11-15 PROCEDURE — 4010F PR ACE/ARB THEARPY RXD/TAKEN: ICD-10-PCS | Mod: CPTII,S$GLB,, | Performed by: SOCIAL WORKER

## 2022-11-15 PROCEDURE — 3044F HG A1C LEVEL LT 7.0%: CPT | Mod: CPTII,S$GLB,, | Performed by: SOCIAL WORKER

## 2022-11-15 PROCEDURE — 99999 PR PBB SHADOW E&M-EST. PATIENT-LVL II: CPT | Mod: PBBFAC,,, | Performed by: SOCIAL WORKER

## 2022-11-15 PROCEDURE — 4010F ACE/ARB THERAPY RXD/TAKEN: CPT | Mod: CPTII,S$GLB,, | Performed by: SOCIAL WORKER

## 2022-11-15 PROCEDURE — 90837 PR PSYCHOTHERAPY W/PATIENT, 60 MIN: ICD-10-PCS | Mod: S$GLB,,, | Performed by: SOCIAL WORKER

## 2022-11-15 PROCEDURE — 3044F PR MOST RECENT HEMOGLOBIN A1C LEVEL <7.0%: ICD-10-PCS | Mod: CPTII,S$GLB,, | Performed by: SOCIAL WORKER

## 2022-11-15 NOTE — PROGRESS NOTES
"Individual Psychotherapy (PhD/LCSW)    11/15/2022    Site:  [place]         Therapeutic Intervention: Met with patient.  Outpatient - Insight oriented psychotherapy 60 min - CPT code 68588, Outpatient - Behavior modifying psychotherapy 60 min - CPT code 13732, and Outpatient - Supportive psychotherapy 60 min - CPT Code 57834    Chief complaint/reason for encounter: depression and porn addiction           Interval history and content of current session: Originally, Ct was referred to treatment by his psychologist for depression and anxiety. He was also encouraged to come to therapy for porn addiction at the request of his wife. SW has not seen this ct in over 1 year. SW reviewed original assessment with ct. Ct is still , his kids still are in the home with him. He loss his job in in June of 2022 and found another one in August of 2022. 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 Ct denied SI/HI/AVH/SA. Ct reported that he is watching porn daily, at least 5 days per week. His longest pd of abstinence was 1 week. He had difficulty identifying what kept him abstinent during that week. SW and ct processed the importance of ct identify things that can keep him "sober" as well as identifying his triggers. Ct reported that one of his main triggers was boredom. Ct and SW developed treatment plan. Ct reported that he wanted to come in 1x per month. Sw recommend 2x per month however, ct wants to come in 1x per month. SW encouraged ct being honest with his wife about his tx sessions. SW and ct will work out of Sex addiction workbook curriculum. Sw and ct processed the definition of a sex addiction and ct completed a screening. Ct scored a 4 on the screening which indicates that he has a significant problem. SW provided ct with workbook packet to complete, trigger identification handouts and things to do when triggered. Upon next session, SW will determine if this level of care is appropriate.       Treatment plan:  Target " symptoms: depression, porn addiction  Why chosen therapy is appropriate versus another modality: relevant to diagnosis, patient responds to this modality, evidence based practice  Outcome monitoring methods: self-report  Therapeutic intervention type: insight oriented psychotherapy, behavior modifying psychotherapy, supportive psychotherapy    Risk parameters:  Patient reports no suicidal ideation  Patient reports no homicidal ideation  Patient reports no self-injurious behavior  Patient reports no violent behavior          CSSRS was completed: No risk    Mental Status Exam:  General Appearance:  unremarkable, age appropriate   Speech: normal tone, normal rate, normal pitch, normal volume      Level of Cooperation: cooperative      Thought Processes: normal and logical   Mood: steady      Thought Content: normal, no suicidality, no homicidality, delusions, or paranoia   Affect: congruent and appropriate   Orientation: Oriented x3   Memory: recent >  intact   Attention Span & Concentration: intact   Fund of General Knowledge: intact and appropriate to age and level of education   Abstract Reasoning: interpretation of similarities was abstract   Judgment & Insight: fair, intact     Language intact             Verbal deficits: None    Patient's response to intervention:  The patient's response to intervention is accepting.    Progress toward goals and other mental status changes:  The patient's progress toward goals is limited.    Diagnosis:     ICD-10-CM ICD-9-CM   1. Adjustment disorder with depressed mood  F43.21 309.0   2. Addiction  F19.20 [id number]       Plan:  individual psychotherapy and Ct to follow up with PCP for anti depressant  Pt to go to ED or call 911 if symptoms worsen or if he has thoughts of harming self and/or others. Pt verbalized understanding.    Return to clinic: as scheduled    Length of Service (minutes): 60      Each patient to whom he or she provides medical services by telemedicine is: (1)  informed of the relationship between the physician and patient and the respective role of any other health care provider with respect to management of the patient; and (2) notified that he or she may decline to receive medical services by telemedicine and may withdraw from such care at any time.

## 2022-12-14 ENCOUNTER — OFFICE VISIT (OUTPATIENT)
Dept: PSYCHIATRY | Facility: CLINIC | Age: 47
End: 2022-12-14
Payer: COMMERCIAL

## 2022-12-14 DIAGNOSIS — F19.20 ADDICTION: Primary | ICD-10-CM

## 2022-12-14 DIAGNOSIS — F32.A DEPRESSION, UNSPECIFIED DEPRESSION TYPE: ICD-10-CM

## 2022-12-14 PROCEDURE — 3044F PR MOST RECENT HEMOGLOBIN A1C LEVEL <7.0%: ICD-10-PCS | Mod: CPTII,S$GLB,, | Performed by: SOCIAL WORKER

## 2022-12-14 PROCEDURE — 1159F MED LIST DOCD IN RCRD: CPT | Mod: CPTII,S$GLB,, | Performed by: SOCIAL WORKER

## 2022-12-14 PROCEDURE — 99999 PR PBB SHADOW E&M-EST. PATIENT-LVL II: CPT | Mod: PBBFAC,,, | Performed by: SOCIAL WORKER

## 2022-12-14 PROCEDURE — 3044F HG A1C LEVEL LT 7.0%: CPT | Mod: CPTII,S$GLB,, | Performed by: SOCIAL WORKER

## 2022-12-14 PROCEDURE — 4010F ACE/ARB THERAPY RXD/TAKEN: CPT | Mod: CPTII,S$GLB,, | Performed by: SOCIAL WORKER

## 2022-12-14 PROCEDURE — 90834 PSYTX W PT 45 MINUTES: CPT | Mod: S$GLB,,, | Performed by: SOCIAL WORKER

## 2022-12-14 PROCEDURE — 99999 PR PBB SHADOW E&M-EST. PATIENT-LVL II: ICD-10-PCS | Mod: PBBFAC,,, | Performed by: SOCIAL WORKER

## 2022-12-14 PROCEDURE — 1159F PR MEDICATION LIST DOCUMENTED IN MEDICAL RECORD: ICD-10-PCS | Mod: CPTII,S$GLB,, | Performed by: SOCIAL WORKER

## 2022-12-14 PROCEDURE — 4010F PR ACE/ARB THEARPY RXD/TAKEN: ICD-10-PCS | Mod: CPTII,S$GLB,, | Performed by: SOCIAL WORKER

## 2022-12-14 PROCEDURE — 90834 PR PSYCHOTHERAPY W/PATIENT, 45 MIN: ICD-10-PCS | Mod: S$GLB,,, | Performed by: SOCIAL WORKER

## 2022-12-14 NOTE — PROGRESS NOTES
Individual Psychotherapy (PhD/LCSW)    12/14/2022    Site:  [place]         Therapeutic Intervention: Met with patient.  Outpatient - Insight oriented psychotherapy 45 min - CPT code 10450, Outpatient - Behavior modifying psychotherapy 45 min - CPT code 22695, and Outpatient - Supportive psychotherapy 45 min - CPT Code 87714    Chief complaint/reason for encounter: addictive disorder and depression             Interval history and content of current session:  Ct was referred to treatment by his psychologist for depression and anxiety. He was also encouraged to come to therapy for porn addiction at the request of his wife. Ct arrived to session on time and was fully engaged. Ct shared that he has not watched porn in 3 weeks. He was unable to directly state what was contributing to his abstiance. SW asked ct to id triggers. Ct had difficulty doing so. Ct was eventually able to identity early afternoon, boredom, provocatively dressed women as external triggers. When asked about his addiction, ct would speak on it minimally then change the subject. He reported that he no longer wants to do it and he's done with porn. He reported that he could not believe that he would do that. Ct was guarded and evasive regarding tx of his addiction. Ct continued to report having issues with reconciling how his life was before his stroke. He reported that he felt as if he could take on the world and he now feels that he can't do anything and has minimal drive. He was unsure if he was depressed. He deferred to what his wife tells him about himself when SW asked things about self care. Ct asked to come in every 6 weeks.       Treatment plan:  Target symptoms: depression, addictive d/o, marital issues  Why chosen therapy is appropriate versus another modality: relevant to diagnosis, patient responds to this modality, evidence based practice  Outcome monitoring methods: self-report  Therapeutic intervention type: insight oriented  psychotherapy, behavior modifying psychotherapy, supportive psychotherapy    Risk parameters:  Patient reports no suicidal ideation  Patient reports no homicidal ideation  Patient reports no self-injurious behavior  Patient reports no violent behavior    CSSRS was completed: No risk    Mental Status Exam:  General Appearance:  unremarkable, age appropriate   Speech: normal tone, normal rate, normal pitch, normal volume      Level of Cooperation: cooperative, guarded      Thought Processes: normal and logical   Mood: steady      Thought Content: normal, no suicidality, no homicidality, delusions, or paranoia   Affect: congruent and appropriate   Orientation: Oriented x3   Memory: recent >  intact   Attention Span & Concentration: intact   Fund of General Knowledge: intact and appropriate to age and level of education   Abstract Reasoning: interpretation of similarities was abstract   Judgment & Insight: fair, limited     Language intact       Verbal deficits: None    Patient's response to intervention:  The patient's response to intervention is guarded.    Progress toward goals and other mental status changes:  The patient's progress toward goals is  Unknown .    Diagnosis:     ICD-10-CM ICD-9-CM   1. Addiction  F19.20 [id number]   2. Depression, unspecified depression type  F32.A 311       Plan:  individual psychotherapy Pt to go to ED or call 911 if symptoms worsen or if he has thoughts of harming self and/or others. Pt verbalized understanding.    Return to clinic: as scheduled    Length of Service (minutes): 45      Each patient to whom he or she provides medical services by telemedicine is: (1) informed of the relationship between the physician and patient and the respective role of any other health care provider with respect to management of the patient; and (2) notified that he or she may decline to receive medical services by telemedicine and may withdraw from such care at any time.

## 2022-12-30 ENCOUNTER — PATIENT MESSAGE (OUTPATIENT)
Dept: ADMINISTRATIVE | Facility: HOSPITAL | Age: 47
End: 2022-12-30
Payer: COMMERCIAL

## 2023-01-04 ENCOUNTER — OFFICE VISIT (OUTPATIENT)
Dept: FAMILY MEDICINE | Facility: CLINIC | Age: 48
End: 2023-01-04
Payer: COMMERCIAL

## 2023-01-04 VITALS
HEIGHT: 69 IN | OXYGEN SATURATION: 96 % | WEIGHT: 266.56 LBS | HEART RATE: 82 BPM | RESPIRATION RATE: 18 BRPM | SYSTOLIC BLOOD PRESSURE: 126 MMHG | DIASTOLIC BLOOD PRESSURE: 84 MMHG | BODY MASS INDEX: 39.48 KG/M2

## 2023-01-04 DIAGNOSIS — Z12.11 ENCOUNTER FOR SCREENING FOR MALIGNANT NEOPLASM OF COLON: ICD-10-CM

## 2023-01-04 DIAGNOSIS — Z00.00 ROUTINE GENERAL MEDICAL EXAMINATION AT A HEALTH CARE FACILITY: Primary | ICD-10-CM

## 2023-01-04 DIAGNOSIS — I10 ESSENTIAL HYPERTENSION: ICD-10-CM

## 2023-01-04 PROCEDURE — 1160F RVW MEDS BY RX/DR IN RCRD: CPT | Mod: CPTII,S$GLB,, | Performed by: STUDENT IN AN ORGANIZED HEALTH CARE EDUCATION/TRAINING PROGRAM

## 2023-01-04 PROCEDURE — 4010F ACE/ARB THERAPY RXD/TAKEN: CPT | Mod: CPTII,S$GLB,, | Performed by: STUDENT IN AN ORGANIZED HEALTH CARE EDUCATION/TRAINING PROGRAM

## 2023-01-04 PROCEDURE — 3079F PR MOST RECENT DIASTOLIC BLOOD PRESSURE 80-89 MM HG: ICD-10-PCS | Mod: CPTII,S$GLB,, | Performed by: STUDENT IN AN ORGANIZED HEALTH CARE EDUCATION/TRAINING PROGRAM

## 2023-01-04 PROCEDURE — 1159F MED LIST DOCD IN RCRD: CPT | Mod: CPTII,S$GLB,, | Performed by: STUDENT IN AN ORGANIZED HEALTH CARE EDUCATION/TRAINING PROGRAM

## 2023-01-04 PROCEDURE — 3008F BODY MASS INDEX DOCD: CPT | Mod: CPTII,S$GLB,, | Performed by: STUDENT IN AN ORGANIZED HEALTH CARE EDUCATION/TRAINING PROGRAM

## 2023-01-04 PROCEDURE — 4010F PR ACE/ARB THEARPY RXD/TAKEN: ICD-10-PCS | Mod: CPTII,S$GLB,, | Performed by: STUDENT IN AN ORGANIZED HEALTH CARE EDUCATION/TRAINING PROGRAM

## 2023-01-04 PROCEDURE — 3008F PR BODY MASS INDEX (BMI) DOCUMENTED: ICD-10-PCS | Mod: CPTII,S$GLB,, | Performed by: STUDENT IN AN ORGANIZED HEALTH CARE EDUCATION/TRAINING PROGRAM

## 2023-01-04 PROCEDURE — 3079F DIAST BP 80-89 MM HG: CPT | Mod: CPTII,S$GLB,, | Performed by: STUDENT IN AN ORGANIZED HEALTH CARE EDUCATION/TRAINING PROGRAM

## 2023-01-04 PROCEDURE — 3074F SYST BP LT 130 MM HG: CPT | Mod: CPTII,S$GLB,, | Performed by: STUDENT IN AN ORGANIZED HEALTH CARE EDUCATION/TRAINING PROGRAM

## 2023-01-04 PROCEDURE — 1160F PR REVIEW ALL MEDS BY PRESCRIBER/CLIN PHARMACIST DOCUMENTED: ICD-10-PCS | Mod: CPTII,S$GLB,, | Performed by: STUDENT IN AN ORGANIZED HEALTH CARE EDUCATION/TRAINING PROGRAM

## 2023-01-04 PROCEDURE — 3074F PR MOST RECENT SYSTOLIC BLOOD PRESSURE < 130 MM HG: ICD-10-PCS | Mod: CPTII,S$GLB,, | Performed by: STUDENT IN AN ORGANIZED HEALTH CARE EDUCATION/TRAINING PROGRAM

## 2023-01-04 PROCEDURE — 99999 PR PBB SHADOW E&M-EST. PATIENT-LVL IV: CPT | Mod: PBBFAC,,, | Performed by: STUDENT IN AN ORGANIZED HEALTH CARE EDUCATION/TRAINING PROGRAM

## 2023-01-04 PROCEDURE — 99396 PREV VISIT EST AGE 40-64: CPT | Mod: S$GLB,,, | Performed by: STUDENT IN AN ORGANIZED HEALTH CARE EDUCATION/TRAINING PROGRAM

## 2023-01-04 PROCEDURE — 99999 PR PBB SHADOW E&M-EST. PATIENT-LVL IV: ICD-10-PCS | Mod: PBBFAC,,, | Performed by: STUDENT IN AN ORGANIZED HEALTH CARE EDUCATION/TRAINING PROGRAM

## 2023-01-04 PROCEDURE — 1159F PR MEDICATION LIST DOCUMENTED IN MEDICAL RECORD: ICD-10-PCS | Mod: CPTII,S$GLB,, | Performed by: STUDENT IN AN ORGANIZED HEALTH CARE EDUCATION/TRAINING PROGRAM

## 2023-01-04 PROCEDURE — 99396 PR PREVENTIVE VISIT,EST,40-64: ICD-10-PCS | Mod: S$GLB,,, | Performed by: STUDENT IN AN ORGANIZED HEALTH CARE EDUCATION/TRAINING PROGRAM

## 2023-01-04 RX ORDER — AMLODIPINE BESYLATE 10 MG/1
10 TABLET ORAL DAILY
Qty: 90 TABLET | Refills: 3 | Status: ON HOLD | OUTPATIENT
Start: 2023-01-04 | End: 2023-03-26 | Stop reason: HOSPADM

## 2023-01-04 RX ORDER — LISINOPRIL 40 MG/1
40 TABLET ORAL DAILY
Qty: 90 TABLET | Refills: 3 | Status: ON HOLD | OUTPATIENT
Start: 2023-01-04 | End: 2023-03-26 | Stop reason: HOSPADM

## 2023-01-04 NOTE — PROGRESS NOTES
"[facility] CLINIC NOTE    Patient Name: Josue Mir  YOB: 1975    PRESENTING HISTORY     History of Present Illness:  Mr. Josue Mir is a 47 y.o. male here for annual.     HTN- better BP compliance.    BP at target.     Gout- no recent flares. Wasn't taking allopurinol appropriately. Recommend cessation and PRN therapy.     Reviewed cancer screening indications. No fhx cancers except pancreatic in a non-primary relative.   Will do C-scope after discussion of options.     ROS      OBJECTIVE:   Vital Signs:  Vitals:    01/04/23 1125   BP: 126/84   Pulse: 82   Resp: 18   SpO2: 96%   Weight: 120.9 kg (266 lb 8.6 oz)   Height: 5' 9" (1.753 m)          Physical Exam: Normal, no change.     Physical Exam    ASSESSMENT & PLAN:     Routine general medical examination at a health care facility    Essential hypertension  -     amLODIPine (NORVASC) 10 MG tablet; Take 1 tablet (10 mg total) by mouth once daily.  Dispense: 90 tablet; Refill: 3  -     lisinopriL (PRINIVIL,ZESTRIL) 40 MG tablet; Take 1 tablet (40 mg total) by mouth once daily.  Dispense: 90 tablet; Refill: 3    Encounter for screening for malignant neoplasm of colon  -     Case Request Endoscopy: COLONOSCOPY        David Quach MD   Internal Medicine            "

## 2023-01-10 ENCOUNTER — PATIENT MESSAGE (OUTPATIENT)
Dept: GASTROENTEROLOGY | Facility: CLINIC | Age: 48
End: 2023-01-10
Payer: COMMERCIAL

## 2023-01-10 ENCOUNTER — TELEPHONE (OUTPATIENT)
Dept: GASTROENTEROLOGY | Facility: CLINIC | Age: 48
End: 2023-01-10
Payer: COMMERCIAL

## 2023-01-11 ENCOUNTER — TELEPHONE (OUTPATIENT)
Dept: GASTROENTEROLOGY | Facility: CLINIC | Age: 48
End: 2023-01-11
Payer: COMMERCIAL

## 2023-01-11 NOTE — TELEPHONE ENCOUNTER
Colonoscopy 3/21 at 10am arrive for 9am instructions reviewed and patient states understanding. Copy to portal.

## 2023-03-09 ENCOUNTER — TELEPHONE (OUTPATIENT)
Dept: GASTROENTEROLOGY | Facility: CLINIC | Age: 48
End: 2023-03-09
Payer: COMMERCIAL

## 2023-03-09 ENCOUNTER — PATIENT MESSAGE (OUTPATIENT)
Dept: GASTROENTEROLOGY | Facility: CLINIC | Age: 48
End: 2023-03-09
Payer: COMMERCIAL

## 2023-03-09 NOTE — TELEPHONE ENCOUNTER
----- Message from Velvet Cunningham sent at 3/9/2023  9:31 AM CST -----  Regarding: pt concerns  Name of Who is Calling:BELA HILARIO [[id number]]          What is the request in detail: pt have some concerns about his procedure           Can the clinic reply by [facility]: no           [facility] [phone] (home)

## 2023-03-21 ENCOUNTER — HOSPITAL ENCOUNTER (INPATIENT)
Facility: HOSPITAL | Age: 48
LOS: 5 days | Discharge: HOME | DRG: 308 | End: 2023-03-26
Attending: EMERGENCY MEDICINE | Admitting: INTERNAL MEDICINE
Payer: COMMERCIAL

## 2023-03-21 DIAGNOSIS — I50.43 ACUTE ON CHRONIC COMBINED SYSTOLIC AND DIASTOLIC HEART FAILURE: ICD-10-CM

## 2023-03-21 DIAGNOSIS — J96.01 ACUTE HYPOXEMIC RESPIRATORY FAILURE: ICD-10-CM

## 2023-03-21 DIAGNOSIS — R00.0 RAPID HEART BEAT: ICD-10-CM

## 2023-03-21 DIAGNOSIS — R94.31 PROLONGATION OF QRS COMPLEX ON ELECTROCARDIOGRAPHY: ICD-10-CM

## 2023-03-21 DIAGNOSIS — R07.9 CHEST PAIN: ICD-10-CM

## 2023-03-21 DIAGNOSIS — I48.91 ATRIAL FIBRILLATION WITH RVR: Primary | ICD-10-CM

## 2023-03-21 LAB
ANION GAP SERPL CALC-SCNC: 11 MMOL/L (ref 8–16)
BASOPHILS # BLD AUTO: 0.06 K/UL (ref 0–0.2)
BASOPHILS NFR BLD: 0.6 % (ref 0–1.9)
BUN SERPL-MCNC: 15 MG/DL (ref 6–20)
CALCIUM SERPL-MCNC: 9.2 MG/DL (ref 8.7–10.5)
CHLORIDE SERPL-SCNC: 109 MMOL/L (ref 95–110)
CO2 SERPL-SCNC: 17 MMOL/L (ref 23–29)
CREAT SERPL-MCNC: 1.1 MG/DL (ref 0.5–1.4)
DIFFERENTIAL METHOD: ABNORMAL
EOSINOPHIL # BLD AUTO: 0.3 K/UL (ref 0–0.5)
EOSINOPHIL NFR BLD: 2.8 % (ref 0–8)
ERYTHROCYTE [DISTWIDTH] IN BLOOD BY AUTOMATED COUNT: 13.2 % (ref 11.5–14.5)
EST. GFR  (NO RACE VARIABLE): >60 ML/MIN/1.73 M^2
GLUCOSE SERPL-MCNC: 104 MG/DL (ref 70–110)
HCT VFR BLD AUTO: 44.7 % (ref 40–54)
HGB BLD-MCNC: 14.8 G/DL (ref 14–18)
IMM GRANULOCYTES # BLD AUTO: 0.03 K/UL (ref 0–0.04)
IMM GRANULOCYTES NFR BLD AUTO: 0.3 % (ref 0–0.5)
INR PPP: 1.1 (ref 0.8–1.2)
LYMPHOCYTES # BLD AUTO: 1.7 K/UL (ref 1–4.8)
LYMPHOCYTES NFR BLD: 17.5 % (ref 18–48)
MCH RBC QN AUTO: 26.8 PG (ref 27–31)
MCHC RBC AUTO-ENTMCNC: 33.1 G/DL (ref 32–36)
MCV RBC AUTO: 81 FL (ref 82–98)
MONOCYTES # BLD AUTO: 0.8 K/UL (ref 0.3–1)
MONOCYTES NFR BLD: 7.6 % (ref 4–15)
NEUTROPHILS # BLD AUTO: 7 K/UL (ref 1.8–7.7)
NEUTROPHILS NFR BLD: 71.2 % (ref 38–73)
NRBC BLD-RTO: 0 /100 WBC
PLATELET # BLD AUTO: 299 K/UL (ref 150–450)
PMV BLD AUTO: 10.7 FL (ref 9.2–12.9)
POTASSIUM SERPL-SCNC: 4 MMOL/L (ref 3.5–5.1)
PROTHROMBIN TIME: 11.8 SEC (ref 9–12.5)
RBC # BLD AUTO: 5.52 M/UL (ref 4.6–6.2)
SODIUM SERPL-SCNC: 137 MMOL/L (ref 136–145)
WBC # BLD AUTO: 9.87 K/UL (ref 3.9–12.7)

## 2023-03-21 PROCEDURE — 93010 EKG 12-LEAD: ICD-10-PCS | Mod: ,,, | Performed by: SPECIALIST

## 2023-03-21 PROCEDURE — 20600001 HC STEP DOWN PRIVATE ROOM

## 2023-03-21 PROCEDURE — 93005 ELECTROCARDIOGRAM TRACING: CPT

## 2023-03-21 PROCEDURE — 96365 THER/PROPH/DIAG IV INF INIT: CPT

## 2023-03-21 PROCEDURE — 80048 BASIC METABOLIC PNL TOTAL CA: CPT | Performed by: EMERGENCY MEDICINE

## 2023-03-21 PROCEDURE — 25000003 PHARM REV CODE 250: Performed by: SPECIALIST

## 2023-03-21 PROCEDURE — 63600175 PHARM REV CODE 636 W HCPCS: Performed by: SPECIALIST

## 2023-03-21 PROCEDURE — 36415 COLL VENOUS BLD VENIPUNCTURE: CPT

## 2023-03-21 PROCEDURE — 99285 EMERGENCY DEPT VISIT HI MDM: CPT | Mod: 25

## 2023-03-21 PROCEDURE — 85025 COMPLETE CBC W/AUTO DIFF WBC: CPT | Performed by: EMERGENCY MEDICINE

## 2023-03-21 PROCEDURE — 25000003 PHARM REV CODE 250: Performed by: EMERGENCY MEDICINE

## 2023-03-21 PROCEDURE — 96361 HYDRATE IV INFUSION ADD-ON: CPT

## 2023-03-21 PROCEDURE — 85610 PROTHROMBIN TIME: CPT

## 2023-03-21 PROCEDURE — 36415 COLL VENOUS BLD VENIPUNCTURE: CPT | Performed by: EMERGENCY MEDICINE

## 2023-03-21 PROCEDURE — 94761 N-INVAS EAR/PLS OXIMETRY MLT: CPT

## 2023-03-21 PROCEDURE — 27000190 HC CPAP FULL FACE MASK W/VALVE

## 2023-03-21 PROCEDURE — 94660 CPAP INITIATION&MGMT: CPT

## 2023-03-21 PROCEDURE — 93010 ELECTROCARDIOGRAM REPORT: CPT | Mod: ,,, | Performed by: SPECIALIST

## 2023-03-21 PROCEDURE — 27000221 HC OXYGEN, UP TO 24 HOURS

## 2023-03-21 PROCEDURE — 63600175 PHARM REV CODE 636 W HCPCS

## 2023-03-21 PROCEDURE — 96375 TX/PRO/DX INJ NEW DRUG ADDON: CPT

## 2023-03-21 PROCEDURE — 99900035 HC TECH TIME PER 15 MIN (STAT)

## 2023-03-21 RX ORDER — DIGOXIN 0.25 MG/ML
350 INJECTION INTRAMUSCULAR; INTRAVENOUS ONCE
Status: COMPLETED | OUTPATIENT
Start: 2023-03-21 | End: 2023-03-21

## 2023-03-21 RX ORDER — ACETAMINOPHEN 325 MG/1
650 TABLET ORAL EVERY 4 HOURS PRN
Status: DISCONTINUED | OUTPATIENT
Start: 2023-03-21 | End: 2023-03-26 | Stop reason: HOSPADM

## 2023-03-21 RX ORDER — GLUCAGON 1 MG
1 KIT INJECTION
Status: DISCONTINUED | OUTPATIENT
Start: 2023-03-21 | End: 2023-03-26 | Stop reason: HOSPADM

## 2023-03-21 RX ORDER — DILTIAZEM HCL 1 MG/ML
0-15 INJECTION, SOLUTION INTRAVENOUS CONTINUOUS
Status: DISCONTINUED | OUTPATIENT
Start: 2023-03-21 | End: 2023-03-21 | Stop reason: SDUPTHER

## 2023-03-21 RX ORDER — ENOXAPARIN SODIUM 100 MG/ML
40 INJECTION SUBCUTANEOUS EVERY 24 HOURS
Status: DISCONTINUED | OUTPATIENT
Start: 2023-03-21 | End: 2023-03-22

## 2023-03-21 RX ORDER — IBUPROFEN 200 MG
24 TABLET ORAL
Status: DISCONTINUED | OUTPATIENT
Start: 2023-03-21 | End: 2023-03-26 | Stop reason: HOSPADM

## 2023-03-21 RX ORDER — LANOLIN ALCOHOL/MO/W.PET/CERES
800 CREAM (GRAM) TOPICAL
Status: DISCONTINUED | OUTPATIENT
Start: 2023-03-21 | End: 2023-03-26 | Stop reason: HOSPADM

## 2023-03-21 RX ORDER — METOPROLOL TARTRATE 25 MG/1
25 TABLET, FILM COATED ORAL 2 TIMES DAILY
Status: DISCONTINUED | OUTPATIENT
Start: 2023-03-21 | End: 2023-03-23

## 2023-03-21 RX ORDER — ONDANSETRON 2 MG/ML
4 INJECTION INTRAMUSCULAR; INTRAVENOUS EVERY 8 HOURS PRN
Status: DISCONTINUED | OUTPATIENT
Start: 2023-03-21 | End: 2023-03-26 | Stop reason: HOSPADM

## 2023-03-21 RX ORDER — SODIUM CHLORIDE 0.9 % (FLUSH) 0.9 %
10 SYRINGE (ML) INJECTION EVERY 12 HOURS PRN
Status: DISCONTINUED | OUTPATIENT
Start: 2023-03-21 | End: 2023-03-26 | Stop reason: HOSPADM

## 2023-03-21 RX ORDER — MAG HYDROX/ALUMINUM HYD/SIMETH 200-200-20
30 SUSPENSION, ORAL (FINAL DOSE FORM) ORAL 4 TIMES DAILY PRN
Status: DISCONTINUED | OUTPATIENT
Start: 2023-03-21 | End: 2023-03-26 | Stop reason: HOSPADM

## 2023-03-21 RX ORDER — IBUPROFEN 200 MG
16 TABLET ORAL
Status: DISCONTINUED | OUTPATIENT
Start: 2023-03-21 | End: 2023-03-26 | Stop reason: HOSPADM

## 2023-03-21 RX ORDER — DILTIAZEM HYDROCHLORIDE 5 MG/ML
25 INJECTION INTRAVENOUS
Status: COMPLETED | OUTPATIENT
Start: 2023-03-21 | End: 2023-03-21

## 2023-03-21 RX ORDER — NALOXONE HCL 0.4 MG/ML
0.02 VIAL (ML) INJECTION
Status: DISCONTINUED | OUTPATIENT
Start: 2023-03-21 | End: 2023-03-26 | Stop reason: HOSPADM

## 2023-03-21 RX ADMIN — SODIUM CHLORIDE 1000 ML: 9 INJECTION, SOLUTION INTRAVENOUS at 10:03

## 2023-03-21 RX ADMIN — DIGOXIN 350 MCG: 0.25 INJECTION INTRAMUSCULAR; INTRAVENOUS at 07:03

## 2023-03-21 RX ADMIN — DILTIAZEM HYDROCHLORIDE 15 MG/HR: 5 INJECTION, SOLUTION INTRAVENOUS at 08:03

## 2023-03-21 RX ADMIN — METOPROLOL TARTRATE 25 MG: 25 TABLET, FILM COATED ORAL at 09:03

## 2023-03-21 RX ADMIN — DILTIAZEM HYDROCHLORIDE 25 MG: 5 INJECTION INTRAVENOUS at 11:03

## 2023-03-21 RX ADMIN — DILTIAZEM HYDROCHLORIDE 5 MG/HR: 5 INJECTION, SOLUTION INTRAVENOUS at 12:03

## 2023-03-21 RX ADMIN — ENOXAPARIN SODIUM 40 MG: 40 INJECTION SUBCUTANEOUS at 05:03

## 2023-03-21 NOTE — AI DETERIORATION ALERT
AI alert for elevated HR. Pt assessed at BS. Pt denies cp, sob, palpitations. He is resting comfortably in bed and denies acute complaints. D/w nurse and she is titrating cardizem gtt - currently at 12.5mg/hr. Will cont to monitor closely.

## 2023-03-21 NOTE — ED NOTES
Patient to room 13 via endo RN- patient here for routine colonoscopy when patient found to be in afib rvr at rate of 170's.

## 2023-03-21 NOTE — H&P
[facility] Medicine  History & Physical    Patient Name: Joseu Mir  MRN: [id number]  Patient Class: IP- Inpatient  Admission Date: 3/21/2023  Attending Physician: Mary Jo Restrepo MD   Primary Care Provider: David Quach MD         Patient information was obtained from patient and past medical records.     Subjective:     Principal Problem:Atrial fibrillation with RVR    Chief Complaint:   Chief Complaint   Patient presents with    Palpitations     Patient was in ENDO and found to be in suspected Afib was transferred down to the ER, patient denies any symptoms         HPI: Josue Mir is a 47 year old white male with a PMHx significant fo hypertension, A-fib, and previous CVA presenting after being found in A fib w/ RVR while preparing for colonscopy. Patient scheduled for his first, routine colonoscopy when he was found to have a HR of 170s in A fib. Patient sent to ED for further evaluation. Patient reports feeling fine. He denies any SOB, chest pain, palpitations, dizziness, or lightheadedness. He complains of hunger as he has been NPO since last night for colonoscopy. He states he only takes Norvas and losartan for hypertension. Denies any rate control medications or blood thinners. Reports history of  cardiac loop recorder placement. Patient started on IV Cardizem infusion in ED. CXR without any acute abnormalities. HR noted to be in 120s-140s on cardiac monitor. Plan to consult cardiology. Patient to be placed in step down for further evaluation and management.             Past Medical History:   Diagnosis Date    Anticoagulant long-term use     Atrial fibrillation     Gout     Gout, joint     Hypertension     Memory deficit following cerebral infarction     Pneumonia 04/2018    Sleep apnea with use of continuous positive airway pressure (CPAP)     Stroke 04/2018    Syncope and collapse        Past Surgical History:   Procedure Laterality Date    INSERTION OF  IMPLANTABLE LOOP RECORDER  10/25/2018    Procedure: Insertion, Implantable Loop Recorder;  Surgeon: Justin Colby MD;  [facility];  Service: Cardiology;;    LAPAROSCOPIC CHOLECYSTECTOMY N/A 12/14/2018    Procedure: CHOLECYSTECTOMY, LAPAROSCOPIC;  Surgeon: Beck Love MD;  Location: [facility] OR;  Service: General;  Laterality: N/A;  Dr. Love requested case to start at 1:00pm    NASAL POLYP EXCISION      TREATMENT OF CARDIAC ARRHYTHMIA N/A 10/25/2018    Procedure: CARDIOVERSION OR DEFIBRILLATION;  Surgeon: Justin Colby MD;  Location: [facility] CATH;  Service: Cardiology;  Laterality: N/A;       Review of patient's allergies indicates:  No Known Allergies    Current Facility-Administered Medications on File Prior to Encounter   Medication    [DISCONTINUED] 0.9%  NaCl infusion     Current Outpatient Medications on File Prior to Encounter   Medication Sig    amLODIPine (NORVASC) 10 MG tablet Take 1 tablet (10 mg total) by mouth once daily.    indomethacin (INDOCIN) 50 MG capsule Take 1 capsule (50 mg total) by mouth 3 (three) times daily as needed (pain).    lisinopriL (PRINIVIL,ZESTRIL) 40 MG tablet Take 1 tablet (40 mg total) by mouth once daily.     Family History       Problem Relation (Age of Onset)    Hypertension Mother    No Known Problems Brother, Brother, Brother    Sleep apnea Mother          Tobacco Use    Smoking status: Never    Smokeless tobacco: Never   Substance and Sexual Activity    Alcohol use: Yes     Comment: rarely    Drug use: No    Sexual activity: Yes     Partners: Female     Review of Systems   Constitutional:  Negative for chills, fatigue and fever.   Respiratory:  Negative for shortness of breath.    Cardiovascular:  Negative for chest pain and palpitations.   Gastrointestinal:  Negative for nausea and vomiting.   Neurological:  Negative for dizziness and light-headedness.   All other systems reviewed and are negative.  Objective:     Vital Signs (Most  Recent):  Temp: 98.5 °F (36.9 °C) (03/21/23 1030)  Pulse: (!) 124 (03/21/23 1232)  Resp: 18 (03/21/23 1232)  BP: (!) 125/95 (03/21/23 1232)  SpO2: 97 % (03/21/23 1232)   Vital Signs (24h Range):  Temp:  [98.1 °F (36.7 °C)-98.5 °F (36.9 °C)] 98.5 °F (36.9 °C)  Pulse:  [124-165] 124  Resp:  [18-20] 18  SpO2:  [94 %-97 %] 97 %  BP: (119-141)/(83-95) 125/95     Weight: 115.7 kg (255 lb)  Body mass index is 37.66 kg/m².    Physical Exam  Vitals and nursing note reviewed.   Constitutional:       General: He is not in acute distress.     Appearance: He is not ill-appearing.      Comments: Normal appearance, well groomed white male   Cardiovascular:      Rate and Rhythm: Tachycardia present. Rhythm irregular.      Pulses: Normal pulses.      Heart sounds: No murmur heard.    No gallop.   Pulmonary:      Effort: Pulmonary effort is normal. No respiratory distress.      Breath sounds: No wheezing or rales.   Abdominal:      General: Abdomen is flat. There is no distension.      Palpations: Abdomen is soft.      Tenderness: There is no abdominal tenderness.   Musculoskeletal:         General: No swelling or tenderness. Normal range of motion.   Skin:     General: Skin is warm.      Findings: No bruising.   Neurological:      General: No focal deficit present.      Mental Status: He is alert. Mental status is at baseline.   Psychiatric:         Mood and Affect: Mood normal.           Significant Labs: All pertinent labs within the past 24 hours have been reviewed.  CBC:   Recent Labs   Lab 03/21/23  1044   WBC 9.87   HGB 14.8   HCT 44.7        CMP:   Recent Labs   Lab 03/21/23  1044      K 4.0      CO2 17*      BUN 15   CREATININE 1.1   CALCIUM 9.2   ANIONGAP 11       Significant Imaging: I have reviewed all pertinent imaging results/findings within the past 24 hours.    Assessment/Plan:     * Atrial fibrillation with RVR  Patient with Paroxysmal (<7 days) atrial fibrillation which is uncontrolled  currently with Calcium Channel Blocker. Patient is currently in atrial fibrillation.[id number] Score: 1.  Anticoagulation indicated. Anticoagulation done with lovenox.  -On cardizem drip  -Cardiology consulted      Adjustment disorder with depressed mood  Noted  Follows Psych outpatient      History of cerebral hemorrhage  Noted        VTE Risk Mitigation (From admission, onward)         Ordered     enoxaparin injection 40 mg  Daily         03/21/23 1342     IP VTE HIGH RISK PATIENT  Once         03/21/23 1342     Place sequential compression device  Until discontinued         03/21/23 1342                       Renny Banda PA-C  Department of Hospital Medicine  [facility]

## 2023-03-21 NOTE — HPI
Josue Mir is a 47 year old white male with a PMHx significant fo hypertension, A-fib, and previous CVA presenting after being found in A fib w/ RVR while preparing for colonscopy. Patient scheduled for his first, routine colonoscopy when he was found to have a HR of 170s in A fib. Patient sent to ED for further evaluation. Patient reports feeling fine. He denies any SOB, chest pain, palpitations, dizziness, or lightheadedness. He complains of hunger as he has been NPO since last night for colonoscopy. He states he only takes Norvas and losartan for hypertension. Denies any rate control medications or blood thinners. Reports history of  cardiac loop recorder placement. Patient started on IV Cardizem infusion in ED. CXR without any acute abnormalities. HR noted to be in 120s-140s on cardiac monitor. Plan to consult cardiology. Patient to be placed in step down for further evaluation and management.

## 2023-03-21 NOTE — SUBJECTIVE & OBJECTIVE
Past Medical History:   Diagnosis Date    Anticoagulant long-term use     Atrial fibrillation     Gout     Gout, joint     Hypertension     Memory deficit following cerebral infarction     Pneumonia 04/2018    Sleep apnea with use of continuous positive airway pressure (CPAP)     Stroke 04/2018    Syncope and collapse        Past Surgical History:   Procedure Laterality Date    INSERTION OF IMPLANTABLE LOOP RECORDER  10/25/2018    Procedure: Insertion, Implantable Loop Recorder;  Surgeon: Justin Colby MD;  Location: [facility];  Service: Cardiology;;    LAPAROSCOPIC CHOLECYSTECTOMY N/A 12/14/2018    Procedure: CHOLECYSTECTOMY, LAPAROSCOPIC;  Surgeon: Beck Love MD;  Location: [facility];  Service: General;  Laterality: N/A;  Dr. Love requested case to start at 1:00pm    NASAL POLYP EXCISION      TREATMENT OF CARDIAC ARRHYTHMIA N/A 10/25/2018    Procedure: CARDIOVERSION OR DEFIBRILLATION;  Surgeon: Justin Colby MD;  Location: [facility];  Service: Cardiology;  Laterality: N/A;       Review of patient's allergies indicates:  No Known Allergies    Current Facility-Administered Medications on File Prior to Encounter   Medication    [DISCONTINUED] 0.9%  NaCl infusion     Current Outpatient Medications on File Prior to Encounter   Medication Sig    amLODIPine (NORVASC) 10 MG tablet Take 1 tablet (10 mg total) by mouth once daily.    indomethacin (INDOCIN) 50 MG capsule Take 1 capsule (50 mg total) by mouth 3 (three) times daily as needed (pain).    lisinopriL (PRINIVIL,ZESTRIL) 40 MG tablet Take 1 tablet (40 mg total) by mouth once daily.     Family History       Problem Relation (Age of Onset)    Hypertension Mother    No Known Problems Brother, Brother, Brother    Sleep apnea Mother          Tobacco Use    Smoking status: Never    Smokeless tobacco: Never   Substance and Sexual Activity    Alcohol use: Yes     Comment: rarely    Drug use: No    Sexual activity: Yes     Partners: Female     Review of Systems    Constitutional:  Negative for chills, fatigue and fever.   Respiratory:  Negative for shortness of breath.    Cardiovascular:  Negative for chest pain and palpitations.   Gastrointestinal:  Negative for nausea and vomiting.   Neurological:  Negative for dizziness and light-headedness.   All other systems reviewed and are negative.  Objective:     Vital Signs (Most Recent):  Temp: 98.5 °F (36.9 °C) (03/21/23 1030)  Pulse: (!) 124 (03/21/23 1232)  Resp: 18 (03/21/23 1232)  BP: (!) 125/95 (03/21/23 1232)  SpO2: 97 % (03/21/23 1232)   Vital Signs (24h Range):  Temp:  [98.1 °F (36.7 °C)-98.5 °F (36.9 °C)] 98.5 °F (36.9 °C)  Pulse:  [124-165] 124  Resp:  [18-20] 18  SpO2:  [94 %-97 %] 97 %  BP: (119-141)/(83-95) 125/95     Weight: 115.7 kg (255 lb)  Body mass index is 37.66 kg/m².    Physical Exam  Vitals and nursing note reviewed.   Constitutional:       General: He is not in acute distress.     Appearance: He is not ill-appearing.      Comments: Normal appearance, well groomed white male   Cardiovascular:      Rate and Rhythm: Tachycardia present. Rhythm irregular.      Pulses: Normal pulses.      Heart sounds: No murmur heard.    No gallop.   Pulmonary:      Effort: Pulmonary effort is normal. No respiratory distress.      Breath sounds: No wheezing or rales.   Abdominal:      General: Abdomen is flat. There is no distension.      Palpations: Abdomen is soft.      Tenderness: There is no abdominal tenderness.   Musculoskeletal:         General: No swelling or tenderness. Normal range of motion.   Skin:     General: Skin is warm.      Findings: No bruising.   Neurological:      General: No focal deficit present.      Mental Status: He is alert. Mental status is at baseline.   Psychiatric:         Mood and Affect: Mood normal.           Significant Labs: All pertinent labs within the past 24 hours have been reviewed.  CBC:   Recent Labs   Lab 03/21/23  1044   WBC 9.87   HGB 14.8   HCT 44.7        CMP:   Recent  Labs   Lab 03/21/23  1044      K 4.0      CO2 17*      BUN 15   CREATININE 1.1   CALCIUM 9.2   ANIONGAP 11       Significant Imaging: I have reviewed all pertinent imaging results/findings within the past 24 hours.

## 2023-03-21 NOTE — ASSESSMENT & PLAN NOTE
Patient with Paroxysmal (<7 days) atrial fibrillation which is uncontrolled currently with Calcium Channel Blocker. Patient is currently in atrial fibrillation.[id number] Score: 1.  Anticoagulation indicated. Anticoagulation done with lovenox.  -On cardizem drip  -Cardiology consulted

## 2023-03-21 NOTE — NURSING
Pt awake, alert- no complaints at present. Pt states he is aware he has had Afib in the past. Noted to have Afib on CM- rate 130s- 150s. Cardizem drip infusing at 7.5mg/hr to right arm PIV- site dry, intact w/out swelling or pain. Pt states he has a loop recorder in place for several years, but hasn't had anyone call him with follow up of rhythm checks for a while. Aware of probable admission- verbalizing his understanding of the plan of care.

## 2023-03-21 NOTE — ED PROVIDER NOTES
Encounter Date: 3/21/2023    SCRIBE #1 NOTE: I, Tanner English, am scribing for, and in the presence of,  Ihsan Potts MD.     History     Chief Complaint   Patient presents with    Palpitations     Patient was in ENDO and found to be in suspected Afib was transferred down to the ER, patient denies any symptoms      Time seen by provider: 10:55 AM on 03/21/2023    Josue Mir is a 47 y.o. male who presents to the ED with a-fib. The patient reports going to have a colonoscopy procedure done earlier PTA when he was found to be in a-fib and transferred to the ER. He mentions that he has had a-fib before in the past. The patient is not on any new medication or blood thinners. The patient denies fever, vomiting, syncope, blood in stool, or any other symptoms at this time. PMHx of HTN, a-fib, and prior CVA. No pertinent PSHx.    The history is provided by the patient.   Review of patient's allergies indicates:  No Known Allergies  Past Medical History:   Diagnosis Date    Anticoagulant long-term use     Atrial fibrillation     Gout     Gout, joint     Hypertension     Memory deficit following cerebral infarction     Pneumonia 04/2018    Sleep apnea with use of continuous positive airway pressure (CPAP)     Stroke 04/2018    Syncope and collapse      Past Surgical History:   Procedure Laterality Date    INSERTION OF IMPLANTABLE LOOP RECORDER  10/25/2018    Procedure: Insertion, Implantable Loop Recorder;  Surgeon: Justin Colby MD;  Location: [facility] CATH;  Service: Cardiology;;    LAPAROSCOPIC CHOLECYSTECTOMY N/A 12/14/2018    Procedure: CHOLECYSTECTOMY, LAPAROSCOPIC;  Surgeon: Bekc Love MD;  Location: [facility];  Service: General;  Laterality: N/A;  Dr. Love requested case to start at 1:00pm    NASAL POLYP EXCISION      TREATMENT OF CARDIAC ARRHYTHMIA N/A 10/25/2018    Procedure: CARDIOVERSION OR DEFIBRILLATION;  Surgeon: Justin Colby MD;  Location: [facility] CATH;  Service: Cardiology;  Laterality: N/A;      Family History   Problem Relation Age of Onset    Sleep apnea Mother     Hypertension Mother     No Known Problems Brother     No Known Problems Brother     No Known Problems Brother      Social History     Tobacco Use    Smoking status: Never    Smokeless tobacco: Never   Substance Use Topics    Alcohol use: Yes     Comment: rarely    Drug use: No     Review of Systems   Constitutional:  Negative for fever.   HENT:  Negative for sore throat.    Respiratory:  Negative for shortness of breath.    Cardiovascular:  Positive for palpitations. Negative for chest pain.   Gastrointestinal:  Negative for nausea and vomiting.   Genitourinary:  Negative for dysuria.   Musculoskeletal:  Negative for back pain.   Skin:  Negative for rash.   Neurological:  Negative for syncope and weakness.   Hematological:  Does not bruise/bleed easily.     Physical Exam     Initial Vitals   BP Pulse Resp Temp SpO2   03/21/23 1029 03/21/23 1029 03/21/23 1030 03/21/23 1030 03/21/23 1029   123/86 (!) 134 20 98.5 °F (36.9 °C) 95 %      MAP       --                Physical Exam    Nursing note and vitals reviewed.  Constitutional: He appears well-developed and well-nourished. He is not diaphoretic. No distress.   HENT:   Head: Normocephalic and atraumatic.   Mouth/Throat: Oropharynx is clear and moist.   Eyes: Conjunctivae are normal.   Neck: Neck supple.   Cardiovascular:  Normal heart sounds and intact distal pulses. An irregular rhythm present.   Tachycardia present.   Exam reveals no gallop and no friction rub.       No murmur heard.  Pulmonary/Chest: Breath sounds normal. He has no wheezes. He has no rhonchi. He has no rales.   Abdominal: Abdomen is soft. He exhibits no distension. There is no abdominal tenderness.   Musculoskeletal:         General: Normal range of motion.      Cervical back: Neck supple.      Right upper leg: No edema or tenderness.      Left upper leg: No edema or tenderness.      Right lower leg: No tenderness. No  edema.      Left lower leg: No tenderness. No edema.      Comments: No edema or leg tenderness noted.     Neurological: He is alert and oriented to person, place, and time.   Skin: No rash noted. No erythema.       ED Course   Critical Care    Date/Time: 3/21/2023 2:21 PM  Performed by: Vince Champagne MD  Authorized by: Mary Jo Restrepo MD   Direct patient critical care time: 17 minutes  Additional history critical care time: 5 minutes  Ordering / reviewing critical care time: 5 minutes  Documentation critical care time: 4 minutes  Consulting other physicians critical care time: 2 minutes  Total critical care time (exclusive of procedural time) : 33 minutes  Critical care time was exclusive of separately billable procedures and treating other patients.      Labs Reviewed   CBC W/ AUTO DIFFERENTIAL - Abnormal; Notable for the following components:       Result Value    MCV 81 (*)     MCH 26.8 (*)     Lymph % 17.5 (*)     All other components within normal limits   BASIC METABOLIC PANEL - Abnormal; Notable for the following components:    CO2 17 (*)     All other components within normal limits   PROTIME-INR        ECG Results              EKG 12-lead (Rapid Heart Beat / Palpitations) Age > 50 (In process)  Result time 03/21/23 12:23:34      In process by Interface, Lab In [facility] (03/21/23 12:23:34)                   Narrative:    Test Reason : R00.0,    Vent. Rate : 159 BPM     Atrial Rate : 153 BPM     P-R Int : 000 ms          QRS Dur : 086 ms      QT Int : 252 ms       P-R-T Axes : 000 027 269 degrees     QTc Int : 409 ms    Atrial fibrillation with rapid ventricular response  Low voltage QRS  Nonspecific ST and T wave abnormality  Abnormal ECG  When compared with ECG of 21-MAR-2023 09:29,  Nonspecific T wave abnormality, worse in Anterior leads    Referred By: [email or web address]   SELF           Confirmed By:                       In process by Interface, Lab In [facility] (03/21/23 12:23:06)                    Narrative:    Test Reason : R00.0,    Vent. Rate : 159 BPM     Atrial Rate : 153 BPM     P-R Int : 000 ms          QRS Dur : 086 ms      QT Int : 252 ms       P-R-T Axes : 000 027 269 degrees     QTc Int : 409 ms    Atrial fibrillation with rapid ventricular response  Low voltage QRS  Nonspecific ST and T wave abnormality  Abnormal ECG  When compared with ECG of 21-MAR-2023 09:29,  Nonspecific T wave abnormality, worse in Anterior leads    Referred By: [email or web address]   SELF           Confirmed By:                                   Imaging Results              X-Ray Chest 1 View (Final result)  Result time 03/21/23 10:55:36      Final result by Beck Arellano MD (03/21/23 10:55:36)                   Impression:      Cardiomegaly and cardiac loop recorder.  No acute findings.      Electronically signed by: Beck Arellano MD  Date:    03/21/2023  Time:    10:55               Narrative:    EXAMINATION:  XR CHEST 1 VIEW    CLINICAL HISTORY:  Atrial fibrillation with RVR, r/o edema;    TECHNIQUE:  Single frontal view of the chest was performed.    COMPARISON:  None    FINDINGS:  The heart is mildly enlarged.  A cardiac loop recorder is present.  The lungs are well expanded without consolidation, edema, or pleural effusion.                                  (Rad read)     Medications   diltiaZEM (CARDIZEM) 125 mg in dextrose 5 % (D5W) 125 mL infusion (10 mg/hr Intravenous Verify Only 3/21/23 1338)   sodium chloride 0.9% flush 10 mL (has no administration in time range)   naloxone 0.4 mg/mL injection 0.02 mg (has no administration in time range)   glucose chewable tablet 16 g (has no administration in time range)   glucose chewable tablet 24 g (has no administration in time range)   glucagon (human recombinant) injection 1 mg (has no administration in time range)   enoxaparin injection 40 mg (has no administration in time range)   potassium bicarbonate disintegrating tablet 50 mEq (has no administration in time range)    potassium bicarbonate disintegrating tablet 35 mEq (has no administration in time range)   potassium bicarbonate disintegrating tablet 60 mEq (has no administration in time range)   magnesium oxide tablet 800 mg (has no administration in time range)   magnesium oxide tablet 800 mg (has no administration in time range)   acetaminophen tablet 650 mg (has no administration in time range)   ondansetron injection 4 mg (has no administration in time range)   aluminum-magnesium hydroxide-simethicone 200-200-20 mg/5 mL suspension 30 mL (has no administration in time range)   dextrose 10% bolus 125 mL 125 mL (has no administration in time range)   dextrose 10% bolus 250 mL 250 mL (has no administration in time range)   sodium chloride 0.9% bolus 1,000 mL 1,000 mL (0 mLs Intravenous Stopped 3/21/23 1208)   diltiaZEM injection 25 mg (25 mg Intravenous Given 3/21/23 1133)     Medical Decision Making:   History:   Old Medical Records: I decided to obtain old medical records.  Independently Interpreted Test(s):   I have ordered and independently interpreted EKG Reading(s) - see prior notes  Clinical Tests:   Lab Tests: Ordered and Reviewed  Radiological Study: Ordered and Reviewed  Medical Tests: Ordered and Reviewed        Scribe Attestation:   Scribe #1: I performed the above scribed service and the documentation accurately describes the services I performed. I attest to the accuracy of the note.      ED Course as of 03/21/23 1422   Tue Mar 21, 2023   1029 EKG prior to ED:  Atrial fibrillation with rate of 156, RVR, normal intervals and axis.  Nonspecific ST/T changes with no significant ST elevation or depression.   [MR]   1034 EKG in ED: Atrial fibrillation with RVR, rate of 159, normal intervals and axis.  Nonspecific ST/T changes similar to last prior and without significant ST elevation or depression. (Independently interpreted by me) [MR]      ED Course User Index  [MR] Vince Champagne MD               I,   Vince Champagne, personally performed the services described in this documentation. All medical record entries made by the scribe were at my direction and in my presence.  I have reviewed the chart and agree that the record reflects my personal performance and is accurate and complete. Vince Champagne MD.  2:21 PM 03/21/2023    Josue Mir is a 47 y.o. male presenting with tachycardic consistent with rapid ventricular response the patient's chronic atrial fibrillation.  Possible mild component of volume depletion with bowel prep for elective GI procedure this morning that was canceled.  I doubt ACS.  I do not think further cardiac biomarker is indicated.  He is improved with rate control with diltiazem bolus and IV titrating infusion in the emergency department.  There is no sign of heart failure.  I have very low suspicion for other emergent process such as PE, pneumonia, CHF.  Laboratories reviewed here.  I have discussed with hospital medicine who will assume care.    Clinical Impression:   Final diagnoses:  [R00.0] Rapid heart beat  [I48.91] Atrial fibrillation with RVR (Primary)  [R07.9] Chest pain        ED Disposition Condition    Admit Stable                Vince Champagne MD  03/21/23 3230

## 2023-03-22 PROBLEM — J96.01 ACUTE HYPOXEMIC RESPIRATORY FAILURE: Status: ACTIVE | Noted: 2023-03-22

## 2023-03-22 LAB
ALBUMIN SERPL BCP-MCNC: 3.2 G/DL (ref 3.5–5.2)
ALP SERPL-CCNC: 52 U/L (ref 55–135)
ALT SERPL W/O P-5'-P-CCNC: 22 U/L (ref 10–44)
ANION GAP SERPL CALC-SCNC: 10 MMOL/L (ref 8–16)
AORTIC ROOT ANNULUS: 2.88 CM
AORTIC VALVE CUSP SEPERATION: 2.2 CM
AST SERPL-CCNC: 16 U/L (ref 10–40)
AV INDEX (PROSTH): 1.05
AV MEAN GRADIENT: 3 MMHG
AV PEAK GRADIENT: 5 MMHG
AV VALVE AREA: 5.27 CM2
AV VELOCITY RATIO: 1.02
BASOPHILS # BLD AUTO: 0.07 K/UL (ref 0–0.2)
BASOPHILS NFR BLD: 0.7 % (ref 0–1.9)
BILIRUB SERPL-MCNC: 1 MG/DL (ref 0.1–1)
BNP SERPL-MCNC: 452 PG/ML (ref 0–99)
BSA FOR ECHO PROCEDURE: 2.37 M2
BUN SERPL-MCNC: 14 MG/DL (ref 6–20)
CALCIUM SERPL-MCNC: 8.8 MG/DL (ref 8.7–10.5)
CHLORIDE SERPL-SCNC: 109 MMOL/L (ref 95–110)
CO2 SERPL-SCNC: 20 MMOL/L (ref 23–29)
CREAT SERPL-MCNC: 1.1 MG/DL (ref 0.5–1.4)
CV ECHO LV RWT: 0.37 CM
D DIMER PPP IA.FEU-MCNC: 0.7 MG/L FEU
DIFFERENTIAL METHOD: ABNORMAL
DOP CALC AO PEAK VEL: 1.09 M/S
DOP CALC AO VTI: 16.7 CM
DOP CALC LVOT AREA: 5 CM2
DOP CALC LVOT DIAMETER: 2.53 CM
DOP CALC LVOT PEAK VEL: 1.11 M/S
DOP CALC LVOT STROKE VOLUME: 87.93 CM3
DOP CALC MV VTI: 25.6 CM
DOP CALCLVOT PEAK VEL VTI: 17.5 CM
E WAVE DECELERATION TIME: 191.36 MSEC
E/E' RATIO: 11.3 M/S
ECHO LV POSTERIOR WALL: 1.05 CM (ref 0.6–1.1)
EJECTION FRACTION: 40 %
EOSINOPHIL # BLD AUTO: 0.3 K/UL (ref 0–0.5)
EOSINOPHIL NFR BLD: 2.5 % (ref 0–8)
ERYTHROCYTE [DISTWIDTH] IN BLOOD BY AUTOMATED COUNT: 13.2 % (ref 11.5–14.5)
EST. GFR  (NO RACE VARIABLE): >60 ML/MIN/1.73 M^2
FRACTIONAL SHORTENING: 20 % (ref 28–44)
GLUCOSE SERPL-MCNC: 91 MG/DL (ref 70–110)
HCT VFR BLD AUTO: 42.2 % (ref 40–54)
HGB BLD-MCNC: 13.6 G/DL (ref 14–18)
IMM GRANULOCYTES # BLD AUTO: 0.11 K/UL (ref 0–0.04)
IMM GRANULOCYTES NFR BLD AUTO: 1.1 % (ref 0–0.5)
INTERVENTRICULAR SEPTUM: 0.95 CM (ref 0.6–1.1)
IVRT: 99.9 MSEC
LA MAJOR: 6.81 CM
LA MINOR: 6.51 CM
LEFT ATRIUM VOLUME INDEX MOD: 31.6 ML/M2
LEFT ATRIUM VOLUME MOD: 72.41 CM3
LEFT INTERNAL DIMENSION IN SYSTOLE: 4.57 CM (ref 2.1–4)
LEFT VENTRICLE DIASTOLIC VOLUME INDEX: 69.25 ML/M2
LEFT VENTRICLE DIASTOLIC VOLUME: 158.58 ML
LEFT VENTRICLE MASS INDEX: 98 G/M2
LEFT VENTRICLE SYSTOLIC VOLUME INDEX: 41.9 ML/M2
LEFT VENTRICLE SYSTOLIC VOLUME: 95.87 ML
LEFT VENTRICULAR INTERNAL DIMENSION IN DIASTOLE: 5.68 CM (ref 3.5–6)
LEFT VENTRICULAR MASS: 225.02 G
LV LATERAL E/E' RATIO: 9.29 M/S
LV SEPTAL E/E' RATIO: 14.44 M/S
LVOT MG: 2.98 MMHG
LVOT MV: 0.81 CM/S
LYMPHOCYTES # BLD AUTO: 1.1 K/UL (ref 1–4.8)
LYMPHOCYTES NFR BLD: 10.5 % (ref 18–48)
MAGNESIUM SERPL-MCNC: 1.9 MG/DL (ref 1.6–2.6)
MCH RBC QN AUTO: 26.4 PG (ref 27–31)
MCHC RBC AUTO-ENTMCNC: 32.2 G/DL (ref 32–36)
MCV RBC AUTO: 82 FL (ref 82–98)
MONOCYTES # BLD AUTO: 0.8 K/UL (ref 0.3–1)
MONOCYTES NFR BLD: 7.4 % (ref 4–15)
MV MEAN GRADIENT: 3 MMHG
MV PEAK E VEL: 1.3 M/S
MV PEAK GRADIENT: 8 MMHG
MV STENOSIS PRESSURE HALF TIME: 56.34 MS
MV VALVE AREA BY CONTINUITY EQUATION: 3.43 CM2
MV VALVE AREA P 1/2 METHOD: 3.9 CM2
NEUTROPHILS # BLD AUTO: 8 K/UL (ref 1.8–7.7)
NEUTROPHILS NFR BLD: 77.8 % (ref 38–73)
NRBC BLD-RTO: 0 /100 WBC
PISA MRMAX VEL: 4.82 M/S
PISA TR MAX VEL: 2.92 M/S
PLATELET # BLD AUTO: 264 K/UL (ref 150–450)
PMV BLD AUTO: 11 FL (ref 9.2–12.9)
POTASSIUM SERPL-SCNC: 3.7 MMOL/L (ref 3.5–5.1)
PROT SERPL-MCNC: 6.6 G/DL (ref 6–8.4)
PV MV: 0.71 M/S
PV PEAK VELOCITY: 1.01 CM/S
RA MAJOR: 5.69 CM
RA PRESSURE: 3 MMHG
RBC # BLD AUTO: 5.16 M/UL (ref 4.6–6.2)
RIGHT VENTRICULAR END-DIASTOLIC DIMENSION: 3.73 CM
RIGHT VENTRICULAR LENGTH IN DIASTOLE (APICAL 4-CHAMBER VIEW): 5.58 CM
RV TISSUE DOPPLER FREE WALL SYSTOLIC VELOCITY 1 (APICAL 4 CHAMBER VIEW): 0.01 CM/S
SODIUM SERPL-SCNC: 139 MMOL/L (ref 136–145)
TDI LATERAL: 0.14 M/S
TDI SEPTAL: 0.09 M/S
TDI: 0.12 M/S
TR MAX PG: 34 MMHG
TRICUSPID ANNULAR PLANE SYSTOLIC EXCURSION: 2.49 CM
TV REST PULMONARY ARTERY PRESSURE: 37 MMHG
WBC # BLD AUTO: 10.34 K/UL (ref 3.9–12.7)

## 2023-03-22 PROCEDURE — 25000003 PHARM REV CODE 250: Performed by: SPECIALIST

## 2023-03-22 PROCEDURE — 99223 PR INITIAL HOSPITAL CARE,LEVL III: ICD-10-PCS | Mod: ,,, | Performed by: INTERNAL MEDICINE

## 2023-03-22 PROCEDURE — 36415 COLL VENOUS BLD VENIPUNCTURE: CPT

## 2023-03-22 PROCEDURE — 85025 COMPLETE CBC W/AUTO DIFF WBC: CPT

## 2023-03-22 PROCEDURE — 94660 CPAP INITIATION&MGMT: CPT

## 2023-03-22 PROCEDURE — 99223 1ST HOSP IP/OBS HIGH 75: CPT | Mod: ,,, | Performed by: INTERNAL MEDICINE

## 2023-03-22 PROCEDURE — 25000003 PHARM REV CODE 250

## 2023-03-22 PROCEDURE — 25000003 PHARM REV CODE 250: Performed by: NURSE PRACTITIONER

## 2023-03-22 PROCEDURE — 99223 1ST HOSP IP/OBS HIGH 75: CPT | Mod: ,,, | Performed by: NURSE PRACTITIONER

## 2023-03-22 PROCEDURE — 99900035 HC TECH TIME PER 15 MIN (STAT)

## 2023-03-22 PROCEDURE — 99223 PR INITIAL HOSPITAL CARE,LEVL III: ICD-10-PCS | Mod: ,,, | Performed by: NURSE PRACTITIONER

## 2023-03-22 PROCEDURE — 36415 COLL VENOUS BLD VENIPUNCTURE: CPT | Performed by: NURSE PRACTITIONER

## 2023-03-22 PROCEDURE — 25000003 PHARM REV CODE 250: Performed by: EMERGENCY MEDICINE

## 2023-03-22 PROCEDURE — 20600001 HC STEP DOWN PRIVATE ROOM

## 2023-03-22 PROCEDURE — 25500020 PHARM REV CODE 255

## 2023-03-22 PROCEDURE — 27000221 HC OXYGEN, UP TO 24 HOURS

## 2023-03-22 PROCEDURE — 85379 FIBRIN DEGRADATION QUANT: CPT

## 2023-03-22 PROCEDURE — 63600175 PHARM REV CODE 636 W HCPCS: Performed by: NURSE PRACTITIONER

## 2023-03-22 PROCEDURE — 94761 N-INVAS EAR/PLS OXIMETRY MLT: CPT

## 2023-03-22 PROCEDURE — 83735 ASSAY OF MAGNESIUM: CPT

## 2023-03-22 PROCEDURE — 80053 COMPREHEN METABOLIC PANEL: CPT

## 2023-03-22 PROCEDURE — 83880 ASSAY OF NATRIURETIC PEPTIDE: CPT | Performed by: NURSE PRACTITIONER

## 2023-03-22 RX ORDER — FUROSEMIDE 20 MG/1
20 TABLET ORAL DAILY
Status: DISCONTINUED | OUTPATIENT
Start: 2023-03-23 | End: 2023-03-26

## 2023-03-22 RX ORDER — FUROSEMIDE 10 MG/ML
20 INJECTION INTRAMUSCULAR; INTRAVENOUS ONCE
Status: COMPLETED | OUTPATIENT
Start: 2023-03-22 | End: 2023-03-22

## 2023-03-22 RX ORDER — ENOXAPARIN SODIUM 150 MG/ML
1 INJECTION SUBCUTANEOUS
Status: DISCONTINUED | OUTPATIENT
Start: 2023-03-22 | End: 2023-03-24

## 2023-03-22 RX ORDER — DIGOXIN 125 MCG
0.12 TABLET ORAL DAILY
Status: DISCONTINUED | OUTPATIENT
Start: 2023-03-22 | End: 2023-03-23

## 2023-03-22 RX ADMIN — ENOXAPARIN SODIUM 120 MG: 150 INJECTION SUBCUTANEOUS at 04:03

## 2023-03-22 RX ADMIN — DILTIAZEM HYDROCHLORIDE 10 MG/HR: 5 INJECTION, SOLUTION INTRAVENOUS at 06:03

## 2023-03-22 RX ADMIN — POTASSIUM BICARBONATE 50 MEQ: 977.5 TABLET, EFFERVESCENT ORAL at 10:03

## 2023-03-22 RX ADMIN — SPIRONOLACTONE 12.5 MG: 25 TABLET ORAL at 05:03

## 2023-03-22 RX ADMIN — IOHEXOL 75 ML: 350 INJECTION, SOLUTION INTRAVENOUS at 08:03

## 2023-03-22 RX ADMIN — METOPROLOL TARTRATE 25 MG: 25 TABLET, FILM COATED ORAL at 08:03

## 2023-03-22 RX ADMIN — DIGOXIN 0.12 MG: 125 TABLET ORAL at 02:03

## 2023-03-22 RX ADMIN — METOPROLOL TARTRATE 25 MG: 25 TABLET, FILM COATED ORAL at 09:03

## 2023-03-22 RX ADMIN — FUROSEMIDE 20 MG: 10 INJECTION, SOLUTION INTRAMUSCULAR; INTRAVENOUS at 04:03

## 2023-03-22 NOTE — PLAN OF CARE
[id number] - [place] - [address]  [address] [place] [address]  Phone: [phone] Fax: [phone]    Called pharmacy to check price of eliquis. Medication covered with $82 copay. In stock for .  will supply $10 copay coupon

## 2023-03-22 NOTE — ASSESSMENT & PLAN NOTE
Patient with Paroxysmal (<7 days) atrial fibrillation which is uncontrolled currently with Calcium Channel Blocker. Patient is currently in atrial fibrillation.[id number] Score: 1.  Anticoagulation indicated. Anticoagulation done with lovenox.  -On cardizem drip, titrating down  -Cardiology consulted  -Received dose of Amiodarone per cardiology   -Echo obtained and pending

## 2023-03-22 NOTE — CARE UPDATE
Pt stated he wears cpap at home every night. Pt sats decreased and cpap ordered. Pt placed on V60 with increased of sats 94% Cpap +8 Fio2 40% pt dionisio well states he feels comfortable on it RT will continue to monitor.      03/21/23 3283   Patient Assessment/Suction   Level of Consciousness (AVPU) alert   Respiratory Effort Short of breath   Expansion/Accessory Muscles/Retractions no use of accessory muscles;no retractions   Skin Integrity   $ Wound Care Tech Time 15 min   Area Observed Bridge of nose   Skin Appearance without discoloration   Barrier used? Liquid Filled Cushion   PRE-TX-O2   Device (Oxygen Therapy) CPAP  (pt was on room air at this time cpap setup)   $ Is the patient on Low Flow Oxygen? Yes   Oxygen Concentration (%) 40   SpO2 (!) 85 %   Pulse Oximetry Type Continuous   $ Pulse Oximetry - Multiple Charge Pulse Oximetry - Multiple   Pulse (!) 164   Resp (!) 27   Ready to Wean/Extubation Screen   FIO2<=50 (chart decimal) 0.4   Preset CPAP/BiPAP Settings   Mode Of Delivery CPAP   $ CPAP/BiPAP Daily Charge BiPAP/CPAP Daily   $ Initial CPAP/BiPAP Setup? Yes   $ Is patient using? Yes   Size of Mask Large   Sized Appropriately? Yes   Equipment Type V60   Airway Device Type large full face mask   CPAP (cm H2O) 8   Patient CPAP/BiPAP Settings   RR Total (Breaths/Min) 23   Tidal Volume (mL) 524   VE Minute Ventilation (L/min) 11.8 L/min   Peak Inspiratory Pressure (cm H2O) 8   TiTOT (%) 36   Total Leak (L/Min) 27   Patient Trigger - ST Mode Only (%) 100   CPAP/BiPAP Alarms   High Pressure (cm H2O) 15   Low Pressure (cm H2O) 5   Minute Ventilation (L/Min) 3   High RR (breaths/min) 35   Low RR (breaths/min) 5

## 2023-03-22 NOTE — EICU
Intervention Initiated From:  COR / BRUCE Acosta intervened regarding:  Rounding (Video assessment)    Comments: Video assessment completed.  Pt lying in bed, noted AAO.  Responds appropriately to questions.  Denies pain or discomfort.  Cardizem infusion in progress at 10 mg/hr.  HR noted to remain a-fib with rate 100-115.  NIBP 106/79.  NAD noted at present.

## 2023-03-22 NOTE — EICU
Intervention Initiated From:  COR / EICU    Dave intervened regarding:  Rounding (Video assessment)    Nurse Notified:  No    Doctor Notified:  No    Comments: Pt on RA sats in 90's.  VSS. AAOx3.  No medications infusing.  No family at bedside.  Pt had no requests or complaints at this time.  NAD observed.

## 2023-03-22 NOTE — PROGRESS NOTES
02 Demands increasing   On ECHO pre read LVEF seems about 40-45%  Check BNP  Lungs soft crackles bases  Give IV lasix 20 mg now

## 2023-03-22 NOTE — PROGRESS NOTES
[facility] Hospital Medicine  Progress Note    Patient Name: Josue Mir  MRN: [id number]  Patient Class: IP- Inpatient   Admission Date: 3/21/2023  Length of Stay: 1 days  Attending Physician: Mary Jo Restrepo MD  Primary Care Provider: David Quach MD        Subjective:     Principal Problem:Atrial fibrillation with RVR        HPI:  Josue Mir is a 47 year old white male with a PMHx significant fo hypertension, A-fib, and previous CVA presenting after being found in A fib w/ RVR while preparing for colonscopy. Patient scheduled for his first, routine colonoscopy when he was found to have a HR of 170s in A fib. Patient sent to ED for further evaluation. Patient reports feeling fine. He denies any SOB, chest pain, palpitations, dizziness, or lightheadedness. He complains of hunger as he has been NPO since last night for colonoscopy. He states he only takes Norvas and losartan for hypertension. Denies any rate control medications or blood thinners. Reports history of  cardiac loop recorder placement. Patient started on IV Cardizem infusion in ED. CXR without any acute abnormalities. HR noted to be in 120s-140s on cardiac monitor. Plan to consult cardiology. Patient to be placed in step down for further evaluation and management.             Overview/Hospital Course:  No notes on file    Interval History: Notes reviewed, no acute events overnight. Patient started on CPAP last night. Patient remains in A fib with rate between 90-100s. He denies any SOB, chest pain, light headedness, dizziness, or palpitations. Echo obtained this morning. Awaiting cardiology consult. IV cardizem infusion titrated down to 10 today.     Review of Systems   Constitutional:  Negative for fatigue and fever.   Respiratory:  Negative for shortness of breath.    Cardiovascular:  Negative for chest pain, palpitations and leg swelling.   Gastrointestinal:  Negative for abdominal distention and abdominal pain.    Neurological:  Negative for dizziness, weakness and light-headedness.   Psychiatric/Behavioral:  Negative for behavioral problems and confusion.    All other systems reviewed and are negative.  Objective:     Vital Signs (Most Recent):  Temp: 98 °F (36.7 °C) (03/22/23 0808)  Pulse: 102 (03/22/23 0808)  Resp: 20 (03/22/23 0808)  BP: 111/84 (03/22/23 0400)  SpO2: (!) 93 % (03/22/23 0808)   Vital Signs (24h Range):  Temp:  [97 °F (36.1 °C)-98 °F (36.7 °C)] 98 °F (36.7 °C)  Pulse:  [] 102  Resp:  [16-71] 20  SpO2:  [85 %-97 %] 93 %  BP: (111-143)/() 111/84     Weight: 115.6 kg (254 lb 13.6 oz)  Body mass index is 37.64 kg/m².    Intake/Output Summary (Last 24 hours) at 3/22/2023 1103  Last data filed at 3/22/2023 0358  Gross per 24 hour   Intake 1378.68 ml   Output --   Net 1378.68 ml      Physical Exam  Vitals and nursing note reviewed.   Constitutional:       General: He is not in acute distress.     Appearance: Normal appearance. He is not ill-appearing.   HENT:      Head: Normocephalic and atraumatic.   Cardiovascular:      Rate and Rhythm: Normal rate. Rhythm irregular.      Pulses: Normal pulses.      Heart sounds: No murmur heard.    No gallop.   Pulmonary:      Effort: Pulmonary effort is normal. No respiratory distress.      Breath sounds: Normal breath sounds. No wheezing or rales.   Abdominal:      General: Abdomen is flat. There is no distension.      Palpations: Abdomen is soft.      Tenderness: There is no abdominal tenderness.   Musculoskeletal:         General: No swelling or tenderness. Normal range of motion.   Neurological:      General: No focal deficit present.      Mental Status: He is alert. Mental status is at baseline.   Psychiatric:         Mood and Affect: Mood normal.       Significant Labs: All pertinent labs within the past 24 hours have been reviewed.  CBC:   Recent Labs   Lab 03/21/23  1044 03/22/23  0505   WBC 9.87 10.34   HGB 14.8 13.6*   HCT 44.7 42.2    264      CMP:   Recent Labs   Lab 03/21/23  1044 03/22/23  0504    139   K 4.0 3.7    109   CO2 17* 20*    91   BUN 15 14   CREATININE 1.1 1.1   CALCIUM 9.2 8.8   PROT  --  6.6   ALBUMIN  --  3.2*   BILITOT  --  1.0   ALKPHOS  --  52*   AST  --  16   ALT  --  22   ANIONGAP 11 10       Significant Imaging: I have reviewed all pertinent imaging results/findings within the past 24 hours.      Assessment/Plan:      * Atrial fibrillation with RVR  Patient with Paroxysmal (<7 days) atrial fibrillation which is uncontrolled currently with Calcium Channel Blocker. Patient is currently in atrial fibrillation.[id number] Score: 1.  Anticoagulation indicated. Anticoagulation done with lovenox.  -On cardizem drip, titrating down  -Cardiology consulted  -Received dose of Amiodarone per cardiology   -Echo obtained and pending      Adjustment disorder with depressed mood  Noted  Follows Psych outpatient      History of cerebral hemorrhage  Noted        VTE Risk Mitigation (From admission, onward)         Ordered     enoxaparin injection 40 mg  Daily         03/21/23 1342     IP VTE HIGH RISK PATIENT  Once         03/21/23 1342     Place sequential compression device  Until discontinued         03/21/23 1342                Discharge Planning   LUCERO:      Code Status: Full Code   Is the patient medically ready for discharge?:     Reason for patient still in hospital (select all that apply): Patient trending condition, Imaging and Consult recommendations                     Renny Banda PA-C  Department of Hospital Medicine   [facility]

## 2023-03-22 NOTE — CONSULTS
03/22/2023      Admit Date: 3/21/2023  Josue Mir  New Patient Consult    Chief Complaint   Patient presents with    Palpitations     Patient was in ENDO and found to be in suspected Afib was transferred down to the ER, patient denies any symptoms        History of Present Illness:  Pt is a 46 yo male with JASPAL, CVA, HTN, gout, atrial fib who was sent to the ED from endoscopy due to having atrial fib RVR with HR in the 170s. Pulmonary is consulted for increasing O2 requirement. He is currently receiving 4.5L oxygen via nc with sat 96%. Pt denies SOB or coughing. He has felt fatigued but has not noticed any other symptoms- does not notice atrial fib. He is not anticoagulated. He has hx of hemorrhagic CVA about 5 yrs ago and denies residual deficits. Pt uses CPAP nightly at home and states he sleeps well. Pt denies smoking or vaping. He had pneumonia once in the past at the time of his stroke.      PFSH:  Past Medical History:   Diagnosis Date    Anticoagulant long-term use     Atrial fibrillation     Gout     Gout, joint     Hypertension     Memory deficit following cerebral infarction     Pneumonia 04/2018    Sleep apnea with use of continuous positive airway pressure (CPAP)     Stroke 04/2018    Syncope and collapse      Past Surgical History:   Procedure Laterality Date    INSERTION OF IMPLANTABLE LOOP RECORDER  10/25/2018    Procedure: Insertion, Implantable Loop Recorder;  Surgeon: Justin Colby MD;  Location: [facility] CATH;  Service: Cardiology;;    LAPAROSCOPIC CHOLECYSTECTOMY N/A 12/14/2018    Procedure: CHOLECYSTECTOMY, LAPAROSCOPIC;  Surgeon: Beck Love MD;  Location: [facility];  Service: General;  Laterality: N/A;  Dr. Love requested case to start at 1:00pm    NASAL POLYP EXCISION      TREATMENT OF CARDIAC ARRHYTHMIA N/A 10/25/2018    Procedure: CARDIOVERSION OR DEFIBRILLATION;  Surgeon: Justin Colby MD;  Location: [facility] CATH;  Service: Cardiology;  Laterality: N/A;     Social History  "    Tobacco Use    Smoking status: Never    Smokeless tobacco: Never   Substance Use Topics    Alcohol use: Yes     Comment: rarely    Drug use: No     Family History   Problem Relation Age of Onset    Sleep apnea Mother     Hypertension Mother     No Known Problems Brother     No Known Problems Brother     No Known Problems Brother      Review of patient's allergies indicates:  No Known Allergies    Performance Status:Performance Status:The patient's activity level is no limits with regular activity.    Review of Systems:  a review of eleven systems covering constitutional, Psych, Eye, HEENT, Respiratory, Cardiac, GI, , Musculoskeletal, Endocrine, Dermatologicwas negative except the above mentioned abnormalities and for any pertinent findings as listed below:  Gained a little weight  Feels hungry        Exam:Comprehensive exam done. /69   Pulse 97   Temp 97.9 °F (36.6 °C) (Temporal)   Resp (!) 29   Ht 5' 9" (1.753 m)   Wt 115.2 kg (254 lb)   SpO2 (!) 88%   BMI 37.51 kg/m²   Exam included Vitals as listed, and patient's appearance and affect and alertness and mood, oral exam for yeast and hygiene and pharynx lesions and Mallapatti (M) score, neck with inspection for jvd and masses and thyroid abnormalities and lymph nodes (supraclavicular and infraclavicular nodes also examined and noted if abn), chest exam included symmetry and effort and fremitus and percussion and auscultation, cardiac exam included rhythm and gallops and murmur and rubs and jvd and edema, abdominal exam for mass and hepatosplenomegaly and tenderness and hernias and bowel sounds, Musculoskeletal exam with muscle tone and posture and mobility/gait and  strenght, and skin for rashes and cyanosis and pallor and turgor, extremity for clubbing.  Findings were normal except as listed below:  Sitting up in chair  No distress  Oropharynx clear  HR irregular  Bibasilar diminished breath sounds  Abd nontender, soft  No " edema    Radiographs reviewed: view by direct vision   CXR 3/22/23- perihilar infiltrates       Labs     Recent Labs   Lab 03/22/23  0505   WBC 10.34   HGB 13.6*   HCT 42.2        Recent Labs   Lab 03/22/23  0504      K 3.7      CO2 20*   BUN 14   CREATININE 1.1   GLU 91   CALCIUM 8.8   MG 1.9   AST 16   ALT 22   ALKPHOS 52*   BILITOT 1.0   PROT 6.6   ALBUMIN 3.2*   No results for input(s): PH, PCO2, PO2, HCO3 in the last 24 hours.  Microbiology Results (last 7 days)       ** No results found for the last 168 hours. **            Impression:  Active Hospital Problems    Diagnosis  POA    *Atrial fibrillation with RVR [I48.91]  Yes    Adjustment disorder with depressed mood [F43.21]  Yes    History of cerebral hemorrhage [Z86.79]  Not Applicable      Resolved Hospital Problems   No resolved problems to display.               Plan:   Acute hypoxemic respiratory failure  Atrial fib w/ RVR  JASPAL on CPAP      - wean O2 to keep sats >92%  - continue CPAP nightly  - cardiology following  - workup and meds for atrial fib per cardiology  - echo is pending    Kerry Douglas MD  Pulmonary & Critical Care Medicine

## 2023-03-22 NOTE — HOSPITAL COURSE
Patient was monitored closely throughout course of hospital stay by hospital medicine team. Patient started on titrated Cardizem infusion on admission. HR improved overnight to 90s-100s. Cardiology consulted on admission. Echo obtained and pending. Cardizem infusion titrated down per nursing for improved ventricular rate. Cardizem infusion stopped per cardiology and started on Digoxin and metoprolol. Cardiology recommending NOAC at discharge. Pulmonology consulted for increasing O2 demands. Repeat Cxr ordered. D dimer elevated. CTA chest ordered and negative for any pulmonary embolism he was initiated on Lasix and oxygen needs improved.  He continued with atrial fibrillation with RVR and metoprolol and digoxin doses were adjusted per Cardiology.  He developed foot pain with elevated uric acid level unknown Hx of gout.  He was initiated on prednisone and IV Toradol.  His pain dramatically improved thereafter.  His heart rate stabilized thereafter.  He maintained atrial fibrillation with rate 70s to 90.  Case is discussed with Cardiology who originally planned for KIRILL cardioversion, however wants rate became controlled was agreeable with discharge home on current regimen with close outpatient follow-up with Cardiology and electrophysiology.  He had mild elevation in his BUN and creatinine.  NSAIDs were held.  The Lasix was discontinued he was given gentle fluid resuscitation.  Kidney function remains stable.  Pt felt ready for discharge.  He was D/C home to continue his oral rate control medications and anticoagulant.  He was D/C on a short course of oral steroids for the gout flare.  He was told to avoid NSAIDs and continue oral hydration close follow-up with his PCP this week, as well as repeat labs to assess kidney function.  Pt verbalized understanding of discharge instructions and return precautions.    Pt was seen on day of discharge and deemed appropriate for discharge.

## 2023-03-22 NOTE — MED STUDENT SUBJECTIVE & OBJECTIVE
Medical Student Subjective & Objective     Principal Problem: Atrial fibrillation with RVR    Chief Complaint:   Chief Complaint   Patient presents with    Palpitations     Patient was in ENDO and found to be in suspected Afib was transferred down to the ER, patient denies any symptoms        HPI: Josue Mir is a 47 year old white male with a PMHx significant fo hypertension, A-fib, and previous CVA presenting after being found in A fib w/ RVR while preparing for colonscopy. Patient scheduled for his first, routine colonoscopy when he was found to have a HR of 170s in A fib. Patient sent to ED for further evaluation. Patient reports feeling fine. He denies any SOB, chest pain, palpitations, dizziness, or lightheadedness. He complains of hunger as he has been NPO since last night for colonoscopy. He states he only takes Norvas and losartan for hypertension. Denies any rate control medications or blood thinners. Reports history of  cardiac loop recorder placement. Patient started on IV Cardizem infusion in ED. CXR without any acute abnormalities. HR noted to be in 120s-140s on cardiac monitor. Plan to consult cardiology. Patient to be placed in step down for further evaluation and management.           Hospital Course: No notes on file    Interval History: Notes reviewed. Patient seen and examined today. Patient's heart rate has been monitored continuously since admission and has fluctuated between  since being placed on Cardiazem infusion. Patient's oxygen saturation remained in the low 90's throughout the night, CPAP ordered by RT and patient placed on 2L NC. Patient denies chest pain, lightheadedness or dizziness, nausea, vomiting, fever or chills. Awaiting Cardiology consult before discharge.    Past Medical History:   Diagnosis Date    Anticoagulant long-term use     Atrial fibrillation     Gout     Gout, joint     Hypertension     Memory deficit following cerebral infarction     Pneumonia 04/2018     Sleep apnea with use of continuous positive airway pressure (CPAP)     Stroke 04/2018    Syncope and collapse        Past Surgical History:   Procedure Laterality Date    INSERTION OF IMPLANTABLE LOOP RECORDER  10/25/2018    Procedure: Insertion, Implantable Loop Recorder;  Surgeon: Justin Colby MD;  Location: [facility] CATH;  Service: Cardiology;;    LAPAROSCOPIC CHOLECYSTECTOMY N/A 12/14/2018    Procedure: CHOLECYSTECTOMY, LAPAROSCOPIC;  Surgeon: Beck Love MD;  Location: [facility];  Service: General;  Laterality: N/A;  Dr. Love requested case to start at 1:00pm    NASAL POLYP EXCISION      TREATMENT OF CARDIAC ARRHYTHMIA N/A 10/25/2018    Procedure: CARDIOVERSION OR DEFIBRILLATION;  Surgeon: Justin Colby MD;  Location: [facility] CATH;  Service: Cardiology;  Laterality: N/A;       Review of patient's allergies indicates:  No Known Allergies    Current Facility-Administered Medications on File Prior to Encounter   Medication    [DISCONTINUED] 0.9%  NaCl infusion     Current Outpatient Medications on File Prior to Encounter   Medication Sig    amLODIPine (NORVASC) 10 MG tablet Take 1 tablet (10 mg total) by mouth once daily.    indomethacin (INDOCIN) 50 MG capsule Take 1 capsule (50 mg total) by mouth 3 (three) times daily as needed (pain).    lisinopriL (PRINIVIL,ZESTRIL) 40 MG tablet Take 1 tablet (40 mg total) by mouth once daily.     Family History       Problem Relation (Age of Onset)    Hypertension Mother    No Known Problems Brother, Brother, Brother    Sleep apnea Mother          Tobacco Use    Smoking status: Never    Smokeless tobacco: Never   Substance and Sexual Activity    Alcohol use: Yes     Comment: rarely    Drug use: No    Sexual activity: Yes     Partners: Female     Review of Systems   Constitutional:  Negative for chills, diaphoresis and fever.   Eyes: Negative.    Respiratory:  Negative for cough, chest tightness, shortness of breath and wheezing.    Cardiovascular:  Negative for chest  pain, palpitations and leg swelling.   Gastrointestinal: Negative.    Genitourinary: Negative.    Musculoskeletal: Negative.    Neurological:  Negative for dizziness and light-headedness.   Objective:     Vital Signs (Most Recent):  Temp: 98 °F (36.7 °C) (03/22/23 0808)  Pulse: 102 (03/22/23 0808)  Resp: 20 (03/22/23 0808)  BP: 111/84 (03/22/23 0400)  SpO2: (!) 93 % (03/22/23 0808)   Vital Signs (24h Range):  Temp:  [97 °F (36.1 °C)-98.5 °F (36.9 °C)] 98 °F (36.7 °C)  Pulse:  [] 102  Resp:  [16-71] 20  SpO2:  [85 %-97 %] 93 %  BP: (111-143)/() 111/84     Weight: 115.6 kg (254 lb 13.6 oz)  Body mass index is 37.64 kg/m².    Intake/Output Summary (Last 24 hours) at 3/22/2023 0947  Last data filed at 3/22/2023 0358  Gross per 24 hour   Intake 1378.68 ml   Output --   Net 1378.68 ml      Physical Exam  Constitutional:       Appearance: He is obese.   HENT:      Head: Normocephalic and atraumatic.   Cardiovascular:      Rate and Rhythm: Tachycardia present. Rhythm irregular.   Pulmonary:      Effort: Pulmonary effort is normal.      Breath sounds: Normal breath sounds.   Abdominal:      General: There is no distension.      Palpations: Abdomen is soft. There is no mass.      Tenderness: There is no abdominal tenderness.   Skin:     General: Skin is warm and dry.   Neurological:      General: No focal deficit present.      Mental Status: He is alert.       Significant Labs: All pertinent labs within the past 24 hours have been reviewed.  CBC:   Recent Labs   Lab 03/21/23  1044 03/22/23  0505   WBC 9.87 10.34   HGB 14.8 13.6*   HCT 44.7 42.2    264     CMP:   Recent Labs   Lab 03/21/23  1044 03/22/23  0504    139   K 4.0 3.7    109   CO2 17* 20*    91   BUN 15 14   CREATININE 1.1 1.1   CALCIUM 9.2 8.8   PROT  --  6.6   ALBUMIN  --  3.2*   BILITOT  --  1.0   ALKPHOS  --  52*   AST  --  16   ALT  --  22   ANIONGAP 11 10       Coagulation:   Recent Labs   Lab 03/21/23  1044   INR 1.1        Significant Imaging: I have reviewed all pertinent imaging results/findings within the past 24 hours.    Medical Student Subjective & Objective

## 2023-03-22 NOTE — MEDICAL/APP STUDENT
[facility]  Progress Note    Patient Name: Josue Mir  MRN: [id number]  Patient Class: IP- Inpatient   Admission Date: 3/21/2023  Length of Stay: 1 days  Attending Physician: Mary Jo Restrepo MD  Primary Care Provider: David Quach MD    Subjective:     Principal Problem: Atrial fibrillation with RVR    Chief Complaint:   Chief Complaint   Patient presents with    Palpitations     Patient was in ENDO and found to be in suspected Afib was transferred down to the ER, patient denies any symptoms        HPI: Josue Mir is a 47 year old white male with a PMHx significant fo hypertension, A-fib, and previous CVA presenting after being found in A fib w/ RVR while preparing for colonscopy. Patient scheduled for his first, routine colonoscopy when he was found to have a HR of 170s in A fib. Patient sent to ED for further evaluation. Patient reports feeling fine. He denies any SOB, chest pain, palpitations, dizziness, or lightheadedness. He complains of hunger as he has been NPO since last night for colonoscopy. He states he only takes Norvas and losartan for hypertension. Denies any rate control medications or blood thinners. Reports history of  cardiac loop recorder placement. Patient started on IV Cardizem infusion in ED. CXR without any acute abnormalities. HR noted to be in 120s-140s on cardiac monitor. Plan to consult cardiology. Patient to be placed in step down for further evaluation and management.           Hospital Course: No notes on file    Interval History: Notes reviewed. Patient seen and examined today. Patient's heart rate has been monitored continuously since admission and has fluctuated between  since being placed on Cardiazem infusion. Patient's oxygen saturation remained in the low 90's throughout the night, CPAP ordered by RT and patient placed on 2L NC. Patient denies chest pain, lightheadedness or dizziness, nausea, vomiting, fever or chills. Awaiting Cardiology consult  before discharge.    Past Medical History:   Diagnosis Date    Anticoagulant long-term use     Atrial fibrillation     Gout     Gout, joint     Hypertension     Memory deficit following cerebral infarction     Pneumonia 04/2018    Sleep apnea with use of continuous positive airway pressure (CPAP)     Stroke 04/2018    Syncope and collapse        Past Surgical History:   Procedure Laterality Date    INSERTION OF IMPLANTABLE LOOP RECORDER  10/25/2018    Procedure: Insertion, Implantable Loop Recorder;  Surgeon: Justin Colby MD;  Location: [facility];  Service: Cardiology;;    LAPAROSCOPIC CHOLECYSTECTOMY N/A 12/14/2018    Procedure: CHOLECYSTECTOMY, LAPAROSCOPIC;  Surgeon: Beck Love MD;  Location: [facility];  Service: General;  Laterality: N/A;  Dr. Love requested case to start at 1:00pm    NASAL POLYP EXCISION      TREATMENT OF CARDIAC ARRHYTHMIA N/A 10/25/2018    Procedure: CARDIOVERSION OR DEFIBRILLATION;  Surgeon: Justin Colby MD;  Location: [facility];  Service: Cardiology;  Laterality: N/A;       Review of patient's allergies indicates:  No Known Allergies    Current Facility-Administered Medications on File Prior to Encounter   Medication    [DISCONTINUED] 0.9%  NaCl infusion     Current Outpatient Medications on File Prior to Encounter   Medication Sig    amLODIPine (NORVASC) 10 MG tablet Take 1 tablet (10 mg total) by mouth once daily.    indomethacin (INDOCIN) 50 MG capsule Take 1 capsule (50 mg total) by mouth 3 (three) times daily as needed (pain).    lisinopriL (PRINIVIL,ZESTRIL) 40 MG tablet Take 1 tablet (40 mg total) by mouth once daily.     Family History       Problem Relation (Age of Onset)    Hypertension Mother    No Known Problems Brother, Brother, Brother    Sleep apnea Mother          Tobacco Use    Smoking status: Never    Smokeless tobacco: Never   Substance and Sexual Activity    Alcohol use: Yes     Comment: rarely    Drug use: No    Sexual activity: Yes     Partners: Female      Review of Systems   Constitutional:  Negative for chills, diaphoresis and fever.   Eyes: Negative.    Respiratory:  Negative for cough, chest tightness, shortness of breath and wheezing.    Cardiovascular:  Negative for chest pain, palpitations and leg swelling.   Gastrointestinal: Negative.    Genitourinary: Negative.    Musculoskeletal: Negative.    Neurological:  Negative for dizziness and light-headedness.   Objective:     Vital Signs (Most Recent):  Temp: 98 °F (36.7 °C) (03/22/23 0808)  Pulse: 102 (03/22/23 0808)  Resp: 20 (03/22/23 0808)  BP: 111/84 (03/22/23 0400)  SpO2: (!) 93 % (03/22/23 0808)   Vital Signs (24h Range):  Temp:  [97 °F (36.1 °C)-98.5 °F (36.9 °C)] 98 °F (36.7 °C)  Pulse:  [] 102  Resp:  [16-71] 20  SpO2:  [85 %-97 %] 93 %  BP: (111-143)/() 111/84     Weight: 115.6 kg (254 lb 13.6 oz)  Body mass index is 37.64 kg/m².    Intake/Output Summary (Last 24 hours) at 3/22/2023 0947  Last data filed at 3/22/2023 0358  Gross per 24 hour   Intake 1378.68 ml   Output --   Net 1378.68 ml      Physical Exam  Constitutional:       Appearance: He is obese.   HENT:      Head: Normocephalic and atraumatic.   Cardiovascular:      Rate and Rhythm: Tachycardia present. Rhythm irregular.   Pulmonary:      Effort: Pulmonary effort is normal.      Breath sounds: Normal breath sounds.   Abdominal:      General: There is no distension.      Palpations: Abdomen is soft. There is no mass.      Tenderness: There is no abdominal tenderness.   Skin:     General: Skin is warm and dry.   Neurological:      General: No focal deficit present.      Mental Status: He is alert.       Significant Labs: All pertinent labs within the past 24 hours have been reviewed.  CBC:   Recent Labs   Lab 03/21/23  1044 03/22/23  0505   WBC 9.87 10.34   HGB 14.8 13.6*   HCT 44.7 42.2    264     CMP:   Recent Labs   Lab 03/21/23  1044 03/22/23  0504    139   K 4.0 3.7    109   CO2 17* 20*    91   BUN 15  14   CREATININE 1.1 1.1   CALCIUM 9.2 8.8   PROT  --  6.6   ALBUMIN  --  3.2*   BILITOT  --  1.0   ALKPHOS  --  52*   AST  --  16   ALT  --  22   ANIONGAP 11 10       Coagulation:   Recent Labs   Lab 03/21/23  1044   INR 1.1       Significant Imaging: I have reviewed all pertinent imaging results/findings within the past 24 hours.        Assessment/Plan:      Atrial fibrillation with RVR  Acute  Cardiazem infusion, HR has decreased from above 190 to below 110. Plan is to switch patient over to oral medication for continuous rate control.  Patient received echocardiogram today, awaiting results  Anticoagulated with Lovenox  Awaiting Cardiology consult before discharge       Adjustment disorder with depressed mood  Noted. Will continue patient's current medication for duration of hospital stay. Consult psychiatry if patient experiences psychosis or drastic change in mood.    History of cerebral hemorrhage  Noted. Monitor for signs/symptoms of neurological deficits and/or cerebral hemorrhage.    Active Diagnoses:    Diagnosis Date Noted POA    PRINCIPAL PROBLEM:  Atrial fibrillation with RVR [I48.91] 03/21/2023 Yes    Adjustment disorder with depressed mood [F43.21] 11/10/2020 Yes    History of cerebral hemorrhage [Z86.79] 07/30/2020 Not Applicable      Problems Resolved During this Admission:     VTE Risk Mitigation (From admission, onward)           Ordered     enoxaparin injection 40 mg  Daily         03/21/23 1342     IP VTE HIGH RISK PATIENT  Once         03/21/23 1342     Place sequential compression device  Until discontinued         03/21/23 1342                       Arlysse Josh, PAS2  [facility]

## 2023-03-22 NOTE — PLAN OF CARE
[facility]  Initial Discharge Assessment       Primary Care Provider: David Quach MD    Admission Diagnosis: Rapid heart beat [R00.0]  Chest pain [R07.9]  Atrial fibrillation with RVR [I48.91]    Admission Date: 3/21/2023  Expected Discharge Date:     Discharge Barriers Identified: None    Payor: [employer] / Plan: BCBS ALL OUT OF STATE / Product Type: PPO /     Extended Emergency Contact Information  Primary Emergency Contact: JebmelissaMallorie  Address: [address]           [place] [address] [facility]  Home Phone: [phone]  Relation: Spouse    Discharge Plan A: Home with family  Discharge Plan B: Home      [id number] - [place], [place] - [address]  [address]  [place] [address]  Phone: [phone] Fax: [phone]    Completed DC assessment with patient at bedside. Verified information on facesheet as correct. Lives at listed address with spouse and 2 dependent children. PCP verified as Dr. Quach and last seen a few months ago. [facility]. Independent at baseline. Denies hh/hd/blood thinners/outpt services. DME- CPAP , states he wears every night as ordered. Drives himself to apts. Reports his wife will provide transportation home upon. Reports taking home medications as prescribed and able to afford them. Verified insurance on file and denies recent inpt stay in last 30 days. DC plan is home- likely with new blood thinner    Initial Assessment (most recent)       Adult Discharge Assessment - 03/22/23 1130          Discharge Assessment    Assessment Type Discharge Planning Assessment     Confirmed/corrected address, phone number and insurance Yes     Confirmed Demographics Correct on Facesheet     Source of Information patient     Communicated LUCERO with patient/caregiver Yes     People in Home spouse;child(silvio), dependent     Facility Arrived From: home     Do you expect to return to your current living situation? Yes     Do  you have help at home or someone to help you manage your care at home? Yes     Prior to hospitilization cognitive status: Alert/Oriented     Current cognitive status: Alert/Oriented     Equipment Currently Used at Home CPAP     Readmission within 30 days? No     Patient currently being followed by outpatient case management? No     Do you currently have service(s) that help you manage your care at home? No     Do you take prescription medications? Yes     Do you have prescription coverage? Yes     Do you have any problems affording any of your prescribed medications? No     Is the patient taking medications as prescribed? yes     Who is going to help you get home at discharge? spouse     How do you get to doctors appointments? car, drives self     Are you on dialysis? No     Do you take coumadin? No     Discharge Plan A Home with family     Discharge Plan B Home     DME Needed Upon Discharge  none     Discharge Plan discussed with: Patient     Discharge Barriers Identified None

## 2023-03-22 NOTE — NURSING
Secure chat sent to Renny SOLARES, notifying him patient in the bedside chair so he can expand his lungs better. However I did have to change the Oxygen to 3, I ask Nat with respiratory to come look at him. he has been running in the high 80's. she mentioned possible having Pulmonary look at him as he does use a C-pap at night.  Orders for CXR and Consult for Pulmonary placed by Renny SOLARES.

## 2023-03-22 NOTE — CONSULTS
[facility]  Department of Cardiology  Consult Note      PATIENT NAME: Josue Mir  MRN: [id number]  TODAY'S DATE: 03/22/2023  ADMIT DATE: 3/21/2023                          CONSULT REQUESTED BY: Mary Jo Restrepo MD    SUBJECTIVE     PRINCIPAL PROBLEM: Atrial fibrillation with RVR      REASON FOR CONSULT:  AFIB with RVR      HPI:            47 year old male patient with a PMH significant for HTN, AF, sleep apnea, obesity, and previous Hemhoragic stroke from uncontrolled BP admitted after being in endo for colonoscopy found to be in AFIB. Patient states he has had afib before but does not feel it. Seems in 2018 he was worked up for this and had an angiogram he tells me at that time and it was clean. He has not been seen by EP. His CHADSVASC score is 4. HASBLED 2.        FROM H AND P       Palpitations        Patient was in ENDO and found to be in suspected Afib was transferred down to the ER, patient denies any symptoms          HPI: Josue Mir is a 47 year old white male with a PMHx significant fo hypertension, A-fib, and previous CVA presenting after being found in A fib w/ RVR while preparing for colonscopy. Patient scheduled for his first, routine colonoscopy when he was found to have a HR of 170s in A fib. Patient sent to ED for further evaluation. Patient reports feeling fine. He denies any SOB, chest pain, palpitations, dizziness, or lightheadedness. He complains of hunger as he has been NPO since last night for colonoscopy. He states he only takes Norvas and losartan for hypertension. Denies any rate control medications or blood thinners. Reports history of  cardiac loop recorder placement. Patient started on IV Cardizem infusion in ED. CXR without any acute abnormalities. HR noted to be in 120s-140s on cardiac monitor. Plan to consult cardiology. Patient to be placed in step down for further evaluation and management.        Review of patient's allergies indicates:  No Known  Allergies    Past Medical History:   Diagnosis Date    Anticoagulant long-term use     Atrial fibrillation     Gout     Gout, joint     Hypertension     Memory deficit following cerebral infarction     Pneumonia 04/2018    Sleep apnea with use of continuous positive airway pressure (CPAP)     Stroke 04/2018    Syncope and collapse      Past Surgical History:   Procedure Laterality Date    INSERTION OF IMPLANTABLE LOOP RECORDER  10/25/2018    Procedure: Insertion, Implantable Loop Recorder;  Surgeon: Justin Colby MD;  Location: [facility];  Service: Cardiology;;    LAPAROSCOPIC CHOLECYSTECTOMY N/A 12/14/2018    Procedure: CHOLECYSTECTOMY, LAPAROSCOPIC;  Surgeon: Beck Love MD;  Location: [facility];  Service: General;  Laterality: N/A;  Dr. Love requested case to start at 1:00pm    NASAL POLYP EXCISION      TREATMENT OF CARDIAC ARRHYTHMIA N/A 10/25/2018    Procedure: CARDIOVERSION OR DEFIBRILLATION;  Surgeon: Justin Colby MD;  Location: [facility];  Service: Cardiology;  Laterality: N/A;     Social History     Tobacco Use    Smoking status: Never    Smokeless tobacco: Never   Substance Use Topics    Alcohol use: Yes     Comment: rarely    Drug use: No        REVIEW OF SYSTEMS  CONSTITUTIONAL: Negative for chills, fatigue and fever.   EYES: No double vision, No blurred vision  NEURO: No headaches, No dizziness  RESPIRATORY: Negative for cough, shortness of breath and wheezing.    CARDIOVASCULAR: Negative for chest pain. Negative for palpitations and leg swelling.   GI: Negative for abdominal pain, No melena, diarrhea, nausea and vomiting.   : Negative for dysuria and frequency, Negative for hematuria  SKIN: Negative for bruising, Negative for edema or discoloration noted.   ENDOCRINE: Negative for polyphagia, Negative for heat intolerance, Negative for cold intolerance  PSYCHIATRIC: Negative for depression, Negative for anxiety, Negative for memory loss  MUSCULOSKELETAL: Negative for neck pain, Negative  for muscle weakness, Negative for back pain     OBJECTIVE     VITAL SIGNS (Most Recent)  Temp: 98 °F (36.7 °C) (03/22/23 0808)  Pulse: 102 (03/22/23 0808)  Resp: 20 (03/22/23 0808)  BP: 111/84 (03/22/23 0400)  SpO2: (!) 93 % (03/22/23 0808)    VENTILATION STATUS  Resp: 20 (03/22/23 0808)  SpO2: (!) 93 % (03/22/23 0808)  Oxygen Concentration (%):  [28-40] 28    I & O (Last 24H):  Intake/Output Summary (Last 24 hours) at 3/22/2023 1048  Last data filed at 3/22/2023 0358  Gross per 24 hour   Intake 1378.68 ml   Output --   Net 1378.68 ml       WEIGHTS  Wt Readings from Last 3 Encounters:   03/21/23 1400 115.6 kg (254 lb 13.6 oz)   03/21/23 1030 115.7 kg (255 lb)   03/21/23 0937 115.7 kg (255 lb)   01/04/23 1125 120.9 kg (266 lb 8.6 oz)       PHYSICAL EXAM  GENERAL: well built, well nourished, well-developed in no apparent distress alert and oriented.   HEENT: Normocephalic. Pupils normal and conjunctivae normal.  Mucous membranes normal, no cyanosis or icterus, trachea central,no pallor or icterus is noted..   NECK: No JVD. No bruit..   THYROID: Thyroid not enlarged. No nodules present..   CARDIAC: IRR  CHEST ANATOMY: normal.   LUNGS: Clear to auscultation. No wheezing or rhonchi..   ABDOMEN: Soft no masses or organomegaly.  No abdomen pulsations or bruits.  Normal bowel sounds. No pulsations and no masses felt, No guarding or rebound.   URINARY: No barclay catheter   EXTREMITIES: No cyanosis, clubbing or edema noted at this time., no calf tenderness bilaterally.   PERIPHERAL VASCULAR SYSTEM: Good palpable distal pulses.   CENTRAL NERVOUS SYSTEM: No focal motor or sensory deficits noted.   SKIN: Skin without lesions, moist, well perfused.   MUSCLE STRENGTH & TONE: No noteable weakness, atrophy or abnormal movement.     HOME MEDICATIONS:  No current facility-administered medications on file prior to encounter.     Current Outpatient Medications on File Prior to Encounter   Medication Sig Dispense Refill    amLODIPine  (NORVASC) 10 MG tablet Take 1 tablet (10 mg total) by mouth once daily. 90 tablet 3    indomethacin (INDOCIN) 50 MG capsule Take 1 capsule (50 mg total) by mouth 3 (three) times daily as needed (pain). 15 capsule 0    lisinopriL (PRINIVIL,ZESTRIL) 40 MG tablet Take 1 tablet (40 mg total) by mouth once daily. 90 tablet 3       SCHEDULED MEDS:   enoxaparin  40 mg Subcutaneous Daily    metoprolol tartrate  25 mg Oral BID       CONTINUOUS INFUSIONS:   dilTIAZem 10 mg/hr (03/22/23 0645)       PRN MEDS:acetaminophen, aluminum-magnesium hydroxide-simethicone, dextrose 10%, dextrose 10%, glucagon (human recombinant), glucose, glucose, magnesium oxide, magnesium oxide, naloxone, ondansetron, potassium bicarbonate, potassium bicarbonate, potassium bicarbonate, sodium chloride 0.9%    LABS AND DIAGNOSTICS     CBC LAST 3 DAYS  Recent Labs   Lab 03/21/23  1044 03/22/23  0505   WBC 9.87 10.34   RBC 5.52 5.16   HGB 14.8 13.6*   HCT 44.7 42.2   MCV 81* 82   MCH 26.8* 26.4*   MCHC 33.1 32.2   RDW 13.2 13.2    264   MPV 10.7 11.0   GRAN 71.2  7.0 77.8*  8.0*   LYMPH 17.5*  1.7 10.5*  1.1   MONO 7.6  0.8 7.4  0.8   BASO 0.06 0.07   NRBC 0 0       COAGULATION LAST 3 DAYS  Recent Labs   Lab 03/21/23  1044   INR 1.1       CHEMISTRY LAST 3 DAYS  Recent Labs   Lab 03/21/23  1044 03/22/23  0504    139   K 4.0 3.7    109   CO2 17* 20*   ANIONGAP 11 10   BUN 15 14   CREATININE 1.1 1.1    91   CALCIUM 9.2 8.8   MG  --  1.9   ALBUMIN  --  3.2*   PROT  --  6.6   ALKPHOS  --  52*   ALT  --  22   AST  --  16   BILITOT  --  1.0       CARDIAC PROFILE LAST 3 DAYS  No results for input(s): BNP, CPK, CPKMB, LDH, TROPONINI, TROPONINIHS in the last 168 hours.    ENDOCRINE LAST 3 DAYS  No results for input(s): TSH, PROCAL in the last 168 hours.    LAST ARTERIAL BLOOD GAS  ABG  No results for input(s): PH, PO2, PCO2, HCO3, BE in the last 168 hours.    LAST 7 DAYS MICROBIOLOGY   Microbiology Results (last 7 days)       ** No  results found for the last 168 hours. **            MOST RECENT IMAGING  X-Ray Chest 1 View  Narrative: EXAMINATION:  XR CHEST 1 VIEW    CLINICAL HISTORY:  Atrial fibrillation with RVR, r/o edema;    TECHNIQUE:  Single frontal view of the chest was performed.    COMPARISON:  None    FINDINGS:  The heart is mildly enlarged.  A cardiac loop recorder is present.  The lungs are well expanded without consolidation, edema, or pleural effusion.  Impression: Cardiomegaly and cardiac loop recorder.  No acute findings.    Electronically signed by: Beck Arellano MD  Date:    03/21/2023  Time:    10:55      ECHOCARDIOGRAM RESULTS (last 5)  Results for orders placed in visit on 10/25/18    Transesophageal echo (KIRILL) with possible cardioversion    Interpretation Summary  · The left ventricle cavity is normal.  · Left ventricle ejection fraction is normal at 55%  · Normal LV diastolic function.  · No wall motion abnormalities.  · RV systolic function is normal.  · No thrombus present.  · No mass present.  · Normal appearing left atrial appendage. No thrombus is present in the appendage.      CURRENT/PREVIOUS VISIT EKG  Results for orders placed or performed during the hospital encounter of 03/21/23   EKG 12-lead (Rapid Heart Beat / Palpitations) Age > 50    Collection Time: 03/21/23 10:30 AM    Narrative    Test Reason : R00.0,    Vent. Rate : 159 BPM     Atrial Rate : 153 BPM     P-R Int : 000 ms          QRS Dur : 086 ms      QT Int : 252 ms       P-R-T Axes : 000 027 269 degrees     QTc Int : 409 ms    Atrial fibrillation with rapid ventricular response  Low voltage QRS  Nonspecific ST and T wave abnormality  Abnormal ECG  When compared with ECG of 21-MAR-2023 09:29,  Nonspecific T wave abnormality, worse in Anterior leads    Referred By: [email or web address]   SELF           Confirmed By:            ASSESSMENT/PLAN:     Active Hospital Problems    Diagnosis    *Atrial fibrillation with RVR    Adjustment disorder with depressed mood     History of cerebral hemorrhage       ASSESSMENT & PLAN:       PAF-CHADSVASC 4  History of cerebral hemorrhage 2018 for uncontrolled bp in past -HASBLED Currently 2  3. HTN  4. Obesity  5. Sleep apnea  6. Cardiomegaly on CXR    RECOMMENDATIONS:      DC Cardizem  Give Digoxin and metoprolol for rate control  ECHO evaluate LV Function as he has mild cardiomegaly on CXR  Increase lovenox therapeutic dose  Recommend NOAC as patient has high CHADS score  He will need to be very compliant with BP medications especially now with being on blood thinner  Resume home and lisinopril as BP Tolerates   He would benefit  OP EP evaluation  Will follow  Thank you         Kerry Nicole NP  Department of Cardiology  Date of Service: 03/22/2023

## 2023-03-22 NOTE — CARE UPDATE
Patient with increasing O2 demands. Up to 5L per Respiratory Tech. Repeat CXR ordered. Pulmonology consulted for increasing O2 demands and JASPAL on CPAP. D dimer pending.

## 2023-03-22 NOTE — MED STUDENT ASSESSMENT & PLAN
Atrial fibrillation with RVR  Acute. Patient's HR remains uncontrolled ranging between 120's - 130's.   Metoprolol increased to 100 mg PO BID and Lisinopril 10 mg PO once daily per Cardiology. Continue Digoxin, hold if HR <60.  Eliquis 5 mg PO BID  Discharge when rate is controlled.  Follow-up appointment with OP Cardiology     Acute hypoxemic respiratory failure  CTA- negative for pulmonary embolism  CXR showed evidence of Pulmonary HTN  CPAP nightly for JASPAL  Supplemental O2 as needed   Home O2 test before d/c    Acute on chronic combined systolic and diastolic heart failure    ECHO Results - 3/23/2023   The left ventricle is mildly enlarged with moderately decreased systolic   function.  · The estimated ejection fraction is 40%.  · Left ventricular diastolic dysfunction.  · There is mild left ventricular global hypokinesis.  · Normal right ventricular size with normal right ventricular systolic   function.  · Mild left atrial enlargement.  · Mild tricuspid regurgitation.    Currently managed with Digoxin and Metoprolol   IV Lasix  Daily weights.   Strict Is & Os    Adjustment disorder with depressed mood  Noted. Patient not currently taking medication for this diagnosis. Consult psychiatry if patient experiences psychosis or drastic change in mood.    History of cerebral hemorrhage  Noted. Monitor for signs/symptoms of neurological deficits and/or cerebral hemorrhage.

## 2023-03-22 NOTE — SUBJECTIVE & OBJECTIVE
Interval History: Notes reviewed, no acute events overnight. Patient started on CPAP last night. Patient remains in A fib with rate between 90-100s. He denies any SOB, chest pain, light headedness, dizziness, or palpitations. Echo obtained this morning. Awaiting cardiology consult. IV cardizem infusion titrated down to 10 today.     Review of Systems   Constitutional:  Negative for fatigue and fever.   Respiratory:  Negative for shortness of breath.    Cardiovascular:  Negative for chest pain, palpitations and leg swelling.   Gastrointestinal:  Negative for abdominal distention and abdominal pain.   Neurological:  Negative for dizziness, weakness and light-headedness.   Psychiatric/Behavioral:  Negative for behavioral problems and confusion.    All other systems reviewed and are negative.  Objective:     Vital Signs (Most Recent):  Temp: 98 °F (36.7 °C) (03/22/23 0808)  Pulse: 102 (03/22/23 0808)  Resp: 20 (03/22/23 0808)  BP: 111/84 (03/22/23 0400)  SpO2: (!) 93 % (03/22/23 0808)   Vital Signs (24h Range):  Temp:  [97 °F (36.1 °C)-98 °F (36.7 °C)] 98 °F (36.7 °C)  Pulse:  [] 102  Resp:  [16-71] 20  SpO2:  [85 %-97 %] 93 %  BP: (111-143)/() 111/84     Weight: 115.6 kg (254 lb 13.6 oz)  Body mass index is 37.64 kg/m².    Intake/Output Summary (Last 24 hours) at 3/22/2023 1103  Last data filed at 3/22/2023 0358  Gross per 24 hour   Intake 1378.68 ml   Output --   Net 1378.68 ml      Physical Exam  Vitals and nursing note reviewed.   Constitutional:       General: He is not in acute distress.     Appearance: Normal appearance. He is not ill-appearing.   HENT:      Head: Normocephalic and atraumatic.   Cardiovascular:      Rate and Rhythm: Normal rate. Rhythm irregular.      Pulses: Normal pulses.      Heart sounds: No murmur heard.    No gallop.   Pulmonary:      Effort: Pulmonary effort is normal. No respiratory distress.      Breath sounds: Normal breath sounds. No wheezing or rales.   Abdominal:       General: Abdomen is flat. There is no distension.      Palpations: Abdomen is soft.      Tenderness: There is no abdominal tenderness.   Musculoskeletal:         General: No swelling or tenderness. Normal range of motion.   Neurological:      General: No focal deficit present.      Mental Status: He is alert. Mental status is at baseline.   Psychiatric:         Mood and Affect: Mood normal.       Significant Labs: All pertinent labs within the past 24 hours have been reviewed.  CBC:   Recent Labs   Lab 03/21/23  1044 03/22/23  0505   WBC 9.87 10.34   HGB 14.8 13.6*   HCT 44.7 42.2    264     CMP:   Recent Labs   Lab 03/21/23  1044 03/22/23  0504    139   K 4.0 3.7    109   CO2 17* 20*    91   BUN 15 14   CREATININE 1.1 1.1   CALCIUM 9.2 8.8   PROT  --  6.6   ALBUMIN  --  3.2*   BILITOT  --  1.0   ALKPHOS  --  52*   AST  --  16   ALT  --  22   ANIONGAP 11 10       Significant Imaging: I have reviewed all pertinent imaging results/findings within the past 24 hours.

## 2023-03-23 ENCOUNTER — TELEPHONE (OUTPATIENT)
Dept: ELECTROPHYSIOLOGY | Facility: CLINIC | Age: 48
End: 2023-03-23
Payer: COMMERCIAL

## 2023-03-23 DIAGNOSIS — I48.0 PAF (PAROXYSMAL ATRIAL FIBRILLATION): Primary | ICD-10-CM

## 2023-03-23 PROBLEM — I50.43 ACUTE ON CHRONIC COMBINED SYSTOLIC AND DIASTOLIC HEART FAILURE: Status: ACTIVE | Noted: 2023-03-23

## 2023-03-23 LAB
ALBUMIN SERPL BCP-MCNC: 3.1 G/DL (ref 3.5–5.2)
ALP SERPL-CCNC: 51 U/L (ref 55–135)
ALT SERPL W/O P-5'-P-CCNC: 18 U/L (ref 10–44)
ANION GAP SERPL CALC-SCNC: 10 MMOL/L (ref 8–16)
ANION GAP SERPL CALC-SCNC: 10 MMOL/L (ref 8–16)
AST SERPL-CCNC: 11 U/L (ref 10–40)
BASOPHILS # BLD AUTO: 0.06 K/UL (ref 0–0.2)
BASOPHILS NFR BLD: 0.7 % (ref 0–1.9)
BILIRUB SERPL-MCNC: 1.1 MG/DL (ref 0.1–1)
BUN SERPL-MCNC: 13 MG/DL (ref 6–20)
BUN SERPL-MCNC: 15 MG/DL (ref 6–20)
CALCIUM SERPL-MCNC: 8.9 MG/DL (ref 8.7–10.5)
CALCIUM SERPL-MCNC: 9.5 MG/DL (ref 8.7–10.5)
CHLORIDE SERPL-SCNC: 107 MMOL/L (ref 95–110)
CHLORIDE SERPL-SCNC: 108 MMOL/L (ref 95–110)
CO2 SERPL-SCNC: 22 MMOL/L (ref 23–29)
CO2 SERPL-SCNC: 24 MMOL/L (ref 23–29)
CREAT SERPL-MCNC: 1.2 MG/DL (ref 0.5–1.4)
CREAT SERPL-MCNC: 1.2 MG/DL (ref 0.5–1.4)
DIFFERENTIAL METHOD: ABNORMAL
EOSINOPHIL # BLD AUTO: 0.3 K/UL (ref 0–0.5)
EOSINOPHIL NFR BLD: 3.5 % (ref 0–8)
ERYTHROCYTE [DISTWIDTH] IN BLOOD BY AUTOMATED COUNT: 13.2 % (ref 11.5–14.5)
EST. GFR  (NO RACE VARIABLE): >60 ML/MIN/1.73 M^2
EST. GFR  (NO RACE VARIABLE): >60 ML/MIN/1.73 M^2
GLUCOSE SERPL-MCNC: 90 MG/DL (ref 70–110)
GLUCOSE SERPL-MCNC: 98 MG/DL (ref 70–110)
HCT VFR BLD AUTO: 41.9 % (ref 40–54)
HGB BLD-MCNC: 13.7 G/DL (ref 14–18)
IMM GRANULOCYTES # BLD AUTO: 0.03 K/UL (ref 0–0.04)
IMM GRANULOCYTES NFR BLD AUTO: 0.4 % (ref 0–0.5)
LYMPHOCYTES # BLD AUTO: 1.3 K/UL (ref 1–4.8)
LYMPHOCYTES NFR BLD: 15.4 % (ref 18–48)
MAGNESIUM SERPL-MCNC: 2.1 MG/DL (ref 1.6–2.6)
MCH RBC QN AUTO: 26.8 PG (ref 27–31)
MCHC RBC AUTO-ENTMCNC: 32.7 G/DL (ref 32–36)
MCV RBC AUTO: 82 FL (ref 82–98)
MONOCYTES # BLD AUTO: 0.7 K/UL (ref 0.3–1)
MONOCYTES NFR BLD: 8.4 % (ref 4–15)
NEUTROPHILS # BLD AUTO: 6.1 K/UL (ref 1.8–7.7)
NEUTROPHILS NFR BLD: 71.6 % (ref 38–73)
NRBC BLD-RTO: 0 /100 WBC
PLATELET # BLD AUTO: 265 K/UL (ref 150–450)
PMV BLD AUTO: 10.9 FL (ref 9.2–12.9)
POTASSIUM SERPL-SCNC: 3.7 MMOL/L (ref 3.5–5.1)
POTASSIUM SERPL-SCNC: 4.8 MMOL/L (ref 3.5–5.1)
PROT SERPL-MCNC: 6.6 G/DL (ref 6–8.4)
RBC # BLD AUTO: 5.12 M/UL (ref 4.6–6.2)
SODIUM SERPL-SCNC: 140 MMOL/L (ref 136–145)
SODIUM SERPL-SCNC: 141 MMOL/L (ref 136–145)
WBC # BLD AUTO: 8.48 K/UL (ref 3.9–12.7)

## 2023-03-23 PROCEDURE — 25000003 PHARM REV CODE 250: Performed by: SPECIALIST

## 2023-03-23 PROCEDURE — 36415 COLL VENOUS BLD VENIPUNCTURE: CPT | Performed by: INTERNAL MEDICINE

## 2023-03-23 PROCEDURE — 25000003 PHARM REV CODE 250

## 2023-03-23 PROCEDURE — 80048 BASIC METABOLIC PNL TOTAL CA: CPT | Mod: XB | Performed by: INTERNAL MEDICINE

## 2023-03-23 PROCEDURE — 20600001 HC STEP DOWN PRIVATE ROOM

## 2023-03-23 PROCEDURE — 25000003 PHARM REV CODE 250: Performed by: NURSE PRACTITIONER

## 2023-03-23 PROCEDURE — 80053 COMPREHEN METABOLIC PANEL: CPT

## 2023-03-23 PROCEDURE — 27000221 HC OXYGEN, UP TO 24 HOURS

## 2023-03-23 PROCEDURE — 85025 COMPLETE CBC W/AUTO DIFF WBC: CPT

## 2023-03-23 PROCEDURE — 36415 COLL VENOUS BLD VENIPUNCTURE: CPT

## 2023-03-23 PROCEDURE — 94761 N-INVAS EAR/PLS OXIMETRY MLT: CPT

## 2023-03-23 PROCEDURE — 99900035 HC TECH TIME PER 15 MIN (STAT)

## 2023-03-23 PROCEDURE — 94660 CPAP INITIATION&MGMT: CPT

## 2023-03-23 PROCEDURE — 99232 PR SUBSEQUENT HOSPITAL CARE,LEVL II: ICD-10-PCS | Mod: ,,, | Performed by: INTERNAL MEDICINE

## 2023-03-23 PROCEDURE — 99233 SBSQ HOSP IP/OBS HIGH 50: CPT | Mod: ,,, | Performed by: NURSE PRACTITIONER

## 2023-03-23 PROCEDURE — 83735 ASSAY OF MAGNESIUM: CPT

## 2023-03-23 PROCEDURE — 99232 SBSQ HOSP IP/OBS MODERATE 35: CPT | Mod: ,,, | Performed by: INTERNAL MEDICINE

## 2023-03-23 PROCEDURE — 99233 PR SUBSEQUENT HOSPITAL CARE,LEVL III: ICD-10-PCS | Mod: ,,, | Performed by: NURSE PRACTITIONER

## 2023-03-23 PROCEDURE — 63600175 PHARM REV CODE 636 W HCPCS: Performed by: NURSE PRACTITIONER

## 2023-03-23 RX ORDER — LOSARTAN POTASSIUM 25 MG/1
25 TABLET ORAL DAILY
Status: DISCONTINUED | OUTPATIENT
Start: 2023-03-23 | End: 2023-03-23

## 2023-03-23 RX ORDER — DIGOXIN 0.25 MG/ML
125 INJECTION INTRAMUSCULAR; INTRAVENOUS ONCE
Status: DISCONTINUED | OUTPATIENT
Start: 2023-03-23 | End: 2023-03-23

## 2023-03-23 RX ORDER — DIGOXIN 250 MCG
0.25 TABLET ORAL DAILY
Status: DISCONTINUED | OUTPATIENT
Start: 2023-03-24 | End: 2023-03-26 | Stop reason: HOSPADM

## 2023-03-23 RX ORDER — METOPROLOL TARTRATE 50 MG/1
50 TABLET ORAL 2 TIMES DAILY
Status: DISCONTINUED | OUTPATIENT
Start: 2023-03-23 | End: 2023-03-23

## 2023-03-23 RX ORDER — METOPROLOL TARTRATE 50 MG/1
50 TABLET ORAL 3 TIMES DAILY
Status: DISCONTINUED | OUTPATIENT
Start: 2023-03-23 | End: 2023-03-24

## 2023-03-23 RX ORDER — LISINOPRIL 2.5 MG/1
5 TABLET ORAL DAILY
Status: DISCONTINUED | OUTPATIENT
Start: 2023-03-23 | End: 2023-03-24

## 2023-03-23 RX ORDER — DIGOXIN 0.25 MG/ML
250 INJECTION INTRAMUSCULAR; INTRAVENOUS ONCE
Status: COMPLETED | OUTPATIENT
Start: 2023-03-23 | End: 2023-03-23

## 2023-03-23 RX ORDER — METOPROLOL TARTRATE 25 MG/1
25 TABLET, FILM COATED ORAL ONCE
Status: COMPLETED | OUTPATIENT
Start: 2023-03-23 | End: 2023-03-23

## 2023-03-23 RX ADMIN — METOPROLOL TARTRATE 25 MG: 25 TABLET, FILM COATED ORAL at 10:03

## 2023-03-23 RX ADMIN — LISINOPRIL 5 MG: 2.5 TABLET ORAL at 01:03

## 2023-03-23 RX ADMIN — DIGOXIN 0.12 MG: 125 TABLET ORAL at 08:03

## 2023-03-23 RX ADMIN — DIGOXIN 250 MCG: 250 INJECTION, SOLUTION INTRAMUSCULAR; INTRAVENOUS at 10:03

## 2023-03-23 RX ADMIN — METOPROLOL TARTRATE 25 MG: 25 TABLET, FILM COATED ORAL at 08:03

## 2023-03-23 RX ADMIN — ENOXAPARIN SODIUM 120 MG: 150 INJECTION SUBCUTANEOUS at 05:03

## 2023-03-23 RX ADMIN — POTASSIUM BICARBONATE 50 MEQ: 977.5 TABLET, EFFERVESCENT ORAL at 08:03

## 2023-03-23 RX ADMIN — ENOXAPARIN SODIUM 120 MG: 150 INJECTION SUBCUTANEOUS at 03:03

## 2023-03-23 RX ADMIN — METOPROLOL TARTRATE 50 MG: 50 TABLET, FILM COATED ORAL at 08:03

## 2023-03-23 RX ADMIN — SPIRONOLACTONE 12.5 MG: 25 TABLET ORAL at 08:03

## 2023-03-23 RX ADMIN — FUROSEMIDE 20 MG: 20 TABLET ORAL at 08:03

## 2023-03-23 NOTE — RESPIRATORY THERAPY
03/22/23 1948   Patient Assessment/Suction   Level of Consciousness (AVPU) alert   Respiratory Effort Unlabored   Expansion/Accessory Muscles/Retractions expansion symmetric   PRE-TX-O2   Device (Oxygen Therapy) nasal cannula   $ Is the patient on Low Flow Oxygen? Yes   Flow (L/min) 2   Oxygen Concentration (%) 28   SpO2 99 %   Pulse Oximetry Type Continuous   $ Pulse Oximetry - Multiple Charge Pulse Oximetry - Multiple   Pulse (!) 158   Resp (!) 24   Ready to Wean/Extubation Screen   FIO2<=50 (chart decimal) 0.28   Preset CPAP/BiPAP Settings   Mode Of Delivery Standby;CPAP        Detail Level: Simple Render Risk Assessment In Note?: no Additional Notes: Cosmetic quote (skin tag removal) given to patient $100

## 2023-03-23 NOTE — ASSESSMENT & PLAN NOTE
Patient is identified as having Combined Systolic and Diastolic heart failure that is Acute. CHF is currently controlled. Latest ECHO performed and demonstrates- Results for orders placed during the hospital encounter of 03/21/23    Echo    Interpretation Summary  · The left ventricle is mildly enlarged with moderately decreased systolic function.  · The estimated ejection fraction is 40%.  · Left ventricular diastolic dysfunction.  · There is mild left ventricular global hypokinesis.  · Normal right ventricular size with normal right ventricular systolic function.  · Mild left atrial enlargement.  · Mild tricuspid regurgitation.  · Mild-to-moderate mitral regurgitation.  · Normal central venous pressure (3 mmHg).  · The estimated PA systolic pressure is 37 mmHg.  . Continue Beta Blocker ACE/ARB Furosemide and monitor clinical status closely. Monitor on telemetry. Patient is on CHF pathway.  Monitor strict Is&Os and daily weights.  Place on fluid restriction of 1.5 L. Continue to stress to patient importance of self efficacy and  on diet for CHF. Last BNP reviewed- and noted below   Recent Labs   Lab 03/22/23  1531   *   .    -Clinically improved after IV lasix  -Rate control key: BB, digoxin  -ACEI to resume this evening.

## 2023-03-23 NOTE — CARE UPDATE
03/22/23 2349   Patient Assessment/Suction   Level of Consciousness (AVPU) alert   Respiratory Effort Unlabored   Skin Integrity   $ Wound Care Tech Time 15 min   Area Observed Bridge of nose   Skin Appearance without discoloration   Barrier used? Liquid Filled Cushion   PRE-TX-O2   Device (Oxygen Therapy) CPAP   $ Is the patient on Low Flow Oxygen? Yes   Oxygen Concentration (%) 35   SpO2 96 %   Pulse Oximetry Type Continuous   $ Pulse Oximetry - Multiple Charge Pulse Oximetry - Multiple   Pulse (!) 121   Resp (!) 22   BP (!) 137/95   Ready to Wean/Extubation Screen   FIO2<=50 (chart decimal) 0.35   Preset CPAP/BiPAP Settings   Mode Of Delivery CPAP   $ Initial CPAP/BiPAP Setup? No   $ Is patient using? Yes   Size of Mask Large   Sized Appropriately? Yes   Equipment Type V60   Airway Device Type large full face mask   CPAP (cm H2O) 8   Patient CPAP/BiPAP Settings   RR Total (Breaths/Min) 23   Tidal Volume (mL) 705   VE Minute Ventilation (L/min) 15 L/min   Peak Inspiratory Pressure (cm H2O) 9   TiTOT (%) 34   Total Leak (L/Min) 5   Patient Trigger - ST Mode Only (%) 100   CPAP/BiPAP Alarms   High Pressure (cm H2O) 15   Low Pressure (cm H2O) 5   Minute Ventilation (L/Min) 3   High RR (breaths/min) 35   Low RR (breaths/min) 5

## 2023-03-23 NOTE — PROGRESS NOTES
[facility] Medicine  Progress Note    Patient Name: Josue Mir  MRN: [id number]  Patient Class: IP- Inpatient   Admission Date: 3/21/2023  Length of Stay: 2 days  Attending Physician: Mary Jo Restrepo MD  Primary Care Provider: David Quach MD        Subjective:     Principal Problem:Atrial fibrillation with RVR        HPI:  Josue Mir is a 47 year old white male with a PMHx significant fo hypertension, A-fib, and previous CVA presenting after being found in A fib w/ RVR while preparing for colonscopy. Patient scheduled for his first, routine colonoscopy when he was found to have a HR of 170s in A fib. Patient sent to ED for further evaluation. Patient reports feeling fine. He denies any SOB, chest pain, palpitations, dizziness, or lightheadedness. He complains of hunger as he has been NPO since last night for colonoscopy. He states he only takes Norvas and losartan for hypertension. Denies any rate control medications or blood thinners. Reports history of  cardiac loop recorder placement. Patient started on IV Cardizem infusion in ED. CXR without any acute abnormalities. HR noted to be in 120s-140s on cardiac monitor. Plan to consult cardiology. Patient to be placed in step down for further evaluation and management.             Overview/Hospital Course:  Patient was monitored closely throughout course of hospital stay by hospital medicine team. Patient started on titrated Cardizem infusion on admission. HR improved overnight to 90s-100s. Cardiology consulted on admission. Echo obtained and pending. Cardizem infusion titrated down per nursing for improved ventricular rate. Cardizem infusion stopped per cardiology and started on Digoxin and metoprolol. Cardiology recommending NOAC at discharge. Pulmonology consulted for increasing O2 demands. Repeat Cxr ordered. D dimer elevated. CTA chest ordered and pending.         Interval History:  Pt seen and examined.  Discussed at the bedside  with Cardiology.  Echo with 40% EF.  Rates remain uncontrolled today requiring up titration of his medication for better rate control.  Long discussion with Pt regarding the importance of rate control, medication compliance, and appropriate follow-up to ensure improvement in his EF.    Review of Systems   Constitutional:  Negative for chills and fever.   Respiratory:  Negative for shortness of breath.    Cardiovascular:  Negative for chest pain and palpitations.   Objective:     Vital Signs (Most Recent):  Temp: 98.6 °F (37 °C) (03/23/23 1044)  Pulse: (!) 127 (03/23/23 1040)  Resp: (!) 23 (03/23/23 1040)  BP: (!) 132/102 (03/23/23 1040)  SpO2: 95 % (03/23/23 1040)   Vital Signs (24h Range):  Temp:  [97.5 °F (36.4 °C)-98.6 °F (37 °C)] 98.6 °F (37 °C)  Pulse:  [] 127  Resp:  [13-30] 23  SpO2:  [93 %-99 %] 95 %  BP: (108-150)/() 132/102     Weight: 115.2 kg (254 lb)  Body mass index is 37.51 kg/m².    Intake/Output Summary (Last 24 hours) at 3/23/2023 1148  Last data filed at 3/23/2023 0927  Gross per 24 hour   Intake 200 ml   Output 500 ml   Net -300 ml      Physical Exam  Vitals and nursing note reviewed.   Constitutional:       Appearance: Normal appearance.   HENT:      Head: Normocephalic and atraumatic.   Eyes:      Extraocular Movements: Extraocular movements intact.      Conjunctiva/sclera: Conjunctivae normal.      Pupils: Pupils are equal, round, and reactive to light.   Cardiovascular:      Rate and Rhythm: Tachycardia present. Rhythm irregular.   Pulmonary:      Effort: Pulmonary effort is normal. No respiratory distress.      Breath sounds: Normal breath sounds. No wheezing.   Abdominal:      General: Abdomen is flat. Bowel sounds are normal.      Palpations: Abdomen is soft.   Musculoskeletal:         General: Normal range of motion.      Cervical back: Normal range of motion and neck supple.   Skin:     General: Skin is warm and dry.      Capillary Refill: Capillary refill takes less than 2  seconds.   Neurological:      General: No focal deficit present.      Mental Status: He is alert and oriented to person, place, and time. Mental status is at baseline.   Psychiatric:         Mood and Affect: Mood normal.       Significant Labs: All pertinent labs within the past 24 hours have been reviewed.  CBC:   Recent Labs   Lab 03/22/23  0505 03/23/23  0504   WBC 10.34 8.48   HGB 13.6* 13.7*   HCT 42.2 41.9    265     CMP:   Recent Labs   Lab 03/22/23  0504 03/23/23  0504    140   K 3.7 3.7    108   CO2 20* 22*   GLU 91 90   BUN 14 13   CREATININE 1.1 1.2   CALCIUM 8.8 8.9   PROT 6.6 6.6   ALBUMIN 3.2* 3.1*   BILITOT 1.0 1.1*   ALKPHOS 52* 51*   AST 16 11   ALT 22 18   ANIONGAP 10 10       Significant Imaging: I have reviewed all pertinent imaging results/findings within the past 24 hours.      Assessment/Plan:      * Atrial fibrillation with RVR  Patient with Paroxysmal (<7 days) atrial fibrillation which is uncontrolled currently with Beta Blocker and Digoxin. Patient is currently in atrial fibrillation.[id number] Score: 4.  Anticoagulation indicated. Anticoagulation done with lovenox.  -Off cardizem today; was placed on oral BB and dig yesterday.  Rates uncontrolled this AM.  -Discussed with cardiology: plan to increase the dig (IV today) and BB.  -Follow on telemetry  -Will need outpatient EP.    Acute on chronic combined systolic and diastolic heart failure  Patient is identified as having Combined Systolic and Diastolic heart failure that is Acute. CHF is currently controlled. Latest ECHO performed and demonstrates- Results for orders placed during the hospital encounter of 03/21/23    Echo    Interpretation Summary  · The left ventricle is mildly enlarged with moderately decreased systolic function.  · The estimated ejection fraction is 40%.  · Left ventricular diastolic dysfunction.  · There is mild left ventricular global hypokinesis.  · Normal right ventricular size with normal  right ventricular systolic function.  · Mild left atrial enlargement.  · Mild tricuspid regurgitation.  · Mild-to-moderate mitral regurgitation.  · Normal central venous pressure (3 mmHg).  · The estimated PA systolic pressure is 37 mmHg.  . Continue Beta Blocker ACE/ARB Furosemide and monitor clinical status closely. Monitor on telemetry. Patient is on CHF pathway.  Monitor strict Is&Os and daily weights.  Place on fluid restriction of 1.5 L. Continue to stress to patient importance of self efficacy and  on diet for CHF. Last BNP reviewed- and noted below   Recent Labs   Lab 03/22/23  1531   *   .    -Clinically improved after IV lasix  -Rate control key: BB, digoxin  -ACEI to resume this evening.         Acute hypoxemic respiratory failure  Patient with Hypoxic Respiratory failure which is Acute.  he is not on home oxygen. Supplemental oxygen was provided and noted- Oxygen Concentration (%):  [28-35] 35.   Signs/symptoms of respiratory failure include- tachypnea and increased work of breathing. Contributing diagnoses includes - pulmonary edema Labs and images were reviewed. Patient Has not had a recent ABG. Will treat underlying causes and adjust management of respiratory failure as follows-   -Improved after IV lasix yesterday  -was 94% on RA at time of my visit  -Appreciate pulm eval and recs.    Adjustment disorder with depressed mood  Noted  Follows Psych outpatient      History of cerebral hemorrhage  Noted  -Needs good BP control on DOAC which is necessary.  -Discussed with patient.      VTE Risk Mitigation (From admission, onward)         Ordered     enoxaparin injection 120 mg  Every 12 hours (non-standard times)         03/22/23 1508     IP VTE HIGH RISK PATIENT  Once         03/21/23 1342     Place sequential compression device  Until discontinued         03/21/23 1342                Discharge Planning   LUCERO: 3/25/2023     Code Status: Full Code   Is the patient medically ready for  discharge?:     Reason for patient still in hospital (select all that apply): Patient new problem, Patient trending condition and Treatment  Discharge Plan A: Home with family                  Christine Almaraz NP  Department of Hospital Medicine   [facility]

## 2023-03-23 NOTE — MED STUDENT SUBJECTIVE & OBJECTIVE
Medical Student Subjective & Objective     Principal Problem: Atrial fibrillation with RVR    Chief Complaint:   Chief Complaint   Patient presents with    Palpitations     Patient was in ENDO and found to be in suspected Afib was transferred down to the ER, patient denies any symptoms        HPI: Josue Mir is a 47 year old white male with a PMHx significant fo hypertension, A-fib, and previous CVA presenting after being found in A fib w/ RVR while preparing for colonscopy. Patient scheduled for his first, routine colonoscopy when he was found to have a HR of 170s in A fib. Patient sent to ED for further evaluation. Patient reports feeling fine. He denies any SOB, chest pain, palpitations, dizziness, or lightheadedness. He complains of hunger as he has been NPO since last night for colonoscopy. He states he only takes Norvas and losartan for hypertension. Denies any rate control medications or blood thinners. Reports history of  cardiac loop recorder placement. Patient started on IV Cardizem infusion in ED. CXR without any acute abnormalities. HR noted to be in 120s-140s on cardiac monitor. Plan to consult cardiology. Patient to be placed in step down for further evaluation and management.           Hospital Course: Patient was monitored closely throughout course of hospital stay by hospital medicine team. Patient started on titrated Cardizem infusion on admission. HR improved overnight to 90s-100s. Cardiology consulted on admission. Echo obtained and pending. Cardizem infusion titrated down per nursing for improved ventricular rate. Cardizem infusion stopped per cardiology and started on Digoxin and metoprolol. Cardiology recommending NOAC at discharge. Pulmonology consulted for increasing O2 demands. Repeat Cxr ordered. D dimer elevated. CTA chest ordered and pending.       Interval History: Notes reviewed. Patient seen and examined today. Reports no acute events overnight. Patient's HR is still uncontrolled.  HR noted to be in 130s-140s this morning with irregularly irregular rhythm. Currently being managed with Digoxin and Metoprolol by Cardiology with the goal of rate control. Will be started on Eliquis for long-term anticoagulation as CHADSVASC score is 4. CTA negative for pulmonary embolism. Patient's breathing has improved. No longer requiring supplemental oxygen. Patient denies chest pain, nausea, vomiting, fever, chills, and lower extremity swelling or pain.    Past Medical History:   Diagnosis Date    Anticoagulant long-term use     Atrial fibrillation     Gout     Gout, joint     Hypertension     Memory deficit following cerebral infarction     Pneumonia 04/2018    Sleep apnea with use of continuous positive airway pressure (CPAP)     Stroke 04/2018    Syncope and collapse        Past Surgical History:   Procedure Laterality Date    INSERTION OF IMPLANTABLE LOOP RECORDER  10/25/2018    Procedure: Insertion, Implantable Loop Recorder;  Surgeon: Justin Colby MD;  Location: [facility] CATH;  Service: Cardiology;;    LAPAROSCOPIC CHOLECYSTECTOMY N/A 12/14/2018    Procedure: CHOLECYSTECTOMY, LAPAROSCOPIC;  Surgeon: Beck Love MD;  Location: [facility];  Service: General;  Laterality: N/A;  Dr. Love requested case to start at 1:00pm    NASAL POLYP EXCISION      TREATMENT OF CARDIAC ARRHYTHMIA N/A 10/25/2018    Procedure: CARDIOVERSION OR DEFIBRILLATION;  Surgeon: Justin Colby MD;  Location: [facility] CATH;  Service: Cardiology;  Laterality: N/A;       Review of patient's allergies indicates:  No Known Allergies    No current facility-administered medications on file prior to encounter.     Current Outpatient Medications on File Prior to Encounter   Medication Sig    amLODIPine (NORVASC) 10 MG tablet Take 1 tablet (10 mg total) by mouth once daily.    indomethacin (INDOCIN) 50 MG capsule Take 1 capsule (50 mg total) by mouth 3 (three) times daily as needed (pain).    lisinopriL (PRINIVIL,ZESTRIL) 40 MG tablet Take 1  tablet (40 mg total) by mouth once daily.     Family History       Problem Relation (Age of Onset)    Hypertension Mother    No Known Problems Brother, Brother, Brother    Sleep apnea Mother          Tobacco Use    Smoking status: Never    Smokeless tobacco: Never   Substance and Sexual Activity    Alcohol use: Yes     Comment: rarely    Drug use: No    Sexual activity: Yes     Partners: Female     Review of Systems   Constitutional:  Negative for chills, diaphoresis and fever.   HENT: Negative.     Eyes: Negative.    Respiratory:  Negative for cough, chest tightness and shortness of breath.    Cardiovascular:  Positive for palpitations. Negative for chest pain and leg swelling.   Gastrointestinal: Negative.    Genitourinary: Negative.    Musculoskeletal: Negative.    Neurological:  Negative for dizziness, syncope, light-headedness and numbness.   Objective:     Vital Signs (Most Recent):  Temp: 98.6 °F (37 °C) (03/23/23 1044)  Pulse: (!) 127 (03/23/23 1040)  Resp: (!) 23 (03/23/23 1040)  BP: (!) 132/102 (03/23/23 1040)  SpO2: 95 % (03/23/23 1040)   Vital Signs (24h Range):  Temp:  [97.5 °F (36.4 °C)-98.6 °F (37 °C)] 98.6 °F (37 °C)  Pulse:  [] 127  Resp:  [13-30] 23  SpO2:  [93 %-99 %] 95 %  BP: (108-150)/() 132/102     Weight: 115.2 kg (254 lb)  Body mass index is 37.51 kg/m².    Intake/Output Summary (Last 24 hours) at 3/23/2023 1157  Last data filed at 3/23/2023 0927  Gross per 24 hour   Intake 200 ml   Output 500 ml   Net -300 ml      Physical Exam  Constitutional:       Appearance: He is obese.   HENT:      Head: Normocephalic and atraumatic.   Cardiovascular:      Rate and Rhythm: Tachycardia present. Rhythm irregular.   Pulmonary:      Effort: Tachypnea present.      Breath sounds: Normal breath sounds.   Abdominal:      General: Abdomen is flat. There is no distension.      Palpations: Abdomen is soft.      Tenderness: There is no abdominal tenderness. There is no guarding.   Musculoskeletal:          General: No swelling or tenderness.   Skin:     General: Skin is warm and dry.   Neurological:      General: No focal deficit present.      Mental Status: He is alert.       Significant Labs: All pertinent labs within the past 24 hours have been reviewed.  CBC:   Recent Labs   Lab 03/22/23  0505 03/23/23  0504   WBC 10.34 8.48   HGB 13.6* 13.7*   HCT 42.2 41.9    265     CMP:   Recent Labs   Lab 03/22/23  0504 03/23/23  0504    140   K 3.7 3.7    108   CO2 20* 22*   GLU 91 90   BUN 14 13   CREATININE 1.1 1.2   CALCIUM 8.8 8.9   PROT 6.6 6.6   ALBUMIN 3.2* 3.1*   BILITOT 1.0 1.1*   ALKPHOS 52* 51*   AST 16 11   ALT 22 18   ANIONGAP 10 10     Cardiac Markers:   Recent Labs   Lab 03/22/23  1531   *     Significant Imaging: I have reviewed all pertinent imaging results/findings within the past 24 hours.    Medical Student Subjective & Objective

## 2023-03-23 NOTE — TELEPHONE ENCOUNTER
Called pt to schedule NP appointment with Dr. Daniels. No answer, left a message with this information and call back #.

## 2023-03-23 NOTE — ASSESSMENT & PLAN NOTE
Patient with Paroxysmal (<7 days) atrial fibrillation which is uncontrolled currently with Beta Blocker and Digoxin. Patient is currently in atrial fibrillation.[id number] Score: 4.  Anticoagulation indicated. Anticoagulation done with lovenox.  -Off cardizem today; was placed on oral BB and dig yesterday.  Rates uncontrolled this AM.  -Discussed with cardiology: plan to increase the dig (IV today) and BB.  -Follow on telemetry  -Will need outpatient EP.

## 2023-03-23 NOTE — CARE UPDATE
03/23/23 0756   Patient Assessment/Suction   Level of Consciousness (AVPU) alert   Respiratory Effort Normal;Unlabored   Expansion/Accessory Muscles/Retractions no use of accessory muscles;no retractions;expansion symmetric   All Lung Fields Breath Sounds clear   Rhythm/Pattern, Respiratory unlabored;pattern regular;depth regular   Cough Frequency no cough   Skin Integrity   $ Wound Care Tech Time 15 min   Area Observed Bridge of nose   Skin Appearance without discoloration   Barrier used? Liquid Filled Cushion   PRE-TX-O2   Device (Oxygen Therapy) CPAP   $ Is the patient on Low Flow Oxygen? Yes   Oxygen Concentration (%) 35   SpO2 98 %   Pulse Oximetry Type Continuous   $ Pulse Oximetry - Multiple Charge Pulse Oximetry - Multiple   Pulse 106   Resp 17   Preset CPAP/BiPAP Settings   Mode Of Delivery CPAP   $ CPAP/BiPAP Daily Charge BiPAP/CPAP Daily   $ Initial CPAP/BiPAP Setup? No   $ Is patient using? Yes   Size of Mask Large   Sized Appropriately? Yes   Equipment Type V60   Airway Device Type large full face mask   Humidifier not applicable   CPAP (cm H2O) 8   Patient CPAP/BiPAP Settings   RR Total (Breaths/Min) 21   Tidal Volume (mL) 600   VE Minute Ventilation (L/min) 12.6 L/min   Peak Inspiratory Pressure (cm H2O) 8   TiTOT (%) 33   Total Leak (L/Min) 24   Patient Trigger - ST Mode Only (%) 100   CPAP/BiPAP Alarms   High Pressure (cm H2O) 15   Low Pressure (cm H2O) 5   Minute Ventilation (L/Min) 3   High RR (breaths/min) 35   Low RR (breaths/min) 5

## 2023-03-23 NOTE — PLAN OF CARE
CM spoke with scheduling dept for Dr. Daniels- states she will send message over to staff to have them review referral and then someone will contact pt for scheduling. CM requested for apt to be within a month if possible

## 2023-03-23 NOTE — PLAN OF CARE
Problem: Adult Inpatient Plan of Care  Goal: Plan of Care Review  Outcome: Ongoing, Progressing     Problem: Cardiac Output Decreased  Goal: Effective Cardiac Output  Outcome: Ongoing, Not Progressing     Cardiac meds adjusted by VALORIE Nicole NP for A fib RVR. Patient's HR currently in the 90s-100s.

## 2023-03-23 NOTE — ASSESSMENT & PLAN NOTE
Patient with Hypoxic Respiratory failure which is Acute.  he is not on home oxygen. Supplemental oxygen was provided and noted- Oxygen Concentration (%):  [28-35] 35.   Signs/symptoms of respiratory failure include- tachypnea and increased work of breathing. Contributing diagnoses includes - pulmonary edema Labs and images were reviewed. Patient Has not had a recent ABG. Will treat underlying causes and adjust management of respiratory failure as follows-   -Improved after IV lasix yesterday  -was 94% on RA at time of my visit  -Appreciate pulwill shields and recs.

## 2023-03-23 NOTE — PLAN OF CARE
Problem: Adult Inpatient Plan of Care  Goal: Plan of Care Review  Outcome: Ongoing, Progressing  Goal: Optimal Comfort and Wellbeing  Outcome: Ongoing, Progressing     Patient resting soundly between care. AAOx4, No c/o pain overnight. Bed locked, in lowest position, bed alarm set. Will continue to monitor.

## 2023-03-23 NOTE — EICU
Intervention Initiated From:  [facility] / EICU    Dave intervened regarding:  Rounding (Video assessment)    Nurse Notified:  No    Doctor Notified:  No    Comments: Video rounding completed. Lying in bed in  NAD, respirations even and unlabored. Sats 97% on 2 L NC. VSS. NAD.

## 2023-03-23 NOTE — EICU
Intervention Initiated From:  [facility] / EICU    Dave intervened regarding:  Documentation    Nurse Notified:  No    Doctor Notified:  No    Comments:  video rounding complete, AAO x's 4, no c/o pain/discomfort/needs/wants, no critical care drips at this time, NAD, afib

## 2023-03-23 NOTE — MEDICAL/APP STUDENT
[facility]  Progress Note    Patient Name: Josue Mir  MRN: [id number]  Patient Class: IP- Inpatient   Admission Date: 3/21/2023  Length of Stay: 2 days  Attending Physician: Mary Jo Restrepo MD  Primary Care Provider: David Quach MD    Subjective:     Principal Problem: Atrial fibrillation with RVR    Chief Complaint:   Chief Complaint   Patient presents with    Palpitations     Patient was in ENDO and found to be in suspected Afib was transferred down to the ER, patient denies any symptoms        HPI: Josue Mir is a 47 year old white male with a PMHx significant fo hypertension, A-fib, and previous CVA presenting after being found in A fib w/ RVR while preparing for colonscopy. Patient scheduled for his first, routine colonoscopy when he was found to have a HR of 170s in A fib. Patient sent to ED for further evaluation. Patient reports feeling fine. He denies any SOB, chest pain, palpitations, dizziness, or lightheadedness. He complains of hunger as he has been NPO since last night for colonoscopy. He states he only takes Norvas and losartan for hypertension. Denies any rate control medications or blood thinners. Reports history of  cardiac loop recorder placement. Patient started on IV Cardizem infusion in ED. CXR without any acute abnormalities. HR noted to be in 120s-140s on cardiac monitor. Plan to consult cardiology. Patient to be placed in step down for further evaluation and management.           Hospital Course: Patient was monitored closely throughout course of hospital stay by hospital medicine team. Patient started on titrated Cardizem infusion on admission. HR improved overnight to 90s-100s. Cardiology consulted on admission. Echo obtained and pending. Cardizem infusion titrated down per nursing for improved ventricular rate. Cardizem infusion stopped per cardiology and started on Digoxin and metoprolol. Cardiology recommending NOAC at discharge. Pulmonology consulted for  increasing O2 demands. Repeat Cxr ordered. D dimer elevated. CTA chest ordered and pending.       Interval History: Notes reviewed. Patient seen and examined today. Reports no acute events overnight. Patient's HR is still uncontrolled. HR noted to be in 130s-140s this morning with irregularly irregular rhythm. Currently being managed with Digoxin and Metoprolol by Cardiology with the goal of rate control. Will be started on Eliquis for long-term anticoagulation as CHADSVASC score is 4. CTA negative for pulmonary embolism. Patient's breathing has improved. No longer requiring supplemental oxygen. Patient denies chest pain, nausea, vomiting, fever, chills, and lower extremity swelling or pain.    Past Medical History:   Diagnosis Date    Anticoagulant long-term use     Atrial fibrillation     Gout     Gout, joint     Hypertension     Memory deficit following cerebral infarction     Pneumonia 04/2018    Sleep apnea with use of continuous positive airway pressure (CPAP)     Stroke 04/2018    Syncope and collapse        Past Surgical History:   Procedure Laterality Date    INSERTION OF IMPLANTABLE LOOP RECORDER  10/25/2018    Procedure: Insertion, Implantable Loop Recorder;  Surgeon: Justin Colby MD;  Location: [facility] CATH;  Service: Cardiology;;    LAPAROSCOPIC CHOLECYSTECTOMY N/A 12/14/2018    Procedure: CHOLECYSTECTOMY, LAPAROSCOPIC;  Surgeon: Beck Love MD;  Location: Replaced by [facility];  Service: General;  Laterality: N/A;  Dr. Love requested case to start at 1:00pm    NASAL POLYP EXCISION      TREATMENT OF CARDIAC ARRHYTHMIA N/A 10/25/2018    Procedure: CARDIOVERSION OR DEFIBRILLATION;  Surgeon: Justin Colby MD;  Location: [facility] CATH;  Service: Cardiology;  Laterality: N/A;       Review of patient's allergies indicates:  No Known Allergies    No current facility-administered medications on file prior to encounter.     Current Outpatient Medications on File Prior to Encounter   Medication Sig    amLODIPine  (NORVASC) 10 MG tablet Take 1 tablet (10 mg total) by mouth once daily.    indomethacin (INDOCIN) 50 MG capsule Take 1 capsule (50 mg total) by mouth 3 (three) times daily as needed (pain).    lisinopriL (PRINIVIL,ZESTRIL) 40 MG tablet Take 1 tablet (40 mg total) by mouth once daily.     Family History       Problem Relation (Age of Onset)    Hypertension Mother    No Known Problems Brother, Brother, Brother    Sleep apnea Mother          Tobacco Use    Smoking status: Never    Smokeless tobacco: Never   Substance and Sexual Activity    Alcohol use: Yes     Comment: rarely    Drug use: No    Sexual activity: Yes     Partners: Female     Review of Systems   Constitutional:  Negative for chills, diaphoresis and fever.   HENT: Negative.     Eyes: Negative.    Respiratory:  Negative for cough, chest tightness and shortness of breath.    Cardiovascular:  Positive for palpitations. Negative for chest pain and leg swelling.   Gastrointestinal: Negative.    Genitourinary: Negative.    Musculoskeletal: Negative.    Neurological:  Negative for dizziness, syncope, light-headedness and numbness.   Objective:     Vital Signs (Most Recent):  Temp: 98.6 °F (37 °C) (03/23/23 1044)  Pulse: (!) 127 (03/23/23 1040)  Resp: (!) 23 (03/23/23 1040)  BP: (!) 132/102 (03/23/23 1040)  SpO2: 95 % (03/23/23 1040)   Vital Signs (24h Range):  Temp:  [97.5 °F (36.4 °C)-98.6 °F (37 °C)] 98.6 °F (37 °C)  Pulse:  [] 127  Resp:  [13-30] 23  SpO2:  [93 %-99 %] 95 %  BP: (108-150)/() 132/102     Weight: 115.2 kg (254 lb)  Body mass index is 37.51 kg/m².    Intake/Output Summary (Last 24 hours) at 3/23/2023 1157  Last data filed at 3/23/2023 0914  Gross per 24 hour   Intake 200 ml   Output 500 ml   Net -300 ml      Physical Exam  Constitutional:       Appearance: He is obese.   HENT:      Head: Normocephalic and atraumatic.   Cardiovascular:      Rate and Rhythm: Tachycardia present. Rhythm irregular.   Pulmonary:      Effort: Tachypnea  present.      Breath sounds: Normal breath sounds.   Abdominal:      General: Abdomen is flat. There is no distension.      Palpations: Abdomen is soft.      Tenderness: There is no abdominal tenderness. There is no guarding.   Musculoskeletal:         General: No swelling or tenderness.   Skin:     General: Skin is warm and dry.   Neurological:      General: No focal deficit present.      Mental Status: He is alert.       Significant Labs: All pertinent labs within the past 24 hours have been reviewed.  CBC:   Recent Labs   Lab 03/22/23  0505 03/23/23  0504   WBC 10.34 8.48   HGB 13.6* 13.7*   HCT 42.2 41.9    265     CMP:   Recent Labs   Lab 03/22/23  0504 03/23/23  0504    140   K 3.7 3.7    108   CO2 20* 22*   GLU 91 90   BUN 14 13   CREATININE 1.1 1.2   CALCIUM 8.8 8.9   PROT 6.6 6.6   ALBUMIN 3.2* 3.1*   BILITOT 1.0 1.1*   ALKPHOS 52* 51*   AST 16 11   ALT 22 18   ANIONGAP 10 10     Cardiac Markers:   Recent Labs   Lab 03/22/23  1531   *     Significant Imaging: I have reviewed all pertinent imaging results/findings within the past 24 hours.        Assessment/Plan:      Atrial fibrillation with RVR  Acute. Patient's HR remains uncontrolled ranging between 130's - 140's.   Cardiology discontinued Cardiazem infusion and switched patient to Digoxin and Metoprolol.   Eliquis for long-term anticoagulation    Acute hypoxemic respiratory failure  CTA- negative for pulmonary embolism  CXR showed evidence of Pulmonary HTN  CPAP nightly for JASPAL  Supplemental O2 as needed     Acute on chronic combined systolic and diastolic heart failure    ECHO Results - 3/23/2023   The left ventricle is mildly enlarged with moderately decreased systolic   function.  · The estimated ejection fraction is 40%.  · Left ventricular diastolic dysfunction.  · There is mild left ventricular global hypokinesis.  · Normal right ventricular size with normal right ventricular systolic   function.  · Mild left atrial  enlargement.  · Mild tricuspid regurgitation.    Currently managed with Digoxin and Metoprolol   IV Lasix  Daily weights.   Strict Is & Os    Adjustment disorder with depressed mood  Noted. Patient not currently taking medication for this diagnosis. Consult psychiatry if patient experiences psychosis or drastic change in mood.    History of cerebral hemorrhage  Noted. Monitor for signs/symptoms of neurological deficits and/or cerebral hemorrhage.  Monitor INR as patient is starting Eliquis.    Active Diagnoses:    Diagnosis Date Noted POA    PRINCIPAL PROBLEM:  Atrial fibrillation with RVR [I48.91] 03/21/2023 Yes    Acute on chronic combined systolic and diastolic heart failure [I50.43] 03/23/2023 Yes    Acute hypoxemic respiratory failure [J96.01] 03/22/2023 Yes    Adjustment disorder with depressed mood [F43.21] 11/10/2020 Yes    History of cerebral hemorrhage [Z86.79] 07/30/2020 Not Applicable      Problems Resolved During this Admission:     VTE Risk Mitigation (From admission, onward)           Ordered     enoxaparin injection 120 mg  Every 12 hours (non-standard times)         03/22/23 1508     IP VTE HIGH RISK PATIENT  Once         03/21/23 1342     Place sequential compression device  Until discontinued         03/21/23 1342                       Arlysse Josh, PAS2  [facility]

## 2023-03-23 NOTE — PROGRESS NOTES
[facility]  Department of Cardiology  Consult Note      PATIENT NAME: Josue Mir  MRN: [id number]  TODAY'S DATE: 03/23/2023  ADMIT DATE: 3/21/2023                          CONSULT REQUESTED BY: Mary Jo Restrepo MD    SUBJECTIVE     PRINCIPAL PROBLEM: Atrial fibrillation with RVR      REASON FOR CONSULT:  AFIB with RVR        INTERVAL HISTORY:    3/23/23  Mr. Mir HR is elevated today. He did just have a BM. BP stable currently. Breathing is improved. When I saw him he was off oxygen.           HPI:            47 year old male patient with a PMH significant for HTN, AF, sleep apnea, obesity, and previous Hemhoragic stroke from uncontrolled BP admitted after being in endo for colonoscopy found to be in AFIB. Patient states he has had afib before but does not feel it. Seems in 2018 he was worked up for this and had an angiogram he tells me at that time and it was clean. He has not been seen by EP. His CHADSVASC score is 4. HASBLED 2.        FROM H AND P       Palpitations        Patient was in ENDO and found to be in suspected Afib was transferred down to the ER, patient denies any symptoms          HPI: Josue Mir is a 47 year old white male with a PMHx significant fo hypertension, A-fib, and previous CVA presenting after being found in A fib w/ RVR while preparing for colonscopy. Patient scheduled for his first, routine colonoscopy when he was found to have a HR of 170s in A fib. Patient sent to ED for further evaluation. Patient reports feeling fine. He denies any SOB, chest pain, palpitations, dizziness, or lightheadedness. He complains of hunger as he has been NPO since last night for colonoscopy. He states he only takes Norvas and losartan for hypertension. Denies any rate control medications or blood thinners. Reports history of  cardiac loop recorder placement. Patient started on IV Cardizem infusion in ED. CXR without any acute abnormalities. HR noted to be in 120s-140s on cardiac  monitor. Plan to consult cardiology. Patient to be placed in step down for further evaluation and management.        Review of patient's allergies indicates:  No Known Allergies    Past Medical History:   Diagnosis Date    Anticoagulant long-term use     Atrial fibrillation     Gout     Gout, joint     Hypertension     Memory deficit following cerebral infarction     Pneumonia 04/2018    Sleep apnea with use of continuous positive airway pressure (CPAP)     Stroke 04/2018    Syncope and collapse      Past Surgical History:   Procedure Laterality Date    INSERTION OF IMPLANTABLE LOOP RECORDER  10/25/2018    Procedure: Insertion, Implantable Loop Recorder;  Surgeon: Justin Colby MD;  [facility];  Service: Cardiology;;    LAPAROSCOPIC CHOLECYSTECTOMY N/A 12/14/2018    Procedure: CHOLECYSTECTOMY, LAPAROSCOPIC;  Surgeon: Beck Love MD;  Location: [facility] OR;  Service: General;  Laterality: N/A;  Dr. Love requested case to start at 1:00pm    NASAL POLYP EXCISION      TREATMENT OF CARDIAC ARRHYTHMIA N/A 10/25/2018    Procedure: CARDIOVERSION OR DEFIBRILLATION;  Surgeon: Justin Colby MD;  Location[facility];  Service: Cardiology;  Laterality: N/A;     Social History     Tobacco Use    Smoking status: Never    Smokeless tobacco: Never   Substance Use Topics    Alcohol use: Yes     Comment: rarely    Drug use: No        REVIEW OF SYSTEMS    Denies CP  Denies Palpitations-does not feel HR elevated  Feels his breathing is better today    OBJECTIVE     VITAL SIGNS (Most Recent)  Temp: 97.5 °F (36.4 °C) (03/23/23 0807)  Pulse: 107 (03/23/23 0903)  Resp: 20 (03/23/23 0903)  BP: (!) 142/83 (03/23/23 0903)  SpO2: (!) 93 % (03/23/23 0903)    VENTILATION STATUS  Resp: 20 (03/23/23 0903)  SpO2: (!) 93 % (03/23/23 0903)  Oxygen Concentration (%):  [28-35] 35    I & O (Last 24H):  Intake/Output Summary (Last 24 hours) at 3/23/2023 0927  Last data filed at 3/22/2023 2338  Gross per 24 hour   Intake --   Output 500  ml   Net -500 ml       WEIGHTS  Wt Readings from Last 3 Encounters:   03/22/23 1130 115.2 kg (254 lb)   03/21/23 1400 115.6 kg (254 lb 13.6 oz)   03/21/23 1030 115.7 kg (255 lb)   03/21/23 0937 115.7 kg (255 lb)   01/04/23 1125 120.9 kg (266 lb 8.6 oz)       PHYSICAL EXAM  GENERAL: well built, well nourished, well-developed in no apparent distress alert and oriented.   HEENT: Normocephalic. Pupils normal and conjunctivae normal.  Mucous membranes normal, no cyanosis or icterus, trachea central,no pallor or icterus is noted..   NECK: No JVD. No bruit..   THYROID: Thyroid not enlarged. No nodules present..   CARDIAC: IRR  CHEST ANATOMY: normal.   LUNGS: Clear to auscultation. No wheezing or rhonchi..   ABDOMEN: Soft no masses or organomegaly.  No abdomen pulsations or bruits.  Normal bowel sounds. No pulsations and no masses felt, No guarding or rebound.   URINARY: No barclay catheter   EXTREMITIES: No cyanosis, clubbing or edema noted at this time., no calf tenderness bilaterally.   PERIPHERAL VASCULAR SYSTEM: Good palpable distal pulses.   CENTRAL NERVOUS SYSTEM: No focal motor or sensory deficits noted.   SKIN: Skin without lesions, moist, well perfused.   MUSCLE STRENGTH & TONE: No noteable weakness, atrophy or abnormal movement.     HOME MEDICATIONS:  No current facility-administered medications on file prior to encounter.     Current Outpatient Medications on File Prior to Encounter   Medication Sig Dispense Refill    amLODIPine (NORVASC) 10 MG tablet Take 1 tablet (10 mg total) by mouth once daily. 90 tablet 3    indomethacin (INDOCIN) 50 MG capsule Take 1 capsule (50 mg total) by mouth 3 (three) times daily as needed (pain). 15 capsule 0    lisinopriL (PRINIVIL,ZESTRIL) 40 MG tablet Take 1 tablet (40 mg total) by mouth once daily. 90 tablet 3       SCHEDULED MEDS:   digoxin  0.125 mg Oral Daily    enoxparin  1 mg/kg Subcutaneous Q12H    furosemide  20 mg Oral Daily    metoprolol tartrate  25 mg Oral BID     spironolactone  12.5 mg Oral Daily       CONTINUOUS INFUSIONS:        PRN MEDS:acetaminophen, aluminum-magnesium hydroxide-simethicone, dextrose 10%, dextrose 10%, glucagon (human recombinant), glucose, glucose, magnesium oxide, magnesium oxide, naloxone, ondansetron, potassium bicarbonate, potassium bicarbonate, potassium bicarbonate, sodium chloride 0.9%    LABS AND DIAGNOSTICS     CBC LAST 3 DAYS  Recent Labs   Lab 03/21/23  1044 03/22/23  0505 03/23/23  0504   WBC 9.87 10.34 8.48   RBC 5.52 5.16 5.12   HGB 14.8 13.6* 13.7*   HCT 44.7 42.2 41.9   MCV 81* 82 82   MCH 26.8* 26.4* 26.8*   MCHC 33.1 32.2 32.7   RDW 13.2 13.2 13.2    264 265   MPV 10.7 11.0 10.9   GRAN 71.2  7.0 77.8*  8.0* 71.6  6.1   LYMPH 17.5*  1.7 10.5*  1.1 15.4*  1.3   MONO 7.6  0.8 7.4  0.8 8.4  0.7   BASO 0.06 0.07 0.06   NRBC 0 0 0       COAGULATION LAST 3 DAYS  Recent Labs   Lab 03/21/23  1044   INR 1.1       CHEMISTRY LAST 3 DAYS  Recent Labs   Lab 03/21/23  1044 03/22/23  0504 03/23/23  0504    139 140   K 4.0 3.7 3.7    109 108   CO2 17* 20* 22*   ANIONGAP 11 10 10   BUN 15 14 13   CREATININE 1.1 1.1 1.2    91 90   CALCIUM 9.2 8.8 8.9   MG  --  1.9 2.1   ALBUMIN  --  3.2* 3.1*   PROT  --  6.6 6.6   ALKPHOS  --  52* 51*   ALT  --  22 18   AST  --  16 11   BILITOT  --  1.0 1.1*       CARDIAC PROFILE LAST 3 DAYS  Recent Labs   Lab 03/22/23  1531   *       ENDOCRINE LAST 3 DAYS  No results for input(s): TSH, PROCAL in the last 168 hours.    LAST ARTERIAL BLOOD GAS  ABG  No results for input(s): PH, PO2, PCO2, HCO3, BE in the last 168 hours.    LAST 7 DAYS MICROBIOLOGY   Microbiology Results (last 7 days)       ** No results found for the last 168 hours. **            MOST RECENT IMAGING  CTA Chest Non-Coronary (PE Studies)  Narrative: EXAMINATION:  CTA CHEST NON CORONARY (PE STUDIES)    CLINICAL HISTORY:  Pulmonary embolism (PE) suspected, positive D-dimer;    TECHNIQUE:  Low dose axial images,  sagittal and coronal reformations were obtained from the thoracic inlet to the lung bases following the IV administration of 75 mL of Omnipaque 350.  Contrast timing was optimized to evaluate the pulmonary arteries.  Maximum intensity projection images were provided for review.    COMPARISON:  Chest radiograph from earlier the same date.    FINDINGS:  Pulmonary vasculature: Satisfactory opacification of the pulmonary arterial system.  There is motion artifact which significantly limits evaluation of the segmental and subsegmental pulmonary arteries, especially in the bilateral lower lobes.  Difficult to exclude small segmental and subsegmental pulmonary emboli in the lower lobes.    Aorta: Left-sided aortic arch.  No aneurysm and no significant atherosclerosis.    Base of Neck: No significant abnormality.    Thoracic soft tissues: There is a loop recorder device in the left anterior chest wall.    Heart: There is global cardiomegaly.  There is no pericardial effusion.    Debra/Mediastinum: No pathologic rebecca enlargement.    Airways: The large airways are patent. No foci of endobronchial filling.    Lungs/Pleura: There is mild atelectasis in the bilateral lower lobes.  The lungs are otherwise clear without focal consolidation or ground-glass opacity.  No pulmonary nodule.  No pleural effusion or thickening.    Esophagus: Normal.    Upper Abdomen: The gallbladder is surgically absent.  There is a 5 mm left renal calculus in the upper prior pole.  No hydronephrosis visualized, noting that the kidneys are not entirely included in the field of view.    Bones: No acute fracture. No suspicious lytic or sclerotic lesions.  Impression: 1. No large central or lobar pulmonary embolism.  Motion artifact limits evaluation of the segmental and subsegmental pulmonary arteries in the lower lobes.  2. Nonobstructing left renal calculus.  3. Cardiomegaly.    Electronically signed by: Devan  Sadia  Date:    03/22/2023  Time:    20:17  Echo  Addendum: · The left ventricle is mildly enlarged with moderately decreased systolic  function.   · The estimated ejection fraction is 40%.   · Left ventricular diastolic dysfunction.   · There is mild left ventricular global hypokinesis.   · Normal right ventricular size with normal right ventricular systolic  function.   · Mild left atrial enlargement.   · Mild tricuspid regurgitation.   · Mild-to-moderate mitral regurgitation.   · Normal central venous pressure (3 mmHg).   · The estimated PA systolic pressure is 37 mmHg.  Narrative: · The left ventricle is mildly enlarged with moderately decreased systolic   function.  · The estimated ejection fraction is 40%.  · Left ventricular diastolic dysfunction.  · There is mild left ventricular global hypokinesis.  · Normal right ventricular size with normal right ventricular systolic   function.  · Mild left atrial enlargement.  · Mild tricuspid regurgitation.     X-Ray Chest AP Portable  Narrative: EXAMINATION:  XR CHEST AP PORTABLE    CLINICAL HISTORY:  SOB, increasing O2 demands;    TECHNIQUE:  Single frontal view of the chest was performed.    COMPARISON:  Chest of March 21, 2023    FINDINGS:  A loop recorder is noted in the left anterior chest.  The cardiac size is borderline.  There is prominence of the pulmonary vasculature.  There is mild increased density of the interstitium at the right lung base which may represent mild pulmonary edema or pneumonitis.  The upper lung fields are clear.  No pneumothorax is seen.  Impression: Borderline heart size.  Prominence of the pulmonary vasculature bilaterally.  Faint density in the right lung base may represent mild pulmonary edema or pneumonitis.  Loop recorder noted.    Electronically signed by: Miguel Camargo MD  Date:    03/22/2023  Time:    14:44      ECHOCARDIOGRAM RESULTS (last 5)  Results for orders placed in visit on 10/25/18    Transesophageal echo (KIRILL) with  possible cardioversion    Interpretation Summary  · The left ventricle cavity is normal.  · Left ventricle ejection fraction is normal at 55%  · Normal LV diastolic function.  · No wall motion abnormalities.  · RV systolic function is normal.  · No thrombus present.  · No mass present.  · Normal appearing left atrial appendage. No thrombus is present in the appendage.      CURRENT/PREVIOUS VISIT EKG  Results for orders placed or performed during the hospital encounter of 03/21/23   EKG 12-lead (Rapid Heart Beat / Palpitations) Age > 50    Collection Time: 03/21/23 10:30 AM    Narrative    Test Reason : R00.0,    Vent. Rate : 159 BPM     Atrial Rate : 153 BPM     P-R Int : 000 ms          QRS Dur : 086 ms      QT Int : 252 ms       P-R-T Axes : 000 027 269 degrees     QTc Int : 409 ms    Atrial fibrillation with rapid ventricular response  Low voltage QRS  Nonspecific ST and T wave abnormality  Abnormal ECG  When compared with ECG of 21-MAR-2023 09:29,  Nonspecific T wave abnormality, worse in Anterior leads    Referred By: [email or web address]   SELF           Confirmed By:            ASSESSMENT/PLAN:     Active Hospital Problems    Diagnosis    *Atrial fibrillation with RVR    Acute hypoxemic respiratory failure    Adjustment disorder with depressed mood    History of cerebral hemorrhage       ASSESSMENT & PLAN:       PAF-CHADSVASC 4  History of cerebral hemorrhage 2018 for uncontrolled bp in past -HASBLED Currently 2  3. HTN  4. Obesity  5.  Moderate MR  6. LVEF 40%- cardiomyopathy -not new last LVEF done in 2018 shows 40-45%  7. Noncompliant    RECOMMENDATIONS:    Rates are elevated today.  Goal for now is rate control   I believe he has been in afib longer than we realize and was not taking his blood thinners although on chart he was on pradaxa and multaq at one point after cardioversion, unsure when he stopped these  Increase Digoxin to 250 mcg daily  GIVE IV dose today  Increase Metoprolol to 50 mg BID  Add Lisinopril  back tonight at 5 mg and increase as BP will tolerate  Continue Lasix 20 mg daily  Will need NOAC at discharge-Eliquis 5 mg PO BID  We will do OP stress testing once HR is stabilized as he denies CP and tells me in 2018 he had an angiogram that had no blockages.- THIS WAS confirmed by wife  OP we will also set him up with Dr. Daniels, EP.         Kerry Nicole NP  Department of Cardiology  Date of Service: 03/23/2023

## 2023-03-23 NOTE — PROGRESS NOTES
Progress Note  PULMONARY    Admit Date: 3/21/2023   03/23/2023    History of Present Illness:  Pt is a 46 yo male with JASPAL, CVA, HTN, gout, atrial fib who was sent to the ED from endoscopy due to having atrial fib RVR with HR in the 170s. Pulmonary is consulted for increasing O2 requirement. He is currently receiving 4.5L oxygen via nc with sat 96%. Pt denies SOB or coughing. He has felt fatigued but has not noticed any other symptoms- does not notice atrial fib. He is not anticoagulated. He has hx of hemorrhagic CVA about 5 yrs ago and denies residual deficits. Pt uses CPAP nightly at home and states he sleeps well. Pt denies smoking or vaping. He had pneumonia once in the past at the time of his stroke.  SUBJECTIVE:     3/23- HR still elevated ranging 100s-120s. Resp status stable on 2L oxygen. Used cpap overnight. He denies symptoms and feels ok.      OBJECTIVE:     Vitals (Most recent):  Vitals:    03/23/23 0820   BP:    Pulse: (!) 123   Resp: 17   Temp:        Vitals (24 hour range):  Temp:  [97.5 °F (36.4 °C)-98 °F (36.7 °C)]   Pulse:  []   Resp:  [17-30]   BP: (105-150)/()   SpO2:  [87 %-99 %]       Intake/Output Summary (Last 24 hours) at 3/23/2023 0824  Last data filed at 3/22/2023 2338  Gross per 24 hour   Intake --   Output 500 ml   Net -500 ml          Physical Exam:  The patient's neuro status (alertness,orientation,cognitive function,motor skills,), pharyngeal exam (oral lesions, hygiene, abn dentition,), Neck (jvd,mass,thyroid,nodes in neck and above/below clavicle),RESPIRATORY(symmetry,effort,fremitus,percussion,auscultation),  Cor(rhythm,heart tones including gallops,perfusion,edema)ABD(distention,hepatic&splenomegaly,tenderness,masses), Skin(rash,cyanosis),Psyc(affect,judgement,).  Exam negative except for these pertinent findings:    Awake, alert, no distress  HR irregular, tachycardic  Breath sounds clear bilaterally  Abd soft, obese, nontender  No edema    Radiographs reviewed: view  by direct vision   CTA chest 3/22/23-   Impression:     1. No large central or lobar pulmonary embolism.  Motion artifact limits evaluation of the segmental and subsegmental pulmonary arteries in the lower lobes.  2. Nonobstructing left renal calculus.  3. Cardiomegaly.      TTE 3/22-   The left ventricle is mildly enlarged with moderately decreased systolic function.  The estimated ejection fraction is 40%.  Left ventricular diastolic dysfunction.  There is mild left ventricular global hypokinesis.  Normal right ventricular size with normal right ventricular systolic function.  Mild left atrial enlargement.  Mild tricuspid regurgitation.  Mild-to-moderate mitral regurgitation.  Normal central venous pressure (3 mmHg).  The estimated PA systolic pressure is 37 mmHg.    Labs     Recent Labs   Lab 03/23/23  0504   WBC 8.48   HGB 13.7*   HCT 41.9        Recent Labs   Lab 03/22/23  1531 03/23/23  0504   NA  --  140   K  --  3.7   CL  --  108   CO2  --  22*   BUN  --  13   CREATININE  --  1.2   GLU  --  90   CALCIUM  --  8.9   MG  --  2.1   AST  --  11   ALT  --  18   ALKPHOS  --  51*   BILITOT  --  1.1*   PROT  --  6.6   ALBUMIN  --  3.1*   *  --    No results for input(s): PH, PCO2, PO2, HCO3 in the last 24 hours.  Microbiology Results (last 7 days)       ** No results found for the last 168 hours. **            Impression:  Active Hospital Problems    Diagnosis  POA    *Atrial fibrillation with RVR [I48.91]  Yes    Acute hypoxemic respiratory failure [J96.01]  Yes    Adjustment disorder with depressed mood [F43.21]  Yes    History of cerebral hemorrhage [Z86.79]  Not Applicable      Resolved Hospital Problems   No resolved problems to display.               Plan:   Acute hypoxemic respiratory failure  Atrial fib w/ RVR  Acute systolic heart failure  Pulmonary HTN likely group 2  JASPAL on CPAP        - wean O2 to keep sats >92%  - continue CPAP nightly  - cardiology following  - workup and meds for atrial  fib, CHF per cardiology  - home O2 eval prior to dc        Kerry Douglas MD  Pulmonary & Critical Care Medicine

## 2023-03-23 NOTE — SUBJECTIVE & OBJECTIVE
Interval History:  Pt seen and examined.  Discussed at the bedside with Cardiology.  Echo with 40% EF.  Rates remain uncontrolled today requiring up titration of his medication for better rate control.  Long discussion with Pt regarding the importance of rate control, medication compliance, and appropriate follow-up to ensure improvement in his EF.    Review of Systems   Constitutional:  Negative for chills and fever.   Respiratory:  Negative for shortness of breath.    Cardiovascular:  Negative for chest pain and palpitations.   Objective:     Vital Signs (Most Recent):  Temp: 98.6 °F (37 °C) (03/23/23 1044)  Pulse: (!) 127 (03/23/23 1040)  Resp: (!) 23 (03/23/23 1040)  BP: (!) 132/102 (03/23/23 1040)  SpO2: 95 % (03/23/23 1040)   Vital Signs (24h Range):  Temp:  [97.5 °F (36.4 °C)-98.6 °F (37 °C)] 98.6 °F (37 °C)  Pulse:  [] 127  Resp:  [13-30] 23  SpO2:  [93 %-99 %] 95 %  BP: (108-150)/() 132/102     Weight: 115.2 kg (254 lb)  Body mass index is 37.51 kg/m².    Intake/Output Summary (Last 24 hours) at 3/23/2023 1148  Last data filed at 3/23/2023 0927  Gross per 24 hour   Intake 200 ml   Output 500 ml   Net -300 ml      Physical Exam  Vitals and nursing note reviewed.   Constitutional:       Appearance: Normal appearance.   HENT:      Head: Normocephalic and atraumatic.   Eyes:      Extraocular Movements: Extraocular movements intact.      Conjunctiva/sclera: Conjunctivae normal.      Pupils: Pupils are equal, round, and reactive to light.   Cardiovascular:      Rate and Rhythm: Tachycardia present. Rhythm irregular.   Pulmonary:      Effort: Pulmonary effort is normal. No respiratory distress.      Breath sounds: Normal breath sounds. No wheezing.   Abdominal:      General: Abdomen is flat. Bowel sounds are normal.      Palpations: Abdomen is soft.   Musculoskeletal:         General: Normal range of motion.      Cervical back: Normal range of motion and neck supple.   Skin:     General: Skin is warm  and dry.      Capillary Refill: Capillary refill takes less than 2 seconds.   Neurological:      General: No focal deficit present.      Mental Status: He is alert and oriented to person, place, and time. Mental status is at baseline.   Psychiatric:         Mood and Affect: Mood normal.       Significant Labs: All pertinent labs within the past 24 hours have been reviewed.  CBC:   Recent Labs   Lab 03/22/23  0505 03/23/23  0504   WBC 10.34 8.48   HGB 13.6* 13.7*   HCT 42.2 41.9    265     CMP:   Recent Labs   Lab 03/22/23  0504 03/23/23  0504    140   K 3.7 3.7    108   CO2 20* 22*   GLU 91 90   BUN 14 13   CREATININE 1.1 1.2   CALCIUM 8.8 8.9   PROT 6.6 6.6   ALBUMIN 3.2* 3.1*   BILITOT 1.0 1.1*   ALKPHOS 52* 51*   AST 16 11   ALT 22 18   ANIONGAP 10 10       Significant Imaging: I have reviewed all pertinent imaging results/findings within the past 24 hours.

## 2023-03-24 LAB
ALBUMIN SERPL BCP-MCNC: 3.3 G/DL (ref 3.5–5.2)
ALP SERPL-CCNC: 54 U/L (ref 55–135)
ALT SERPL W/O P-5'-P-CCNC: 19 U/L (ref 10–44)
ANION GAP SERPL CALC-SCNC: 9 MMOL/L (ref 8–16)
AST SERPL-CCNC: 13 U/L (ref 10–40)
BASOPHILS # BLD AUTO: 0.05 K/UL (ref 0–0.2)
BASOPHILS NFR BLD: 0.5 % (ref 0–1.9)
BILIRUB SERPL-MCNC: 1 MG/DL (ref 0.1–1)
BUN SERPL-MCNC: 16 MG/DL (ref 6–20)
CALCIUM SERPL-MCNC: 9.4 MG/DL (ref 8.7–10.5)
CHLORIDE SERPL-SCNC: 106 MMOL/L (ref 95–110)
CO2 SERPL-SCNC: 25 MMOL/L (ref 23–29)
CREAT SERPL-MCNC: 1.2 MG/DL (ref 0.5–1.4)
DIFFERENTIAL METHOD: ABNORMAL
EOSINOPHIL # BLD AUTO: 0.4 K/UL (ref 0–0.5)
EOSINOPHIL NFR BLD: 3.7 % (ref 0–8)
ERYTHROCYTE [DISTWIDTH] IN BLOOD BY AUTOMATED COUNT: 13.4 % (ref 11.5–14.5)
EST. GFR  (NO RACE VARIABLE): >60 ML/MIN/1.73 M^2
GLUCOSE SERPL-MCNC: 88 MG/DL (ref 70–110)
HCT VFR BLD AUTO: 46.5 % (ref 40–54)
HGB BLD-MCNC: 14.8 G/DL (ref 14–18)
IMM GRANULOCYTES # BLD AUTO: 0.03 K/UL (ref 0–0.04)
IMM GRANULOCYTES NFR BLD AUTO: 0.3 % (ref 0–0.5)
LYMPHOCYTES # BLD AUTO: 1.5 K/UL (ref 1–4.8)
LYMPHOCYTES NFR BLD: 14.2 % (ref 18–48)
MAGNESIUM SERPL-MCNC: 2.2 MG/DL (ref 1.6–2.6)
MCH RBC QN AUTO: 26.3 PG (ref 27–31)
MCHC RBC AUTO-ENTMCNC: 31.8 G/DL (ref 32–36)
MCV RBC AUTO: 83 FL (ref 82–98)
MONOCYTES # BLD AUTO: 0.9 K/UL (ref 0.3–1)
MONOCYTES NFR BLD: 8.8 % (ref 4–15)
NEUTROPHILS # BLD AUTO: 7.4 K/UL (ref 1.8–7.7)
NEUTROPHILS NFR BLD: 72.5 % (ref 38–73)
NRBC BLD-RTO: 0 /100 WBC
PLATELET # BLD AUTO: 308 K/UL (ref 150–450)
PMV BLD AUTO: 10.7 FL (ref 9.2–12.9)
POTASSIUM SERPL-SCNC: 4 MMOL/L (ref 3.5–5.1)
PROT SERPL-MCNC: 7.2 G/DL (ref 6–8.4)
RBC # BLD AUTO: 5.63 M/UL (ref 4.6–6.2)
SODIUM SERPL-SCNC: 140 MMOL/L (ref 136–145)
WBC # BLD AUTO: 10.23 K/UL (ref 3.9–12.7)

## 2023-03-24 PROCEDURE — 25000003 PHARM REV CODE 250: Performed by: NURSE PRACTITIONER

## 2023-03-24 PROCEDURE — 12000002 HC ACUTE/MED SURGE SEMI-PRIVATE ROOM

## 2023-03-24 PROCEDURE — 25000003 PHARM REV CODE 250

## 2023-03-24 PROCEDURE — 99231 PR SUBSEQUENT HOSPITAL CARE,LEVL I: ICD-10-PCS | Mod: ,,, | Performed by: INTERNAL MEDICINE

## 2023-03-24 PROCEDURE — 93010 EKG 12-LEAD: ICD-10-PCS | Mod: ,,, | Performed by: SPECIALIST

## 2023-03-24 PROCEDURE — 99233 PR SUBSEQUENT HOSPITAL CARE,LEVL III: ICD-10-PCS | Mod: ,,, | Performed by: NURSE PRACTITIONER

## 2023-03-24 PROCEDURE — 80053 COMPREHEN METABOLIC PANEL: CPT

## 2023-03-24 PROCEDURE — 99231 SBSQ HOSP IP/OBS SF/LOW 25: CPT | Mod: ,,, | Performed by: INTERNAL MEDICINE

## 2023-03-24 PROCEDURE — 83735 ASSAY OF MAGNESIUM: CPT

## 2023-03-24 PROCEDURE — 63600175 PHARM REV CODE 636 W HCPCS: Performed by: NURSE PRACTITIONER

## 2023-03-24 PROCEDURE — 99233 SBSQ HOSP IP/OBS HIGH 50: CPT | Mod: ,,, | Performed by: NURSE PRACTITIONER

## 2023-03-24 PROCEDURE — 93010 ELECTROCARDIOGRAM REPORT: CPT | Mod: ,,, | Performed by: SPECIALIST

## 2023-03-24 PROCEDURE — 27000221 HC OXYGEN, UP TO 24 HOURS

## 2023-03-24 PROCEDURE — 93005 ELECTROCARDIOGRAM TRACING: CPT

## 2023-03-24 PROCEDURE — 85025 COMPLETE CBC W/AUTO DIFF WBC: CPT

## 2023-03-24 PROCEDURE — 94660 CPAP INITIATION&MGMT: CPT

## 2023-03-24 PROCEDURE — 99900035 HC TECH TIME PER 15 MIN (STAT)

## 2023-03-24 PROCEDURE — 36415 COLL VENOUS BLD VENIPUNCTURE: CPT

## 2023-03-24 PROCEDURE — 94761 N-INVAS EAR/PLS OXIMETRY MLT: CPT

## 2023-03-24 RX ORDER — TRAMADOL HYDROCHLORIDE 50 MG/1
50 TABLET ORAL EVERY 6 HOURS PRN
Status: DISCONTINUED | OUTPATIENT
Start: 2023-03-24 | End: 2023-03-26 | Stop reason: HOSPADM

## 2023-03-24 RX ORDER — METOPROLOL TARTRATE 50 MG/1
100 TABLET ORAL 2 TIMES DAILY
Status: DISCONTINUED | OUTPATIENT
Start: 2023-03-24 | End: 2023-03-26 | Stop reason: HOSPADM

## 2023-03-24 RX ORDER — LISINOPRIL 10 MG/1
10 TABLET ORAL DAILY
Status: DISCONTINUED | OUTPATIENT
Start: 2023-03-25 | End: 2023-03-26 | Stop reason: HOSPADM

## 2023-03-24 RX ORDER — METOPROLOL TARTRATE 50 MG/1
50 TABLET ORAL ONCE
Status: COMPLETED | OUTPATIENT
Start: 2023-03-24 | End: 2023-03-24

## 2023-03-24 RX ORDER — METOPROLOL TARTRATE 25 MG/1
25 TABLET, FILM COATED ORAL ONCE
Status: DISCONTINUED | OUTPATIENT
Start: 2023-03-24 | End: 2023-03-24

## 2023-03-24 RX ADMIN — METOPROLOL TARTRATE 100 MG: 50 TABLET, FILM COATED ORAL at 09:03

## 2023-03-24 RX ADMIN — TRAMADOL HYDROCHLORIDE 50 MG: 50 TABLET, COATED ORAL at 09:03

## 2023-03-24 RX ADMIN — APIXABAN 5 MG: 2.5 TABLET, FILM COATED ORAL at 09:03

## 2023-03-24 RX ADMIN — ACETAMINOPHEN 650 MG: 325 TABLET ORAL at 12:03

## 2023-03-24 RX ADMIN — DIGOXIN 0.25 MG: 250 TABLET ORAL at 08:03

## 2023-03-24 RX ADMIN — TRAMADOL HYDROCHLORIDE 50 MG: 50 TABLET, COATED ORAL at 02:03

## 2023-03-24 RX ADMIN — FUROSEMIDE 20 MG: 20 TABLET ORAL at 08:03

## 2023-03-24 RX ADMIN — LISINOPRIL 5 MG: 2.5 TABLET ORAL at 08:03

## 2023-03-24 RX ADMIN — ENOXAPARIN SODIUM 120 MG: 150 INJECTION SUBCUTANEOUS at 03:03

## 2023-03-24 RX ADMIN — METOPROLOL TARTRATE 50 MG: 50 TABLET, FILM COATED ORAL at 09:03

## 2023-03-24 RX ADMIN — METOPROLOL TARTRATE 50 MG: 50 TABLET, FILM COATED ORAL at 06:03

## 2023-03-24 NOTE — PLAN OF CARE
03/23/23 1950   Patient Assessment/Suction   Level of Consciousness (AVPU) alert   Respiratory Effort Normal;Unlabored   Rhythm/Pattern, Respiratory pattern regular   Cough Frequency no cough   Skin Integrity   $ Wound Care Tech Time 15 min   Area Observed Bridge of nose   Skin Appearance without discoloration   Barrier used? Liquid Filled Cushion   PRE-TX-O2   Device (Oxygen Therapy) nasal cannula   Flow (L/min) 1   SpO2 96 %   Pulse Oximetry Type Continuous   Pulse 108   Resp (!) 41   Preset CPAP/BiPAP Settings   Mode Of Delivery Standby

## 2023-03-24 NOTE — NURSING
Notified BRANDYN Robb NP that Monitor room just informed us TERRIE Mir in 222 has abberrant conduction rhythm with a widening QRS forming into a bundle branch block. Patient remains asymptomatic, denies pain/discomfort. VSS. He's resting comfortably. EKG ordered per NP. Results remain A-Fib. Reported results to BRANDYN Robb NP. No new orders received at this time.

## 2023-03-24 NOTE — PROGRESS NOTES
[facility] Medicine  Progress Note    Patient Name: Josue Mir  MRN: [id number]  Patient Class: IP- Inpatient   Admission Date: 3/21/2023  Length of Stay: 3 days  Attending Physician: Mary Jo Restrepo MD  Primary Care Provider: David Quach MD        Subjective:     Principal Problem:Atrial fibrillation with RVR        HPI:  Josue Mir is a 47 year old white male with a PMHx significant fo hypertension, A-fib, and previous CVA presenting after being found in A fib w/ RVR while preparing for colonscopy. Patient scheduled for his first, routine colonoscopy when he was found to have a HR of 170s in A fib. Patient sent to ED for further evaluation. Patient reports feeling fine. He denies any SOB, chest pain, palpitations, dizziness, or lightheadedness. He complains of hunger as he has been NPO since last night for colonoscopy. He states he only takes Norvas and losartan for hypertension. Denies any rate control medications or blood thinners. Reports history of  cardiac loop recorder placement. Patient started on IV Cardizem infusion in ED. CXR without any acute abnormalities. HR noted to be in 120s-140s on cardiac monitor. Plan to consult cardiology. Patient to be placed in step down for further evaluation and management.             Overview/Hospital Course:  Patient was monitored closely throughout course of hospital stay by hospital medicine team. Patient started on titrated Cardizem infusion on admission. HR improved overnight to 90s-100s. Cardiology consulted on admission. Echo obtained and pending. Cardizem infusion titrated down per nursing for improved ventricular rate. Cardizem infusion stopped per cardiology and started on Digoxin and metoprolol. Cardiology recommending NOAC at discharge. Pulmonology consulted for increasing O2 demands. Repeat Cxr ordered. D dimer elevated. CTA chest ordered and pending.         Interval History:  Pt seen and examined.  Discussed with  Cardiology.  Still having issues with rate control-metoprolol dose was increased today.  Pt remains asymptomatic with no chest pain or shortness a breath.  He did report bilateral foot pain worse with ambulation.  He denies any joint pain, swelling, or injury.  Discussed a trial of nonnarcotic analgesia.    Review of Systems   Constitutional:  Negative for chills and fever.   Respiratory:  Negative for shortness of breath.    Cardiovascular:  Negative for chest pain and palpitations.   Musculoskeletal:  Positive for myalgias (bilateral foot pain).   Objective:     Vital Signs (Most Recent):  Temp: 97.8 °F (36.6 °C) (03/24/23 0800)  Pulse: 104 (03/24/23 0900)  Resp: (!) 21 (03/24/23 0900)  BP: (!) 141/94 (03/24/23 0800)  SpO2: 97 % (03/24/23 0900)   Vital Signs (24h Range):  Temp:  [97.6 °F (36.4 °C)-98.1 °F (36.7 °C)] 97.8 °F (36.6 °C)  Pulse:  [] 104  Resp:  [15-41] 21  SpO2:  [94 %-100 %] 97 %  BP: (112-160)/() 141/94     Weight: 115.2 kg (254 lb)  Body mass index is 37.51 kg/m².    Intake/Output Summary (Last 24 hours) at 3/24/2023 1058  Last data filed at 3/24/2023 0623  Gross per 24 hour   Intake 510 ml   Output 2000 ml   Net -1490 ml        Physical Exam  Vitals and nursing note reviewed.   Constitutional:       Appearance: Normal appearance.   HENT:      Head: Normocephalic and atraumatic.   Eyes:      Extraocular Movements: Extraocular movements intact.      Conjunctiva/sclera: Conjunctivae normal.      Pupils: Pupils are equal, round, and reactive to light.   Cardiovascular:      Rate and Rhythm: Tachycardia present. Rhythm irregular.   Pulmonary:      Effort: Pulmonary effort is normal. No respiratory distress.      Breath sounds: Normal breath sounds. No wheezing.   Abdominal:      General: Abdomen is flat. Bowel sounds are normal.      Palpations: Abdomen is soft.   Musculoskeletal:         General: Normal range of motion.      Cervical back: Normal range of motion and neck supple.   Skin:      General: Skin is warm and dry.      Capillary Refill: Capillary refill takes less than 2 seconds.   Neurological:      General: No focal deficit present.      Mental Status: He is alert and oriented to person, place, and time. Mental status is at baseline.   Psychiatric:         Mood and Affect: Mood normal.       Significant Labs: All pertinent labs within the past 24 hours have been reviewed.  CBC:   Recent Labs   Lab 03/23/23  0504 03/24/23  0430   WBC 8.48 10.23   HGB 13.7* 14.8   HCT 41.9 46.5    308       CMP:   Recent Labs   Lab 03/23/23  0504 03/23/23  1701 03/24/23  0430    141 140   K 3.7 4.8 4.0    107 106   CO2 22* 24 25   GLU 90 98 88   BUN 13 15 16   CREATININE 1.2 1.2 1.2   CALCIUM 8.9 9.5 9.4   PROT 6.6  --  7.2   ALBUMIN 3.1*  --  3.3*   BILITOT 1.1*  --  1.0   ALKPHOS 51*  --  54*   AST 11  --  13   ALT 18  --  19   ANIONGAP 10 10 9         Significant Imaging: I have reviewed all pertinent imaging results/findings within the past 24 hours.      Assessment/Plan:      * Atrial fibrillation with RVR  Patient with Paroxysmal (<7 days) atrial fibrillation which is uncontrolled currently with Beta Blocker and Digoxin. Patient is currently in atrial fibrillation.[id number] Score: 4.  Anticoagulation indicated. Anticoagulation done with lovenox.  -Off cardizem today; was placed on oral BB and dig yesterday.  Rates uncontrolled this AM.  -Discussed with cardiology: continue the digoxin; bb increased today.  -Follow on telemetry  -Will need outpatient EP.  -If HR improves with med titration will plan for d/c tomorrow.    Acute on chronic combined systolic and diastolic heart failure  Patient is identified as having Combined Systolic and Diastolic heart failure that is Acute. CHF is currently controlled. Latest ECHO performed and demonstrates- Results for orders placed during the hospital encounter of 03/21/23    Echo    Interpretation Summary  · The left ventricle is mildly enlarged with  moderately decreased systolic function.  · The estimated ejection fraction is 40%.  · Left ventricular diastolic dysfunction.  · There is mild left ventricular global hypokinesis.  · Normal right ventricular size with normal right ventricular systolic function.  · Mild left atrial enlargement.  · Mild tricuspid regurgitation.  · Mild-to-moderate mitral regurgitation.  · Normal central venous pressure (3 mmHg).  · The estimated PA systolic pressure is 37 mmHg.  . Continue Beta Blocker ACE/ARB Furosemide and monitor clinical status closely. Monitor on telemetry. Patient is on CHF pathway.  Monitor strict Is&Os and daily weights.  Place on fluid restriction of 1.5 L. Continue to stress to patient importance of self efficacy and  on diet for CHF. Last BNP reviewed- and noted below   Recent Labs   Lab 03/22/23  1531   *   .    -Clinically improved after IV lasix  -Rate control key: BB, digoxin  -continue the lisinopril and lasix.        Acute hypoxemic respiratory failure  Patient with Hypoxic Respiratory failure which is Acute.  he is not on home oxygen. Supplemental oxygen was provided and noted- Oxygen Concentration (%):  [35] 35.   Signs/symptoms of respiratory failure include- tachypnea and increased work of breathing. Contributing diagnoses includes - pulmonary edema Labs and images were reviewed. Patient Has not had a recent ABG. Will treat underlying causes and adjust management of respiratory failure as follows-   -Improved after IV lasix yesterday  -Appreciate pulm eval and recs.  -Plan for home o2 eval prior to d/c.    Adjustment disorder with depressed mood  Noted  Follows Psych outpatient      History of cerebral hemorrhage  Noted  -Needs good BP control on DOAC which is necessary.  -Discussed with patient.      VTE Risk Mitigation (From admission, onward)         Ordered     apixaban tablet 5 mg  2 times daily         03/24/23 0922     IP VTE HIGH RISK PATIENT  Once         03/21/23 1342      Place sequential compression device  Until discontinued         03/21/23 1342                Discharge Planning   LUCERO: 3/27/2023     Code Status: Full Code   Is the patient medically ready for discharge?:     Reason for patient still in hospital (select all that apply): Patient unstable, Patient trending condition and Treatment  Discharge Plan A: Home with family                  Christine Almaraz NP  Department of Hospital Medicine   [facility]

## 2023-03-24 NOTE — ASSESSMENT & PLAN NOTE
Patient is identified as having Combined Systolic and Diastolic heart failure that is Acute. CHF is currently controlled. Latest ECHO performed and demonstrates- Results for orders placed during the hospital encounter of 03/21/23    Echo    Interpretation Summary  · The left ventricle is mildly enlarged with moderately decreased systolic function.  · The estimated ejection fraction is 40%.  · Left ventricular diastolic dysfunction.  · There is mild left ventricular global hypokinesis.  · Normal right ventricular size with normal right ventricular systolic function.  · Mild left atrial enlargement.  · Mild tricuspid regurgitation.  · Mild-to-moderate mitral regurgitation.  · Normal central venous pressure (3 mmHg).  · The estimated PA systolic pressure is 37 mmHg.  . Continue Beta Blocker ACE/ARB Furosemide and monitor clinical status closely. Monitor on telemetry. Patient is on CHF pathway.  Monitor strict Is&Os and daily weights.  Place on fluid restriction of 1.5 L. Continue to stress to patient importance of self efficacy and  on diet for CHF. Last BNP reviewed- and noted below   Recent Labs   Lab 03/22/23  1531   *   .    -Clinically improved after IV lasix  -Rate control key: BB, digoxin  -continue the lisinopril and lasix.

## 2023-03-24 NOTE — PROGRESS NOTES
[facility]  Department of Cardiology  Consult Note      PATIENT NAME: Josue Mir  MRN: [id number]  TODAY'S DATE: 03/24/2023  ADMIT DATE: 3/21/2023                          CONSULT REQUESTED BY: Mary Jo Restrepo MD    SUBJECTIVE     PRINCIPAL PROBLEM: Atrial fibrillation with RVR      REASON FOR CONSULT:  AFIB with RVR        INTERVAL HISTORY:      3/24/23    Diuresed well yesterday.  HR improving however still elevated at times  Denies CP or SOB  Denies palpitations      3/23/23  Mr. Mir HR is elevated today. He did just have a BM. BP stable currently. Breathing is improved. When I saw him he was off oxygen.           HPI:            47 year old male patient with a PMH significant for HTN, AF, sleep apnea, obesity, and previous Hemhoragic stroke from uncontrolled BP admitted after being in endo for colonoscopy found to be in AFIB. Patient states he has had afib before but does not feel it. Seems in 2018 he was worked up for this and had an angiogram he tells me at that time and it was clean. He has not been seen by EP. His CHADSVASC score is 4. HASBLED 2.        FROM H AND P       Palpitations        Patient was in ENDO and found to be in suspected Afib was transferred down to the ER, patient denies any symptoms          HPI: Josue Mir is a 47 year old white male with a PMHx significant fo hypertension, A-fib, and previous CVA presenting after being found in A fib w/ RVR while preparing for colonscopy. Patient scheduled for his first, routine colonoscopy when he was found to have a HR of 170s in A fib. Patient sent to ED for further evaluation. Patient reports feeling fine. He denies any SOB, chest pain, palpitations, dizziness, or lightheadedness. He complains of hunger as he has been NPO since last night for colonoscopy. He states he only takes Norvas and losartan for hypertension. Denies any rate control medications or blood thinners. Reports history of  cardiac loop recorder placement.  Patient started on IV Cardizem infusion in ED. CXR without any acute abnormalities. HR noted to be in 120s-140s on cardiac monitor. Plan to consult cardiology. Patient to be placed in step down for further evaluation and management.        Review of patient's allergies indicates:  No Known Allergies    Past Medical History:   Diagnosis Date    Anticoagulant long-term use     Atrial fibrillation     Gout     Gout, joint     Hypertension     Memory deficit following cerebral infarction     Pneumonia 04/2018    Sleep apnea with use of continuous positive airway pressure (CPAP)     Stroke 04/2018    Syncope and collapse      Past Surgical History:   Procedure Laterality Date    INSERTION OF IMPLANTABLE LOOP RECORDER  10/25/2018    Procedure: Insertion, Implantable Loop Recorder;  Surgeon: Justin Colby MD;  Location: [facility] CATH;  Service: Cardiology;;    LAPAROSCOPIC CHOLECYSTECTOMY N/A 12/14/2018    Procedure: CHOLECYSTECTOMY, LAPAROSCOPIC;  Surgeon: Beck Love MD;  Location: [facility];  Service: General;  Laterality: N/A;  Dr. Love requested case to start at 1:00pm    NASAL POLYP EXCISION      TREATMENT OF CARDIAC ARRHYTHMIA N/A 10/25/2018    Procedure: CARDIOVERSION OR DEFIBRILLATION;  Surgeon: Justin Colby MD;  Location: [facility] CATH;  Service: Cardiology;  Laterality: N/A;     Social History     Tobacco Use    Smoking status: Never    Smokeless tobacco: Never   Substance Use Topics    Alcohol use: Yes     Comment: rarely    Drug use: No        REVIEW OF SYSTEMS    Denies CP  Denies Palpitations-does not feel HR elevated  Feels his breathing is better today    OBJECTIVE     VITAL SIGNS (Most Recent)  Temp: 97.8 °F (36.6 °C) (03/24/23 0800)  Pulse: 104 (03/24/23 0900)  Resp: (!) 21 (03/24/23 0900)  BP: (!) 141/94 (03/24/23 0800)  SpO2: 97 % (03/24/23 0900)    VENTILATION STATUS  Resp: (!) 21 (03/24/23 0900)  SpO2: 97 % (03/24/23 0900)  Oxygen Concentration (%):  [35] 35    I & O (Last 24H):  Intake/Output  Summary (Last 24 hours) at 3/24/2023 0919  Last data filed at 3/24/2023 0623  Gross per 24 hour   Intake 710 ml   Output 2000 ml   Net -1290 ml       WEIGHTS  Wt Readings from Last 3 Encounters:   03/22/23 1130 115.2 kg (254 lb)   03/21/23 1400 115.6 kg (254 lb 13.6 oz)   03/21/23 1030 115.7 kg (255 lb)   03/21/23 0937 115.7 kg (255 lb)   01/04/23 1125 120.9 kg (266 lb 8.6 oz)       PHYSICAL EXAM  GENERAL: well built, well nourished, well-developed in no apparent distress alert and oriented.   HEENT: Normocephalic. Pupils normal and conjunctivae normal.  Mucous membranes normal, no cyanosis or icterus, trachea central,no pallor or icterus is noted..   NECK: No JVD. No bruit..   THYROID: Thyroid not enlarged. No nodules present..   CARDIAC: IRR  CHEST ANATOMY: normal.   LUNGS: Clear to auscultation. No wheezing or rhonchi..   ABDOMEN: Soft no masses or organomegaly.  No abdomen pulsations or bruits.  Normal bowel sounds. No pulsations and no masses felt, No guarding or rebound.   URINARY: No barclay catheter   EXTREMITIES: No cyanosis, clubbing or edema noted at this time., no calf tenderness bilaterally.   PERIPHERAL VASCULAR SYSTEM: Good palpable distal pulses.   CENTRAL NERVOUS SYSTEM: No focal motor or sensory deficits noted.   SKIN: Skin without lesions, moist, well perfused.   MUSCLE STRENGTH & TONE: No noteable weakness, atrophy or abnormal movement.     HOME MEDICATIONS:  No current facility-administered medications on file prior to encounter.     Current Outpatient Medications on File Prior to Encounter   Medication Sig Dispense Refill    amLODIPine (NORVASC) 10 MG tablet Take 1 tablet (10 mg total) by mouth once daily. 90 tablet 3    indomethacin (INDOCIN) 50 MG capsule Take 1 capsule (50 mg total) by mouth 3 (three) times daily as needed (pain). 15 capsule 0    lisinopriL (PRINIVIL,ZESTRIL) 40 MG tablet Take 1 tablet (40 mg total) by mouth once daily. 90 tablet 3       SCHEDULED MEDS:   digoxin  0.25 mg Oral  Daily    enoxparin  1 mg/kg Subcutaneous Q12H    furosemide  20 mg Oral Daily    lisinopriL  5 mg Oral Daily    metoprolol tartrate  100 mg Oral BID    metoprolol tartrate  50 mg Oral Once       CONTINUOUS INFUSIONS:        PRN MEDS:acetaminophen, aluminum-magnesium hydroxide-simethicone, dextrose 10%, dextrose 10%, glucagon (human recombinant), glucose, glucose, magnesium oxide, magnesium oxide, naloxone, ondansetron, potassium bicarbonate, potassium bicarbonate, potassium bicarbonate, sodium chloride 0.9%    LABS AND DIAGNOSTICS     CBC LAST 3 DAYS  Recent Labs   Lab 03/22/23  0505 03/23/23  0504 03/24/23  0430   WBC 10.34 8.48 10.23   RBC 5.16 5.12 5.63   HGB 13.6* 13.7* 14.8   HCT 42.2 41.9 46.5   MCV 82 82 83   MCH 26.4* 26.8* 26.3*   MCHC 32.2 32.7 31.8*   RDW 13.2 13.2 13.4    265 308   MPV 11.0 10.9 10.7   GRAN 77.8*  8.0* 71.6  6.1 72.5  7.4   LYMPH 10.5*  1.1 15.4*  1.3 14.2*  1.5   MONO 7.4  0.8 8.4  0.7 8.8  0.9   BASO 0.07 0.06 0.05   NRBC 0 0 0       COAGULATION LAST 3 DAYS  Recent Labs   Lab 03/21/23  1044   INR 1.1       CHEMISTRY LAST 3 DAYS  Recent Labs   Lab 03/22/23  0504 03/23/23  0504 03/23/23  1701 03/24/23  0430    140 141 140   K 3.7 3.7 4.8 4.0    108 107 106   CO2 20* 22* 24 25   ANIONGAP 10 10 10 9   BUN 14 13 15 16   CREATININE 1.1 1.2 1.2 1.2   GLU 91 90 98 88   CALCIUM 8.8 8.9 9.5 9.4   MG 1.9 2.1  --  2.2   ALBUMIN 3.2* 3.1*  --  3.3*   PROT 6.6 6.6  --  7.2   ALKPHOS 52* 51*  --  54*   ALT 22 18  --  19   AST 16 11  --  13   BILITOT 1.0 1.1*  --  1.0       CARDIAC PROFILE LAST 3 DAYS  Recent Labs   Lab 03/22/23  1531   *       ENDOCRINE LAST 3 DAYS  No results for input(s): TSH, PROCAL in the last 168 hours.    LAST ARTERIAL BLOOD GAS  ABG  No results for input(s): PH, PO2, PCO2, HCO3, BE in the last 168 hours.    LAST 7 DAYS MICROBIOLOGY   Microbiology Results (last 7 days)       ** No results found for the last 168 hours. **            MOST RECENT  IMAGING  X-Ray Chest 1 View  Narrative: EXAMINATION:  XR CHEST 1 VIEW    CLINICAL HISTORY:  chf;    TECHNIQUE:  Single frontal view of the chest was performed.    COMPARISON:  Chest of March 22, 2023    FINDINGS:  A loop recorder is noted in the left anterior chest wall.  The heart is upper limits of normal.  No intrapulmonary mass or infiltrate is seen.  No pneumothorax or pleural effusion is noted.  Impression: Borderline heart size.  Loop recorder in position    Electronically signed by: Miguel Camargo MD  Date:    03/23/2023  Time:    14:52      ECHOCARDIOGRAM RESULTS (last 5)  Results for orders placed in visit on 10/25/18    Transesophageal echo (KIRILL) with possible cardioversion    Interpretation Summary  · The left ventricle cavity is normal.  · Left ventricle ejection fraction is normal at 55%  · Normal LV diastolic function.  · No wall motion abnormalities.  · RV systolic function is normal.  · No thrombus present.  · No mass present.  · Normal appearing left atrial appendage. No thrombus is present in the appendage.      CURRENT/PREVIOUS VISIT EKG  Results for orders placed or performed during the hospital encounter of 03/21/23   EKG 12-lead    Collection Time: 03/24/23  2:22 AM    Narrative    Test Reason : R94.31,    Vent. Rate : 095 BPM     Atrial Rate : 104 BPM     P-R Int : 000 ms          QRS Dur : 100 ms      QT Int : 330 ms       P-R-T Axes : 000 016 244 degrees     QTc Int : 414 ms    Atrial fibrillation  T wave abnormality, consider inferolateral ischemia  Abnormal ECG  When compared with ECG of 21-MAR-2023 10:30,  Vent. rate has decreased BY  64 BPM    Referred By: [email or web address]   SELF           Confirmed By:            ASSESSMENT/PLAN:     Active Hospital Problems    Diagnosis    *Atrial fibrillation with RVR    Acute on chronic combined systolic and diastolic heart failure    Acute hypoxemic respiratory failure    Adjustment disorder with depressed mood    History of cerebral hemorrhage        ASSESSMENT & PLAN:       PAF-CHADSVASC 4  History of cerebral hemorrhage 2018 for uncontrolled bp in past -HASBLED Currently 2  3. HTN  4. Obesity  5.  Moderate MR  6. LVEF 40%- cardiomyopathy -not new last LVEF done in 2018 shows 40-45%  7. Noncompliant    RECOMMENDATIONS:    Increase Metoprolol to 100 mg PO BID  Place parameters on digoxin to hold for HR < 60  Change lovenox to Eliquis 5 mg PO BID  Increase Lisinopril to 10 mg daily  If remains stable let him go home later today with OP follow up in clinic 7 -10 days and follow up with EP, Dr. Daniels  I have explained the importance about caffeine intake and energy drinks to be avoided in his case  I have explained he needs to be compliant with medications.   Keep HR and BP Log at home.  I had a long conversation with him yesterday and his wife regarding the above as well.   We will do OP stress testing  as he denies CP and tells me in 2018 he had an angiogram that had no blockages.- THIS WAS confirmed by wife as well.           Kerry Nicole NP  Department of Cardiology  Date of Service: 03/24/2023

## 2023-03-24 NOTE — RESPIRATORY THERAPY
Went to patient twice and patient unable to walk.  Nurse notified.  02 saturation 93% on room air sitting in bed.

## 2023-03-24 NOTE — NURSING
Notified REAGAN Robb NP of patient's 's-130's sustained for several minutes and /101. NP order to give patient's 0900 dose of metoprolol early.

## 2023-03-24 NOTE — ASSESSMENT & PLAN NOTE
Patient with Hypoxic Respiratory failure which is Acute.  he is not on home oxygen. Supplemental oxygen was provided and noted- Oxygen Concentration (%):  [35] 35.   Signs/symptoms of respiratory failure include- tachypnea and increased work of breathing. Contributing diagnoses includes - pulmonary edema Labs and images were reviewed. Patient Has not had a recent ABG. Will treat underlying causes and adjust management of respiratory failure as follows-   -Improved after IV lasix yesterday  -Appreciate pulm eval and recs.  -Plan for home o2 eval prior to d/c.

## 2023-03-24 NOTE — ASSESSMENT & PLAN NOTE
Patient with Paroxysmal (<7 days) atrial fibrillation which is uncontrolled currently with Beta Blocker and Digoxin. Patient is currently in atrial fibrillation.[id number] Score: 4.  Anticoagulation indicated. Anticoagulation done with lovenox.  -Off cardizem today; was placed on oral BB and dig yesterday.  Rates uncontrolled this AM.  -Discussed with cardiology: continue the digoxin; bb increased today.  -Follow on telemetry  -Will need outpatient EP.  -If HR improves with med titration will plan for d/c tomorrow.

## 2023-03-24 NOTE — PLAN OF CARE
Problem: Adult Inpatient Plan of Care  Goal: Plan of Care Review  3/24/2023 1434 by Jodi Cantor RN  Outcome: Ongoing, Progressing  3/24/2023 1433 by Jodi Cantor RN  Outcome: Ongoing, Progressing  Goal: Patient-Specific Goal (Individualized)  3/24/2023 1434 by Jodi Cantor RN  Outcome: Ongoing, Progressing  3/24/2023 1433 by Jodi Cantor RN  Outcome: Ongoing, Progressing  Goal: Absence of Hospital-Acquired Illness or Injury  3/24/2023 1434 by Jodi Cantor RN  Outcome: Ongoing, Progressing  3/24/2023 1433 by Jodi Cantor RN  Outcome: Ongoing, Progressing  Goal: Optimal Comfort and Wellbeing  3/24/2023 1434 by Jodi Cantor RN  Outcome: Ongoing, Progressing  3/24/2023 1433 by Jodi Cantor RN  Outcome: Ongoing, Progressing  Goal: Readiness for Transition of Care  3/24/2023 1434 by Jodi Cantor RN  Outcome: Ongoing, Progressing  3/24/2023 1433 by Jodi Cantor RN  Outcome: Ongoing, Progressing     Problem: Cardiac Output Decreased  Goal: Effective Cardiac Output  3/24/2023 1434 by Jodi Cantor RN  Outcome: Ongoing, Progressing  3/24/2023 1433 by Jodi Cantor RN  Outcome: Ongoing, Progressing

## 2023-03-24 NOTE — MED STUDENT SUBJECTIVE & OBJECTIVE
Medical Student Subjective & Objective     Principal Problem: Atrial fibrillation with RVR    Chief Complaint:   Chief Complaint   Patient presents with    Palpitations     Patient was in ENDO and found to be in suspected Afib was transferred down to the ER, patient denies any symptoms        HPI: Josue Mir is a 47 year old white male with a PMHx significant fo hypertension, A-fib, and previous CVA presenting after being found in A fib w/ RVR while preparing for colonscopy. Patient scheduled for his first, routine colonoscopy when he was found to have a HR of 170s in A fib. Patient sent to ED for further evaluation. Patient reports feeling fine. He denies any SOB, chest pain, palpitations, dizziness, or lightheadedness. He complains of hunger as he has been NPO since last night for colonoscopy. He states he only takes Norvas and losartan for hypertension. Denies any rate control medications or blood thinners. Reports history of  cardiac loop recorder placement. Patient started on IV Cardizem infusion in ED. CXR without any acute abnormalities. HR noted to be in 120s-140s on cardiac monitor. Plan to consult cardiology. Patient to be placed in step down for further evaluation and management.           Hospital Course: Patient was monitored closely throughout course of hospital stay by hospital medicine team. Patient started on titrated Cardizem infusion on admission. HR improved overnight to 90s-100s. Cardiology consulted on admission. Echo obtained and pending. Cardizem infusion titrated down per nursing for improved ventricular rate. Cardizem infusion stopped per cardiology and started on Digoxin and metoprolol. Cardiology recommending NOAC at discharge. Pulmonology consulted for increasing O2 demands. Repeat Cxr ordered. D dimer elevated. CTA chest ordered and pending.       Interval History:     3/22: Notes reviewed. Patient seen and examined today. Patient's heart rate has been monitored continuously since  admission and has fluctuated between  since being placed on Cardiazem infusion. Patient's oxygen saturation remained in the low 90's throughout the night, CPAP ordered by RT and patient placed on 2L NC. Patient denies chest pain, lightheadedness or dizziness, nausea, vomiting, fever or chills. Awaiting Cardiology consult before discharge.    3/23:  Notes reviewed. Patient seen and examined today. Reports no acute events overnight. Patient's HR is still uncontrolled. HR noted to be in 130s-140s this morning with irregularly irregular rhythm. Currently being managed with Digoxin and Metoprolol by Cardiology with the goal of rate control. Will be started on Eliquis for long-term anticoagulation as CHADSVASC score is 4. CTA negative for pulmonary embolism. Patient's breathing has improved. No longer requiring supplemental oxygen. Patient denies chest pain, nausea, vomiting, fever, chills, and lower extremity swelling or pain.    3/24: Notes reviewed. Patient seen and examined today. Patient's HR remains uncontrolled. Noted to be between 120's and 130's overnight and this morning. Plan is to increase Metoprolol to 100mg BID and increase Lisinopril to 10mg daily per Cardiology. Continue patient on Eliquis 5 mg BID. Patient complains of bilateral foot pain which increases with movement or standing. Patient denies numbness or tingling in extremities. Physical exam negative for findings which may suggest acute gout flare-up. Plan is to treat with acetaminophen, follow-up if pain does not reside. Patient denies chest pain, shortness of breath, lightheadedness or dizziness, nausea, vomiting, fever or chills.    Past Medical History:   Diagnosis Date    Anticoagulant long-term use     Atrial fibrillation     Gout     Gout, joint     Hypertension     Memory deficit following cerebral infarction     Pneumonia 04/2018    Sleep apnea with use of continuous positive airway pressure (CPAP)     Stroke 04/2018    Syncope and  collapse        Past Surgical History:   Procedure Laterality Date    INSERTION OF IMPLANTABLE LOOP RECORDER  10/25/2018    Procedure: Insertion, Implantable Loop Recorder;  Surgeon: Justin Colby MD;  Location: [facility] CATH;  Service: Cardiology;;    LAPAROSCOPIC CHOLECYSTECTOMY N/A 12/14/2018    Procedure: CHOLECYSTECTOMY, LAPAROSCOPIC;  Surgeon: Beck Love MD;  Location: [facility] OR;  Service: General;  Laterality: N/A;  Dr. Love requested case to start at 1:00pm    NASAL POLYP EXCISION      TREATMENT OF CARDIAC ARRHYTHMIA N/A 10/25/2018    Procedure: CARDIOVERSION OR DEFIBRILLATION;  Surgeon: Justin Colby MD;  Location: [facility] CATH;  Service: Cardiology;  Laterality: N/A;       Review of patient's allergies indicates:  No Known Allergies    No current facility-administered medications on file prior to encounter.     Current Outpatient Medications on File Prior to Encounter   Medication Sig    amLODIPine (NORVASC) 10 MG tablet Take 1 tablet (10 mg total) by mouth once daily.    indomethacin (INDOCIN) 50 MG capsule Take 1 capsule (50 mg total) by mouth 3 (three) times daily as needed (pain).    lisinopriL (PRINIVIL,ZESTRIL) 40 MG tablet Take 1 tablet (40 mg total) by mouth once daily.     Family History       Problem Relation (Age of Onset)    Hypertension Mother    No Known Problems Brother, Brother, Brother    Sleep apnea Mother          Tobacco Use    Smoking status: Never    Smokeless tobacco: Never   Substance and Sexual Activity    Alcohol use: Yes     Comment: rarely    Drug use: No    Sexual activity: Yes     Partners: Female     Review of Systems   Constitutional:  Negative for chills, diaphoresis, fatigue and fever.   HENT: Negative.     Eyes: Negative.    Respiratory:  Positive for apnea. Negative for cough, chest tightness, shortness of breath and wheezing.    Cardiovascular:  Negative for chest pain, palpitations and leg swelling.   Gastrointestinal:  Negative for abdominal distention, abdominal  pain, diarrhea, nausea and vomiting.   Genitourinary:  Negative for difficulty urinating, dysuria, frequency and urgency.   Musculoskeletal:  Positive for arthralgias.   Skin: Negative.    Neurological:  Negative for dizziness, syncope, weakness, light-headedness, numbness and headaches.   Objective:     Vital Signs (Most Recent):  Temp: 97.8 °F (36.6 °C) (03/24/23 0800)  Pulse: 104 (03/24/23 0900)  Resp: (!) 21 (03/24/23 0900)  BP: (!) 141/94 (03/24/23 0800)  SpO2: 97 % (03/24/23 0900)   Vital Signs (24h Range):  Temp:  [97.6 °F (36.4 °C)-98.1 °F (36.7 °C)] 97.8 °F (36.6 °C)  Pulse:  [] 104  Resp:  [15-41] 21  SpO2:  [94 %-100 %] 97 %  BP: (112-160)/() 141/94     Weight: 115.2 kg (254 lb)  Body mass index is 37.51 kg/m².    Intake/Output Summary (Last 24 hours) at 3/24/2023 1053  Last data filed at 3/24/2023 0623  Gross per 24 hour   Intake 510 ml   Output 2000 ml   Net -1490 ml      Physical Exam  Constitutional:       Appearance: He is obese.   HENT:      Head: Normocephalic and atraumatic.   Cardiovascular:      Rate and Rhythm: Tachycardia present. Rhythm irregular.   Pulmonary:      Effort: Pulmonary effort is normal. No respiratory distress.      Breath sounds: Normal breath sounds. No wheezing or rales.   Chest:      Chest wall: No tenderness.   Abdominal:      General: There is no distension.      Tenderness: There is no abdominal tenderness. There is no guarding or rebound.   Musculoskeletal:         General: Tenderness present.      Comments: Pt reports new onset bilateral foot pain   Skin:     General: Skin is warm and dry.   Neurological:      General: No focal deficit present.      Mental Status: He is alert.       Significant Labs: All pertinent labs within the past 24 hours have been reviewed.  CBC:   Recent Labs   Lab 03/23/23  0504 03/24/23  0430   WBC 8.48 10.23   HGB 13.7* 14.8   HCT 41.9 46.5    308     CMP:   Recent Labs   Lab 03/23/23  0504 03/23/23  1701 03/24/23  0430   NA  140 141 140   K 3.7 4.8 4.0    107 106   CO2 22* 24 25   GLU 90 98 88   BUN 13 15 16   CREATININE 1.2 1.2 1.2   CALCIUM 8.9 9.5 9.4   PROT 6.6  --  7.2   ALBUMIN 3.1*  --  3.3*   BILITOT 1.1*  --  1.0   ALKPHOS 51*  --  54*   AST 11  --  13   ALT 18  --  19   ANIONGAP 10 10 9     Cardiac Markers:   Recent Labs   Lab 03/22/23  1531   *     Coagulation: No results for input(s): PT, INR, APTT in the last 48 hours.    Significant Imaging: I have reviewed all pertinent imaging results/findings within the past 24 hours.    Medical Student Subjective & Objective

## 2023-03-24 NOTE — EICU
Intervention Initiated From:  COR / EICU    Dave intervened regarding:  Documentation    Nurse Notified:  No    Doctor Notified:  No    Comments:  video rounding complete, AAO x's 4, no c/o pain/discomfort/needs/wants, no critical care drips at this time, NAD, VSS

## 2023-03-24 NOTE — MEDICAL/APP STUDENT
[facility]  Progress Note    Patient Name: Josue Mir  MRN: [id number]  Patient Class: IP- Inpatient   Admission Date: 3/21/2023  Length of Stay: 3 days  Attending Physician: Mary Jo Restrepo MD  Primary Care Provider: David Quach MD    Subjective:     Principal Problem: Atrial fibrillation with RVR    Chief Complaint:   Chief Complaint   Patient presents with    Palpitations     Patient was in ENDO and found to be in suspected Afib was transferred down to the ER, patient denies any symptoms        HPI: Josue Mir is a 47 year old white male with a PMHx significant fo hypertension, A-fib, and previous CVA presenting after being found in A fib w/ RVR while preparing for colonscopy. Patient scheduled for his first, routine colonoscopy when he was found to have a HR of 170s in A fib. Patient sent to ED for further evaluation. Patient reports feeling fine. He denies any SOB, chest pain, palpitations, dizziness, or lightheadedness. He complains of hunger as he has been NPO since last night for colonoscopy. He states he only takes Norvas and losartan for hypertension. Denies any rate control medications or blood thinners. Reports history of  cardiac loop recorder placement. Patient started on IV Cardizem infusion in ED. CXR without any acute abnormalities. HR noted to be in 120s-140s on cardiac monitor. Plan to consult cardiology. Patient to be placed in step down for further evaluation and management.           Hospital Course: Patient was monitored closely throughout course of hospital stay by hospital medicine team. Patient started on titrated Cardizem infusion on admission. HR improved overnight to 90s-100s. Cardiology consulted on admission. Echo obtained and pending. Cardizem infusion titrated down per nursing for improved ventricular rate. Cardizem infusion stopped per cardiology and started on Digoxin and metoprolol. Cardiology recommending NOAC at discharge. Pulmonology consulted for  increasing O2 demands. Repeat Cxr ordered. D dimer elevated. CTA chest ordered and pending.       Interval History:     3/22: Notes reviewed. Patient seen and examined today. Patient's heart rate has been monitored continuously since admission and has fluctuated between  since being placed on Cardiazem infusion. Patient's oxygen saturation remained in the low 90's throughout the night, CPAP ordered by RT and patient placed on 2L NC. Patient denies chest pain, lightheadedness or dizziness, nausea, vomiting, fever or chills. Awaiting Cardiology consult before discharge.    3/23:  Notes reviewed. Patient seen and examined today. Reports no acute events overnight. Patient's HR is still uncontrolled. HR noted to be in 130s-140s this morning with irregularly irregular rhythm. Currently being managed with Digoxin and Metoprolol by Cardiology with the goal of rate control. Will be started on Eliquis for long-term anticoagulation as CHADSVASC score is 4. CTA negative for pulmonary embolism. Patient's breathing has improved. No longer requiring supplemental oxygen. Patient denies chest pain, nausea, vomiting, fever, chills, and lower extremity swelling or pain.    3/24: Notes reviewed. Patient seen and examined today. Patient's HR remains uncontrolled. Noted to be between 120's and 130's overnight and this morning. Plan is to increase Metoprolol to 100mg BID and increase Lisinopril to 10mg daily per Cardiology. Continue patient on Eliquis 5 mg BID. Patient complains of bilateral foot pain which increases with movement or standing. Patient denies numbness or tingling in extremities. Physical exam negative for findings which may suggest acute gout flare-up. Plan is to treat with acetaminophen, follow-up if pain does not reside. Patient denies chest pain, shortness of breath, lightheadedness or dizziness, nausea, vomiting, fever or chills.    Past Medical History:   Diagnosis Date    Anticoagulant long-term use     Atrial  fibrillation     Gout     Gout, joint     Hypertension     Memory deficit following cerebral infarction     Pneumonia 04/2018    Sleep apnea with use of continuous positive airway pressure (CPAP)     Stroke 04/2018    Syncope and collapse        Past Surgical History:   Procedure Laterality Date    INSERTION OF IMPLANTABLE LOOP RECORDER  10/25/2018    Procedure: Insertion, Implantable Loop Recorder;  Surgeon: Justin Colby MD;  Location: [facility];  Service: Cardiology;;    LAPAROSCOPIC CHOLECYSTECTOMY N/A 12/14/2018    Procedure: CHOLECYSTECTOMY, LAPAROSCOPIC;  Surgeon: Beck Love MD;  Location: [facility];  Service: General;  Laterality: N/A;  Dr. Love requested case to start at 1:00pm    NASAL POLYP EXCISION      TREATMENT OF CARDIAC ARRHYTHMIA N/A 10/25/2018    Procedure: CARDIOVERSION OR DEFIBRILLATION;  Surgeon: Justin Colby MD;  Location: [facility];  Service: Cardiology;  Laterality: N/A;       Review of patient's allergies indicates:  No Known Allergies    No current facility-administered medications on file prior to encounter.     Current Outpatient Medications on File Prior to Encounter   Medication Sig    amLODIPine (NORVASC) 10 MG tablet Take 1 tablet (10 mg total) by mouth once daily.    indomethacin (INDOCIN) 50 MG capsule Take 1 capsule (50 mg total) by mouth 3 (three) times daily as needed (pain).    lisinopriL (PRINIVIL,ZESTRIL) 40 MG tablet Take 1 tablet (40 mg total) by mouth once daily.     Family History       Problem Relation (Age of Onset)    Hypertension Mother    No Known Problems Brother, Brother, Brother    Sleep apnea Mother          Tobacco Use    Smoking status: Never    Smokeless tobacco: Never   Substance and Sexual Activity    Alcohol use: Yes     Comment: rarely    Drug use: No    Sexual activity: Yes     Partners: Female     Review of Systems   Constitutional:  Negative for chills, diaphoresis, fatigue and fever.   HENT: Negative.     Eyes: Negative.    Respiratory:   Positive for apnea. Negative for cough, chest tightness, shortness of breath and wheezing.    Cardiovascular:  Negative for chest pain, palpitations and leg swelling.   Gastrointestinal:  Negative for abdominal distention, abdominal pain, diarrhea, nausea and vomiting.   Genitourinary:  Negative for difficulty urinating, dysuria, frequency and urgency.   Musculoskeletal:  Positive for arthralgias.   Skin: Negative.    Neurological:  Negative for dizziness, syncope, weakness, light-headedness, numbness and headaches.   Objective:     Vital Signs (Most Recent):  Temp: 97.8 °F (36.6 °C) (03/24/23 0800)  Pulse: 104 (03/24/23 0900)  Resp: (!) 21 (03/24/23 0900)  BP: (!) 141/94 (03/24/23 0800)  SpO2: 97 % (03/24/23 0900)   Vital Signs (24h Range):  Temp:  [97.6 °F (36.4 °C)-98.1 °F (36.7 °C)] 97.8 °F (36.6 °C)  Pulse:  [] 104  Resp:  [15-41] 21  SpO2:  [94 %-100 %] 97 %  BP: (112-160)/() 141/94     Weight: 115.2 kg (254 lb)  Body mass index is 37.51 kg/m².    Intake/Output Summary (Last 24 hours) at 3/24/2023 1053  Last data filed at 3/24/2023 0623  Gross per 24 hour   Intake 510 ml   Output 2000 ml   Net -1490 ml      Physical Exam  Constitutional:       Appearance: He is obese.   HENT:      Head: Normocephalic and atraumatic.   Cardiovascular:      Rate and Rhythm: Tachycardia present. Rhythm irregular.   Pulmonary:      Effort: Pulmonary effort is normal. No respiratory distress.      Breath sounds: Normal breath sounds. No wheezing or rales.   Chest:      Chest wall: No tenderness.   Abdominal:      General: There is no distension.      Tenderness: There is no abdominal tenderness. There is no guarding or rebound.   Musculoskeletal:         General: Tenderness present.      Comments: Pt reports new onset bilateral foot pain   Skin:     General: Skin is warm and dry.   Neurological:      General: No focal deficit present.      Mental Status: He is alert.       Significant Labs: All pertinent labs within the  past 24 hours have been reviewed.  CBC:   Recent Labs   Lab 03/23/23  0504 03/24/23  0430   WBC 8.48 10.23   HGB 13.7* 14.8   HCT 41.9 46.5    308     CMP:   Recent Labs   Lab 03/23/23  0504 03/23/23  1701 03/24/23  0430    141 140   K 3.7 4.8 4.0    107 106   CO2 22* 24 25   GLU 90 98 88   BUN 13 15 16   CREATININE 1.2 1.2 1.2   CALCIUM 8.9 9.5 9.4   PROT 6.6  --  7.2   ALBUMIN 3.1*  --  3.3*   BILITOT 1.1*  --  1.0   ALKPHOS 51*  --  54*   AST 11  --  13   ALT 18  --  19   ANIONGAP 10 10 9     Cardiac Markers:   Recent Labs   Lab 03/22/23  1531   *     Coagulation: No results for input(s): PT, INR, APTT in the last 48 hours.    Significant Imaging: I have reviewed all pertinent imaging results/findings within the past 24 hours.        Assessment/Plan:      Atrial fibrillation with RVR  Acute. Patient's HR remains uncontrolled ranging between 120's - 130's.   Metoprolol increased to 100 mg PO BID and Lisinopril 10 mg PO once daily per Cardiology. Continue Digoxin, hold if HR <60.  Eliquis 5 mg PO BID  Discharge when rate is controlled.  Follow-up appointment with OP Cardiology     Acute hypoxemic respiratory failure  CTA- negative for pulmonary embolism  CXR showed evidence of Pulmonary HTN  CPAP nightly for JASPAL  Supplemental O2 as needed   Home O2 test before d/c    Acute on chronic combined systolic and diastolic heart failure    ECHO Results - 3/23/2023   The left ventricle is mildly enlarged with moderately decreased systolic   function.  · The estimated ejection fraction is 40%.  · Left ventricular diastolic dysfunction.  · There is mild left ventricular global hypokinesis.  · Normal right ventricular size with normal right ventricular systolic   function.  · Mild left atrial enlargement.  · Mild tricuspid regurgitation.    Currently managed with Digoxin and Metoprolol   IV Lasix  Daily weights.   Strict Is & Os    Adjustment disorder with depressed mood  Noted. Patient not currently  taking medication for this diagnosis. Consult psychiatry if patient experiences psychosis or drastic change in mood.    History of cerebral hemorrhage  Noted. Monitor for signs/symptoms of neurological deficits and/or cerebral hemorrhage.    Active Diagnoses:    Diagnosis Date Noted POA    PRINCIPAL PROBLEM:  Atrial fibrillation with RVR [I48.91] 03/21/2023 Yes    Acute on chronic combined systolic and diastolic heart failure [I50.43] 03/23/2023 Yes    Acute hypoxemic respiratory failure [J96.01] 03/22/2023 Yes    Adjustment disorder with depressed mood [F43.21] 11/10/2020 Yes    History of cerebral hemorrhage [Z86.79] 07/30/2020 Not Applicable      Problems Resolved During this Admission:     VTE Risk Mitigation (From admission, onward)           Ordered     apixaban tablet 5 mg  2 times daily         03/24/23 0922     IP VTE HIGH RISK PATIENT  Once         03/21/23 1342     Place sequential compression device  Until discontinued         03/21/23 1342                       Lizzie Moreno, PAS2  [facility]

## 2023-03-24 NOTE — SUBJECTIVE & OBJECTIVE
Interval History:  Pt seen and examined.  Discussed with Cardiology.  Still having issues with rate control-metoprolol dose was increased today.  Pt remains asymptomatic with no chest pain or shortness a breath.  He did report bilateral foot pain worse with ambulation.  He denies any joint pain, swelling, or injury.  Discussed a trial of nonnarcotic analgesia.    Review of Systems   Constitutional:  Negative for chills and fever.   Respiratory:  Negative for shortness of breath.    Cardiovascular:  Negative for chest pain and palpitations.   Musculoskeletal:  Positive for myalgias (bilateral foot pain).   Objective:     Vital Signs (Most Recent):  Temp: 97.8 °F (36.6 °C) (03/24/23 0800)  Pulse: 104 (03/24/23 0900)  Resp: (!) 21 (03/24/23 0900)  BP: (!) 141/94 (03/24/23 0800)  SpO2: 97 % (03/24/23 0900)   Vital Signs (24h Range):  Temp:  [97.6 °F (36.4 °C)-98.1 °F (36.7 °C)] 97.8 °F (36.6 °C)  Pulse:  [] 104  Resp:  [15-41] 21  SpO2:  [94 %-100 %] 97 %  BP: (112-160)/() 141/94     Weight: 115.2 kg (254 lb)  Body mass index is 37.51 kg/m².    Intake/Output Summary (Last 24 hours) at 3/24/2023 1058  Last data filed at 3/24/2023 0623  Gross per 24 hour   Intake 510 ml   Output 2000 ml   Net -1490 ml        Physical Exam  Vitals and nursing note reviewed.   Constitutional:       Appearance: Normal appearance.   HENT:      Head: Normocephalic and atraumatic.   Eyes:      Extraocular Movements: Extraocular movements intact.      Conjunctiva/sclera: Conjunctivae normal.      Pupils: Pupils are equal, round, and reactive to light.   Cardiovascular:      Rate and Rhythm: Tachycardia present. Rhythm irregular.   Pulmonary:      Effort: Pulmonary effort is normal. No respiratory distress.      Breath sounds: Normal breath sounds. No wheezing.   Abdominal:      General: Abdomen is flat. Bowel sounds are normal.      Palpations: Abdomen is soft.   Musculoskeletal:         General: Normal range of motion.      Cervical  back: Normal range of motion and neck supple.   Skin:     General: Skin is warm and dry.      Capillary Refill: Capillary refill takes less than 2 seconds.   Neurological:      General: No focal deficit present.      Mental Status: He is alert and oriented to person, place, and time. Mental status is at baseline.   Psychiatric:         Mood and Affect: Mood normal.       Significant Labs: All pertinent labs within the past 24 hours have been reviewed.  CBC:   Recent Labs   Lab 03/23/23  0504 03/24/23  0430   WBC 8.48 10.23   HGB 13.7* 14.8   HCT 41.9 46.5    308       CMP:   Recent Labs   Lab 03/23/23  0504 03/23/23  1701 03/24/23  0430    141 140   K 3.7 4.8 4.0    107 106   CO2 22* 24 25   GLU 90 98 88   BUN 13 15 16   CREATININE 1.2 1.2 1.2   CALCIUM 8.9 9.5 9.4   PROT 6.6  --  7.2   ALBUMIN 3.1*  --  3.3*   BILITOT 1.1*  --  1.0   ALKPHOS 51*  --  54*   AST 11  --  13   ALT 18  --  19   ANIONGAP 10 10 9         Significant Imaging: I have reviewed all pertinent imaging results/findings within the past 24 hours.

## 2023-03-24 NOTE — PROGRESS NOTES
Progress Note  PULMONARY    Admit Date: 3/21/2023   03/24/2023    History of Present Illness:  Pt is a 48 yo male with JASPAL, CVA, HTN, gout, atrial fib who was sent to the ED from endoscopy due to having atrial fib RVR with HR in the 170s. Pulmonary is consulted for increasing O2 requirement. He is currently receiving 4.5L oxygen via nc with sat 96%. Pt denies SOB or coughing. He has felt fatigued but has not noticed any other symptoms- does not notice atrial fib. He is not anticoagulated. He has hx of hemorrhagic CVA about 5 yrs ago and denies residual deficits. Pt uses CPAP nightly at home and states he sleeps well. Pt denies smoking or vaping. He had pneumonia once in the past at the time of his stroke.  SUBJECTIVE:     3/23- HR still elevated ranging 100s-120s. Resp status stable on 2L oxygen. Used cpap overnight. He denies symptoms and feels ok.  3/24- pt feels fine, HR still 110s-120s. Asymptomatic. He used CPAP last night and requiring 2L oxygen      OBJECTIVE:     Vitals (Most recent):  Vitals:    03/24/23 0900   BP:    Pulse: 104   Resp: (!) 21   Temp:        Vitals (24 hour range):  Temp:  [97.6 °F (36.4 °C)-98.1 °F (36.7 °C)]   Pulse:  []   Resp:  [15-41]   BP: (112-160)/()   SpO2:  [94 %-100 %]       Intake/Output Summary (Last 24 hours) at 3/24/2023 1133  Last data filed at 3/24/2023 0623  Gross per 24 hour   Intake 510 ml   Output 2000 ml   Net -1490 ml            Physical Exam:  The patient's neuro status (alertness,orientation,cognitive function,motor skills,), pharyngeal exam (oral lesions, hygiene, abn dentition,), Neck (jvd,mass,thyroid,nodes in neck and above/below clavicle),RESPIRATORY(symmetry,effort,fremitus,percussion,auscultation),  Cor(rhythm,heart tones including gallops,perfusion,edema)ABD(distention,hepatic&splenomegaly,tenderness,masses), Skin(rash,cyanosis),Psyc(affect,judgement,).  Exam negative except for these pertinent findings:    Awake, alert, no distress  HR  irregular, tachycardic  Breath sounds clear bilaterally  Abd soft, obese, nontender  No edema    Radiographs reviewed: view by direct vision   CTA chest 3/22/23-   Impression:     1. No large central or lobar pulmonary embolism.  Motion artifact limits evaluation of the segmental and subsegmental pulmonary arteries in the lower lobes.  2. Nonobstructing left renal calculus.  3. Cardiomegaly.      TTE 3/22-   The left ventricle is mildly enlarged with moderately decreased systolic function.  The estimated ejection fraction is 40%.  Left ventricular diastolic dysfunction.  There is mild left ventricular global hypokinesis.  Normal right ventricular size with normal right ventricular systolic function.  Mild left atrial enlargement.  Mild tricuspid regurgitation.  Mild-to-moderate mitral regurgitation.  Normal central venous pressure (3 mmHg).  The estimated PA systolic pressure is 37 mmHg.    Labs     Recent Labs   Lab 03/24/23  0430   WBC 10.23   HGB 14.8   HCT 46.5          Recent Labs   Lab 03/24/23  0430      K 4.0      CO2 25   BUN 16   CREATININE 1.2   GLU 88   CALCIUM 9.4   MG 2.2   AST 13   ALT 19   ALKPHOS 54*   BILITOT 1.0   PROT 7.2   ALBUMIN 3.3*     No results for input(s): PH, PCO2, PO2, HCO3 in the last 24 hours.  Microbiology Results (last 7 days)       ** No results found for the last 168 hours. **            Impression:  Active Hospital Problems    Diagnosis  POA    *Atrial fibrillation with RVR [I48.91]  Yes    Acute on chronic combined systolic and diastolic heart failure [I50.43]  Yes    Acute hypoxemic respiratory failure [J96.01]  Yes    Adjustment disorder with depressed mood [F43.21]  Yes    History of cerebral hemorrhage [Z86.79]  Not Applicable      Resolved Hospital Problems   No resolved problems to display.               Plan:   Acute hypoxemic respiratory failure  Atrial fib w/ RVR  Acute systolic heart failure  Pulmonary HTN likely group 2  JASPAL on CPAP        - wean  O2 to keep sats >92%  - continue CPAP nightly  - cardiology following  - workup and meds for atrial fib, CHF per cardiology  - home O2 eval prior to dc  - pulmonary will sign off; please call if further questions arise        Kerry Douglas MD  Pulmonary & Critical Care Medicine

## 2023-03-24 NOTE — PLAN OF CARE
Problem: Adult Inpatient Plan of Care  Goal: Plan of Care Review  Outcome: Ongoing, Progressing  Goal: Patient-Specific Goal (Individualized)  Outcome: Ongoing, Progressing  Goal: Absence of Hospital-Acquired Illness or Injury  Outcome: Ongoing, Progressing  Goal: Optimal Comfort and Wellbeing  Outcome: Ongoing, Progressing  Goal: Readiness for Transition of Care  Outcome: Ongoing, Progressing     Problem: Cardiac Output Decreased  Goal: Effective Cardiac Output  Outcome: Ongoing, Progressing

## 2023-03-24 NOTE — EICU
Intervention Initiated From:  [facility] / EICU    Dave intervened regarding:  Rounding (Video assessment)    Nurse Notified:  No    Doctor Notified:  No    Comments: Video rounding completed. Lying in bed in NAD, respirations even and unlabored. HR 96. VSS. NAD.

## 2023-03-25 LAB
ALBUMIN SERPL BCP-MCNC: 3.3 G/DL (ref 3.5–5.2)
ALP SERPL-CCNC: 56 U/L (ref 55–135)
ALT SERPL W/O P-5'-P-CCNC: 19 U/L (ref 10–44)
ANION GAP SERPL CALC-SCNC: 9 MMOL/L (ref 8–16)
AST SERPL-CCNC: 14 U/L (ref 10–40)
BASOPHILS # BLD AUTO: 0.06 K/UL (ref 0–0.2)
BASOPHILS NFR BLD: 0.6 % (ref 0–1.9)
BILIRUB SERPL-MCNC: 1 MG/DL (ref 0.1–1)
BUN SERPL-MCNC: 19 MG/DL (ref 6–20)
CALCIUM SERPL-MCNC: 9.6 MG/DL (ref 8.7–10.5)
CHLORIDE SERPL-SCNC: 104 MMOL/L (ref 95–110)
CK SERPL-CCNC: 21 U/L (ref 20–200)
CO2 SERPL-SCNC: 25 MMOL/L (ref 23–29)
CREAT SERPL-MCNC: 1.2 MG/DL (ref 0.5–1.4)
DIFFERENTIAL METHOD: ABNORMAL
DIGOXIN SERPL-MCNC: 0.6 NG/ML (ref 0.8–2)
EOSINOPHIL # BLD AUTO: 0.4 K/UL (ref 0–0.5)
EOSINOPHIL NFR BLD: 3.3 % (ref 0–8)
ERYTHROCYTE [DISTWIDTH] IN BLOOD BY AUTOMATED COUNT: 13.4 % (ref 11.5–14.5)
EST. GFR  (NO RACE VARIABLE): >60 ML/MIN/1.73 M^2
GLUCOSE SERPL-MCNC: 90 MG/DL (ref 70–110)
HCT VFR BLD AUTO: 47.5 % (ref 40–54)
HGB BLD-MCNC: 15.3 G/DL (ref 14–18)
IMM GRANULOCYTES # BLD AUTO: 0.05 K/UL (ref 0–0.04)
IMM GRANULOCYTES NFR BLD AUTO: 0.5 % (ref 0–0.5)
LYMPHOCYTES # BLD AUTO: 1.3 K/UL (ref 1–4.8)
LYMPHOCYTES NFR BLD: 11.7 % (ref 18–48)
MCH RBC QN AUTO: 26.5 PG (ref 27–31)
MCHC RBC AUTO-ENTMCNC: 32.2 G/DL (ref 32–36)
MCV RBC AUTO: 82 FL (ref 82–98)
MONOCYTES # BLD AUTO: 1.3 K/UL (ref 0.3–1)
MONOCYTES NFR BLD: 11.9 % (ref 4–15)
NEUTROPHILS # BLD AUTO: 7.8 K/UL (ref 1.8–7.7)
NEUTROPHILS NFR BLD: 72 % (ref 38–73)
NRBC BLD-RTO: 0 /100 WBC
PLATELET # BLD AUTO: 339 K/UL (ref 150–450)
PMV BLD AUTO: 10.4 FL (ref 9.2–12.9)
POTASSIUM SERPL-SCNC: 4.4 MMOL/L (ref 3.5–5.1)
PROT SERPL-MCNC: 7.4 G/DL (ref 6–8.4)
RBC # BLD AUTO: 5.78 M/UL (ref 4.6–6.2)
SODIUM SERPL-SCNC: 138 MMOL/L (ref 136–145)
URATE SERPL-MCNC: 12.8 MG/DL (ref 3.4–7)
WBC # BLD AUTO: 10.76 K/UL (ref 3.9–12.7)

## 2023-03-25 PROCEDURE — 25000003 PHARM REV CODE 250: Performed by: NURSE PRACTITIONER

## 2023-03-25 PROCEDURE — 94660 CPAP INITIATION&MGMT: CPT

## 2023-03-25 PROCEDURE — 82550 ASSAY OF CK (CPK): CPT | Performed by: NURSE PRACTITIONER

## 2023-03-25 PROCEDURE — 99900035 HC TECH TIME PER 15 MIN (STAT)

## 2023-03-25 PROCEDURE — 12000002 HC ACUTE/MED SURGE SEMI-PRIVATE ROOM

## 2023-03-25 PROCEDURE — 63600175 PHARM REV CODE 636 W HCPCS: Performed by: NURSE PRACTITIONER

## 2023-03-25 PROCEDURE — 27000221 HC OXYGEN, UP TO 24 HOURS

## 2023-03-25 PROCEDURE — 94761 N-INVAS EAR/PLS OXIMETRY MLT: CPT

## 2023-03-25 PROCEDURE — 80053 COMPREHEN METABOLIC PANEL: CPT | Performed by: NURSE PRACTITIONER

## 2023-03-25 PROCEDURE — 80162 ASSAY OF DIGOXIN TOTAL: CPT | Performed by: NURSE PRACTITIONER

## 2023-03-25 PROCEDURE — 84550 ASSAY OF BLOOD/URIC ACID: CPT | Performed by: NURSE PRACTITIONER

## 2023-03-25 PROCEDURE — 36415 COLL VENOUS BLD VENIPUNCTURE: CPT | Performed by: NURSE PRACTITIONER

## 2023-03-25 PROCEDURE — 85025 COMPLETE CBC W/AUTO DIFF WBC: CPT | Performed by: NURSE PRACTITIONER

## 2023-03-25 RX ORDER — KETOROLAC TROMETHAMINE 30 MG/ML
15 INJECTION, SOLUTION INTRAMUSCULAR; INTRAVENOUS EVERY 6 HOURS PRN
Status: DISCONTINUED | OUTPATIENT
Start: 2023-03-25 | End: 2023-03-25

## 2023-03-25 RX ORDER — PREDNISONE 20 MG/1
20 TABLET ORAL DAILY
Status: DISCONTINUED | OUTPATIENT
Start: 2023-03-25 | End: 2023-03-26 | Stop reason: HOSPADM

## 2023-03-25 RX ORDER — COLCHICINE 0.6 MG/1
1.2 TABLET, FILM COATED ORAL ONCE
Status: DISCONTINUED | OUTPATIENT
Start: 2023-03-25 | End: 2023-03-25

## 2023-03-25 RX ORDER — KETOROLAC TROMETHAMINE 30 MG/ML
15 INJECTION, SOLUTION INTRAMUSCULAR; INTRAVENOUS EVERY 6 HOURS
Status: DISCONTINUED | OUTPATIENT
Start: 2023-03-25 | End: 2023-03-26 | Stop reason: HOSPADM

## 2023-03-25 RX ADMIN — APIXABAN 5 MG: 2.5 TABLET, FILM COATED ORAL at 09:03

## 2023-03-25 RX ADMIN — TRAMADOL HYDROCHLORIDE 50 MG: 50 TABLET, COATED ORAL at 09:03

## 2023-03-25 RX ADMIN — APIXABAN 5 MG: 2.5 TABLET, FILM COATED ORAL at 08:03

## 2023-03-25 RX ADMIN — DIGOXIN 0.25 MG: 250 TABLET ORAL at 08:03

## 2023-03-25 RX ADMIN — KETOROLAC TROMETHAMINE 15 MG: 30 INJECTION, SOLUTION INTRAMUSCULAR; INTRAVENOUS at 10:03

## 2023-03-25 RX ADMIN — KETOROLAC TROMETHAMINE 15 MG: 30 INJECTION, SOLUTION INTRAMUSCULAR; INTRAVENOUS at 05:03

## 2023-03-25 RX ADMIN — KETOROLAC TROMETHAMINE 15 MG: 30 INJECTION, SOLUTION INTRAMUSCULAR; INTRAVENOUS at 11:03

## 2023-03-25 RX ADMIN — PREDNISONE 20 MG: 20 TABLET ORAL at 12:03

## 2023-03-25 RX ADMIN — LISINOPRIL 10 MG: 10 TABLET ORAL at 08:03

## 2023-03-25 RX ADMIN — METOPROLOL TARTRATE 100 MG: 50 TABLET, FILM COATED ORAL at 08:03

## 2023-03-25 RX ADMIN — METOPROLOL TARTRATE 100 MG: 50 TABLET, FILM COATED ORAL at 09:03

## 2023-03-25 RX ADMIN — FUROSEMIDE 20 MG: 20 TABLET ORAL at 08:03

## 2023-03-25 RX ADMIN — TRAMADOL HYDROCHLORIDE 50 MG: 50 TABLET, COATED ORAL at 02:03

## 2023-03-25 NOTE — SUBJECTIVE & OBJECTIVE
Interval History:  Pt seen and examined.  Has increased foot pain and is now able to elucidate more focal pain to the left lateral foot worse with pressure.  Concern for gout flare.  Uric acid was elevated today.  No nausea or vomiting.  He has no chest pain or palpitations, but heart rate continues to fluctuate into the mid 110s.    Review of Systems   Constitutional:  Negative for chills and fever.   Respiratory:  Negative for shortness of breath.    Cardiovascular:  Negative for chest pain and palpitations.   Musculoskeletal:  Positive for arthralgias and gait problem. Negative for myalgias.   Objective:     Vital Signs (Most Recent):  Temp: 98.3 °F (36.8 °C) (03/25/23 1128)  Pulse: 102 (03/25/23 1433)  Resp: 20 (03/25/23 1436)  BP: 121/72 (03/25/23 1433)  SpO2: (!) 93 % (03/25/23 1128)   Vital Signs (24h Range):  Temp:  [96.3 °F (35.7 °C)-99.7 °F (37.6 °C)] 98.3 °F (36.8 °C)  Pulse:  [] 102  Resp:  [16-20] 20  SpO2:  [93 %-99 %] 93 %  BP: (116-165)/() 121/72     Weight: 115.2 kg (254 lb)  Body mass index is 37.51 kg/m².    Intake/Output Summary (Last 24 hours) at 3/25/2023 1500  Last data filed at 3/25/2023 1126  Gross per 24 hour   Intake --   Output 1025 ml   Net -1025 ml        Physical Exam  Vitals and nursing note reviewed.   Constitutional:       Appearance: Normal appearance.   HENT:      Head: Normocephalic and atraumatic.   Eyes:      Extraocular Movements: Extraocular movements intact.      Conjunctiva/sclera: Conjunctivae normal.      Pupils: Pupils are equal, round, and reactive to light.   Cardiovascular:      Rate and Rhythm: Tachycardia present. Rhythm irregular.   Pulmonary:      Effort: Pulmonary effort is normal. No respiratory distress.      Breath sounds: Normal breath sounds. No wheezing.   Abdominal:      General: Abdomen is flat. Bowel sounds are normal.      Palpations: Abdomen is soft.   Musculoskeletal:         General: Tenderness (LEFT lateral foot pain, erythema without  warmth. Slight swelling.) present. Normal range of motion.      Cervical back: Normal range of motion and neck supple.   Skin:     General: Skin is warm and dry.      Capillary Refill: Capillary refill takes less than 2 seconds.   Neurological:      General: No focal deficit present.      Mental Status: He is alert and oriented to person, place, and time. Mental status is at baseline.   Psychiatric:         Mood and Affect: Mood normal.         Significant Labs: All pertinent labs within the past 24 hours have been reviewed.  CBC:   Recent Labs   Lab 03/24/23  0430 03/25/23  0721   WBC 10.23 10.76   HGB 14.8 15.3   HCT 46.5 47.5    339       CMP:   Recent Labs   Lab 03/23/23  1701 03/24/23  0430 03/25/23  0721    140 138   K 4.8 4.0 4.4    106 104   CO2 24 25 25   GLU 98 88 90   BUN 15 16 19   CREATININE 1.2 1.2 1.2   CALCIUM 9.5 9.4 9.6   PROT  --  7.2 7.4   ALBUMIN  --  3.3* 3.3*   BILITOT  --  1.0 1.0   ALKPHOS  --  54* 56   AST  --  13 14   ALT  --  19 19   ANIONGAP 10 9 9         Significant Imaging: I have reviewed all pertinent imaging results/findings within the past 24 hours.

## 2023-03-25 NOTE — ASSESSMENT & PLAN NOTE
Chronic, controlled.  Latest blood pressure and vitals reviewed-   Temp:  [96.3 °F (35.7 °C)-99.7 °F (37.6 °C)]   Pulse:  []   Resp:  [16-20]   BP: (116-165)/()   SpO2:  [93 %-99 %] .   Home meds for hypertension were reviewed and noted below.   Hypertension Medications             amLODIPine (NORVASC) 10 MG tablet Take 1 tablet (10 mg total) by mouth once daily.    lisinopriL (PRINIVIL,ZESTRIL) 40 MG tablet Take 1 tablet (40 mg total) by mouth once daily.          While in the hospital, will manage blood pressure as follows; Continue home antihypertensive regimen- lisinopril continued at lower dose; amlodipine on hold.     Will utilize p.r.n. blood pressure medication only if patient's blood pressure greater than  180/110 and he develops symptoms such as worsening chest pain or shortness of breath.

## 2023-03-25 NOTE — PLAN OF CARE
Problem: Adult Inpatient Plan of Care  Goal: Plan of Care Review  Outcome: Ongoing, Progressing  Goal: Patient-Specific Goal (Individualized)  Outcome: Ongoing, Progressing  Goal: Absence of Hospital-Acquired Illness or Injury  Outcome: Ongoing, Progressing  Goal: Optimal Comfort and Wellbeing  Outcome: Ongoing, Progressing  Goal: Readiness for Transition of Care  Outcome: Ongoing, Progressing     Problem: Cardiac Output Decreased  Goal: Effective Cardiac Output  Outcome: Ongoing, Progressing   Pt has a 15 beat of VTach at 0115. Obtain pt's vitals. No complaints of chest pain. Np notified. Plan of care ongoing.

## 2023-03-25 NOTE — ASSESSMENT & PLAN NOTE
Patient with Paroxysmal (<7 days) atrial fibrillation which is uncontrolled currently with Beta Blocker and Digoxin. Patient is currently in atrial fibrillation.[id number] Score: 4.  Anticoagulation indicated. Anticoagulation done with lovenox.  -Discussed with cardiology, Dr. Acosta. He continues with fluctuating rates typically under 120 but never below 90.  He feels patient is optimized on current rate control medication. He feels that if rates persist like this, he will require KIRILL/cardioversion at [facility] on Monday.  -Follow on telemetry  -Will need outpatient EP.

## 2023-03-25 NOTE — ASSESSMENT & PLAN NOTE
Presumed gout flare.   -Discussed with attending: Start toradol and prednisone and assess response  -Avoiding colchicine due to interaction with digoxin.

## 2023-03-25 NOTE — PROGRESS NOTES
[facility] Hospital Medicine  Progress Note    Patient Name: Josue iMr  MRN: [id number]  Patient Class: IP- Inpatient   Admission Date: 3/21/2023  Length of Stay: 4 days  Attending Physician: Hernan Nicholas MD  Primary Care Provider: David Quach MD        Subjective:     Principal Problem:Atrial fibrillation with RVR        HPI:  Josue Mir is a 47 year old white male with a PMHx significant fo hypertension, A-fib, and previous CVA presenting after being found in A fib w/ RVR while preparing for colonscopy. Patient scheduled for his first, routine colonoscopy when he was found to have a HR of 170s in A fib. Patient sent to ED for further evaluation. Patient reports feeling fine. He denies any SOB, chest pain, palpitations, dizziness, or lightheadedness. He complains of hunger as he has been NPO since last night for colonoscopy. He states he only takes Norvas and losartan for hypertension. Denies any rate control medications or blood thinners. Reports history of  cardiac loop recorder placement. Patient started on IV Cardizem infusion in ED. CXR without any acute abnormalities. HR noted to be in 120s-140s on cardiac monitor. Plan to consult cardiology. Patient to be placed in step down for further evaluation and management.             Overview/Hospital Course:  Patient was monitored closely throughout course of hospital stay by hospital medicine team. Patient started on titrated Cardizem infusion on admission. HR improved overnight to 90s-100s. Cardiology consulted on admission. Echo obtained and pending. Cardizem infusion titrated down per nursing for improved ventricular rate. Cardizem infusion stopped per cardiology and started on Digoxin and metoprolol. Cardiology recommending NOAC at discharge. Pulmonology consulted for increasing O2 demands. Repeat Cxr ordered. D dimer elevated. CTA chest ordered and pending.         Interval History:  Pt seen and examined.  Has increased foot  pain and is now able to elucidate more focal pain to the left lateral foot worse with pressure.  Concern for gout flare.  Uric acid was elevated today.  No nausea or vomiting.  He has no chest pain or palpitations, but heart rate continues to fluctuate into the mid 110s.    Review of Systems   Constitutional:  Negative for chills and fever.   Respiratory:  Negative for shortness of breath.    Cardiovascular:  Negative for chest pain and palpitations.   Musculoskeletal:  Positive for arthralgias and gait problem. Negative for myalgias.   Objective:     Vital Signs (Most Recent):  Temp: 98.3 °F (36.8 °C) (03/25/23 1128)  Pulse: 102 (03/25/23 1433)  Resp: 20 (03/25/23 1436)  BP: 121/72 (03/25/23 1433)  SpO2: (!) 93 % (03/25/23 1128)   Vital Signs (24h Range):  Temp:  [96.3 °F (35.7 °C)-99.7 °F (37.6 °C)] 98.3 °F (36.8 °C)  Pulse:  [] 102  Resp:  [16-20] 20  SpO2:  [93 %-99 %] 93 %  BP: (116-165)/() 121/72     Weight: 115.2 kg (254 lb)  Body mass index is 37.51 kg/m².    Intake/Output Summary (Last 24 hours) at 3/25/2023 1500  Last data filed at 3/25/2023 1126  Gross per 24 hour   Intake --   Output 1025 ml   Net -1025 ml        Physical Exam  Vitals and nursing note reviewed.   Constitutional:       Appearance: Normal appearance.   HENT:      Head: Normocephalic and atraumatic.   Eyes:      Extraocular Movements: Extraocular movements intact.      Conjunctiva/sclera: Conjunctivae normal.      Pupils: Pupils are equal, round, and reactive to light.   Cardiovascular:      Rate and Rhythm: Tachycardia present. Rhythm irregular.   Pulmonary:      Effort: Pulmonary effort is normal. No respiratory distress.      Breath sounds: Normal breath sounds. No wheezing.   Abdominal:      General: Abdomen is flat. Bowel sounds are normal.      Palpations: Abdomen is soft.   Musculoskeletal:         General: Tenderness (LEFT lateral foot pain, erythema without warmth. Slight swelling.) present. Normal range of motion.       Cervical back: Normal range of motion and neck supple.   Skin:     General: Skin is warm and dry.      Capillary Refill: Capillary refill takes less than 2 seconds.   Neurological:      General: No focal deficit present.      Mental Status: He is alert and oriented to person, place, and time. Mental status is at baseline.   Psychiatric:         Mood and Affect: Mood normal.         Significant Labs: All pertinent labs within the past 24 hours have been reviewed.  CBC:   Recent Labs   Lab 03/24/23  0430 03/25/23  0721   WBC 10.23 10.76   HGB 14.8 15.3   HCT 46.5 47.5    339       CMP:   Recent Labs   Lab 03/23/23  1701 03/24/23  0430 03/25/23  0721    140 138   K 4.8 4.0 4.4    106 104   CO2 24 25 25   GLU 98 88 90   BUN 15 16 19   CREATININE 1.2 1.2 1.2   CALCIUM 9.5 9.4 9.6   PROT  --  7.2 7.4   ALBUMIN  --  3.3* 3.3*   BILITOT  --  1.0 1.0   ALKPHOS  --  54* 56   AST  --  13 14   ALT  --  19 19   ANIONGAP 10 9 9         Significant Imaging: I have reviewed all pertinent imaging results/findings within the past 24 hours.      Assessment/Plan:      * Atrial fibrillation with RVR  Patient with Paroxysmal (<7 days) atrial fibrillation which is uncontrolled currently with Beta Blocker and Digoxin. Patient is currently in atrial fibrillation.[id number] Score: 4.  Anticoagulation indicated. Anticoagulation done with lovenox.  -Discussed with cardiology, Dr. Acosta. He continues with fluctuating rates typically under 120 but never below 90.  He feels patient is optimized on current rate control medication. He feels that if rates persist like this, he will require KIRILL/cardioversion at [facility] on Monday.  -Follow on telemetry  -Will need outpatient EP.      Acute on chronic combined systolic and diastolic heart failure  Patient is identified as having Combined Systolic and Diastolic heart failure that is Acute. CHF is currently controlled. Latest ECHO performed and demonstrates- Results for orders placed during  the hospital encounter of 03/21/23    Echo    Interpretation Summary  · The left ventricle is mildly enlarged with moderately decreased systolic function.  · The estimated ejection fraction is 40%.  · Left ventricular diastolic dysfunction.  · There is mild left ventricular global hypokinesis.  · Normal right ventricular size with normal right ventricular systolic function.  · Mild left atrial enlargement.  · Mild tricuspid regurgitation.  · Mild-to-moderate mitral regurgitation.  · Normal central venous pressure (3 mmHg).  · The estimated PA systolic pressure is 37 mmHg.  . Continue Beta Blocker ACE/ARB Furosemide and monitor clinical status closely. Monitor on telemetry. Patient is on CHF pathway.  Monitor strict Is&Os and daily weights.  Place on fluid restriction of 1.5 L. Continue to stress to patient importance of self efficacy and  on diet for CHF. Last BNP reviewed- and noted below   Recent Labs   Lab 03/22/23  1531   *   .    -Clinically improved after IV lasix  -Rate control key: BB, digoxin  -continue the lisinopril and lasix.        Acute hypoxemic respiratory failure  Patient with Hypoxic Respiratory failure which is Acute.  he is not on home oxygen. Supplemental oxygen was provided and noted- Oxygen Concentration (%):  [35] 35.   Signs/symptoms of respiratory failure include- tachypnea and increased work of breathing. Contributing diagnoses includes - pulmonary edema Labs and images were reviewed. Patient Has not had a recent ABG. Will treat underlying causes and adjust management of respiratory failure as follows-   -Improved after IV lasix yesterday  -Appreciate pulm eval and recs.  -Plan for home o2 eval prior to d/c.    Adjustment disorder with depressed mood  Noted  Follows Psych outpatient      Acute gout of left foot  Presumed gout flare.   -Discussed with attending: Start toradol and prednisone and assess response  -Avoiding colchicine due to interaction with digoxin.      History  of cerebral hemorrhage  Noted  -Needs good BP control on DOAC which is necessary.  -Discussed with patient.    JASPAL (obstructive sleep apnea)  Chronic issue, continue the CPAP nightly.        VTE Risk Mitigation (From admission, onward)           Ordered     apixaban tablet 5 mg  2 times daily         03/24/23 0922     IP VTE HIGH RISK PATIENT  Once         03/21/23 1342     Place sequential compression device  Until discontinued         03/21/23 1342                    Discharge Planning   LUCERO: 3/27/2023     Code Status: Full Code   Is the patient medically ready for discharge?:     Reason for patient still in hospital (select all that apply): Patient trending condition, Treatment and Consult recommendations  Discharge Plan A: Home with family        I did make multiple attempts in the morning and evening to telephone his wife per his request with no answer. He is aware.        Christine Almaraz NP  Department of Hospital Medicine   [facility]

## 2023-03-26 VITALS
WEIGHT: 254 LBS | HEART RATE: 84 BPM | HEIGHT: 69 IN | OXYGEN SATURATION: 94 % | BODY MASS INDEX: 37.62 KG/M2 | DIASTOLIC BLOOD PRESSURE: 93 MMHG | RESPIRATION RATE: 16 BRPM | SYSTOLIC BLOOD PRESSURE: 135 MMHG | TEMPERATURE: 98 F

## 2023-03-26 LAB
ALBUMIN SERPL BCP-MCNC: 3.1 G/DL (ref 3.5–5.2)
ALP SERPL-CCNC: 51 U/L (ref 55–135)
ALT SERPL W/O P-5'-P-CCNC: 26 U/L (ref 10–44)
ANION GAP SERPL CALC-SCNC: 11 MMOL/L (ref 8–16)
ANION GAP SERPL CALC-SCNC: 11 MMOL/L (ref 8–16)
AST SERPL-CCNC: 16 U/L (ref 10–40)
BASOPHILS # BLD AUTO: 0.03 K/UL (ref 0–0.2)
BASOPHILS NFR BLD: 0.3 % (ref 0–1.9)
BILIRUB SERPL-MCNC: 0.7 MG/DL (ref 0.1–1)
BUN SERPL-MCNC: 31 MG/DL (ref 6–20)
BUN SERPL-MCNC: 33 MG/DL (ref 6–20)
CALCIUM SERPL-MCNC: 9.4 MG/DL (ref 8.7–10.5)
CALCIUM SERPL-MCNC: 9.6 MG/DL (ref 8.7–10.5)
CHLORIDE SERPL-SCNC: 104 MMOL/L (ref 95–110)
CHLORIDE SERPL-SCNC: 105 MMOL/L (ref 95–110)
CO2 SERPL-SCNC: 23 MMOL/L (ref 23–29)
CO2 SERPL-SCNC: 23 MMOL/L (ref 23–29)
CREAT SERPL-MCNC: 1.4 MG/DL (ref 0.5–1.4)
CREAT SERPL-MCNC: 1.4 MG/DL (ref 0.5–1.4)
DIFFERENTIAL METHOD: ABNORMAL
EOSINOPHIL # BLD AUTO: 0.1 K/UL (ref 0–0.5)
EOSINOPHIL NFR BLD: 1 % (ref 0–8)
ERYTHROCYTE [DISTWIDTH] IN BLOOD BY AUTOMATED COUNT: 13.3 % (ref 11.5–14.5)
EST. GFR  (NO RACE VARIABLE): >60 ML/MIN/1.73 M^2
EST. GFR  (NO RACE VARIABLE): >60 ML/MIN/1.73 M^2
GLUCOSE SERPL-MCNC: 103 MG/DL (ref 70–110)
GLUCOSE SERPL-MCNC: 97 MG/DL (ref 70–110)
HCT VFR BLD AUTO: 45.9 % (ref 40–54)
HGB BLD-MCNC: 14.9 G/DL (ref 14–18)
IMM GRANULOCYTES # BLD AUTO: 0.04 K/UL (ref 0–0.04)
IMM GRANULOCYTES NFR BLD AUTO: 0.4 % (ref 0–0.5)
LYMPHOCYTES # BLD AUTO: 1.2 K/UL (ref 1–4.8)
LYMPHOCYTES NFR BLD: 10.9 % (ref 18–48)
MCH RBC QN AUTO: 26.4 PG (ref 27–31)
MCHC RBC AUTO-ENTMCNC: 32.5 G/DL (ref 32–36)
MCV RBC AUTO: 81 FL (ref 82–98)
MONOCYTES # BLD AUTO: 1.3 K/UL (ref 0.3–1)
MONOCYTES NFR BLD: 11 % (ref 4–15)
NEUTROPHILS # BLD AUTO: 8.7 K/UL (ref 1.8–7.7)
NEUTROPHILS NFR BLD: 76.4 % (ref 38–73)
NRBC BLD-RTO: 0 /100 WBC
PLATELET # BLD AUTO: 371 K/UL (ref 150–450)
PMV BLD AUTO: 10.7 FL (ref 9.2–12.9)
POTASSIUM SERPL-SCNC: 4 MMOL/L (ref 3.5–5.1)
POTASSIUM SERPL-SCNC: 4.3 MMOL/L (ref 3.5–5.1)
PROT SERPL-MCNC: 7.4 G/DL (ref 6–8.4)
RBC # BLD AUTO: 5.65 M/UL (ref 4.6–6.2)
SODIUM SERPL-SCNC: 138 MMOL/L (ref 136–145)
SODIUM SERPL-SCNC: 139 MMOL/L (ref 136–145)
WBC # BLD AUTO: 11.4 K/UL (ref 3.9–12.7)

## 2023-03-26 PROCEDURE — 63600175 PHARM REV CODE 636 W HCPCS: Performed by: NURSE PRACTITIONER

## 2023-03-26 PROCEDURE — 27000221 HC OXYGEN, UP TO 24 HOURS

## 2023-03-26 PROCEDURE — 25000003 PHARM REV CODE 250: Performed by: NURSE PRACTITIONER

## 2023-03-26 PROCEDURE — 99900035 HC TECH TIME PER 15 MIN (STAT)

## 2023-03-26 PROCEDURE — 94761 N-INVAS EAR/PLS OXIMETRY MLT: CPT

## 2023-03-26 PROCEDURE — 85025 COMPLETE CBC W/AUTO DIFF WBC: CPT | Performed by: NURSE PRACTITIONER

## 2023-03-26 PROCEDURE — 97161 PT EVAL LOW COMPLEX 20 MIN: CPT

## 2023-03-26 PROCEDURE — 94660 CPAP INITIATION&MGMT: CPT

## 2023-03-26 PROCEDURE — 80053 COMPREHEN METABOLIC PANEL: CPT | Performed by: NURSE PRACTITIONER

## 2023-03-26 PROCEDURE — 80048 BASIC METABOLIC PNL TOTAL CA: CPT | Mod: XB | Performed by: NURSE PRACTITIONER

## 2023-03-26 PROCEDURE — 36415 COLL VENOUS BLD VENIPUNCTURE: CPT | Performed by: NURSE PRACTITIONER

## 2023-03-26 RX ORDER — METOPROLOL TARTRATE 100 MG/1
100 TABLET ORAL 2 TIMES DAILY
Qty: 60 TABLET | Refills: 0 | Status: SHIPPED | OUTPATIENT
Start: 2023-03-26 | End: 2023-04-21 | Stop reason: SDUPTHER

## 2023-03-26 RX ORDER — SODIUM CHLORIDE 9 MG/ML
INJECTION, SOLUTION INTRAVENOUS CONTINUOUS
Status: ACTIVE | OUTPATIENT
Start: 2023-03-26 | End: 2023-03-26

## 2023-03-26 RX ORDER — DIGOXIN 250 MCG
0.25 TABLET ORAL DAILY
Qty: 30 TABLET | Refills: 0 | Status: SHIPPED | OUTPATIENT
Start: 2023-03-27 | End: 2023-04-21 | Stop reason: SDUPTHER

## 2023-03-26 RX ORDER — PREDNISONE 20 MG/1
20 TABLET ORAL DAILY
Qty: 5 TABLET | Refills: 0 | Status: SHIPPED | OUTPATIENT
Start: 2023-03-26 | End: 2023-03-29

## 2023-03-26 RX ORDER — SODIUM CHLORIDE 9 MG/ML
INJECTION, SOLUTION INTRAVENOUS CONTINUOUS
Status: DISCONTINUED | OUTPATIENT
Start: 2023-03-26 | End: 2023-03-26

## 2023-03-26 RX ORDER — LISINOPRIL 10 MG/1
10 TABLET ORAL DAILY
Qty: 30 TABLET | Refills: 0 | Status: SHIPPED | OUTPATIENT
Start: 2023-03-27 | End: 2023-04-21 | Stop reason: SDUPTHER

## 2023-03-26 RX ADMIN — PREDNISONE 20 MG: 20 TABLET ORAL at 08:03

## 2023-03-26 RX ADMIN — KETOROLAC TROMETHAMINE 15 MG: 30 INJECTION, SOLUTION INTRAMUSCULAR; INTRAVENOUS at 01:03

## 2023-03-26 RX ADMIN — DIGOXIN 0.25 MG: 250 TABLET ORAL at 08:03

## 2023-03-26 RX ADMIN — APIXABAN 5 MG: 2.5 TABLET, FILM COATED ORAL at 08:03

## 2023-03-26 RX ADMIN — METOPROLOL TARTRATE 100 MG: 50 TABLET, FILM COATED ORAL at 08:03

## 2023-03-26 RX ADMIN — SODIUM CHLORIDE: 9 INJECTION, SOLUTION INTRAVENOUS at 08:03

## 2023-03-26 RX ADMIN — LISINOPRIL 10 MG: 10 TABLET ORAL at 08:03

## 2023-03-26 RX ADMIN — KETOROLAC TROMETHAMINE 15 MG: 30 INJECTION, SOLUTION INTRAMUSCULAR; INTRAVENOUS at 06:03

## 2023-03-26 NOTE — DISCHARGE SUMMARY
[facility] Medicine  Discharge Summary      Patient Name: Josue Mir  MRN: [id number]  ROSS: [id number]  Patient Class: IP- Inpatient  Admission Date: 3/21/2023  Hospital Length of Stay: 5 days  Discharge Date and Time:  03/26/2023 3:43 PM  Attending Physician: Hernan Nicholas MD   Discharging Provider: Christine Almaraz NP  Primary Care Provider: David Quach MD    Primary Care Team: Networked reference to record PCT     HPI:   Josue Mir is a 47 year old white male with a PMHx significant fo hypertension, A-fib, and previous CVA presenting after being found in A fib w/ RVR while preparing for colonscopy. Patient scheduled for his first, routine colonoscopy when he was found to have a HR of 170s in A fib. Patient sent to ED for further evaluation. Patient reports feeling fine. He denies any SOB, chest pain, palpitations, dizziness, or lightheadedness. He complains of hunger as he has been NPO since last night for colonoscopy. He states he only takes Norvas and losartan for hypertension. Denies any rate control medications or blood thinners. Reports history of  cardiac loop recorder placement. Patient started on IV Cardizem infusion in ED. CXR without any acute abnormalities. HR noted to be in 120s-140s on cardiac monitor. Plan to consult cardiology. Patient to be placed in step down for further evaluation and management.             * No surgery found *      Hospital Course:   Patient was monitored closely throughout course of hospital stay by hospital medicine team. Patient started on titrated Cardizem infusion on admission. HR improved overnight to 90s-100s. Cardiology consulted on admission. Echo obtained and pending. Cardizem infusion titrated down per nursing for improved ventricular rate. Cardizem infusion stopped per cardiology and started on Digoxin and metoprolol. Cardiology recommending NOAC at discharge. Pulmonology consulted for increasing O2 demands. Repeat Cxr  ordered. D dimer elevated. CTA chest ordered and negative for any pulmonary embolism he was initiated on Lasix and oxygen needs improved.  He continued with atrial fibrillation with RVR and metoprolol and digoxin doses were adjusted per Cardiology.  He developed foot pain with elevated uric acid level unknown Hx of gout.  He was initiated on prednisone and IV Toradol.  His pain dramatically improved thereafter.  His heart rate stabilized thereafter.  He maintained atrial fibrillation with rate 70s to 90.  Case is discussed with Cardiology who originally planned for KIRILL cardioversion, however wants rate became controlled was agreeable with discharge home on current regimen with close outpatient follow-up with Cardiology and electrophysiology.  He had mild elevation in his BUN and creatinine.  NSAIDs were held.  The Lasix was discontinued he was given gentle fluid resuscitation.  Kidney function remains stable.  Pt felt ready for discharge.  He was D/C home to continue his oral rate control medications and anticoagulant.  He was D/C on a short course of oral steroids for the gout flare.  He was told to avoid NSAIDs and continue oral hydration close follow-up with his PCP this week, as well as repeat labs to assess kidney function.  Pt verbalized understanding of discharge instructions and return precautions.    Pt was seen on day of discharge and deemed appropriate for discharge.         Goals of Care Treatment Preferences:  Code Status: Full Code      Consults:   Consults (From admission, onward)        Status Ordering Provider     Inpatient consult to Pulmonology  Once        Provider:  Kerry Douglas MD    Completed RACHEL GOODEN     Inpatient consult to Cardiology  Once        Provider:  (Not yet assigned)    Completed RACHEL GOODEN          No new Assessment & Plan notes have been filed under this hospital service since the last note was generated.  Service: Hospital Medicine    Final Active Diagnoses:     Diagnosis Date Noted POA    PRINCIPAL PROBLEM:  Atrial fibrillation with RVR [I48.91] 03/21/2023 Yes    Acute on chronic combined systolic and diastolic heart failure [I50.43] 03/23/2023 Yes    Acute hypoxemic respiratory failure [J96.01] 03/22/2023 Yes    Adjustment disorder with depressed mood [F43.21] 11/10/2020 Yes    Acute gout of left foot [M10.9] 10/06/2020 No    History of cerebral hemorrhage [Z86.79] 07/30/2020 Not Applicable    JASPAL (obstructive sleep apnea) [G47.33] 12/13/2018 Yes    Essential hypertension [I10] 12/13/2018 Yes      Problems Resolved During this Admission:       Discharged Condition: good    Disposition: Home or Self Care    Follow Up:   Follow-up Information     David Quach MD Follow up on 3/29/2023.    Specialty: Family Medicine  Why: at 10 AM for hospital follow up. will see PA for this visit  Contact information:  [address]  [place] [address]  [phone]             Ailyn Murrieta MD Follow up.    Specialties: Electrophysiology, Cardiovascular Disease, Cardiology  Why: spoke with office- they are aware of referral in computer system and will be contacting you to schedule apt  Contact information:  [address] [facility] [address]  [phone]             Kerry Nicole NP Follow up on 4/4/2023.    Specialty: Cardiology  Why: at 9 AM for hospital follow up  Contact information:  [address]  [address]  [place] [address]  [phone]                       Patient Instructions:      BASIC METABOLIC PANEL   Standing Status: Future Standing Exp. Date: 05/24/24     Ambulatory referral/consult to Cardiac Electrophysiology   Standing Status: Future   Referral Priority: Routine Referral Type: Consultation   Referral Reason: Specialty Services Required   Referred to Provider: AILYN MURRIETA Requested Specialty: Cardiology   Number of Visits Requested: 1     Diet diabetic     Notify your health care provider if you experience any of the following:  severe  uncontrolled pain     Notify your health care provider if you experience any of the following:  persistent dizziness, light-headedness, or visual disturbances     Notify your health care provider if you experience any of the following:  increased confusion or weakness     Notify your health care provider if you experience any of the following:  difficulty breathing or increased cough     Notify your health care provider if you experience any of the following:  difficulty breathing or increased cough     Notify your health care provider if you experience any of the following:  persistent dizziness, light-headedness, or visual disturbances     Notify your health care provider if you experience any of the following:  increased confusion or weakness     Activity as tolerated     Activity as tolerated       Significant Diagnostic Studies: Labs:   CMP   Recent Labs   Lab 03/25/23  0721 03/26/23  0430 03/26/23  1355    139 138   K 4.4 4.3 4.0    105 104   CO2 25 23 23   GLU 90 97 103   BUN 19 31* 33*   CREATININE 1.2 1.4 1.4   CALCIUM 9.6 9.6 9.4   PROT 7.4 7.4  --    ALBUMIN 3.3* 3.1*  --    BILITOT 1.0 0.7  --    ALKPHOS 56 51*  --    AST 14 16  --    ALT 19 26  --    ANIONGAP 9 11 11    and CBC   Recent Labs   Lab 03/25/23  0721 03/26/23  0430   WBC 10.76 11.40   HGB 15.3 14.9   HCT 47.5 45.9    371       Pending Diagnostic Studies:     None         Medications:  Reconciled Home Medications:      Medication List      START taking these medications    apixaban 5 mg Tab  Commonly known as: ELIQUIS  Take 1 tablet (5 mg total) by mouth 2 (two) times daily.     digoxin 250 mcg tablet  Commonly known as: LANOXIN  Take 1 tablet (0.25 mg total) by mouth once daily.  Start taking on: March 27, 2023     metoprolol tartrate 100 MG tablet  Commonly known as: LOPRESSOR  Take 1 tablet (100 mg total) by mouth 2 (two) times daily.     predniSONE 20 MG tablet  Commonly known as: DELTASONE  Take 1 tablet (20 mg total)  by mouth once daily. for 5 days        CHANGE how you take these medications    lisinopriL 10 MG tablet  Take 1 tablet (10 mg total) by mouth once daily.  Start taking on: March 27, 2023  What changed:   · medication strength  · how much to take        STOP taking these medications    amLODIPine 10 MG tablet  Commonly known as: NORVASC     indomethacin 50 MG capsule  Commonly known as: INDOCIN            Indwelling Lines/Drains at time of discharge:   Lines/Drains/Airways     None                 Time spent on the discharge of patient: 38 minutes         Christine Almaraz NP  Department of Hospital Medicine  [facility]

## 2023-03-26 NOTE — DISCHARGE INSTRUCTIONS
Complete the course of oral steroids for the gout  Follow up as scheduled with primary provider and cardiology.  Please get in touch with electrophysiology to schedule your follow up with them- this is definitive management of the atrial fibrillation.    Avoid excessive caffeine intake.    I have kidney labs ordered for you to recheck your kidneys- you can get these drawn at follow up with the primary care.    Discharge Instructions, [facility] Medicine    Thank you for choosing [facility] for your medical care. The primary doctor who is taking care of you at the time of your discharge is Hernan Nicholas MD.     You were admitted to the hospital with Atrial fibrillation with RVR.     Please note your discharge instructions, including diet/activity restrictions, follow-up appointments, and medication changes.  If you have any questions about your medical issues, prescriptions, or any other questions, please feel free to contact the [facility] Medicine Dept at [phone] and we will help.    If you are previously with Home health, outpatient PT/OT or under a therapy program, you are cleared to return to those programs.    Please direct all long term medication refills and follow up to your primary care provider, David Quach MD. Thank you again for letting us take care of your health care needs.    Please note the following discharge instructions per your discharging physician-  Christine Almaraz NP

## 2023-03-26 NOTE — RESPIRATORY THERAPY
03/26/23 0759   Patient Assessment/Suction   Level of Consciousness (AVPU) alert   Respiratory Effort Normal;Unlabored   Expansion/Accessory Muscles/Retractions no use of accessory muscles;no retractions   Rhythm/Pattern, Respiratory no shortness of breath reported   Skin Integrity   $ Wound Care Tech Time 15 min   Area Observed Behind ear;Cheek;Bridge of nose;Upper lip;Lower lip;Nares   Skin Appearance without discoloration   Barrier used? Liquid Filled Cushion  (remains on his bridge of nose)   PRE-TX-O2   Device (Oxygen Therapy) room air   SpO2 (!) 94 %   Pulse Oximetry Type Intermittent   $ Pulse Oximetry - Multiple Charge Pulse Oximetry - Multiple   Pulse 74   Resp 16  (Patient resting  having periods of apnea >10 sec.  Sao2 dec to 90%.  no snoring or choking noted.  pat easily arousable. with no discomfort.)

## 2023-03-26 NOTE — PT/OT/SLP EVAL
Physical Therapy Evaluation and Discharge Note    Patient Name:  Josue Mir   MRN:  [id number]    Recommendations:     Discharge Recommendations: home  Discharge Equipment Recommendations: none   Barriers to discharge: None    Assessment:     Josue Mir is a 47 y.o. male admitted with a medical diagnosis of Atrial fibrillation with RVR. .  At this time, patient is functioning at their prior level of function and does not require further acute PT services.     Recent Surgery: * No surgery found *      Plan:     During this hospitalization, patient does not require further acute PT services.  Please re-consult if situation changes.      Subjective     Patient/Family Comments/goals: feels safe and independent, denies any recent decline in function    Living Environment:  House with family, 0 steps to enter  Prior to admission, patients level of function was independent.  Equipment used at home: none.  DME owned (not currently used): rolling walker.  Upon discharge, patient will have assistance from family.    Objective:     Communicated with nurse (Jake) prior to session.  Patient found supine with peripheral IV upon PT entry to room.    General Precautions: Standard,      Orthopedic Precautions:N/A   Braces: N/A  Respiratory Status: Room air    Exams:  RLE Strength: WFL  LLE Strength: WFL    Functional Mobility:  Bed Mobility:     Rolling Left:  independence  Supine to Sit: independence  Sit to Supine: independence  Transfers:     Sit to Stand:  independence with no AD  Gait: independently in room (patient declines to ambulate in sepulveda, reports that  he recently did so with RT)    AM-PAC 6 CLICK MOBILITY  Total Score:24       Treatment and Education:  N/A    AM-PAC 6 CLICK MOBILITY  Total Score:24     Patient left supine with all lines intact and call button in reach.    GOALS:   Multidisciplinary Problems       Physical Therapy Goals       Not on file                    History:     Past Medical History:    Diagnosis Date    Anticoagulant long-term use     Atrial fibrillation     Gout     Gout, joint     Hypertension     Memory deficit following cerebral infarction     Pneumonia 04/2018    Sleep apnea with use of continuous positive airway pressure (CPAP)     Stroke 04/2018    Syncope and collapse        Past Surgical History:   Procedure Laterality Date    INSERTION OF IMPLANTABLE LOOP RECORDER  10/25/2018    Procedure: Insertion, Implantable Loop Recorder;  Surgeon: Justin Colby MD;  Location: [facility];  Service: Cardiology;;    LAPAROSCOPIC CHOLECYSTECTOMY N/A 12/14/2018    Procedure: CHOLECYSTECTOMY, LAPAROSCOPIC;  Surgeon: Beck Love MD;  Location: [facility];  Service: General;  Laterality: N/A;  Dr. Love requested case to start at 1:00pm    NASAL POLYP EXCISION      TREATMENT OF CARDIAC ARRHYTHMIA N/A 10/25/2018    Procedure: CARDIOVERSION OR DEFIBRILLATION;  Surgeon: Justin Colby MD;  Location: [facility];  Service: Cardiology;  Laterality: N/A;       Time Tracking:     PT Received On: 03/26/23  PT Start Time: 1042     PT Stop Time: 1052  PT Total Time (min): 10 min     Billable Minutes: Evaluation 10      03/26/2023

## 2023-03-26 NOTE — PLAN OF CARE
Eliquis $10 copay card was provided to pt by Francoise GREEN  Appts on AVS    Pt clear for discharge from CM standpoint After PM labs if BUN/Cr improved       03/26/23 0908   Final Note   Assessment Type Final Discharge Note   Anticipated Discharge Disposition Home   Hospital Resources/Appts/Education Provided Appointments scheduled by Navigator/Coordinator   Post-Acute Status   Post-Acute Authorization Medications   Medication Status Set-up complete/Auth obtained

## 2023-03-26 NOTE — PLAN OF CARE
Problem: Adult Inpatient Plan of Care  Goal: Plan of Care Review  Outcome: Ongoing, Progressing  Goal: Readiness for Transition of Care  Outcome: Ongoing, Progressing     Problem: Cardiac Output Decreased  Goal: Effective Cardiac Output  Outcome: Ongoing, Progressing     Problem: Obstructive Sleep Apnea Risk or Actual  Goal: Unobstructed Breathing During Sleep  Outcome: Ongoing, Progressing   Pt HR sustained in 70s-102. Prn meds given for pain per pt request. CPAP worn at night. Peripheral IV inserted 22 G in Left anterior wrist.

## 2023-03-26 NOTE — PLAN OF CARE
Problem: Adult Inpatient Plan of Care  Goal: Patient-Specific Goal (Individualized)  Outcome: Ongoing, Progressing  Goal: Absence of Hospital-Acquired Illness or Injury  Outcome: Ongoing, Progressing  Goal: Optimal Comfort and Wellbeing  Outcome: Ongoing, Progressing  Goal: Readiness for Transition of Care  Outcome: Ongoing, Progressing     Problem: Cardiac Output Decreased  Goal: Effective Cardiac Output  Outcome: Ongoing, Progressing

## 2023-03-26 NOTE — RESPIRATORY THERAPY
Home O2 Eval completed.  Pat serge well with LULÚ.   03/26/23 0950   Home Oxygen Qualification   $ Home O2 Qualification Tech time 30 minutes   Room Air SpO2 At Rest 92 %   Room Air SpO2 During Ambulation 95 %   Heart Rate on O2 82 bpm   Ambulation O2  LPM   SpO2 Post Ambulation 94 %   Post Ambulation Heart Rate 80 bpm   Post Ambulation O2 LPM 0 LPM   Home O2 Eval Comments patient ambulated, was able to speak sentences without issues during walk. pateint stated felt fine during walk.

## 2023-03-26 NOTE — PLAN OF CARE
Eliquis $10 copay card was provided to pt by Francoise GREEN  Appts on AVS  Pt did not qualify for home o2     Pt clear for discharge from CM standpoint After PM labs if BUN/Cr improved       03/26/23 1020   Final Note   Assessment Type Final Discharge Note   Anticipated Discharge Disposition Home   Hospital Resources/Appts/Education Provided Appointments scheduled by Navigator/Coordinator   Post-Acute Status   Post-Acute Authorization Medications

## 2023-03-27 ENCOUNTER — PATIENT OUTREACH (OUTPATIENT)
Dept: ADMINISTRATIVE | Facility: CLINIC | Age: 48
End: 2023-03-27
Payer: COMMERCIAL

## 2023-03-27 NOTE — PROGRESS NOTES
C3 nurse spoke with Josue Mir for a TCC post hospital discharge follow up call. Upon going to review the medication list with the pt., connection was lost.  The pt. Did not answer my immediate return call.  I left a VM.  Pt.s spouse has no VM.  The patient has a scheduled HOSFU appointment with Dvaid Quach MD on 03/29/23 @ 1000.

## 2023-03-28 ENCOUNTER — TELEPHONE (OUTPATIENT)
Dept: ELECTROPHYSIOLOGY | Facility: CLINIC | Age: 48
End: 2023-03-28
Payer: COMMERCIAL

## 2023-03-28 DIAGNOSIS — I49.8 OTHER CARDIAC ARRHYTHMIA: Primary | ICD-10-CM

## 2023-03-28 NOTE — TELEPHONE ENCOUNTER
----- Message from Denisha Rossa RN sent at 3/23/2023  2:25 PM CDT -----  Regarding: FW: Referral  Please see below. Referral in for Dr. Daniels.  ----- Message -----  From: Etelvina Remy  Sent: 3/23/2023   1:25 PM CDT  To: [facility] Arrhythmia Clinical Support Staff  Subject: Referral                                         Jillian alberto  would like the pt to be called in ref to his referral. Pt is being discharged this weekend and needs to be scheduled within 1 month, and this is a new pt.    Thanks

## 2023-03-28 NOTE — PROGRESS NOTES
C3 nurse spoke with Josue Mir for a TCC post hospital discharge follow up call. The patient has a scheduled HOSFU appointment with David Quach MD on 03/29/23 @ 1000.

## 2023-03-28 NOTE — TELEPHONE ENCOUNTER
Spoke with pt in regards to message below. Pt has been scheduled with Dr. Daniels in [place]. Pt has a MDT/ILR and has records with Dr. Colby. Records have been requested. Pt verbally confirmed appointment date, time, and location, and thanked me for calling.

## 2023-03-29 ENCOUNTER — LAB VISIT (OUTPATIENT)
Dept: LAB | Facility: HOSPITAL | Age: 48
End: 2023-03-29
Payer: COMMERCIAL

## 2023-03-29 ENCOUNTER — OFFICE VISIT (OUTPATIENT)
Dept: FAMILY MEDICINE | Facility: CLINIC | Age: 48
End: 2023-03-29
Payer: COMMERCIAL

## 2023-03-29 VITALS
SYSTOLIC BLOOD PRESSURE: 134 MMHG | HEART RATE: 91 BPM | OXYGEN SATURATION: 97 % | WEIGHT: 255.5 LBS | TEMPERATURE: 98 F | DIASTOLIC BLOOD PRESSURE: 89 MMHG | HEIGHT: 69 IN | BODY MASS INDEX: 37.84 KG/M2 | RESPIRATION RATE: 18 BRPM

## 2023-03-29 DIAGNOSIS — Z09 HOSPITAL DISCHARGE FOLLOW-UP: Primary | ICD-10-CM

## 2023-03-29 DIAGNOSIS — M10.9 GOUT, UNSPECIFIED CAUSE, UNSPECIFIED CHRONICITY, UNSPECIFIED SITE: ICD-10-CM

## 2023-03-29 DIAGNOSIS — N17.9 AKI (ACUTE KIDNEY INJURY): ICD-10-CM

## 2023-03-29 DIAGNOSIS — Z09 HOSPITAL DISCHARGE FOLLOW-UP: ICD-10-CM

## 2023-03-29 DIAGNOSIS — I48.0 PAROXYSMAL ATRIAL FIBRILLATION: ICD-10-CM

## 2023-03-29 DIAGNOSIS — I10 ESSENTIAL HYPERTENSION: ICD-10-CM

## 2023-03-29 LAB
ALBUMIN SERPL BCP-MCNC: 3.3 G/DL (ref 3.5–5.2)
ANION GAP SERPL CALC-SCNC: 11 MMOL/L (ref 8–16)
BUN SERPL-MCNC: 19 MG/DL (ref 6–20)
CALCIUM SERPL-MCNC: 9.8 MG/DL (ref 8.7–10.5)
CHLORIDE SERPL-SCNC: 109 MMOL/L (ref 95–110)
CO2 SERPL-SCNC: 24 MMOL/L (ref 23–29)
CREAT SERPL-MCNC: 1.1 MG/DL (ref 0.5–1.4)
EST. GFR  (NO RACE VARIABLE): >60 ML/MIN/1.73 M^2
GLUCOSE SERPL-MCNC: 95 MG/DL (ref 70–110)
PHOSPHATE SERPL-MCNC: 3.6 MG/DL (ref 2.7–4.5)
POTASSIUM SERPL-SCNC: 4.1 MMOL/L (ref 3.5–5.1)
SODIUM SERPL-SCNC: 144 MMOL/L (ref 136–145)

## 2023-03-29 PROCEDURE — 3075F SYST BP GE 130 - 139MM HG: CPT | Mod: CPTII,S$GLB,,

## 2023-03-29 PROCEDURE — 4010F PR ACE/ARB THEARPY RXD/TAKEN: ICD-10-PCS | Mod: CPTII,S$GLB,,

## 2023-03-29 PROCEDURE — 1159F PR MEDICATION LIST DOCUMENTED IN MEDICAL RECORD: ICD-10-PCS | Mod: CPTII,S$GLB,,

## 2023-03-29 PROCEDURE — 3008F BODY MASS INDEX DOCD: CPT | Mod: CPTII,S$GLB,,

## 2023-03-29 PROCEDURE — 3075F PR MOST RECENT SYSTOLIC BLOOD PRESS GE 130-139MM HG: ICD-10-PCS | Mod: CPTII,S$GLB,,

## 2023-03-29 PROCEDURE — 1160F RVW MEDS BY RX/DR IN RCRD: CPT | Mod: CPTII,S$GLB,,

## 2023-03-29 PROCEDURE — 99999 PR PBB SHADOW E&M-EST. PATIENT-LVL IV: CPT | Mod: PBBFAC,,,

## 2023-03-29 PROCEDURE — 80069 RENAL FUNCTION PANEL: CPT

## 2023-03-29 PROCEDURE — 1159F MED LIST DOCD IN RCRD: CPT | Mod: CPTII,S$GLB,,

## 2023-03-29 PROCEDURE — 36415 COLL VENOUS BLD VENIPUNCTURE: CPT | Mod: PO

## 2023-03-29 PROCEDURE — 3079F DIAST BP 80-89 MM HG: CPT | Mod: CPTII,S$GLB,,

## 2023-03-29 PROCEDURE — 99495 TCM SERVICES (MODERATE COMPLEXITY): ICD-10-PCS | Mod: S$GLB,,,

## 2023-03-29 PROCEDURE — 99495 TRANSJ CARE MGMT MOD F2F 14D: CPT | Mod: S$GLB,,,

## 2023-03-29 PROCEDURE — 99999 PR PBB SHADOW E&M-EST. PATIENT-LVL IV: ICD-10-PCS | Mod: PBBFAC,,,

## 2023-03-29 PROCEDURE — 3079F PR MOST RECENT DIASTOLIC BLOOD PRESSURE 80-89 MM HG: ICD-10-PCS | Mod: CPTII,S$GLB,,

## 2023-03-29 PROCEDURE — 3008F PR BODY MASS INDEX (BMI) DOCUMENTED: ICD-10-PCS | Mod: CPTII,S$GLB,,

## 2023-03-29 PROCEDURE — 1160F PR REVIEW ALL MEDS BY PRESCRIBER/CLIN PHARMACIST DOCUMENTED: ICD-10-PCS | Mod: CPTII,S$GLB,,

## 2023-03-29 PROCEDURE — 4010F ACE/ARB THERAPY RXD/TAKEN: CPT | Mod: CPTII,S$GLB,,

## 2023-03-29 RX ORDER — PREDNISONE 20 MG/1
TABLET ORAL
Qty: 23 TABLET | Refills: 0 | Status: SHIPPED | OUTPATIENT
Start: 2023-03-29 | End: 2023-04-15

## 2023-03-29 NOTE — PROGRESS NOTES
Transitional Care Note  Admit date: 03/21/2023  Discharge date: 03/26/2023  Date of interactive contact (2 business days post D/C): 03/27/2023  Hospitalized at: [facility]  Discharge diagnoses: A fib w/ RVR, Acute hypoxemic resp failure, Acute on chronic combined systolic and diastolic heart failure, HTN, JASPAL, Hx of cerebral hemorrhage, Acute gout of left foot, Adjustment disorder with depressed mood    Family and/or Caretaker present at visit?  No.  Diagnostic tests reviewed/disposition: I have reviewed all completed as well as pending diagnostic tests at the time of discharge.  Disease/illness education: As below  Medication changes: As below  Home health/community services discussion/referrals: Patient does not have home health established from hospital visit.  They do not need home health.  If needed, we will set up home health for the patient.   Establishment or re-establishment of referral orders for community resources: No other necessary community resources.   Discussion with other health care providers: No discussion with other health care providers necessary.                     Subjective:       Patient ID: Josue Mir is a 47 y.o. male.    Chief Complaint: Hospital Follow Up (03/21-03/26 palpitations )    TCC hospital discharge follow up. Presentation and course can be seen below. Since discharge he has been doing fine. Fitbit still notifying him that he is in Afib. Taking medications as prescribed at discharge. Has follow up scheduled with electrophys and cardiology in the next few days. Gout flare still ongoing. Will restart decent dose of steroid. Avoiding NSAIDs due to RAMONA experienced in the hospital. Will recheck renal function today.       HPI:   Josue Mir is a 47 year old white male with a PMHx significant fo hypertension, A-fib, and previous CVA presenting after being found in A fib w/ RVR while preparing for colonscopy. Patient scheduled for his first, routine colonoscopy when he was found to  have a HR of 170s in A fib. Patient sent to ED for further evaluation. Patient reports feeling fine. He denies any SOB, chest pain, palpitations, dizziness, or lightheadedness. He complains of hunger as he has been NPO since last night for colonoscopy. He states he only takes Norvas and losartan for hypertension. Denies any rate control medications or blood thinners. Reports history of  cardiac loop recorder placement. Patient started on IV Cardizem infusion in ED. CXR without any acute abnormalities. HR noted to be in 120s-140s on cardiac monitor. Plan to consult cardiology. Patient to be placed in step down for further evaluation and management.                  * No surgery found *       Hospital Course:   Patient was monitored closely throughout course of hospital stay by hospital medicine team. Patient started on titrated Cardizem infusion on admission. HR improved overnight to 90s-100s. Cardiology consulted on admission. Echo obtained and pending. Cardizem infusion titrated down per nursing for improved ventricular rate. Cardizem infusion stopped per cardiology and started on Digoxin and metoprolol. Cardiology recommending NOAC at discharge. Pulmonology consulted for increasing O2 demands. Repeat Cxr ordered. D dimer elevated. CTA chest ordered and negative for any pulmonary embolism he was initiated on Lasix and oxygen needs improved.  He continued with atrial fibrillation with RVR and metoprolol and digoxin doses were adjusted per Cardiology.  He developed foot pain with elevated uric acid level unknown Hx of gout.  He was initiated on prednisone and IV Toradol.  His pain dramatically improved thereafter.  His heart rate stabilized thereafter.  He maintained atrial fibrillation with rate 70s to 90.  Case is discussed with Cardiology who originally planned for KIRILL cardioversion, however wants rate became controlled was agreeable with discharge home on current regimen with close outpatient follow-up with  "Cardiology and electrophysiology.  He had mild elevation in his BUN and creatinine.  NSAIDs were held.  The Lasix was discontinued he was given gentle fluid resuscitation.  Kidney function remains stable.  Pt felt ready for discharge.  He was D/C home to continue his oral rate control medications and anticoagulant.  He was D/C on a short course of oral steroids for the gout flare.  He was told to avoid NSAIDs and continue oral hydration close follow-up with his PCP this week, as well as repeat labs to assess kidney function.  Pt verbalized understanding of discharge instructions and return precautions.    Past Medical History:   Diagnosis Date    Anticoagulant long-term use     Atrial fibrillation     Gout     Gout, joint     Hypertension     Memory deficit following cerebral infarction     Pneumonia 04/2018    Sleep apnea with use of continuous positive airway pressure (CPAP)     Stroke 04/2018    Syncope and collapse        Review of patient's allergies indicates:  No Known Allergies      Current Outpatient Medications:     apixaban (ELIQUIS) 5 mg Tab, Take 1 tablet (5 mg total) by mouth 2 (two) times daily., Disp: 60 tablet, Rfl: 11    digoxin (LANOXIN) 250 mcg tablet, Take 1 tablet (0.25 mg total) by mouth once daily., Disp: 30 tablet, Rfl: 0    lisinopriL 10 MG tablet, Take 1 tablet (10 mg total) by mouth once daily., Disp: 30 tablet, Rfl: 0    metoprolol tartrate (LOPRESSOR) 100 MG tablet, Take 1 tablet (100 mg total) by mouth 2 (two) times daily., Disp: 60 tablet, Rfl: 0    predniSONE (DELTASONE) 20 MG tablet, Take 1 tablet (20 mg total) by mouth once daily. for 5 days, Disp: 5 tablet, Rfl: 0    Review of Systems    Objective:      /89 (BP Location: Right arm, Patient Position: Sitting, BP Method: Large (Manual))   Pulse 91   Temp 97.6 °F (36.4 °C) (Oral)   Resp 18   Ht 5' 9" (1.753 m)   Wt 115.9 kg (255 lb 8.2 oz)   SpO2 97%   BMI 37.73 kg/m²   Physical Exam    Assessment:       1. [facility] " discharge follow-up    2. RAMONA (acute kidney injury)    3. Gout, unspecified cause, unspecified chronicity, unspecified site    4. Essential hypertension    5. Paroxysmal atrial fibrillation        Plan:       Hospital discharge follow-up  -     RENAL FUNCTION PANEL; Future; Expected date: 03/29/2023    RAMONA (acute kidney injury)  -     RENAL FUNCTION PANEL; Future; Expected date: 03/29/2023    Gout, unspecified cause, unspecified chronicity, unspecified site  -     predniSONE (DELTASONE) 20 MG tablet; Take 2 tablets (40 mg total) by mouth once daily for 7 days, THEN 1 tablet (20 mg total) once daily for 7 days, THEN 0.5 tablets (10 mg total) once daily for 3 days.  Dispense: 23 tablet; Refill: 0         -    States that he was taken off allopurinol at one point. May need to reinstate if appropriate if he continues to have flares.     Essential hypertension        -    Stable. Continue meds. Will continue to monitor.     Paroxysmal atrial fibrillation        -    Continue meds. Will continue to monitor.           Has follow up with me in July.        Raul Cruz PA-C  Family Medicine Physician Assistant       I spent a total of 20 minutes on the day of the visit.This includes face to face time and non-face to face time preparing to see the patient (eg, review of tests), obtaining and/or reviewing separately obtained history, documenting clinical information in the electronic or other health record, independently interpreting results and communicating results to the patient/family/caregiver, or care coordinator.      We have addressed [4] Moderate: 1 or more chronic illnesses with exacerbation, progression, or side effects of treatment / 2 or more stable chronic illnesses / 1 undiagnosed new problem with uncertain prognosis / 1 acute illness with systemic symptoms / 1 acute complicated injury  The complexity of the data reviewed and analyzed for this visit was [3] Limited (Reviewed prior external note, ordered  unique testing or reviewed the results of each unique test)   The risk of complications and/or morbidity or mortality are [4] Moderate risk (I.e. prescription drug management / decision regarding minor surgery with identified pt or procedure risk factors / decision regarding elective major surgery without identified pt or procedure risk factors / diagnosis or treatment significantly limited by social determinants of health)   The level of Medical Decision Making for this visit is [4] Moderate

## 2023-03-29 NOTE — PATIENT INSTRUCTIONS
Camilo Salas,     If you are due for any health screening(s) below please notify me so we can arrange them to be ordered and scheduled to maintain your health. Most healthy patients complete it. Don't lose out on improving your health.     Tests to Keep You Healthy    Colon Cancer Screening: DUE  Last Blood Pressure <= 139/89 (3/29/2023): Yes         Colon Cancer Screening    Of cancers affecting both men and women, colorectal cancer is the third leading cancer killer in the [place]. But it doesnt have to be. Screening can prevent colorectal cancer or find it at an early stage when treatment often leads to a cure.    A colonoscopy is the preferred test for detecting colon cancer. It is needed only once every 10 years if results are negative. While sedated, a flexible, lighted tube with a tiny camera is inserted into the rectum and advanced through the colon to look for cancers. An alternative screening test that is used at home and returned to the lab may also be used. It detects hidden blood in bowel movements which could indicate cancer in the colon. If results are positive, you will need a colonoscopy to determine if the blood is a sign of cancer. This type of follow up (diagnostic) colonoscopy usually requires additional copays as required by your insurance provider. Please contact your PCP if you have any questions.    Although you are still overdue for this important screening, due to the COVID-19 pandemic, we recommend scheduling it in the near future.

## 2023-03-29 NOTE — PROGRESS NOTES
Subjective:     HPI    I had the pleasure of seeing Josue Mir in consultation at your request for the evaluation of AF. He is a 47M with HTN, JASPAL on CPAP, previous hemorrhagic CVA (4/2018, manifesting as loss of consciousness, no thrombolytics administered, in medically induced coma for 30 days, ultimately attributed to HTN), who was diagnosed with AF in 10/2018 after giving blood and then passing out while exiting the lab. Hospitalized at [place], and underwent KIRILL/DCCV in 10/2018. Was well until 3/2023 when he was incidentally noted to be in AF with RVR before a screening colonoscopy. Admitted to [facility], where echo showed EF 40%, mild-mod MR, mild LAE. Rate controlled, and discharged on eliquis, digoxin 250 ug daily, metoprolol 100 mg bid.    My interpretation of today's ECG is AF at 86 bpm.    Review of Systems   Constitutional: Positive for malaise/fatigue. Negative for decreased appetite, weight gain and weight loss.   HENT:  Negative for sore throat.    Eyes:  Negative for blurred vision.   Cardiovascular:  Negative for chest pain, dyspnea on exertion, irregular heartbeat, leg swelling, near-syncope, orthopnea, palpitations, paroxysmal nocturnal dyspnea and syncope.   Respiratory:  Negative for shortness of breath.    Skin:  Negative for rash.   Musculoskeletal:  Negative for arthritis.   Gastrointestinal:  Negative for abdominal pain.   Neurological:  Negative for focal weakness.   Psychiatric/Behavioral:  Negative for altered mental status.      Objective:   Physical Exam  Vitals and nursing note reviewed.   Constitutional:       General: He is not in acute distress.     Appearance: He is well-developed.   HENT:      Head: Normocephalic and atraumatic.   Eyes:      General: No scleral icterus.     Pupils: Pupils are equal, round, and reactive to light.   Neck:      Thyroid: No thyromegaly.   Cardiovascular:      Rate and Rhythm: Rhythm irregular.      Pulses: Normal pulses.      Heart sounds: Normal heart  sounds. No murmur heard.    No friction rub. No gallop.   Pulmonary:      Effort: Pulmonary effort is normal.      Breath sounds: Normal breath sounds.   Abdominal:      General: Bowel sounds are normal. There is no distension.      Palpations: Abdomen is soft.      Tenderness: There is no abdominal tenderness.   Musculoskeletal:      Cervical back: Neck supple.   Skin:     General: Skin is warm and dry.      Findings: No rash.   Neurological:      Mental Status: He is alert and oriented to person, place, and time.   Psychiatric:         Behavior: Behavior normal.       Assessment:      1. Cerebrovascular accident (CVA), unspecified mechanism    2. Left hemiparesis    3. Paroxysmal atrial fibrillation    4. Essential hypertension    5. Long term (current) use of anticoagulants    6. JASPAL (obstructive sleep apnea)        Plan:     In summary, Josue Mir is a 47M with a history of persistent AF. His [id number] Score is 3 (CVA, HTN) so he should remain on anticoagulation.    We discussed in detail the pathophysiology of AF as well as the myriad of treatment options available to manage it including antiarrhythmics and catheter ablation. We specifically discussed the risks, benefits, indications, and alternatives to PVI. Risks discussed include bleeding, hematoma, vascular damage, cardiac tamponade, stroke, PV stenosis, AE fistula, phrenic nerve damage, and death.  After considering his options he has decided to proceed.     CARTO. Hold eliquis AM of procedure. KIRILL--cancel if sinus. Post-procedure will initiate a brief course of amiodarone.    Thank you for allowing me to participate in the care of this patient. Please do not hesitate to call me with any questions or concerns.

## 2023-04-03 ENCOUNTER — HOSPITAL ENCOUNTER (OUTPATIENT)
Dept: CARDIOLOGY | Facility: CLINIC | Age: 48
Discharge: HOME | End: 2023-04-03
Payer: COMMERCIAL

## 2023-04-03 ENCOUNTER — OFFICE VISIT (OUTPATIENT)
Dept: ELECTROPHYSIOLOGY | Facility: CLINIC | Age: 48
End: 2023-04-03
Payer: COMMERCIAL

## 2023-04-03 ENCOUNTER — CLINICAL SUPPORT (OUTPATIENT)
Dept: CARDIOLOGY | Facility: HOSPITAL | Age: 48
End: 2023-04-03
Attending: INTERNAL MEDICINE
Payer: COMMERCIAL

## 2023-04-03 VITALS
SYSTOLIC BLOOD PRESSURE: 150 MMHG | DIASTOLIC BLOOD PRESSURE: 85 MMHG | HEART RATE: 86 BPM | HEIGHT: 69 IN | BODY MASS INDEX: 37.77 KG/M2 | WEIGHT: 255 LBS

## 2023-04-03 DIAGNOSIS — I48.0 PAF (PAROXYSMAL ATRIAL FIBRILLATION): ICD-10-CM

## 2023-04-03 DIAGNOSIS — I49.8 OTHER CARDIAC ARRHYTHMIA: ICD-10-CM

## 2023-04-03 DIAGNOSIS — G47.33 OSA (OBSTRUCTIVE SLEEP APNEA): ICD-10-CM

## 2023-04-03 DIAGNOSIS — Z79.01 LONG TERM (CURRENT) USE OF ANTICOAGULANTS: ICD-10-CM

## 2023-04-03 DIAGNOSIS — I48.91 ATRIAL FIBRILLATION WITH RVR: ICD-10-CM

## 2023-04-03 DIAGNOSIS — I63.9 CEREBROVASCULAR ACCIDENT (CVA), UNSPECIFIED MECHANISM: Primary | ICD-10-CM

## 2023-04-03 DIAGNOSIS — I10 ESSENTIAL HYPERTENSION: ICD-10-CM

## 2023-04-03 DIAGNOSIS — G81.94 LEFT HEMIPARESIS: ICD-10-CM

## 2023-04-03 DIAGNOSIS — I48.0 PAROXYSMAL ATRIAL FIBRILLATION: ICD-10-CM

## 2023-04-03 PROCEDURE — 1111F DSCHRG MED/CURRENT MED MERGE: CPT | Mod: CPTII,S$GLB,, | Performed by: INTERNAL MEDICINE

## 2023-04-03 PROCEDURE — 3008F BODY MASS INDEX DOCD: CPT | Mod: CPTII,S$GLB,, | Performed by: INTERNAL MEDICINE

## 2023-04-03 PROCEDURE — 93005 RHYTHM STRIP: ICD-10-PCS | Mod: S$GLB,,, | Performed by: INTERNAL MEDICINE

## 2023-04-03 PROCEDURE — 99999 PR PBB SHADOW E&M-EST. PATIENT-LVL III: CPT | Mod: PBBFAC,,, | Performed by: INTERNAL MEDICINE

## 2023-04-03 PROCEDURE — 1111F PR DISCHARGE MEDS RECONCILED W/ CURRENT OUTPATIENT MED LIST: ICD-10-PCS | Mod: CPTII,S$GLB,, | Performed by: INTERNAL MEDICINE

## 2023-04-03 PROCEDURE — 1159F PR MEDICATION LIST DOCUMENTED IN MEDICAL RECORD: ICD-10-PCS | Mod: CPTII,S$GLB,, | Performed by: INTERNAL MEDICINE

## 2023-04-03 PROCEDURE — 3079F PR MOST RECENT DIASTOLIC BLOOD PRESSURE 80-89 MM HG: ICD-10-PCS | Mod: CPTII,S$GLB,, | Performed by: INTERNAL MEDICINE

## 2023-04-03 PROCEDURE — 4010F PR ACE/ARB THEARPY RXD/TAKEN: ICD-10-PCS | Mod: CPTII,S$GLB,, | Performed by: INTERNAL MEDICINE

## 2023-04-03 PROCEDURE — 3077F SYST BP >= 140 MM HG: CPT | Mod: CPTII,S$GLB,, | Performed by: INTERNAL MEDICINE

## 2023-04-03 PROCEDURE — 99205 OFFICE O/P NEW HI 60 MIN: CPT | Mod: S$GLB,,, | Performed by: INTERNAL MEDICINE

## 2023-04-03 PROCEDURE — 99999 PR PBB SHADOW E&M-EST. PATIENT-LVL III: ICD-10-PCS | Mod: PBBFAC,,, | Performed by: INTERNAL MEDICINE

## 2023-04-03 PROCEDURE — 3079F DIAST BP 80-89 MM HG: CPT | Mod: CPTII,S$GLB,, | Performed by: INTERNAL MEDICINE

## 2023-04-03 PROCEDURE — 93005 ELECTROCARDIOGRAM TRACING: CPT | Mod: S$GLB,,, | Performed by: INTERNAL MEDICINE

## 2023-04-03 PROCEDURE — 99205 PR OFFICE/OUTPT VISIT, NEW, LEVL V, 60-74 MIN: ICD-10-PCS | Mod: S$GLB,,, | Performed by: INTERNAL MEDICINE

## 2023-04-03 PROCEDURE — 3008F PR BODY MASS INDEX (BMI) DOCUMENTED: ICD-10-PCS | Mod: CPTII,S$GLB,, | Performed by: INTERNAL MEDICINE

## 2023-04-03 PROCEDURE — 93010 RHYTHM STRIP: ICD-10-PCS | Mod: S$GLB,,, | Performed by: INTERNAL MEDICINE

## 2023-04-03 PROCEDURE — 3077F PR MOST RECENT SYSTOLIC BLOOD PRESSURE >= 140 MM HG: ICD-10-PCS | Mod: CPTII,S$GLB,, | Performed by: INTERNAL MEDICINE

## 2023-04-03 PROCEDURE — 1159F MED LIST DOCD IN RCRD: CPT | Mod: CPTII,S$GLB,, | Performed by: INTERNAL MEDICINE

## 2023-04-03 PROCEDURE — 93010 ELECTROCARDIOGRAM REPORT: CPT | Mod: S$GLB,,, | Performed by: INTERNAL MEDICINE

## 2023-04-03 PROCEDURE — 4010F ACE/ARB THERAPY RXD/TAKEN: CPT | Mod: CPTII,S$GLB,, | Performed by: INTERNAL MEDICINE

## 2023-04-04 ENCOUNTER — OFFICE VISIT (OUTPATIENT)
Dept: CARDIOLOGY | Facility: CLINIC | Age: 48
End: 2023-04-04
Payer: COMMERCIAL

## 2023-04-04 VITALS
DIASTOLIC BLOOD PRESSURE: 80 MMHG | SYSTOLIC BLOOD PRESSURE: 136 MMHG | OXYGEN SATURATION: 98 % | HEIGHT: 69 IN | WEIGHT: 257 LBS | HEART RATE: 96 BPM | BODY MASS INDEX: 38.06 KG/M2

## 2023-04-04 DIAGNOSIS — I10 ESSENTIAL HYPERTENSION: ICD-10-CM

## 2023-04-04 DIAGNOSIS — I48.0 PAF (PAROXYSMAL ATRIAL FIBRILLATION): Primary | ICD-10-CM

## 2023-04-04 DIAGNOSIS — I51.9 LV DYSFUNCTION: ICD-10-CM

## 2023-04-04 PROCEDURE — 3075F SYST BP GE 130 - 139MM HG: CPT | Mod: CPTII,S$GLB,, | Performed by: NURSE PRACTITIONER

## 2023-04-04 PROCEDURE — 1111F DSCHRG MED/CURRENT MED MERGE: CPT | Mod: CPTII,S$GLB,, | Performed by: NURSE PRACTITIONER

## 2023-04-04 PROCEDURE — 4010F ACE/ARB THERAPY RXD/TAKEN: CPT | Mod: CPTII,S$GLB,, | Performed by: NURSE PRACTITIONER

## 2023-04-04 PROCEDURE — 99214 PR OFFICE/OUTPT VISIT, EST, LEVL IV, 30-39 MIN: ICD-10-PCS | Mod: S$GLB,,, | Performed by: NURSE PRACTITIONER

## 2023-04-04 PROCEDURE — 1111F PR DISCHARGE MEDS RECONCILED W/ CURRENT OUTPATIENT MED LIST: ICD-10-PCS | Mod: CPTII,S$GLB,, | Performed by: NURSE PRACTITIONER

## 2023-04-04 PROCEDURE — 99999 PR PBB SHADOW E&M-EST. PATIENT-LVL III: CPT | Mod: PBBFAC,,, | Performed by: NURSE PRACTITIONER

## 2023-04-04 PROCEDURE — 4010F PR ACE/ARB THEARPY RXD/TAKEN: ICD-10-PCS | Mod: CPTII,S$GLB,, | Performed by: NURSE PRACTITIONER

## 2023-04-04 PROCEDURE — 3008F BODY MASS INDEX DOCD: CPT | Mod: CPTII,S$GLB,, | Performed by: NURSE PRACTITIONER

## 2023-04-04 PROCEDURE — 3008F PR BODY MASS INDEX (BMI) DOCUMENTED: ICD-10-PCS | Mod: CPTII,S$GLB,, | Performed by: NURSE PRACTITIONER

## 2023-04-04 PROCEDURE — 99214 OFFICE O/P EST MOD 30 MIN: CPT | Mod: S$GLB,,, | Performed by: NURSE PRACTITIONER

## 2023-04-04 PROCEDURE — 1159F MED LIST DOCD IN RCRD: CPT | Mod: CPTII,S$GLB,, | Performed by: NURSE PRACTITIONER

## 2023-04-04 PROCEDURE — 1159F PR MEDICATION LIST DOCUMENTED IN MEDICAL RECORD: ICD-10-PCS | Mod: CPTII,S$GLB,, | Performed by: NURSE PRACTITIONER

## 2023-04-04 PROCEDURE — 3079F PR MOST RECENT DIASTOLIC BLOOD PRESSURE 80-89 MM HG: ICD-10-PCS | Mod: CPTII,S$GLB,, | Performed by: NURSE PRACTITIONER

## 2023-04-04 PROCEDURE — 3075F PR MOST RECENT SYSTOLIC BLOOD PRESS GE 130-139MM HG: ICD-10-PCS | Mod: CPTII,S$GLB,, | Performed by: NURSE PRACTITIONER

## 2023-04-04 PROCEDURE — 99999 PR PBB SHADOW E&M-EST. PATIENT-LVL III: ICD-10-PCS | Mod: PBBFAC,,, | Performed by: NURSE PRACTITIONER

## 2023-04-04 PROCEDURE — 3079F DIAST BP 80-89 MM HG: CPT | Mod: CPTII,S$GLB,, | Performed by: NURSE PRACTITIONER

## 2023-04-04 NOTE — LETTER
April 4, 2023      [facility]  [address], [address]  [place] [address]  Phone: [phone]  Fax: [phone]       Patient: Josue Mir   YOB: 1975  Date of Visit: 04/04/2023    To Whom It May Concern:    Jaden Mir  was at [facility] on 04/04/2023. The patient may return to work/school on 4/5/2023 with no restrictions. If you have any questions or concerns, or if I can be of further assistance, please do not hesitate to contact me.    Sincerely,    Kerry Nicole NP

## 2023-04-04 NOTE — PROGRESS NOTES
Subjective:    Patient ID:  Josue Mir is a 47 y.o. male patient here for evaluation Hospital Follow Up      History of Present Illness:       Patient is here for follow-up visit  Denies chest pain or shortness of breath.  Denies recent fever cough chills or congestion.  Denies blood in the urine or blood in the stool.  Denies myalgias  Denies orthopnea or peripheral edema  Denies nausea vomiting or dyspepsia  No recent arm neck or jaw pain.    No lightheadedness or dizziness        Review of patient's allergies indicates:  No Known Allergies    Past Medical History:   Diagnosis Date    Anticoagulant long-term use     Atrial fibrillation     Gout     Gout, joint     Hypertension     Memory deficit following cerebral infarction     Pneumonia 04/2018    Sleep apnea with use of continuous positive airway pressure (CPAP)     Stroke 04/2018    Syncope and collapse      Past Surgical History:   Procedure Laterality Date    INSERTION OF IMPLANTABLE LOOP RECORDER  10/25/2018    Procedure: Insertion, Implantable Loop Recorder;  Surgeon: Justin Colby MD;  Location: [facility];  Service: Cardiology;;    LAPAROSCOPIC CHOLECYSTECTOMY N/A 12/14/2018    Procedure: CHOLECYSTECTOMY, LAPAROSCOPIC;  Surgeon: Beck Love MD;  Location: [facility];  Service: General;  Laterality: N/A;  Dr. Love requested case to start at 1:00pm    NASAL POLYP EXCISION      TREATMENT OF CARDIAC ARRHYTHMIA N/A 10/25/2018    Procedure: CARDIOVERSION OR DEFIBRILLATION;  Surgeon: Justin Colby MD;  Location: [facility];  Service: Cardiology;  Laterality: N/A;     Social History     Tobacco Use    Smoking status: Never    Smokeless tobacco: Never   Substance Use Topics    Alcohol use: Yes     Comment: rarely    Drug use: No        Review of Systems:    As noted in HPI in addition      REVIEW OF SYSTEMS  CARDIOVASCULAR: No recent chest pain, palpitations, arm, neck, or jaw pain  RESPIRATORY: No recent fever, cough chills, SOB or congestion  :  No blood in the urine  GI: No Nausea, vomiting, constipation, diarrhea, blood, or reflux.  MUSCULOSKELETAL: No myalgias  NEURO: No lightheadedness or dizziness  EYES: No Double vision, blurry, vision or headache              Objective        Vitals:    04/04/23 0907   BP: 136/80   Pulse: 96       LIPIDS - LAST 2   Lab Results   Component Value Date    CHOL 191 10/04/2022    CHOL 193 03/16/2020    HDL 43 10/04/2022    HDL 41 03/16/2020    LDLCALC 133.0 10/04/2022    LDLCALC 132.4 03/16/2020    TRIG 75 10/04/2022    TRIG 98 03/16/2020    CHOLHDL 22.5 10/04/2022    CHOLHDL 21.2 03/16/2020       CBC - LAST 2  Lab Results   Component Value Date    WBC 11.40 03/26/2023    WBC 10.76 03/25/2023    RBC 5.65 03/26/2023    RBC 5.78 03/25/2023    HGB 14.9 03/26/2023    HGB 15.3 03/25/2023    HCT 45.9 03/26/2023    HCT 47.5 03/25/2023    MCV 81 (L) 03/26/2023    MCV 82 03/25/2023    MCH 26.4 (L) 03/26/2023    MCH 26.5 (L) 03/25/2023    MCHC 32.5 03/26/2023    MCHC 32.2 03/25/2023    RDW 13.3 03/26/2023    RDW 13.4 03/25/2023     03/26/2023     03/25/2023    MPV 10.7 03/26/2023    MPV 10.4 03/25/2023    GRAN 8.7 (H) 03/26/2023    GRAN 76.4 (H) 03/26/2023    LYMPH 1.2 03/26/2023    LYMPH 10.9 (L) 03/26/2023    MONO 1.3 (H) 03/26/2023    MONO 11.0 03/26/2023    BASO 0.03 03/26/2023    BASO 0.06 03/25/2023    NRBC 0 03/26/2023    NRBC 0 03/25/2023       CHEMISTRY & LIVER FUNCTION - LAST 2  Lab Results   Component Value Date     03/29/2023     03/26/2023    K 4.1 03/29/2023    K 4.0 03/26/2023     03/29/2023     03/26/2023    CO2 24 03/29/2023    CO2 23 03/26/2023    ANIONGAP 11 03/29/2023    ANIONGAP 11 03/26/2023    BUN 19 03/29/2023    BUN 33 (H) 03/26/2023    CREATININE 1.1 03/29/2023    CREATININE 1.4 03/26/2023    GLU 95 03/29/2023     03/26/2023    CALCIUM 9.8 03/29/2023    CALCIUM 9.4 03/26/2023    MG 2.2 03/24/2023    MG 2.1 03/23/2023    ALBUMIN 3.3 (L) 03/29/2023    ALBUMIN 3.1  (L) 03/26/2023    PROT 7.4 03/26/2023    PROT 7.4 03/25/2023    ALKPHOS 51 (L) 03/26/2023    ALKPHOS 56 03/25/2023    ALT 26 03/26/2023    ALT 19 03/25/2023    AST 16 03/26/2023    AST 14 03/25/2023    BILITOT 0.7 03/26/2023    BILITOT 1.0 03/25/2023        CARDIAC PROFILE - LAST 2  Lab Results   Component Value Date     (H) 03/22/2023    CPK 21 03/25/2023        COAGULATION - LAST 2  Lab Results   Component Value Date    INR 1.1 03/21/2023    INR 1.1 12/13/2018       ENDOCRINE & PSA - LAST 2  Lab Results   Component Value Date    HGBA1C 5.4 10/04/2022    HGBA1C 5.4 03/16/2020    TSH 2.394 10/04/2022    TSH 0.223 (L) 03/16/2020        ECHOCARDIOGRAM RESULTS  Results for orders placed during the hospital encounter of 03/21/23    Echo    Interpretation Summary  · The left ventricle is mildly enlarged with moderately decreased systolic function.  · The estimated ejection fraction is 40%.  · Left ventricular diastolic dysfunction.  · There is mild left ventricular global hypokinesis.  · Normal right ventricular size with normal right ventricular systolic function.  · Mild left atrial enlargement.  · Mild tricuspid regurgitation.  · Mild-to-moderate mitral regurgitation.  · Normal central venous pressure (3 mmHg).  · The estimated PA systolic pressure is 37 mmHg.      CURRENT/PREVIOUS VISIT EKG  Results for orders placed or performed during the hospital encounter of 03/21/23   EKG 12-lead    Collection Time: 03/24/23  2:22 AM    Narrative    Test Reason : R94.31,    Vent. Rate : 095 BPM     Atrial Rate : 104 BPM     P-R Int : 000 ms          QRS Dur : 100 ms      QT Int : 330 ms       P-R-T Axes : 000 016 244 degrees     QTc Int : 414 ms    Atrial fibrillation  T wave abnormality, consider inferolateral ischemia  Abnormal ECG  When compared with ECG of 21-MAR-2023 10:30,  Vent. rate has decreased BY  64 BPM  Confirmed by Keven ARMSTRONG, Travis CUBA ([id number]) on 3/24/2023 3:15:35 PM    Referred By: [email or web address]   SELF            Confirmed By:Travis Baca MD     No valid procedures specified.   No results found for this or any previous visit.    No valid procedures specified.    PHYSICAL EXAM  CONSTITUTIONAL: Well built, well nourished in no apparent distress  NECK: no carotid bruit, no JVD  LUNGS: CTA  CHEST WALL: no tenderness  HEART: regular rate and rhythm, S1, S2 normal, no murmur, click, rub or gallop   ABDOMEN: soft, non-tender; bowel sounds normal; no masses,  no organomegaly  EXTREMITIES: Extremities normal, no edema, no calf tenderness noted  NEURO: AAO X 3    I HAVE REVIEWED :    The vital signs, nurses notes, and all the pertinent radiology and labs.        Current Outpatient Medications   Medication Instructions    apixaban (ELIQUIS) 5 mg, Oral, 2 times daily    digoxin (LANOXIN) 0.25 mg, Oral, Daily    lisinopriL 10 mg, Oral, Daily    metoprolol tartrate (LOPRESSOR) 100 mg, Oral, 2 times daily    predniSONE (DELTASONE) 20 MG tablet Take 2 tablets (40 mg total) by mouth once daily for 7 days, THEN 1 tablet (20 mg total) once daily for 7 days, THEN 0.5 tablets (10 mg total) once daily for 3 days.          Assessment & Plan       FROM HOSPITAL    [facility] 4  History of cerebral hemorrhage 2018 for uncontrolled bp in past -HASBLED Currently 2  3. HTN  4. Obesity  5.  Moderate MR  6. LVEF 40%- cardiomyopathy -not new last LVEF done in 2018 shows 40-45%  7. Noncompliant     RECOMMENDATIONS:     Increase Metoprolol to 100 mg PO BID  Place parameters on digoxin to hold for HR < 60  Change lovenox to Eliquis 5 mg PO BID  Increase Lisinopril to 10 mg daily  If remains stable let him go home later today with OP follow up in clinic 7 -10 days and follow up with EP, Dr. Daniels  I have explained the importance about caffeine intake and energy drinks to be avoided in his case  I have explained he needs to be compliant with medications.   Keep HR and BP Log at home.  I had a long conversation with him yesterday and his wife regarding  the above as well.   We will do OP stress testing  as he denies CP and tells me in 2018 he had an angiogram that had no blockages.- THIS WAS confirmed by wife as well.     4/4  Patient saw Dr. Daniels yesterday and is planned for ablation on 5/23   Will do stress testing after this as he has no cp or SOB

## 2023-04-11 ENCOUNTER — PATIENT MESSAGE (OUTPATIENT)
Dept: ADMINISTRATIVE | Facility: HOSPITAL | Age: 48
End: 2023-04-11
Payer: COMMERCIAL

## 2023-04-11 DIAGNOSIS — I48.0 PAROXYSMAL ATRIAL FIBRILLATION: Primary | ICD-10-CM

## 2023-04-11 DIAGNOSIS — Z01.812 ENCOUNTER FOR PRE-OPERATIVE LABORATORY TESTING: ICD-10-CM

## 2023-04-19 ENCOUNTER — PATIENT MESSAGE (OUTPATIENT)
Dept: ELECTROPHYSIOLOGY | Facility: CLINIC | Age: 48
End: 2023-04-19
Payer: COMMERCIAL

## 2023-04-21 RX ORDER — LISINOPRIL 10 MG/1
10 TABLET ORAL DAILY
Qty: 180 TABLET | Refills: 3 | Status: SHIPPED | OUTPATIENT
Start: 2023-04-21 | End: 2023-06-12

## 2023-04-21 RX ORDER — DIGOXIN 250 MCG
0.25 TABLET ORAL DAILY
Qty: 180 TABLET | Refills: 3 | Status: ON HOLD | OUTPATIENT
Start: 2023-04-21 | End: 2023-06-12 | Stop reason: HOSPADM

## 2023-04-21 RX ORDER — METOPROLOL TARTRATE 100 MG/1
100 TABLET ORAL 2 TIMES DAILY
Qty: 180 TABLET | Refills: 3 | Status: SHIPPED | OUTPATIENT
Start: 2023-04-21 | End: 2024-04-20

## 2023-04-21 NOTE — TELEPHONE ENCOUNTER
----- Message from Zbigniew Copeland sent at 4/21/2023  9:06 AM CDT -----  Type:  RX Refill Request    Who Called:  pt  Refill or New Rx:  refill  RX Name and Strength:  said all his meds--no other details left  How is the patient currently taking it? (ex. 1XDay):  as directed  Is this a 30 day or 90 day RX:  90  Preferred Pharmacy with phone number:    [facility]/pharmacy #[id number] - [place] - [address]  [address] [place] [address]  Phone: [phone] Fax: [phone]      Local or Mail Order:  local  Ordering Provider:  Kerry Shane Call Back Number:  [phone] (home)     Additional Information:  please call and advise--thank you

## 2023-04-28 ENCOUNTER — PATIENT MESSAGE (OUTPATIENT)
Dept: CARDIOLOGY | Facility: CLINIC | Age: 48
End: 2023-04-28
Payer: COMMERCIAL

## 2023-04-28 ENCOUNTER — TELEPHONE (OUTPATIENT)
Dept: CARDIOLOGY | Facility: CLINIC | Age: 48
End: 2023-04-28
Payer: COMMERCIAL

## 2023-04-28 NOTE — TELEPHONE ENCOUNTER
----- Message from Clementina Conley sent at 4/28/2023 10:13 AM CDT -----  Type:  Needs Medical Advice    Who Called:  pt  Symptoms (please be specific):  face breakout/ possibly due to medication    How long has patient had these symptoms:  2 days ago   Pharmacy name and phone #:  [id number] - [place] - [address]   Phone: [phone]  Fax:  [phone]  Would the patient rather a call back or a response via [facility]?  Call   Best Call Back Number: [phone]   Additional Information: pt would like to know if he can take benadryl

## 2023-04-28 NOTE — TELEPHONE ENCOUNTER
Called and spoke with a patient, we are consulting with Dr. Mott as well. He is aware to follow up at the ED for possible steroid

## 2023-05-19 ENCOUNTER — LAB VISIT (OUTPATIENT)
Dept: LAB | Facility: HOSPITAL | Age: 48
End: 2023-05-19
Attending: INTERNAL MEDICINE
Payer: COMMERCIAL

## 2023-05-19 DIAGNOSIS — Z01.812 ENCOUNTER FOR PRE-OPERATIVE LABORATORY TESTING: ICD-10-CM

## 2023-05-19 DIAGNOSIS — I48.0 PAROXYSMAL ATRIAL FIBRILLATION: ICD-10-CM

## 2023-05-19 LAB
ANION GAP SERPL CALC-SCNC: 9 MMOL/L (ref 8–16)
BASOPHILS # BLD AUTO: 0.03 K/UL (ref 0–0.2)
BASOPHILS NFR BLD: 0.3 % (ref 0–1.9)
BUN SERPL-MCNC: 14 MG/DL (ref 6–20)
CALCIUM SERPL-MCNC: 9.1 MG/DL (ref 8.7–10.5)
CHLORIDE SERPL-SCNC: 108 MMOL/L (ref 95–110)
CO2 SERPL-SCNC: 25 MMOL/L (ref 23–29)
CREAT SERPL-MCNC: 1.1 MG/DL (ref 0.5–1.4)
DIFFERENTIAL METHOD: ABNORMAL
EOSINOPHIL # BLD AUTO: 0.5 K/UL (ref 0–0.5)
EOSINOPHIL NFR BLD: 4.9 % (ref 0–8)
ERYTHROCYTE [DISTWIDTH] IN BLOOD BY AUTOMATED COUNT: 15.3 % (ref 11.5–14.5)
EST. GFR  (NO RACE VARIABLE): >60 ML/MIN/1.73 M^2
GLUCOSE SERPL-MCNC: 81 MG/DL (ref 70–110)
HCT VFR BLD AUTO: 50.1 % (ref 40–54)
HGB BLD-MCNC: 15.7 G/DL (ref 14–18)
IMM GRANULOCYTES # BLD AUTO: 0.03 K/UL (ref 0–0.04)
IMM GRANULOCYTES NFR BLD AUTO: 0.3 % (ref 0–0.5)
LYMPHOCYTES # BLD AUTO: 2.1 K/UL (ref 1–4.8)
LYMPHOCYTES NFR BLD: 22.4 % (ref 18–48)
MCH RBC QN AUTO: 26.4 PG (ref 27–31)
MCHC RBC AUTO-ENTMCNC: 31.3 G/DL (ref 32–36)
MCV RBC AUTO: 84 FL (ref 82–98)
MONOCYTES # BLD AUTO: 0.8 K/UL (ref 0.3–1)
MONOCYTES NFR BLD: 8.9 % (ref 4–15)
NEUTROPHILS # BLD AUTO: 5.8 K/UL (ref 1.8–7.7)
NEUTROPHILS NFR BLD: 63.2 % (ref 38–73)
NRBC BLD-RTO: 0 /100 WBC
PLATELET # BLD AUTO: 318 K/UL (ref 150–450)
PMV BLD AUTO: 10.1 FL (ref 9.2–12.9)
POTASSIUM SERPL-SCNC: 4 MMOL/L (ref 3.5–5.1)
RBC # BLD AUTO: 5.95 M/UL (ref 4.6–6.2)
SODIUM SERPL-SCNC: 142 MMOL/L (ref 136–145)
WBC # BLD AUTO: 9.24 K/UL (ref 3.9–12.7)

## 2023-05-19 PROCEDURE — 85610 PROTHROMBIN TIME: CPT | Performed by: INTERNAL MEDICINE

## 2023-05-19 PROCEDURE — 85730 THROMBOPLASTIN TIME PARTIAL: CPT | Performed by: INTERNAL MEDICINE

## 2023-05-19 PROCEDURE — 85025 COMPLETE CBC W/AUTO DIFF WBC: CPT | Performed by: INTERNAL MEDICINE

## 2023-05-19 PROCEDURE — 80048 BASIC METABOLIC PNL TOTAL CA: CPT | Performed by: INTERNAL MEDICINE

## 2023-05-19 PROCEDURE — 36415 COLL VENOUS BLD VENIPUNCTURE: CPT | Mod: PO | Performed by: INTERNAL MEDICINE

## 2023-05-22 ENCOUNTER — TELEPHONE (OUTPATIENT)
Dept: ELECTROPHYSIOLOGY | Facility: CLINIC | Age: 48
End: 2023-05-22
Payer: COMMERCIAL

## 2023-05-22 LAB
APTT PPP: 29.2 SEC (ref 21–32)
INR PPP: 1 (ref 0.8–1.2)
PROTHROMBIN TIME: 11.1 SEC (ref 9–12.5)

## 2023-05-23 ENCOUNTER — HOSPITAL ENCOUNTER (OUTPATIENT)
Dept: CARDIOLOGY | Facility: HOSPITAL | Age: 48
Discharge: HOME | End: 2023-05-23
Attending: INTERNAL MEDICINE | Admitting: INTERNAL MEDICINE
Payer: COMMERCIAL

## 2023-05-23 ENCOUNTER — ANESTHESIA EVENT (OUTPATIENT)
Dept: MEDSURG UNIT | Facility: HOSPITAL | Age: 48
End: 2023-05-23
Payer: COMMERCIAL

## 2023-05-23 ENCOUNTER — ANESTHESIA (OUTPATIENT)
Dept: MEDSURG UNIT | Facility: HOSPITAL | Age: 48
End: 2023-05-23
Payer: COMMERCIAL

## 2023-05-23 ENCOUNTER — HOSPITAL ENCOUNTER (OUTPATIENT)
Facility: HOSPITAL | Age: 48
Discharge: HOME | End: 2023-05-23
Attending: INTERNAL MEDICINE | Admitting: INTERNAL MEDICINE
Payer: COMMERCIAL

## 2023-05-23 VITALS
HEART RATE: 62 BPM | BODY MASS INDEX: 37.03 KG/M2 | WEIGHT: 250 LBS | DIASTOLIC BLOOD PRESSURE: 81 MMHG | TEMPERATURE: 98 F | HEIGHT: 69 IN | RESPIRATION RATE: 19 BRPM | OXYGEN SATURATION: 95 % | SYSTOLIC BLOOD PRESSURE: 135 MMHG

## 2023-05-23 VITALS
HEIGHT: 69 IN | SYSTOLIC BLOOD PRESSURE: 181 MMHG | BODY MASS INDEX: 37.03 KG/M2 | DIASTOLIC BLOOD PRESSURE: 125 MMHG | WEIGHT: 250 LBS

## 2023-05-23 DIAGNOSIS — I49.9 ARRHYTHMIA: ICD-10-CM

## 2023-05-23 DIAGNOSIS — Z86.79 S/P RF ABLATION OPERATION FOR ARRHYTHMIA: ICD-10-CM

## 2023-05-23 DIAGNOSIS — I48.91 ATRIAL FIBRILLATION: ICD-10-CM

## 2023-05-23 DIAGNOSIS — I48.0 PAROXYSMAL ATRIAL FIBRILLATION: ICD-10-CM

## 2023-05-23 DIAGNOSIS — Z98.890 S/P RF ABLATION OPERATION FOR ARRHYTHMIA: ICD-10-CM

## 2023-05-23 DIAGNOSIS — I48.11 LONGSTANDING PERSISTENT ATRIAL FIBRILLATION: ICD-10-CM

## 2023-05-23 DIAGNOSIS — Z01.812 ENCOUNTER FOR PRE-OPERATIVE LABORATORY TESTING: ICD-10-CM

## 2023-05-23 LAB
AORTIC ROOT ANNULUS: 3.3 CM
BSA FOR ECHO PROCEDURE: 2.35 M2
EJECTION FRACTION: 25 %
STJ: 2.5 CM

## 2023-05-23 PROCEDURE — 93010 EKG 12-LEAD: ICD-10-PCS | Mod: ,,, | Performed by: INTERNAL MEDICINE

## 2023-05-23 PROCEDURE — C1732 CATH, EP, DIAG/ABL, 3D/VECT: HCPCS | Performed by: INTERNAL MEDICINE

## 2023-05-23 PROCEDURE — 25000003 PHARM REV CODE 250: Performed by: NURSE ANESTHETIST, CERTIFIED REGISTERED

## 2023-05-23 PROCEDURE — 25000003 PHARM REV CODE 250: Performed by: INTERNAL MEDICINE

## 2023-05-23 PROCEDURE — 37000009 HC ANESTHESIA EA ADD 15 MINS: Performed by: INTERNAL MEDICINE

## 2023-05-23 PROCEDURE — C8925 2D TEE W OR W/O FOL W/CON,IN: HCPCS

## 2023-05-23 PROCEDURE — 93320 DOPPLER ECHO COMPLETE: CPT | Mod: 26,,, | Performed by: INTERNAL MEDICINE

## 2023-05-23 PROCEDURE — D9220A PRA ANESTHESIA: Mod: CRNA,,, | Performed by: NURSE ANESTHETIST, CERTIFIED REGISTERED

## 2023-05-23 PROCEDURE — 93656 COMPRE EP EVAL ABLTJ ATR FIB: CPT | Mod: ,,, | Performed by: INTERNAL MEDICINE

## 2023-05-23 PROCEDURE — 25500020 PHARM REV CODE 255: Performed by: INTERNAL MEDICINE

## 2023-05-23 PROCEDURE — 93010 ELECTROCARDIOGRAM REPORT: CPT | Mod: ,,, | Performed by: INTERNAL MEDICINE

## 2023-05-23 PROCEDURE — 92960 CARDIOVERSION ELECTRIC EXT: CPT | Performed by: INTERNAL MEDICINE

## 2023-05-23 PROCEDURE — C1753 CATH, INTRAVAS ULTRASOUND: HCPCS | Performed by: INTERNAL MEDICINE

## 2023-05-23 PROCEDURE — 27201037 HC PRESSURE MONITORING SET UP

## 2023-05-23 PROCEDURE — C1730 CATH, EP, 19 OR FEW ELECT: HCPCS | Performed by: INTERNAL MEDICINE

## 2023-05-23 PROCEDURE — D9220A PRA ANESTHESIA: ICD-10-PCS | Mod: CRNA,,, | Performed by: NURSE ANESTHETIST, CERTIFIED REGISTERED

## 2023-05-23 PROCEDURE — 63600175 PHARM REV CODE 636 W HCPCS: Performed by: NURSE ANESTHETIST, CERTIFIED REGISTERED

## 2023-05-23 PROCEDURE — C1766 INTRO/SHEATH,STRBLE,NON-PEEL: HCPCS | Performed by: INTERNAL MEDICINE

## 2023-05-23 PROCEDURE — 93312 TRANSESOPHAGEAL ECHO (TEE) (CUPID ONLY): ICD-10-PCS | Mod: 26,,, | Performed by: INTERNAL MEDICINE

## 2023-05-23 PROCEDURE — 63600175 PHARM REV CODE 636 W HCPCS: Performed by: INTERNAL MEDICINE

## 2023-05-23 PROCEDURE — D9220A PRA ANESTHESIA: ICD-10-PCS | Mod: ANES,,, | Performed by: STUDENT IN AN ORGANIZED HEALTH CARE EDUCATION/TRAINING PROGRAM

## 2023-05-23 PROCEDURE — 37000008 HC ANESTHESIA 1ST 15 MINUTES: Performed by: INTERNAL MEDICINE

## 2023-05-23 PROCEDURE — 92960 CARDIOVERSION ELECTRIC EXT: CPT | Mod: 59,,, | Performed by: INTERNAL MEDICINE

## 2023-05-23 PROCEDURE — 93005 ELECTROCARDIOGRAM TRACING: CPT | Mod: 59

## 2023-05-23 PROCEDURE — D9220A PRA ANESTHESIA: Mod: ANES,,, | Performed by: STUDENT IN AN ORGANIZED HEALTH CARE EDUCATION/TRAINING PROGRAM

## 2023-05-23 PROCEDURE — 93312 ECHO TRANSESOPHAGEAL: CPT | Mod: 26,,, | Performed by: INTERNAL MEDICINE

## 2023-05-23 PROCEDURE — 27201423 OPTIME MED/SURG SUP & DEVICES STERILE SUPPLY: Performed by: INTERNAL MEDICINE

## 2023-05-23 PROCEDURE — 36620 INSERTION CATHETER ARTERY: CPT | Mod: 59,,, | Performed by: NURSE ANESTHETIST, CERTIFIED REGISTERED

## 2023-05-23 PROCEDURE — 92960 PR CARDIOVERSION, ELECTIVE;EXTERN: ICD-10-PCS | Mod: 59,,, | Performed by: INTERNAL MEDICINE

## 2023-05-23 PROCEDURE — 36620 ARTERIAL: ICD-10-PCS | Mod: 59,,, | Performed by: NURSE ANESTHETIST, CERTIFIED REGISTERED

## 2023-05-23 PROCEDURE — 93656 COMPRE EP EVAL ABLTJ ATR FIB: CPT | Performed by: INTERNAL MEDICINE

## 2023-05-23 PROCEDURE — 93656 PR ELECTROPHYS EVAL, COMPREHEN, W/ABLATION OF ATRIAL FIBR: ICD-10-PCS | Mod: ,,, | Performed by: INTERNAL MEDICINE

## 2023-05-23 PROCEDURE — C1731 CATH, EP, 20 OR MORE ELEC: HCPCS | Performed by: INTERNAL MEDICINE

## 2023-05-23 PROCEDURE — 93320 PR DOPPLER ECHO HEART,COMPLETE: ICD-10-PCS | Mod: 26,,, | Performed by: INTERNAL MEDICINE

## 2023-05-23 PROCEDURE — C1894 INTRO/SHEATH, NON-LASER: HCPCS | Performed by: INTERNAL MEDICINE

## 2023-05-23 RX ORDER — HEPARIN SODIUM 10000 [USP'U]/100ML
INJECTION, SOLUTION INTRAVENOUS CONTINUOUS PRN
Status: DISCONTINUED | OUTPATIENT
Start: 2023-05-23 | End: 2023-05-23

## 2023-05-23 RX ORDER — LIDOCAINE HYDROCHLORIDE 10 MG/ML
2 INJECTION INFILTRATION; PERINEURAL ONCE
Status: COMPLETED | OUTPATIENT
Start: 2023-05-23 | End: 2023-05-23

## 2023-05-23 RX ORDER — PROPOFOL 10 MG/ML
VIAL (ML) INTRAVENOUS
Status: DISCONTINUED | OUTPATIENT
Start: 2023-05-23 | End: 2023-05-23

## 2023-05-23 RX ORDER — FUROSEMIDE 20 MG/1
20 TABLET ORAL 2 TIMES DAILY
Qty: 60 TABLET | Refills: 11 | Status: SHIPPED | OUTPATIENT
Start: 2023-05-23 | End: 2023-05-23 | Stop reason: SDUPTHER

## 2023-05-23 RX ORDER — FUROSEMIDE 10 MG/ML
20 INJECTION INTRAMUSCULAR; INTRAVENOUS ONCE
Status: COMPLETED | OUTPATIENT
Start: 2023-05-23 | End: 2023-05-23

## 2023-05-23 RX ORDER — ACETAMINOPHEN 325 MG/1
650 TABLET ORAL EVERY 4 HOURS PRN
Status: DISCONTINUED | OUTPATIENT
Start: 2023-05-23 | End: 2023-05-23 | Stop reason: HOSPADM

## 2023-05-23 RX ORDER — SUCCINYLCHOLINE CHLORIDE 20 MG/ML
INJECTION INTRAMUSCULAR; INTRAVENOUS
Status: DISCONTINUED | OUTPATIENT
Start: 2023-05-23 | End: 2023-05-23

## 2023-05-23 RX ORDER — AMIODARONE HYDROCHLORIDE 200 MG/1
TABLET ORAL
Qty: 84 TABLET | Refills: 11 | Status: SHIPPED | OUTPATIENT
Start: 2023-05-23 | End: 2023-09-06

## 2023-05-23 RX ORDER — FUROSEMIDE 20 MG/1
20 TABLET ORAL DAILY PRN
Qty: 10 TABLET | Refills: 0 | Status: ON HOLD | OUTPATIENT
Start: 2023-05-23 | End: 2023-11-10 | Stop reason: CLARIF

## 2023-05-23 RX ORDER — ROCURONIUM BROMIDE 10 MG/ML
INJECTION, SOLUTION INTRAVENOUS
Status: DISCONTINUED | OUTPATIENT
Start: 2023-05-23 | End: 2023-05-23

## 2023-05-23 RX ORDER — PANTOPRAZOLE SODIUM 40 MG/1
40 TABLET, DELAYED RELEASE ORAL DAILY
Qty: 30 TABLET | Refills: 11 | Status: SHIPPED | OUTPATIENT
Start: 2023-05-23 | End: 2023-10-17

## 2023-05-23 RX ORDER — SODIUM CHLORIDE 0.9 % (FLUSH) 0.9 %
10 SYRINGE (ML) INJECTION
Status: DISCONTINUED | OUTPATIENT
Start: 2023-05-23 | End: 2023-05-23 | Stop reason: HOSPADM

## 2023-05-23 RX ORDER — ONDANSETRON 2 MG/ML
4 INJECTION INTRAMUSCULAR; INTRAVENOUS DAILY PRN
Status: DISCONTINUED | OUTPATIENT
Start: 2023-05-23 | End: 2023-05-23 | Stop reason: HOSPADM

## 2023-05-23 RX ORDER — FENTANYL CITRATE 50 UG/ML
INJECTION, SOLUTION INTRAMUSCULAR; INTRAVENOUS
Status: DISCONTINUED | OUTPATIENT
Start: 2023-05-23 | End: 2023-05-23

## 2023-05-23 RX ORDER — LIDOCAINE HYDROCHLORIDE 20 MG/ML
INJECTION, SOLUTION INFILTRATION; PERINEURAL
Status: DISCONTINUED | OUTPATIENT
Start: 2023-05-23 | End: 2023-05-23 | Stop reason: HOSPADM

## 2023-05-23 RX ORDER — MIDAZOLAM HYDROCHLORIDE 1 MG/ML
INJECTION, SOLUTION INTRAMUSCULAR; INTRAVENOUS
Status: DISCONTINUED | OUTPATIENT
Start: 2023-05-23 | End: 2023-05-23

## 2023-05-23 RX ORDER — KETAMINE HCL IN 0.9 % NACL 50 MG/5 ML
SYRINGE (ML) INTRAVENOUS
Status: DISCONTINUED | OUTPATIENT
Start: 2023-05-23 | End: 2023-05-23

## 2023-05-23 RX ORDER — HEPARIN SOD,PORCINE/0.9 % NACL 1000/500ML
INTRAVENOUS SOLUTION INTRAVENOUS
Status: DISCONTINUED | OUTPATIENT
Start: 2023-05-23 | End: 2023-05-23 | Stop reason: HOSPADM

## 2023-05-23 RX ORDER — ONDANSETRON 2 MG/ML
INJECTION INTRAMUSCULAR; INTRAVENOUS
Status: DISCONTINUED | OUTPATIENT
Start: 2023-05-23 | End: 2023-05-23

## 2023-05-23 RX ORDER — LIDOCAINE HYDROCHLORIDE 20 MG/ML
INJECTION INTRAVENOUS
Status: DISCONTINUED | OUTPATIENT
Start: 2023-05-23 | End: 2023-05-23

## 2023-05-23 RX ORDER — PHENYLEPHRINE HYDROCHLORIDE 10 MG/ML
INJECTION INTRAVENOUS CONTINUOUS PRN
Status: DISCONTINUED | OUTPATIENT
Start: 2023-05-23 | End: 2023-05-23

## 2023-05-23 RX ORDER — PROTAMINE SULFATE 10 MG/ML
INJECTION, SOLUTION INTRAVENOUS
Status: DISCONTINUED | OUTPATIENT
Start: 2023-05-23 | End: 2023-05-23

## 2023-05-23 RX ORDER — FENTANYL CITRATE 50 UG/ML
25 INJECTION, SOLUTION INTRAMUSCULAR; INTRAVENOUS EVERY 5 MIN PRN
Status: DISCONTINUED | OUTPATIENT
Start: 2023-05-23 | End: 2023-05-23 | Stop reason: HOSPADM

## 2023-05-23 RX ORDER — HEPARIN SODIUM 1000 [USP'U]/ML
INJECTION, SOLUTION INTRAVENOUS; SUBCUTANEOUS
Status: DISCONTINUED | OUTPATIENT
Start: 2023-05-23 | End: 2023-05-23

## 2023-05-23 RX ORDER — PHENYLEPHRINE HYDROCHLORIDE 10 MG/ML
INJECTION INTRAVENOUS
Status: DISCONTINUED | OUTPATIENT
Start: 2023-05-23 | End: 2023-05-23

## 2023-05-23 RX ORDER — DEXAMETHASONE SODIUM PHOSPHATE 4 MG/ML
INJECTION, SOLUTION INTRA-ARTICULAR; INTRALESIONAL; INTRAMUSCULAR; INTRAVENOUS; SOFT TISSUE
Status: DISCONTINUED | OUTPATIENT
Start: 2023-05-23 | End: 2023-05-23

## 2023-05-23 RX ADMIN — HUMAN ALBUMIN MICROSPHERES AND PERFLUTREN 0.66 MG: 10; .22 INJECTION, SOLUTION INTRAVENOUS at 08:05

## 2023-05-23 RX ADMIN — LIDOCAINE HYDROCHLORIDE 100 MG: 20 INJECTION INTRAVENOUS at 07:05

## 2023-05-23 RX ADMIN — SUCCINYLCHOLINE CHLORIDE 200 MG: 20 INJECTION, SOLUTION INTRAMUSCULAR; INTRAVENOUS at 07:05

## 2023-05-23 RX ADMIN — HEPARIN SODIUM 6000 UNITS: 1000 INJECTION, SOLUTION INTRAVENOUS; SUBCUTANEOUS at 09:05

## 2023-05-23 RX ADMIN — PHENYLEPHRINE HYDROCHLORIDE 100 MCG: 10 INJECTION INTRAVENOUS at 07:05

## 2023-05-23 RX ADMIN — PROPOFOL 180 MG: 10 INJECTION, EMULSION INTRAVENOUS at 07:05

## 2023-05-23 RX ADMIN — DEXAMETHASONE SODIUM PHOSPHATE 4 MG: 4 INJECTION, SOLUTION INTRAMUSCULAR; INTRAVENOUS at 07:05

## 2023-05-23 RX ADMIN — HEPARIN SODIUM 5000 UNITS: 1000 INJECTION, SOLUTION INTRAVENOUS; SUBCUTANEOUS at 09:05

## 2023-05-23 RX ADMIN — PHENYLEPHRINE HYDROCHLORIDE 200 MCG: 10 INJECTION INTRAVENOUS at 07:05

## 2023-05-23 RX ADMIN — ROCURONIUM BROMIDE 10 MG: 10 INJECTION INTRAVENOUS at 07:05

## 2023-05-23 RX ADMIN — Medication 30 MG: at 07:05

## 2023-05-23 RX ADMIN — FUROSEMIDE 20 MG: 10 INJECTION, SOLUTION INTRAMUSCULAR; INTRAVENOUS at 03:05

## 2023-05-23 RX ADMIN — ACETAMINOPHEN 650 MG: 325 TABLET ORAL at 01:05

## 2023-05-23 RX ADMIN — PROPOFOL 70 MG: 10 INJECTION, EMULSION INTRAVENOUS at 10:05

## 2023-05-23 RX ADMIN — PROTAMINE SULFATE 5 MG: 10 INJECTION, SOLUTION INTRAVENOUS at 10:05

## 2023-05-23 RX ADMIN — PHENYLEPHRINE HYDROCHLORIDE 0.5 MCG/KG/MIN: 10 INJECTION INTRAVENOUS at 07:05

## 2023-05-23 RX ADMIN — LIDOCAINE HYDROCHLORIDE 2 ML: 10 INJECTION, SOLUTION INFILTRATION; PERINEURAL at 03:05

## 2023-05-23 RX ADMIN — Medication 10 MG: at 08:05

## 2023-05-23 RX ADMIN — PROTAMINE SULFATE 60 MG: 10 INJECTION, SOLUTION INTRAVENOUS at 10:05

## 2023-05-23 RX ADMIN — SODIUM CHLORIDE, SODIUM GLUCONATE, SODIUM ACETATE, POTASSIUM CHLORIDE, MAGNESIUM CHLORIDE, SODIUM PHOSPHATE, DIBASIC, AND POTASSIUM PHOSPHATE: .53; .5; .37; .037; .03; .012; .00082 INJECTION, SOLUTION INTRAVENOUS at 07:05

## 2023-05-23 RX ADMIN — FENTANYL CITRATE 100 MCG: 50 INJECTION, SOLUTION INTRAMUSCULAR; INTRAVENOUS at 07:05

## 2023-05-23 RX ADMIN — MIDAZOLAM HYDROCHLORIDE 2 MG: 1 INJECTION, SOLUTION INTRAMUSCULAR; INTRAVENOUS at 07:05

## 2023-05-23 RX ADMIN — SODIUM CHLORIDE: 0.9 INJECTION, SOLUTION INTRAVENOUS at 07:05

## 2023-05-23 RX ADMIN — ONDANSETRON 4 MG: 2 INJECTION INTRAMUSCULAR; INTRAVENOUS at 07:05

## 2023-05-23 RX ADMIN — HEPARIN SODIUM 13000 UNITS: 1000 INJECTION, SOLUTION INTRAVENOUS; SUBCUTANEOUS at 08:05

## 2023-05-23 RX ADMIN — HEPARIN SODIUM 12 UNITS/KG/HR: 10000 INJECTION, SOLUTION INTRAVENOUS at 08:05

## 2023-05-23 RX ADMIN — Medication 10 MG: at 10:05

## 2023-05-23 RX ADMIN — LIDOCAINE HYDROCHLORIDE 2 ML: 10 INJECTION, SOLUTION EPIDURAL; INFILTRATION; INTRACAUDAL; PERINEURAL at 03:05

## 2023-05-23 NOTE — ASSESSMENT & PLAN NOTE
Josue Mir is a 47M with a history of persistent AF.   [id number] Score is 3 (CVA, HTN), on eliquis. Last dose was 5/22 pm  Presents for elective PVI    - The risks, benefits and alternatives of the procedure were explained to the patient, patient's family and/or surrogate decision maker. Risks include (but not limited to) bleeding, hematoma, infection, pain, vascular damage, damage to structures surrounding vasculature, myocardial damage [perforation, valvular damage], cardiac tamponade, myocardial infarction, stroke, and death.     All questions were answered. Patient is understanding of these risks, and would like to proceed. Consents signed.    CARTO  KIRILL prior, currently in AF.  Post-procedure will initiate a brief course of amiodarone.

## 2023-05-23 NOTE — PROGRESS NOTES
Dr holden at bedside with dr randhawa.  Right groin with sutures stuck under skin.  1% lidocaine injection done by dr holden to right groin.  Sutures removed by md.  Maryjane/trans film placed to right groin. Pt able to go to sscu bed.

## 2023-05-23 NOTE — ANESTHESIA POSTPROCEDURE EVALUATION
Anesthesia Post Evaluation    Patient: Josue Mir    Procedure(s) Performed: Procedure(s) (LRB):  Ablation atrial fibrillation (N/A)  ECHOCARDIOGRAM, TRANSESOPHAGEAL (N/A)  Cardioversion or Defibrillation    Final Anesthesia Type: general      Patient location during evaluation: PACU  Patient participation: Yes- Able to Participate  Level of consciousness: awake and alert, awake and oriented  Post-procedure vital signs: reviewed and stable  Pain management: adequate  Airway patency: patent    PONV status at discharge: No PONV  Anesthetic complications: no      Cardiovascular status: blood pressure returned to baseline, hemodynamically stable and stable  Respiratory status: unassisted, spontaneous ventilation and room air  Hydration status: euvolemic  Follow-up not needed.          Vitals Value Taken Time   /88 05/23/23 1546   Temp 36.8 °C (98.2 °F) 05/23/23 1545   Pulse 67 05/23/23 1550   Resp 25 05/23/23 1549   SpO2 96 % 05/23/23 1550   Vitals shown include unvalidated device data.      No case tracking events are documented in the log.      Pain/Marah Score: Pain Rating Prior to Med Admin: 3 (5/23/2023  1:16 PM)  Marah Score: 9 (5/23/2023  2:45 PM)

## 2023-05-23 NOTE — Clinical Note
Current transseptal needle and SL1 sheath removed. Needle punctured side of SL1. Sheath and needle wasted.

## 2023-05-23 NOTE — SUBJECTIVE & OBJECTIVE
Past Medical History:   Diagnosis Date    Anticoagulant long-term use     Atrial fibrillation     Gout     Gout, joint     Hypertension     Memory deficit following cerebral infarction     Pneumonia 04/2018    Sleep apnea with use of continuous positive airway pressure (CPAP)     Stroke 04/2018    Syncope and collapse        Past Surgical History:   Procedure Laterality Date    INSERTION OF IMPLANTABLE LOOP RECORDER  10/25/2018    Procedure: Insertion, Implantable Loop Recorder;  Surgeon: Justin Colby MD;  Location: [facility];  Service: Cardiology;;    LAPAROSCOPIC CHOLECYSTECTOMY N/A 12/14/2018    Procedure: CHOLECYSTECTOMY, LAPAROSCOPIC;  Surgeon: Beck Love MD;  Location: [facility];  Service: General;  Laterality: N/A;  Dr. Love requested case to start at 1:00pm    NASAL POLYP EXCISION      TREATMENT OF CARDIAC ARRHYTHMIA N/A 10/25/2018    Procedure: CARDIOVERSION OR DEFIBRILLATION;  Surgeon: Justin Colby MD;  Location: [facility];  Service: Cardiology;  Laterality: N/A;       Review of patient's allergies indicates:  No Known Allergies    No current facility-administered medications on file prior to encounter.     No current outpatient medications on file prior to encounter.     Family History       Problem Relation (Age of Onset)    Hypertension Mother    No Known Problems Brother, Brother, Brother    Sleep apnea Mother          Tobacco Use    Smoking status: Never    Smokeless tobacco: Never   Substance and Sexual Activity    Alcohol use: Yes     Comment: rarely    Drug use: No    Sexual activity: Yes     Partners: Female     Review of Systems   Constitutional: Negative for fever and malaise/fatigue.   Eyes:  Negative for blurred vision and pain.   Cardiovascular:  Negative for chest pain, dyspnea on exertion, leg swelling, orthopnea, palpitations and paroxysmal nocturnal dyspnea.   Respiratory:  Negative for cough, shortness of breath, sputum production and wheezing.    Hematologic/Lymphatic:  Negative for adenopathy and bleeding problem.   Skin:  Negative for rash.   Musculoskeletal:  Negative for back pain and neck pain.   Gastrointestinal:  Negative for abdominal pain, constipation, diarrhea, nausea and vomiting.   Genitourinary:  Negative for dysuria.   Neurological:  Negative for dizziness, headaches, light-headedness and weakness.   Objective:     Vital Signs (Most Recent):  Temp: 98.7 °F (37.1 °C) (05/23/23 0602)  Pulse: 78 (05/23/23 0602)  Resp: 18 (05/23/23 0602)  BP: (!) 172/114 (05/23/23 0606)  SpO2: (!) 92 % (05/23/23 0602) Vital Signs (24h Range):  Temp:  [98.7 °F (37.1 °C)] 98.7 °F (37.1 °C)  Pulse:  [78] 78  Resp:  [18] 18  SpO2:  [92 %] 92 %  BP: (172)/(114-115) 172/114     Weight: 113.4 kg (250 lb)  Body mass index is 36.92 kg/m².    SpO2: (!) 92 %       No intake or output data in the 24 hours ending 05/23/23 0643    Lines/Drains/Airways       Peripheral Intravenous Line  Duration                  Peripheral IV - Single Lumen 05/23/23 0622 20 G Left;Posterior Wrist <1 day         Peripheral IV - Single Lumen 05/23/23 0622 20 G Posterior;Right Hand <1 day                     Physical Exam  Constitutional:       General: He is not in acute distress.     Appearance: Normal appearance. He is not ill-appearing, toxic-appearing or diaphoretic.   HENT:      Head: Normocephalic and atraumatic.      Nose: Nose normal.   Eyes:      Extraocular Movements: Extraocular movements intact.      Pupils: Pupils are equal, round, and reactive to light.   Cardiovascular:      Rate and Rhythm: Normal rate. Rhythm irregular.      Heart sounds: No murmur heard.    No friction rub. No gallop.   Pulmonary:      Effort: Pulmonary effort is normal. No respiratory distress.      Breath sounds: Normal breath sounds. No wheezing or rales.   Abdominal:      General: Abdomen is flat. There is no distension.      Palpations: Abdomen is soft. There is no mass.      Tenderness: There is no abdominal tenderness.    Musculoskeletal:         General: No swelling. Normal range of motion.      Cervical back: Normal range of motion. No rigidity.      Right lower leg: No edema.      Left lower leg: No edema.   Skin:     General: Skin is warm and dry.      Coloration: Skin is not jaundiced.      Findings: No bruising.   Neurological:      General: No focal deficit present.      Mental Status: He is alert and oriented to person, place, and time.      Cranial Nerves: No cranial nerve deficit.      Motor: No weakness.        Significant Labs: BMP: No results for input(s): GLU, NA, K, CL, CO2, BUN, CREATININE, CALCIUM, MG in the last 48 hours. and CBC No results for input(s): WBC, HGB, HCT, PLT in the last 48 hours.    Significant Imaging: Reviewed

## 2023-05-23 NOTE — CONSULTS
[facility] - Short Stay Cardiac Unit  Cardiology  Consult Note    Patient Name: Josue Mir  MRN: [id number]  Admission Date: 5/23/2023  Hospital Length of Stay: 0 days  Code Status: Prior   Attending Provider: Abdullahi Daniels MD   Consulting Provider: Stanely Jarvis MD  Primary Care Physician: David Quach MD  Principal Problem:<principal problem not specified>    Patient information was obtained from patient and ER records.     Consults  Subjective:     Chief Complaint:  AF      HPI:   Josue Mir is a 47M with HTN, JASPAL on CPAP, previous hemorrhagic CVA (4/2018, manifesting as loss of consciousness, no thrombolytics administered, in medically induced coma for 30 days, ultimately attributed to HTN), who was diagnosed with AF in 10/2018 after giving blood and then passing out while exiting the lab. Hospitalized at [place], and underwent KIRILL/DCCV in 10/2018. Was well until 3/2023 when he was incidentally noted to be in AF with RVR before a screening colonoscopy. Admitted to [facility], where echo showed EF 40%, mild-mod MR, mild LAE. Rate controlled, and discharged on eliquis, digoxin 250 ug daily, metoprolol 100 mg bid.     My interpretation of today's ECG is AF.     Plan for KIRILL prior to PVI today. He denies any issues today.     Dysphagia or odynophagia:  No  Liver Disease, esophageal disease, or known varices:  No  Upper GI Bleeding: No  Snoring:  No  Sleep Apnea:  No  Prior neck surgery or radiation:  No  History of anesthetic difficulties:  No  Family history of anesthetic difficulties:  No  Last oral intake:  12 hours ago  Able to move neck in all directions:  Yes        Past Medical History:   Diagnosis Date    Anticoagulant long-term use     Atrial fibrillation     Gout     Gout, joint     Hypertension     Memory deficit following cerebral infarction     Pneumonia 04/2018    Sleep apnea with use of continuous positive airway pressure (CPAP)     Stroke 04/2018    Syncope and collapse        Past  Surgical History:   Procedure Laterality Date    INSERTION OF IMPLANTABLE LOOP RECORDER  10/25/2018    Procedure: Insertion, Implantable Loop Recorder;  Surgeon: Justin Colby MD;  Location: [facility] CATH;  Service: Cardiology;;    LAPAROSCOPIC CHOLECYSTECTOMY N/A 12/14/2018    Procedure: CHOLECYSTECTOMY, LAPAROSCOPIC;  Surgeon: Beck Love MD;  Location: [facility] OR;  Service: General;  Laterality: N/A;  Dr. Love requested case to start at 1:00pm    NASAL POLYP EXCISION      TREATMENT OF CARDIAC ARRHYTHMIA N/A 10/25/2018    Procedure: CARDIOVERSION OR DEFIBRILLATION;  Surgeon: Jsutin Colby MD;  [facility];  Service: Cardiology;  Laterality: N/A;       Review of patient's allergies indicates:  No Known Allergies    No current facility-administered medications on file prior to encounter.     No current outpatient medications on file prior to encounter.     Family History       Problem Relation (Age of Onset)    Hypertension Mother    No Known Problems Brother, Brother, Brother    Sleep apnea Mother          Tobacco Use    Smoking status: Never    Smokeless tobacco: Never   Substance and Sexual Activity    Alcohol use: Yes     Comment: rarely    Drug use: No    Sexual activity: Yes     Partners: Female     Review of Systems   Constitutional: Negative for fever and malaise/fatigue.   Eyes:  Negative for blurred vision and pain.   Cardiovascular:  Negative for chest pain, dyspnea on exertion, leg swelling, orthopnea, palpitations and paroxysmal nocturnal dyspnea.   Respiratory:  Negative for cough, shortness of breath, sputum production and wheezing.    Hematologic/Lymphatic: Negative for adenopathy and bleeding problem.   Skin:  Negative for rash.   Musculoskeletal:  Negative for back pain and neck pain.   Gastrointestinal:  Negative for abdominal pain, constipation, diarrhea, nausea and vomiting.   Genitourinary:  Negative for dysuria.   Neurological:  Negative for dizziness, headaches,  light-headedness and weakness.   Objective:     Vital Signs (Most Recent):  Temp: 98.7 °F (37.1 °C) (05/23/23 0602)  Pulse: 78 (05/23/23 0602)  Resp: 18 (05/23/23 0602)  BP: (!) 172/114 (05/23/23 0606)  SpO2: (!) 92 % (05/23/23 0602) Vital Signs (24h Range):  Temp:  [98.7 °F (37.1 °C)] 98.7 °F (37.1 °C)  Pulse:  [78] 78  Resp:  [18] 18  SpO2:  [92 %] 92 %  BP: (172)/(114-115) 172/114     Weight: 113.4 kg (250 lb)  Body mass index is 36.92 kg/m².    SpO2: (!) 92 %       No intake or output data in the 24 hours ending 05/23/23 0643    Lines/Drains/Airways       Peripheral Intravenous Line  Duration                  Peripheral IV - Single Lumen 05/23/23 0622 20 G Left;Posterior Wrist <1 day         Peripheral IV - Single Lumen 05/23/23 0622 20 G Posterior;Right Hand <1 day                     Physical Exam  Constitutional:       General: He is not in acute distress.     Appearance: Normal appearance. He is not ill-appearing, toxic-appearing or diaphoretic.   HENT:      Head: Normocephalic and atraumatic.      Nose: Nose normal.   Eyes:      Extraocular Movements: Extraocular movements intact.      Pupils: Pupils are equal, round, and reactive to light.   Cardiovascular:      Rate and Rhythm: Normal rate. Rhythm irregular.      Heart sounds: No murmur heard.    No friction rub. No gallop.   Pulmonary:      Effort: Pulmonary effort is normal. No respiratory distress.      Breath sounds: Normal breath sounds. No wheezing or rales.   Abdominal:      General: Abdomen is flat. There is no distension.      Palpations: Abdomen is soft. There is no mass.      Tenderness: There is no abdominal tenderness.   Musculoskeletal:         General: No swelling. Normal range of motion.      Cervical back: Normal range of motion. No rigidity.      Right lower leg: No edema.      Left lower leg: No edema.   Skin:     General: Skin is warm and dry.      Coloration: Skin is not jaundiced.      Findings: No bruising.   Neurological:       General: No focal deficit present.      Mental Status: He is alert and oriented to person, place, and time.      Cranial Nerves: No cranial nerve deficit.      Motor: No weakness.        Significant Labs: BMP: No results for input(s): GLU, NA, K, CL, CO2, BUN, CREATININE, CALCIUM, MG in the last 48 hours. and CBC No results for input(s): WBC, HGB, HCT, PLT in the last 48 hours.    Significant Imaging: Reviewed     Assessment and Plan:     PAF (paroxysmal atrial fibrillation)  In summary, Josue Mir is a 47M with a history of persistent AF. His [id number] Score is 3 (CVA, HTN) so he should remain on anticoagulation.     1. KIRILL for evaluation of TRESA prior to   -No absolute contraindications of esophageal stricture, tumor, perforation, laceration,or diverticulum and/or active GI bleed  -The risks, benefits & alternatives of the procedure were explained to the patient.   -The risks of transesophageal echo include but are not limited to:  Dental trauma, esophageal trauma/perforation, bleeding, laryngospasm/brochospasm, aspiration, sore throat/hoarseness, & dislodgement of the endotracheal tube/nasogastric tube (where applicable).    -The risks of moderate sedation include hypotension, respiratory depression, arrhythmias, bronchospasm, & death.    -Informed consent was obtained. The patient is agreeable to proceed with the procedure and all questions and concerns addressed.    Case discussed with an attending in echocardiography lab.     Further recommendations per attending addendum          VTE Risk Mitigation (From admission, onward)    None          Thank you for your consult. I will follow-up with patient. Please contact us if you have any additional questions.    Stanley Jarvis MD  Cardiology   [facility] - Short Stay Cardiac Unit

## 2023-05-23 NOTE — ANESTHESIA PROCEDURE NOTES
Arterial    Diagnosis: A Fib    Patient location during procedure: done in OR    Staffing  Authorizing Provider: Miguel Molina MD  Performing Provider: Enzo Mena MD    Anesthesiologist was present at the time of the procedure.    Preanesthetic Checklist  Completed: patient identified, IV checked, site marked, risks and benefits discussed, surgical consent, monitors and equipment checked, pre-op evaluation, timeout performed and anesthesia consent givenArterial  Skin Prep: chlorhexidine gluconate and isopropyl alcohol  Local Infiltration: none  Orientation: right  Location: radial    Catheter Size: 20 G  Catheter placement by Anatomical landmarks. Heme positive aspiration all ports. Insertion Attempts: 1  Assessment  Dressing: secured with tape and tegaderm  Patient: Tolerated well

## 2023-05-23 NOTE — DISCHARGE SUMMARY
[facility] - Cardiology  Cardiac Electrophysiology  Discharge Summary      Patient Name: Josue Mir  MRN: [id number]  Admission Date: 5/23/2023  Hospital Length of Stay: 0 days  Discharge Date and Time:  05/23/2023 4:24 PM  Attending Physician: Abdullahi Daniels MD    Discharging Provider: Janeth Leo MD  Primary Care Physician: David Quach MD    HPI:   Mr. Josue Mir is a 46 yo M with pmhx s/f persistent AF, HTN, CVA (hemorrhagic) who presents for elective PVI for management of persistent AF. Pt doing well today without complaints. He reports compliance with his medications including eliquis (last dose 5/22 pm) and metoprolol/digoxin.    ECG today shows AF    Following history obtained from most recent EP clinic encounter with Dr. Daniels on 4/3/23.        I had the pleasure of seeing Josue Mir in consultation at your request for the evaluation of AF. He is a 47M with HTN, JASPAL on CPAP, previous hemorrhagic CVA (4/2018, manifesting as loss of consciousness, no thrombolytics administered, in medically induced coma for 30 days, ultimately attributed to HTN), who was diagnosed with AF in 10/2018 after giving blood and then passing out while exiting the lab. Hospitalized at [place], and underwent KIRILL/DCCV in 10/2018. Was well until 3/2023 when he was incidentally noted to be in AF with RVR before a screening colonoscopy. Admitted to [facility], where echo showed EF 40%, mild-mod MR, mild LAE. Rate controlled, and discharged on eliquis, digoxin 250 ug daily, metoprolol 100 mg bid.     My interpretation of today's ECG is AF at 86 bpm.      Procedure(s) (LRB):  Ablation atrial fibrillation (N/A)  ECHOCARDIOGRAM, TRANSESOPHAGEAL (N/A)  Cardioversion or Defibrillation     Indwelling Lines/Drains at time of discharge:  Lines/Drains/Airways     Drain  Duration           Male External Urinary Catheter 05/23/23 0745 Small <1 day                Hospital Course:  Pt underwent succesful PVI for management of persistent AF. Pt  tolerated procedure well. He was seen post operatively and reported no complaints or concerns. He was instructed to continue home medications. Digoxin was discontinued. Will start amiodarone with loading dose. He was instructed to take protonix x 30days and lasix/ibuprofen prn as directed. Pt to follow up with Dr. Daniels in 3 months.      Goals of Care Treatment Preferences:  Code Status: Full Code       Pending Diagnostic Studies:     Procedure Component Value Units Date/Time    Transesophageal echo (KIRILL) [[id number]]     Order Status: Sent Lab Status: No result           Final Active Diagnoses:    Diagnosis Date Noted POA    PAF (paroxysmal atrial fibrillation) [I48.0] 12/13/2018 Yes      Problems Resolved During this Admission:     No new Assessment & Plan notes have been filed under this hospital service since the last note was generated.  Service: Arrhythmia      Discharged Condition: good    Disposition: home    Follow Up: Dr. Daniels 3 months    Patient Instructions:   Resume home medications.  Start protonix (heart burn medication) for 30 days  Lasix 20 mg prn sob/le swelling  If you feel sharp chest pain associated with deep breaths in the next 1-3 days, take ibuprofen/alleve/advil/motrin: 600 mg every 8 hours as needed with food.    Do not strain or lift anything greater than 5 lb for 1 week.  Do not drive or operate any dangerous machinery for 24 hours.   Clean the area with mild soap and water and then cover it with a bandage.   Once the skin has healed, bathing in a tub or swimming is allowed.   Inspect the groin site daily and report to the physician any swelling at the site that   cannot be controlled with manual pressure for 10 minutes, unusual pain at the   access site or affected extremity, unusual swelling at the access site, or signs or   symptoms of infection such as redness, pain, or fever.   Call 911 if you have:   Bleeding from the puncture site that you cannot stop by doing the following:    Relax and lie down right away. Keep your leg flat and apply firm pressure to the site using your fingers and a gauze pad. Keep the pressure on for 20 minutes. Continue this until the bleeding stops. This may take awhile. When bleeding stops, cover the site with a sterile bandage and keep your leg still as much as possible.    Medications:  Reconciled Home Medications:      Medication List      START taking these medications    amiodarone 200 MG Tab  Commonly known as: PACERONE  Take 2 tablets (400 mg total) by mouth 2 (two) times daily for 14 days, THEN 1 tablet (200 mg total) 2 (two) times daily.  Start taking on: May 23, 2023     furosemide 20 MG tablet  Commonly known as: LASIX  Take 1 tablet (20 mg total) by mouth daily as needed (shortness of breath or leg swelling).     pantoprazole 40 MG tablet  Commonly known as: PROTONIX  Take 1 tablet (40 mg total) by mouth once daily.        CONTINUE taking these medications    apixaban 5 mg Tab  Commonly known as: ELIQUIS  Take 1 tablet (5 mg total) by mouth 2 (two) times daily.     lisinopriL 10 MG tablet  Take 1 tablet (10 mg total) by mouth once daily.     metoprolol tartrate 100 MG tablet  Commonly known as: LOPRESSOR  Take 1 tablet (100 mg total) by mouth 2 (two) times daily.        STOP taking these medications    digoxin 250 mcg tablet  Commonly known as: LANOXIN            Time spent on the discharge of patient: 20 minutes    Janeth Leo MD  Cardiac Electrophysiology  [facility] - Cardiology

## 2023-05-23 NOTE — HPI
Josue Mir is a 47M with HTN, JASPAL on CPAP, previous hemorrhagic CVA (4/2018, manifesting as loss of consciousness, no thrombolytics administered, in medically induced coma for 30 days, ultimately attributed to HTN), who was diagnosed with AF in 10/2018 after giving blood and then passing out while exiting the lab. Hospitalized at [place], and underwent KIRILL/DCCV in 10/2018. Was well until 3/2023 when he was incidentally noted to be in AF with RVR before a screening colonoscopy. Admitted to [facility], where echo showed EF 40%, mild-mod MR, mild LAE. Rate controlled, and discharged on eliquis, digoxin 250 ug daily, metoprolol 100 mg bid.     My interpretation of today's ECG is AF.     Plan for KIRILL prior to PVI today. He denies any issues today.     Dysphagia or odynophagia:  No  Liver Disease, esophageal disease, or known varices:  No  Upper GI Bleeding: No  Snoring:  No  Sleep Apnea:  No  Prior neck surgery or radiation:  No  History of anesthetic difficulties:  No  Family history of anesthetic difficulties:  No  Last oral intake:  12 hours ago  Able to move neck in all directions:  Yes

## 2023-05-23 NOTE — TRANSFER OF CARE
"Anesthesia Transfer of Care Note    Patient: Josue Mir    Procedure(s) Performed: Procedure(s) (LRB):  Ablation atrial fibrillation (N/A)  ECHOCARDIOGRAM, TRANSESOPHAGEAL (N/A)  Cardioversion or Defibrillation    Patient location: PACU    Anesthesia Type: general    Transport from OR: Transported from OR on 6-10 L/min O2 by face mask with adequate spontaneous ventilation    Post pain: adequate analgesia    Post assessment: tolerated procedure well and no apparent anesthetic complications    Post vital signs: stable    Level of consciousness: awake and alert    Nausea/Vomiting: no nausea/vomiting    Complications: none    Transfer of care protocol was followed      Last vitals:   Visit Vitals  BP (!) 172/114 (BP Location: Right arm, Patient Position: Lying)   Pulse 78   Temp 37.1 °C (98.7 °F) (Temporal)   Resp 18   Ht 5' 9" (1.753 m)   Wt 113.4 kg (250 lb)   SpO2 (!) 92%   BMI 36.92 kg/m²     "

## 2023-05-23 NOTE — ASSESSMENT & PLAN NOTE
In summary, Josue Mir is a 47M with a history of persistent AF. His [id number] Score is 3 (CVA, HTN) so he should remain on anticoagulation.     1. KIRILL for evaluation of TRESA prior to   -No absolute contraindications of esophageal stricture, tumor, perforation, laceration,or diverticulum and/or active GI bleed  -The risks, benefits & alternatives of the procedure were explained to the patient.   -The risks of transesophageal echo include but are not limited to:  Dental trauma, esophageal trauma/perforation, bleeding, laryngospasm/brochospasm, aspiration, sore throat/hoarseness, & dislodgement of the endotracheal tube/nasogastric tube (where applicable).    -The risks of moderate sedation include hypotension, respiratory depression, arrhythmias, bronchospasm, & death.    -Informed consent was obtained. The patient is agreeable to proceed with the procedure and all questions and concerns addressed.    Case discussed with an attending in echocardiography lab.     Further recommendations per attending addendum

## 2023-05-23 NOTE — PLAN OF CARE
Patient has ambulated and voided. Bilateral groins with dry gauze/tegaderm dressing intact. No bleeding, no hematoma noted. No distress noted. No complaints. Spouse at bedside. Iv hep lock removed. AVS printed. Home care instructions reviewed with patient. All questions answered. Transport with wheelchair for discharge.

## 2023-05-23 NOTE — ANESTHESIA PROCEDURE NOTES
Intubation    Date/Time: 5/23/2023 7:23 AM  Performed by: Gerald Valentino CRNA  Authorized by: Miguel Molina MD     Intubation:     Induction:  Intravenous    Intubated:  Postinduction    Mask Ventilation:  Easy with oral airway    Attempts:  1    Attempted By:  CRNA    Method of Intubation:  Video laryngoscopy    Blade:  French 4    Laryngeal View Grade: Grade I - full view of cords      Difficult Airway Encountered?: No      Complications:  None    Airway Device:  Oral endotracheal tube    Airway Device Size:  7.5    Style/Cuff Inflation:  Cuffed    Inflation Amount (mL):  5    Tube secured:  22    Secured at:  The lips    Placement Verified By:  Capnometry    Complicating Factors:  None    Findings Post-Intubation:  BS equal bilateral and atraumatic/condition of teeth unchanged

## 2023-05-23 NOTE — HPI
Mr. Josue Mir is a 48 yo M with pmhx s/f persistent AF, HTN, CVA (hemorrhagic) who presents for elective PVI for management of persistent AF. Pt doing well today without complaints. He reports compliance with his medications including eliquis (last dose 5/22 pm) and metoprolol/digoxin.    ECG today shows AF    Following history obtained from most recent EP clinic encounter with Dr. Daniels on 4/3/23.        I had the pleasure of seeing Josue Mir in consultation at your request for the evaluation of AF. He is a 47M with HTN, JASPAL on CPAP, previous hemorrhagic CVA (4/2018, manifesting as loss of consciousness, no thrombolytics administered, in medically induced coma for 30 days, ultimately attributed to HTN), who was diagnosed with AF in 10/2018 after giving blood and then passing out while exiting the lab. Hospitalized at [place], and underwent KIRILL/DCCV in 10/2018. Was well until 3/2023 when he was incidentally noted to be in AF with RVR before a screening colonoscopy. Admitted to [facility], where echo showed EF 40%, mild-mod MR, mild LAE. Rate controlled, and discharged on eliquis, digoxin 250 ug daily, metoprolol 100 mg bid.     My interpretation of today's ECG is AF at 86 bpm.

## 2023-05-23 NOTE — NURSING TRANSFER
Nursing Transfer Note      5/23/2023     Reason patient is being transferred: ep pacu 2 to sscu 03    Transfer To: ep pacu 2 to sscu 03    Transfer via bed    Transfer with cardiac monitoring, tele box on confirmed by tele tech    Transported by carolina pacu rn and sscu rn    Telemetry: Box Number [id number], Rate 67, and Rhythm sr    Medicines sent: none. Lasix 20mg iv x1 per md order    Any special needs or follow-up needed: bedrest x6hrs. Sutures removed at 1430. Bedrest done at 1630. Condom cath in place    Chart send with patient: Yes    Notified: spouse    Patient reassessed at: 5/23/23 1600    Upon arrival to floor: cardiac monitor applied, patient oriented to room, call bell in reach, and bed in lowest position

## 2023-05-23 NOTE — ANESTHESIA PREPROCEDURE EVALUATION
05/23/2023  Josue Mir is a 47 y.o., male.  Pre-operative evaluation for Procedure(s) (LRB):  Ablation atrial fibrillation (N/A)  ECHOCARDIOGRAM, TRANSESOPHAGEAL (N/A)    Josue Mir is a 47 y.o. male     Patient Active Problem List   Diagnosis    Acute pancreatitis    PAF (paroxysmal atrial fibrillation)    Essential hypertension    JASPAL (obstructive sleep apnea)    Acute epigastric pain    Left hemiparesis    CVA (cerebral vascular accident)    History of cerebral hemorrhage    Long term (current) use of anticoagulants    Acute gout of left foot    Cognitive complaints    Apathy    Adjustment disorder with depressed mood    Atrial fibrillation with RVR    Acute hypoxemic respiratory failure    Acute on chronic combined systolic and diastolic heart failure    LV dysfunction       Review of patient's allergies indicates:  No Known Allergies    No current facility-administered medications on file prior to encounter.     No current outpatient medications on file prior to encounter.       Past Surgical History:   Procedure Laterality Date    INSERTION OF IMPLANTABLE LOOP RECORDER  10/25/2018    Procedure: Insertion, Implantable Loop Recorder;  Surgeon: Justin Colby MD;  Location: [facility] CATH;  Service: Cardiology;;    LAPAROSCOPIC CHOLECYSTECTOMY N/A 12/14/2018    Procedure: CHOLECYSTECTOMY, LAPAROSCOPIC;  Surgeon: Beck Love MD;  Location: [facility] OR;  Service: General;  Laterality: N/A;  Dr. Love requested case to start at 1:00pm    NASAL POLYP EXCISION      TREATMENT OF CARDIAC ARRHYTHMIA N/A 10/25/2018    Procedure: CARDIOVERSION OR DEFIBRILLATION;  Surgeon: Justin Colby MD;  Location: [facility] CATH;  Service: Cardiology;  Laterality: N/A;       Social History     Socioeconomic History    Marital status:    Tobacco Use    Smoking status: Never    Smokeless tobacco:  Never   Substance and Sexual Activity    Alcohol use: Yes     Comment: rarely    Drug use: No    Sexual activity: Yes     Partners: Female     Social Determinants of Health     Financial Resource Strain: Low Risk     Difficulty of Paying Living Expenses: Not hard at all   Food Insecurity: No Food Insecurity    Worried About Running Out of Food in the Last Year: Never true    Ran Out of Food in the Last Year: Never true   Transportation Needs: No Transportation Needs    Lack of Transportation (Medical): No    Lack of Transportation (Non-Medical): No   Physical Activity: Inactive    Days of Exercise per Week: 0 days    Minutes of Exercise per Session: 0 min   Stress: No Stress Concern Present    Feeling of Stress : Not at all   Social Connections: Moderately Isolated    Frequency of Communication with Friends and Family: More than three times a week    Frequency of Social Gatherings with Friends and Family: Once a week    Attends Bahai Services: Never    Active Member of Clubs or Organizations: No    Attends Club or Organization Meetings: Never    Marital Status:    Housing Stability: High Risk    Unable to Pay for Housing in the Last Year: Yes    Number of Places Lived in the Last Year: 1    Unstable Housing in the Last Year: No         CBC: No results for input(s): WBC, RBC, HGB, HCT, PLT, MCV, MCH, MCHC in the last 72 hours.    CMP: No results for input(s): NA, K, CL, CO2, BUN, CREATININE, GLU, MG, PHOS, CALCIUM, ALBUMIN, PROT, ALKPHOS, ALT, AST, BILITOT in the last 72 hours.    INR  No results for input(s): PT, INR, PROTIME, APTT in the last 72 hours.        Diagnostic Studies:      EKD Echo:  No results found for this or any previous visit.   The left ventricle is mildly enlarged with moderately decreased systolic function.   The estimated ejection fraction is 40%.   Left ventricular diastolic dysfunction.   There is mild left ventricular global hypokinesis.   Normal  right ventricular size with normal right ventricular systolic function.   Mild left atrial enlargement.   Mild tricuspid regurgitation.   Mild-to-moderate mitral regurgitation.   Normal central venous pressure (3 mmHg).   The estimated PA systolic pressure is 37 mmHg.            Pre-op Assessment    I have reviewed the Patient Summary Reports.     I have reviewed the Nursing Notes. I have reviewed the NPO Status.   I have reviewed the Medications.     Review of Systems  Cardiovascular:   Hypertension    Pulmonary:   Pneumonia    Neurological:   CVA    Psych:   Psychiatric History          Physical Exam    Airway:  Mallampati: II           Anesthesia Plan  Type of Anesthesia, risks & benefits discussed:    Anesthesia Type: Gen ETT  Intra-op Monitoring Plan: Standard ASA Monitors and Art Line  Post Op Pain Control Plan: multimodal analgesia  Induction:  IV  Airway Plan: Direct, Post-Induction  Informed Consent: Informed consent signed with the Patient and all parties understand the risks and agree with anesthesia plan.  All questions answered.   ASA Score: 3  Day of Surgery Review of History & Physical: H&P Update referred to the surgeon/provider.    Ready For Surgery From Anesthesia Perspective.     .

## 2023-05-23 NOTE — SUBJECTIVE & OBJECTIVE
Past Medical History:   Diagnosis Date    Anticoagulant long-term use     Atrial fibrillation     Gout     Gout, joint     Hypertension     Memory deficit following cerebral infarction     Pneumonia 04/2018    Sleep apnea with use of continuous positive airway pressure (CPAP)     Stroke 04/2018    Syncope and collapse        Past Surgical History:   Procedure Laterality Date    INSERTION OF IMPLANTABLE LOOP RECORDER  10/25/2018    Procedure: Insertion, Implantable Loop Recorder;  Surgeon: Justin Colby MD;  Location: [facility];  Service: Cardiology;;    LAPAROSCOPIC CHOLECYSTECTOMY N/A 12/14/2018    Procedure: CHOLECYSTECTOMY, LAPAROSCOPIC;  Surgeon: Beck Love MD;  [facility];  Service: General;  Laterality: N/A;  Dr. Love requested case to start at 1:00pm    NASAL POLYP EXCISION      TREATMENT OF CARDIAC ARRHYTHMIA N/A 10/25/2018    Procedure: CARDIOVERSION OR DEFIBRILLATION;  Surgeon: Justin Colby MD;  Location: [facility];  Service: Cardiology;  Laterality: N/A;       Review of patient's allergies indicates:  No Known Allergies    No current facility-administered medications on file prior to encounter.     No current outpatient medications on file prior to encounter.     Family History       Problem Relation (Age of Onset)    Hypertension Mother    No Known Problems Brother, Brother, Brother    Sleep apnea Mother          Tobacco Use    Smoking status: Never    Smokeless tobacco: Never   Substance and Sexual Activity    Alcohol use: Yes     Comment: rarely    Drug use: No    Sexual activity: Yes     Partners: Female     Review of Systems   Constitutional: Negative for chills, decreased appetite and fever.   HENT:  Negative for congestion and sore throat.    Eyes:  Negative for blurred vision and discharge.   Cardiovascular:  Negative for chest pain, claudication, cyanosis, dyspnea on exertion and leg swelling.   Respiratory:  Negative for cough, hemoptysis and shortness of breath.     Endocrine: Negative for cold intolerance and heat intolerance.   Skin:  Negative for color change.   Musculoskeletal:  Negative for muscle weakness and myalgias.   Gastrointestinal:  Negative for bloating and abdominal pain.   Neurological:  Negative for dizziness, focal weakness and weakness.   Psychiatric/Behavioral:  Negative for altered mental status and depression.    Objective:     Vital Signs (Most Recent):  Temp: 98.7 °F (37.1 °C) (05/23/23 0602)  Pulse: 78 (05/23/23 0602)  Resp: 18 (05/23/23 0602)  BP: (!) 172/114 (05/23/23 0606)  SpO2: (!) 92 % (05/23/23 0602) Vital Signs (24h Range):  Temp:  [98.7 °F (37.1 °C)] 98.7 °F (37.1 °C)  Pulse:  [78] 78  Resp:  [18] 18  SpO2:  [92 %] 92 %  BP: (172)/(114-115) 172/114       Weight: 113.4 kg (250 lb)  Body mass index is 36.92 kg/m².    SpO2: (!) 92 %        Physical Exam  Constitutional:       Appearance: He is well-developed.   HENT:      Head: Normocephalic and atraumatic.      Right Ear: External ear normal.      Left Ear: External ear normal.   Eyes:      Conjunctiva/sclera: Conjunctivae normal.   Cardiovascular:      Rate and Rhythm: Normal rate. Rhythm irregular.      Pulses: Intact distal pulses.           Radial pulses are 2+ on the right side and 2+ on the left side.      Heart sounds: Normal heart sounds.   Pulmonary:      Effort: Pulmonary effort is normal. No respiratory distress.      Breath sounds: Normal breath sounds. No wheezing.   Abdominal:      General: Bowel sounds are normal. There is no distension.      Palpations: Abdomen is soft.      Tenderness: There is no abdominal tenderness.   Musculoskeletal:         General: Normal range of motion.      Cervical back: Normal range of motion and neck supple.   Skin:     General: Skin is warm and dry.   Neurological:      Mental Status: He is alert and oriented to person, place, and time.   Psychiatric:         Mood and Affect: Mood normal.         Behavior: Behavior normal.

## 2023-05-23 NOTE — H&P
[facility] - Short Stay Cardiac Unit  Cardiac Electrophysiology  History and Physical     Admission Date: 5/23/2023  Code Status: Prior   Attending Provider: Abdullahi Daniels MD   Principal Problem:<principal problem not specified>    Subjective:     Chief Complaint:  Elective PVI     HPI:  Mr. Josue Mir is a 46 yo M with pmhx s/f persistent AF, HTN, CVA (hemorrhagic) who presents for elective PVI for management of persistent AF. Pt doing well today without complaints. He reports compliance with his medications including eliquis (last dose 5/22 pm) and metoprolol/digoxin.    ECG today shows AF    Following history obtained from most recent EP clinic encounter with Dr. Daniels on 4/3/23.        I had the pleasure of seeing Josue Mir in consultation at your request for the evaluation of AF. He is a 47M with HTN, JASPAL on CPAP, previous hemorrhagic CVA (4/2018, manifesting as loss of consciousness, no thrombolytics administered, in medically induced coma for 30 days, ultimately attributed to HTN), who was diagnosed with AF in 10/2018 after giving blood and then passing out while exiting the lab. Hospitalized at [place], and underwent KIRILL/DCCV in 10/2018. Was well until 3/2023 when he was incidentally noted to be in AF with RVR before a screening colonoscopy. Admitted to [facility], where echo showed EF 40%, mild-mod MR, mild LAE. Rate controlled, and discharged on eliquis, digoxin 250 ug daily, metoprolol 100 mg bid.     My interpretation of today's ECG is AF at 86 bpm.      Past Medical History:   Diagnosis Date    Anticoagulant long-term use     Atrial fibrillation     Gout     Gout, joint     Hypertension     Memory deficit following cerebral infarction     Pneumonia 04/2018    Sleep apnea with use of continuous positive airway pressure (CPAP)     Stroke 04/2018    Syncope and collapse        Past Surgical History:   Procedure Laterality Date    INSERTION OF IMPLANTABLE LOOP RECORDER  10/25/2018    Procedure:  Insertion, Implantable Loop Recorder;  Surgeon: Justin Colby MD;  [facility];  Service: Cardiology;;    LAPAROSCOPIC CHOLECYSTECTOMY N/A 12/14/2018    Procedure: CHOLECYSTECTOMY, LAPAROSCOPIC;  Surgeon: Beck Love MD;  Location: [facility] OR;  Service: General;  Laterality: N/A;  Dr. Love requested case to start at 1:00pm    NASAL POLYP EXCISION      TREATMENT OF CARDIAC ARRHYTHMIA N/A 10/25/2018    Procedure: CARDIOVERSION OR DEFIBRILLATION;  Surgeon: Justin Colby MD;  [facility];  Service: Cardiology;  Laterality: N/A;       Review of patient's allergies indicates:  No Known Allergies    No current facility-administered medications on file prior to encounter.     No current outpatient medications on file prior to encounter.     Family History       Problem Relation (Age of Onset)    Hypertension Mother    No Known Problems Brother, Brother, Brother    Sleep apnea Mother          Tobacco Use    Smoking status: Never    Smokeless tobacco: Never   Substance and Sexual Activity    Alcohol use: Yes     Comment: rarely    Drug use: No    Sexual activity: Yes     Partners: Female     Review of Systems   Constitutional: Negative for chills, decreased appetite and fever.   HENT:  Negative for congestion and sore throat.    Eyes:  Negative for blurred vision and discharge.   Cardiovascular:  Negative for chest pain, claudication, cyanosis, dyspnea on exertion and leg swelling.   Respiratory:  Negative for cough, hemoptysis and shortness of breath.    Endocrine: Negative for cold intolerance and heat intolerance.   Skin:  Negative for color change.   Musculoskeletal:  Negative for muscle weakness and myalgias.   Gastrointestinal:  Negative for bloating and abdominal pain.   Neurological:  Negative for dizziness, focal weakness and weakness.   Psychiatric/Behavioral:  Negative for altered mental status and depression.    Objective:     Vital Signs (Most Recent):  Temp: 98.7 °F (37.1 °C)  (05/23/23 0602)  Pulse: 78 (05/23/23 0602)  Resp: 18 (05/23/23 0602)  BP: (!) 172/114 (05/23/23 0606)  SpO2: (!) 92 % (05/23/23 0602) Vital Signs (24h Range):  Temp:  [98.7 °F (37.1 °C)] 98.7 °F (37.1 °C)  Pulse:  [78] 78  Resp:  [18] 18  SpO2:  [92 %] 92 %  BP: (172)/(114-115) 172/114       Weight: 113.4 kg (250 lb)  Body mass index is 36.92 kg/m².    SpO2: (!) 92 %        Physical Exam  Constitutional:       Appearance: He is well-developed.   HENT:      Head: Normocephalic and atraumatic.      Right Ear: External ear normal.      Left Ear: External ear normal.   Eyes:      Conjunctiva/sclera: Conjunctivae normal.   Cardiovascular:      Rate and Rhythm: Normal rate. Rhythm irregular.      Pulses: Intact distal pulses.           Radial pulses are 2+ on the right side and 2+ on the left side.      Heart sounds: Normal heart sounds.   Pulmonary:      Effort: Pulmonary effort is normal. No respiratory distress.      Breath sounds: Normal breath sounds. No wheezing.   Abdominal:      General: Bowel sounds are normal. There is no distension.      Palpations: Abdomen is soft.      Tenderness: There is no abdominal tenderness.   Musculoskeletal:         General: Normal range of motion.      Cervical back: Normal range of motion and neck supple.   Skin:     General: Skin is warm and dry.   Neurological:      Mental Status: He is alert and oriented to person, place, and time.   Psychiatric:         Mood and Affect: Mood normal.         Behavior: Behavior normal.           Assessment and Plan:     PAF (paroxysmal atrial fibrillation)  Josue Mir is a 47M with a history of persistent AF.   [id number] Score is 3 (CVA, HTN), on eliquis. Last dose was 5/22 pm  Presents for elective PVI    - The risks, benefits and alternatives of the procedure were explained to the patient, patient's family and/or surrogate decision maker. Risks include (but not limited to) bleeding, hematoma, infection, pain, vascular damage, damage to  structures surrounding vasculature, myocardial damage [perforation, valvular damage], cardiac tamponade, myocardial infarction, stroke, and death.     All questions were answered. Patient is understanding of these risks, and would like to proceed. Consents signed.    CARTO  KIRILL prior, currently in AF.  Post-procedure will initiate a brief course of amiodarone.          Janeth Leo MD  Cardiac Electrophysiology  [facility] - Short Stay Cardiac Unit

## 2023-05-23 NOTE — DISCHARGE INSTRUCTIONS
Resume home medications.  Start protonix (heart burn medication) for 30 days  Start amiodarone 400 mg twice daily x 14 days followed by 200 mg daily until seen by Dr. Daniels  Take lasix (20 mg) tablet daily as needed for shortness of breath, leg swelling, or weight gain over the next 3-5 days  If you feel sharp chest pain associated with deep breaths in the next 1-3 days, take ibuprofen/alleve/advil/motrin: 600 mg every 8 hours as needed with food.      Do not strain or lift anything greater than 5 lb for 1 week.  Do not drive or operate any dangerous machinery for 24 hours.   Clean the area with mild soap and water and then cover it with a bandage.   Once the skin has healed, bathing in a tub or swimming is allowed.   Inspect the groin site daily and report to the physician any swelling at the site that   cannot be controlled with manual pressure for 10 minutes, unusual pain at the   access site or affected extremity, unusual swelling at the access site, or signs or   symptoms of infection such as redness, pain, or fever.   Call 911 if you have:   Bleeding from the puncture site that you cannot stop by doing the following:   Relax and lie down right away. Keep your leg flat and apply firm pressure to the site using your fingers and a gauze pad. Keep the pressure on for 20 minutes. Continue this until the bleeding stops. This may take awhile. When bleeding stops, cover the site with a sterile bandage and keep your leg still as much as possible.

## 2023-05-23 NOTE — PLAN OF CARE
Vss. Sats 96% on room air.  Pt aaox4 follows commands.  S/p pvi ablation. Surya groin sutures placed at 1030, sutures removal time 1430. Bedrest done at 1630. When sutures removal time, ep pacu rn removed left groin sutures, no problems, andres, well approx. No hematoma or drainage noted. Guaze/trans film placed. When right groin sutures removed, one part of suture stuck under skin. Rn could not cut. Dr randhawa notified. Md came to bedside, dr randhawa injected pt with lidocaine 1% to right groin area.  Md unable to remove sutures.  Dr holden ep md at bedside at 1540. Re injected pt with lidocaine 1% to right groin area.  Dr randhawa and dr holden able to get suture from under skin.  Surya groin gauze/trans film noted. 12 lead ekg done and in chart. Pt sr noted on cm. Tele box on confirmed by tele tech. See flowsheet for full assessment condom cath in place.  Pt to get 20mg lasix iv x1 when ambulating. Jackson sscu rn to give md when arrives to sscu.  Condom cath in place. Pt's wife at bedside. See flowsheet for full assessment. Dr randhawa update pt's wife and pt in ep pacu 2. Verbalizes understanding. Pt tolerating water and sprite in ep pacu 2. Cardioversion x2 in procedure. Silvadene to chest and back. Pt educated on purpose of med. Silvadene brought to sscu room with pt.

## 2023-05-24 LAB
POC ACTIVATED CLOTTING TIME K: 143 SEC (ref 74–137)
POC ACTIVATED CLOTTING TIME K: 161 SEC (ref 74–137)
POC ACTIVATED CLOTTING TIME K: 281 SEC (ref 74–137)
POC ACTIVATED CLOTTING TIME K: 323 SEC (ref 74–137)
POC ACTIVATED CLOTTING TIME K: 323 SEC (ref 74–137)
POC ACTIVATED CLOTTING TIME K: 384 SEC (ref 74–137)
SAMPLE: ABNORMAL

## 2023-05-26 ENCOUNTER — HOSPITAL ENCOUNTER (EMERGENCY)
Facility: HOSPITAL | Age: 48
Discharge: HOME | End: 2023-05-26
Attending: EMERGENCY MEDICINE
Payer: COMMERCIAL

## 2023-05-26 ENCOUNTER — NURSE TRIAGE (OUTPATIENT)
Dept: ADMINISTRATIVE | Facility: CLINIC | Age: 48
End: 2023-05-26
Payer: COMMERCIAL

## 2023-05-26 VITALS
WEIGHT: 250 LBS | TEMPERATURE: 98 F | SYSTOLIC BLOOD PRESSURE: 178 MMHG | BODY MASS INDEX: 37.03 KG/M2 | HEIGHT: 69 IN | HEART RATE: 56 BPM | OXYGEN SATURATION: 95 % | RESPIRATION RATE: 20 BRPM | DIASTOLIC BLOOD PRESSURE: 103 MMHG

## 2023-05-26 DIAGNOSIS — R52 PAIN: ICD-10-CM

## 2023-05-26 DIAGNOSIS — Z51.89 VISIT FOR WOUND CHECK: Primary | ICD-10-CM

## 2023-05-26 PROCEDURE — 99284 EMERGENCY DEPT VISIT MOD MDM: CPT | Mod: 25

## 2023-05-26 NOTE — TELEPHONE ENCOUNTER
Patient seen in the ED for bruising to rt groin s/p cardiac ablation on 5/23/2023. Ultrasound without evidence of hematoma or pseudoaneurysm. Patient discharged home. Dr Daniels to be notified.

## 2023-05-26 NOTE — ED NOTES
"C/o R groin bruising that he states started Wed after an ablation on Tues. Denies pain or swelling feeling but states his wife thinks it has gotten a "little bigger since Wed." Dark bruising noted almost to L groin, site is soft and non tender. Denies any other issues, dizziness, lightheadedness, urinary problems or tenderness to site.   "

## 2023-05-26 NOTE — TELEPHONE ENCOUNTER
Pt post Ablation for A. Fib on Tuesday. C/o bruising from right groin to hip. Pt states that he is on a blood thinner. Advised to be seen per protocol. Pt advised if haven't heard from provider within 30 mins to go to ED/UC per protocol. Verbalized understanding. Encounter routed to provider.       Reason for Disposition   Patient sounds very sick or weak to the triager    Additional Information   Negative: Shock suspected (e.g., cold/pale/clammy skin, too weak to stand, low BP, rapid pulse)   Negative: Fever and purple or blood-colored spots or dots   Negative: Sounds like a life-threatening emergency to the triager   Negative: Bruise on head, face, chest, or abdomen and taking Coumadin (warfarin) or other strong blood thinner, or known bleeding disorder (e.g., thrombocytopenia)   Negative: Unexplained bleeding from another site (e.g., gums, nose, urine) as well    Protocols used: Bruises-A-OH

## 2023-05-26 NOTE — TELEPHONE ENCOUNTER
I spoke with pt via phone, I advised him that I reviewed his chart and noticed that he is currently at the ED. Will f/u if needed.

## 2023-05-26 NOTE — ED PROVIDER NOTES
Encounter Date: 5/26/2023    SCRIBE #1 NOTE: I, Johannatian Bustamante, dolores scribing for, and in the presence of,  Christine Mann NP.     History     Chief Complaint   Patient presents with    Wound Check     Increased bruising to right groin, had cardiac ablation on Tuesday, taking eliquis      Time seen by provider: 10:41 AM on 05/26/2023    Josue Mir is a 47 y.o. male who presents to the ED with an onset of purple bruising in the right groin area. He had a cardiac ablation done 3 days ago by Dr. Abdullahi Daniels at [facility]. Patient began taking his Eliquis the evening after the procedure and went to work the next day. When he got home from work 2 days ago he noticed the purple bruising and became concerned. The patient denies pain in the bruised area, chest pain or any other symptoms at this time. He has a PMHx of HTN, A-fib, stroke, and is on Eliquis. He has a PSHx of treatment of cardiac arrhythmia.    The history is provided by the patient.   Review of patient's allergies indicates:  No Known Allergies  Past Medical History:   Diagnosis Date    Anticoagulant long-term use     Atrial fibrillation     Gout     Gout, joint     Hypertension     Memory deficit following cerebral infarction     Pneumonia 04/2018    Sleep apnea with use of continuous positive airway pressure (CPAP)     Stroke 04/2018    Syncope and collapse      Past Surgical History:   Procedure Laterality Date    ABLATION OF ARRHYTHMOGENIC FOCUS FOR ATRIAL FIBRILLATION N/A 5/23/2023    Procedure: Ablation atrial fibrillation;  Surgeon: Abdullahi Daniels MD;  [facility] LAB;  Service: Cardiology;  Laterality: N/A;  AF, KIRILL(Cx if SR), PVI, RFA, CARTO, MAC, GP, 3 PREP* MDT ILR in situ*    INSERTION OF IMPLANTABLE LOOP RECORDER  10/25/2018    Procedure: Insertion, Implantable Loop Recorder;  Surgeon: Justin Colby MD;  Location: [facility] CATH;  Service: Cardiology;;    LAPAROSCOPIC CHOLECYSTECTOMY N/A 12/14/2018    Procedure: CHOLECYSTECTOMY,  LAPAROSCOPIC;  Surgeon: Beck Love MD;  Location: [facility] OR;  Service: General;  Laterality: N/A;  Dr. Love requested case to start at 1:00pm    NASAL POLYP EXCISION      TRANSESOPHAGEAL ECHOCARDIOGRAPHY N/A 5/23/2023    Procedure: ECHOCARDIOGRAM, TRANSESOPHAGEAL;  Surgeon: Kaylin Cota MD;  Location: [facility] EP LAB;  Service: Cardiology;  Laterality: N/A;  AF, KIRILL(Cx if SR), PVI, RFA, CARTO, MAC, GP, 3 PREP* MDT ILR in situ*    TREATMENT OF CARDIAC ARRHYTHMIA N/A 10/25/2018    Procedure: CARDIOVERSION OR DEFIBRILLATION;  Surgeon: Justin Colby MD;  Location: [facility] CATH;  Service: Cardiology;  Laterality: N/A;    TREATMENT OF CARDIAC ARRHYTHMIA  5/23/2023    Procedure: Cardioversion or Defibrillation;  Surgeon: Abdullahi Daniels MD;  Location: [facility] EP LAB;  Service: Cardiology;;     Family History   Problem Relation Age of Onset    Sleep apnea Mother     Hypertension Mother     No Known Problems Brother     No Known Problems Brother     No Known Problems Brother      Social History     Tobacco Use    Smoking status: Never    Smokeless tobacco: Never   Substance Use Topics    Alcohol use: Yes     Comment: rarely    Drug use: No     Review of Systems   Constitutional:  Negative for fever.   HENT:  Negative for sore throat.    Respiratory:  Negative for shortness of breath.    Cardiovascular:  Negative for chest pain.   Gastrointestinal:  Negative for nausea.   Genitourinary:  Negative for dysuria.   Musculoskeletal:  Negative for back pain.   Skin:  Positive for color change (purple bruise). Negative for rash.        Negative for pain around the bruise.   Neurological:  Negative for weakness.   Hematological:  Does not bruise/bleed easily.     Physical Exam     Initial Vitals [05/26/23 1031]   BP Pulse Resp Temp SpO2   (!) 178/103 (!) 56 20 97.7 °F (36.5 °C) 95 %      MAP       --         Physical Exam    Nursing note and vitals reviewed.  Constitutional: Vital signs are normal. He appears well-developed and  well-nourished. He is Obese .   HENT:   Head: Normocephalic and atraumatic.   Eyes: Pupils are equal, round, and reactive to light.   Neck: Neck supple.   Cardiovascular:  Normal rate, regular rhythm, normal heart sounds and intact distal pulses.     Exam reveals no gallop and no friction rub.       No murmur heard.  Pulses:       Dorsalis pedis pulses are 2+ on the right side.        Posterior tibial pulses are 2+ on the right side.   Pulmonary/Chest: Breath sounds normal. He has no wheezes. He has no rhonchi. He has no rales.   Abdominal: Abdomen is soft. Bowel sounds are normal. There is no abdominal tenderness.   Genitourinary: Right testis shows no swelling and no tenderness. Left testis shows no swelling and no tenderness. Circumcised.    Genitourinary Comments: Ecchymosis to the right thigh and suprapubic area. No palpable mass or hematoma. Right groin without ecchymosis, erythema, swelling, tenderness, hematoma or drainage.  Right groin puncture wound without any bleeding or surrounding swelling or hematoma noted.  There is no drainage from the site.  It is not tender at the puncture wound.  It appears to be healed.  See images below.     Musculoskeletal:      Cervical back: Neck supple.     Neurological: He is alert and oriented to person, place, and time. He has normal strength.   Skin: Skin is warm, dry and intact.   Psychiatric: He has a normal mood and affect. His speech is normal and behavior is normal.         ED Course   Procedures  Labs Reviewed - No data to display       Imaging Results              US Lower Extremity Arteries Right (Final result)  Result time 05/26/23 12:06:31      Final result by Beck Arellano MD (05/26/23 12:06:31)                   Impression:      No evidence for pseudoaneurysm or other significant abnormality.      Electronically signed by: Beck Arellano MD  Date:    05/26/2023  Time:    12:06               Narrative:    EXAMINATION:  US LOWER EXTREMITY ARTERIES  RIGHT    CLINICAL HISTORY:  Pain, unspecified    TECHNIQUE:  Grayscale, color Doppler, and spectral Doppler sonographic evaluation of the arterial system of the right lower extremity was performed    COMPARISON:  None    FINDINGS:  There are multiphasic waveforms from the common femoral through the calf arteries with well maintained peak systolic velocities.  No focal stenosis is identified.  No pseudoaneurysm or hematoma is identified.                                       Medications - No data to display  Medical Decision Making:   History:   Old Medical Records: I decided to obtain old medical records.  Differential Diagnosis:   Contusion  Hematoma  Pseudoaneurysm      APC / Resident Notes:   Patient is a 47 y.o. male who presents to the ED 05/26/2023 who underwent emergent evaluation for bruising to right groin following ablation 3 days ago.  Patient on Eliquis.  Plus two DP and PT pulses to right lower extremity.  Patient having only minor pain and discomfort with palpation.  There is no hematoma palpated.  No signs of infection.  Arterial Ultrasound without evidence of hematoma or pseudoaneurysm I do not think pseudoaneurysm and there is no sign of any bleeding at this time.  Patient eloped the emergency department prior to discharge.  I was able to talk to the patient on the phone and went over all findings with the patient.  Also recommended outpatient follow-up.  Is also given strict return precautions over the phone.  I called the cardiologist on-call for the patient's cardiologist who performed the procedure and he agrees with continuing current Eliquis and outpatient follow-up. Based on my clinical evaluation, I do not appreciate any immediate, emergent, or life threatening condition or etiology that warrants additional workup today and feel that the patient can be discharged with close follow up care. Case discussed with Dr. Roman who is agreeable to plan of care. Follow up and return precautions  discussed; patient verbalized understanding and is agreeable to plan of care. Patient discharged home in stable condition.              Scribe Attestation:   Scribe #1: I performed the above scribed service and the documentation accurately describes the services I performed. I attest to the accuracy of the note.    Attending Attestation:           Physician Attestation for Scribe:  Physician Attestation Statement for Scribe #1: I, Christine Mann, reviewed documentation, as scribed by in my presence, and it is both accurate and complete.     Comments: I, Christine Mann NP-C, personally performed the services described in this documentation. All medical record entries made by the scribe were at my direction and in my presence.  I have reviewed the chart and agree that the record reflects my personal performance and is accurate and complete. ADITYA Biswas.  3:29 PM 05/26/2023        ED Course as of 05/26/23 1532   Fri May 26, 2023   1220 Attempted to go over results and discuss findings the patient however patient appears to have eloped the emergency department. [JK]   1225 Will attempt to call pt [JK]   1227 Spoke with patient who states he eloped ER.  All findings discussed with patient as well as plan of care.  He is agreeable. [JK]      ED Course User Index  [JK] Chirstine Mann NP                 Clinical Impression:   Final diagnoses:  [R52] Pain  [R52] Pain - post angio, rule out pseudoaneurysm  [Z51.89] Visit for wound check (Primary)        ED Disposition Condition    Discharge Stable          ED Prescriptions    None       Follow-up Information       Follow up With Specialties Details Why Contact Info    Abdullahi Daneils MD Electrophysiology, Cardiovascular Disease, Cardiology In 2 days  [facility] [id number] [phone]      [facility] Emergency Dept Emergency Medicine  As needed, If symptoms worsen [address] [place] [id number] [phone]             Christine  HANNAH Mann  05/26/23 1533

## 2023-06-12 ENCOUNTER — OFFICE VISIT (OUTPATIENT)
Dept: CARDIOLOGY | Facility: CLINIC | Age: 48
End: 2023-06-12
Payer: COMMERCIAL

## 2023-06-12 VITALS
HEART RATE: 58 BPM | SYSTOLIC BLOOD PRESSURE: 130 MMHG | WEIGHT: 264 LBS | DIASTOLIC BLOOD PRESSURE: 76 MMHG | HEIGHT: 69 IN | BODY MASS INDEX: 39.1 KG/M2

## 2023-06-12 DIAGNOSIS — G47.33 OSA (OBSTRUCTIVE SLEEP APNEA): ICD-10-CM

## 2023-06-12 DIAGNOSIS — I10 ESSENTIAL HYPERTENSION: ICD-10-CM

## 2023-06-12 DIAGNOSIS — I51.9 LV DYSFUNCTION: ICD-10-CM

## 2023-06-12 DIAGNOSIS — I48.0 PAF (PAROXYSMAL ATRIAL FIBRILLATION): Primary | ICD-10-CM

## 2023-06-12 DIAGNOSIS — M10.071 ACUTE IDIOPATHIC GOUT OF RIGHT ANKLE: ICD-10-CM

## 2023-06-12 PROCEDURE — 1159F MED LIST DOCD IN RCRD: CPT | Mod: CPTII,S$GLB,, | Performed by: NURSE PRACTITIONER

## 2023-06-12 PROCEDURE — 3078F PR MOST RECENT DIASTOLIC BLOOD PRESSURE < 80 MM HG: ICD-10-PCS | Mod: CPTII,S$GLB,, | Performed by: NURSE PRACTITIONER

## 2023-06-12 PROCEDURE — 99999 PR PBB SHADOW E&M-EST. PATIENT-LVL III: CPT | Mod: PBBFAC,,, | Performed by: NURSE PRACTITIONER

## 2023-06-12 PROCEDURE — 99999 PR PBB SHADOW E&M-EST. PATIENT-LVL III: ICD-10-PCS | Mod: PBBFAC,,, | Performed by: NURSE PRACTITIONER

## 2023-06-12 PROCEDURE — 1159F PR MEDICATION LIST DOCUMENTED IN MEDICAL RECORD: ICD-10-PCS | Mod: CPTII,S$GLB,, | Performed by: NURSE PRACTITIONER

## 2023-06-12 PROCEDURE — 3008F BODY MASS INDEX DOCD: CPT | Mod: CPTII,S$GLB,, | Performed by: NURSE PRACTITIONER

## 2023-06-12 PROCEDURE — 4010F ACE/ARB THERAPY RXD/TAKEN: CPT | Mod: CPTII,S$GLB,, | Performed by: NURSE PRACTITIONER

## 2023-06-12 PROCEDURE — 3075F SYST BP GE 130 - 139MM HG: CPT | Mod: CPTII,S$GLB,, | Performed by: NURSE PRACTITIONER

## 2023-06-12 PROCEDURE — 3008F PR BODY MASS INDEX (BMI) DOCUMENTED: ICD-10-PCS | Mod: CPTII,S$GLB,, | Performed by: NURSE PRACTITIONER

## 2023-06-12 PROCEDURE — 3078F DIAST BP <80 MM HG: CPT | Mod: CPTII,S$GLB,, | Performed by: NURSE PRACTITIONER

## 2023-06-12 PROCEDURE — 99214 PR OFFICE/OUTPT VISIT, EST, LEVL IV, 30-39 MIN: ICD-10-PCS | Mod: S$GLB,,, | Performed by: NURSE PRACTITIONER

## 2023-06-12 PROCEDURE — 93000 ELECTROCARDIOGRAM COMPLETE: CPT | Mod: S$GLB,,, | Performed by: INTERNAL MEDICINE

## 2023-06-12 PROCEDURE — 99214 OFFICE O/P EST MOD 30 MIN: CPT | Mod: S$GLB,,, | Performed by: NURSE PRACTITIONER

## 2023-06-12 PROCEDURE — 3075F PR MOST RECENT SYSTOLIC BLOOD PRESS GE 130-139MM HG: ICD-10-PCS | Mod: CPTII,S$GLB,, | Performed by: NURSE PRACTITIONER

## 2023-06-12 PROCEDURE — 4010F PR ACE/ARB THEARPY RXD/TAKEN: ICD-10-PCS | Mod: CPTII,S$GLB,, | Performed by: NURSE PRACTITIONER

## 2023-06-12 PROCEDURE — 93000 EKG 12-LEAD: ICD-10-PCS | Mod: S$GLB,,, | Performed by: INTERNAL MEDICINE

## 2023-06-12 RX ORDER — COLCHICINE 0.6 MG/1
0.6 TABLET ORAL 2 TIMES DAILY
Qty: 14 TABLET | Refills: 3 | Status: SHIPPED | OUTPATIENT
Start: 2023-06-12 | End: 2024-02-22

## 2023-06-12 NOTE — PROGRESS NOTES
Subjective:    Patient ID:  Josue Mir is a 47 y.o. male patient here for evaluation Follow-up      History of Present Illness:       Mr. Mir is here for his follow up visit. He recently had a cardiac ablation for AFIB with Dr. MURRIETA on 5/23/23. Post procedure had pain to groin and went to er for evaluation. No issues has healed. Today is in NSR. Denies CP or SOB. EKG shows st abnormality.No ischemic work up done.      Review of patient's allergies indicates:  No Known Allergies    Past Medical History:   Diagnosis Date    Anticoagulant long-term use     Atrial fibrillation     Gout     Gout, joint     Hypertension     Memory deficit following cerebral infarction     Pneumonia 04/2018    Sleep apnea with use of continuous positive airway pressure (CPAP)     Stroke 04/2018    Syncope and collapse      Past Surgical History:   Procedure Laterality Date    ABLATION OF ARRHYTHMOGENIC FOCUS FOR ATRIAL FIBRILLATION N/A 5/23/2023    Procedure: Ablation atrial fibrillation;  Surgeon: Abdullahi Murrieta MD;  Location: [facility] EP LAB;  Service: Cardiology;  Laterality: N/A;  AF, KIRILL(Cx if SR), PVI, RFA, CARTO, MAC, GP, 3 PREP* MDT ILR in situ*    INSERTION OF IMPLANTABLE LOOP RECORDER  10/25/2018    Procedure: Insertion, Implantable Loop Recorder;  Surgeon: Justin Colby MD;  Location: [facility] CATH;  Service: Cardiology;;    LAPAROSCOPIC CHOLECYSTECTOMY N/A 12/14/2018    Procedure: CHOLECYSTECTOMY, LAPAROSCOPIC;  Surgeon: Beck Love MD;  Location: [facility] OR;  Service: General;  Laterality: N/A;  Dr. Love requested case to start at 1:00pm    NASAL POLYP EXCISION      TRANSESOPHAGEAL ECHOCARDIOGRAPHY N/A 5/23/2023    Procedure: ECHOCARDIOGRAM, TRANSESOPHAGEAL;  Surgeon: Kaylin Cota MD;  Location: [facility] EP LAB;  Service: Cardiology;  Laterality: N/A;  AF, KIRILL(Cx if SR), PVI, RFA, CARTO, MAC, GP, 3 PREP* MDT ILR in situ*    TREATMENT OF CARDIAC ARRHYTHMIA N/A 10/25/2018    Procedure: CARDIOVERSION OR  DEFIBRILLATION;  Surgeon: Justin Colby MD;  [email or web address] CATH;  Service: Cardiology;  Laterality: N/A;    TREATMENT OF CARDIAC ARRHYTHMIA  5/23/2023    Procedure: Cardioversion or Defibrillation;  Surgeon: Abdullahi Daniels MD;  Location: [facility] EP LAB;  Service: Cardiology;;     Social History     Tobacco Use    Smoking status: Never    Smokeless tobacco: Never   Substance Use Topics    Alcohol use: Yes     Comment: rarely    Drug use: No        Review of Systems:    As noted in HPI in addition                 Objective        Vitals:    06/12/23 0818   BP: 130/76   Pulse: (!) 58       LIPIDS - LAST 2   Lab Results   Component Value Date    CHOL 191 10/04/2022    CHOL 193 03/16/2020    HDL 43 10/04/2022    HDL 41 03/16/2020    LDLCALC 133.0 10/04/2022    LDLCALC 132.4 03/16/2020    TRIG 75 10/04/2022    TRIG 98 03/16/2020    CHOLHDL 22.5 10/04/2022    CHOLHDL 21.2 03/16/2020       CBC - LAST 2  Lab Results   Component Value Date    WBC 9.24 05/19/2023    WBC 11.40 03/26/2023    RBC 5.95 05/19/2023    RBC 5.65 03/26/2023    HGB 15.7 05/19/2023    HGB 14.9 03/26/2023    HCT 50.1 05/19/2023    HCT 45.9 03/26/2023    MCV 84 05/19/2023    MCV 81 (L) 03/26/2023    MCH 26.4 (L) 05/19/2023    MCH 26.4 (L) 03/26/2023    MCHC 31.3 (L) 05/19/2023    MCHC 32.5 03/26/2023    RDW 15.3 (H) 05/19/2023    RDW 13.3 03/26/2023     05/19/2023     03/26/2023    MPV 10.1 05/19/2023    MPV 10.7 03/26/2023    GRAN 5.8 05/19/2023    GRAN 63.2 05/19/2023    LYMPH 2.1 05/19/2023    LYMPH 22.4 05/19/2023    MONO 0.8 05/19/2023    MONO 8.9 05/19/2023    BASO 0.03 05/19/2023    BASO 0.03 03/26/2023    NRBC 0 05/19/2023    NRBC 0 03/26/2023       CHEMISTRY & LIVER FUNCTION - LAST 2  Lab Results   Component Value Date     05/19/2023     03/29/2023    K 4.0 05/19/2023    K 4.1 03/29/2023     05/19/2023     03/29/2023    CO2 25 05/19/2023    CO2 24 03/29/2023    ANIONGAP 9 05/19/2023    ANIONGAP 11 03/29/2023     BUN 14 05/19/2023    BUN 19 03/29/2023    CREATININE 1.1 05/19/2023    CREATININE 1.1 03/29/2023    GLU 81 05/19/2023    GLU 95 03/29/2023    CALCIUM 9.1 05/19/2023    CALCIUM 9.8 03/29/2023    MG 2.2 03/24/2023    MG 2.1 03/23/2023    ALBUMIN 3.3 (L) 03/29/2023    ALBUMIN 3.1 (L) 03/26/2023    PROT 7.4 03/26/2023    PROT 7.4 03/25/2023    ALKPHOS 51 (L) 03/26/2023    ALKPHOS 56 03/25/2023    ALT 26 03/26/2023    ALT 19 03/25/2023    AST 16 03/26/2023    AST 14 03/25/2023    BILITOT 0.7 03/26/2023    BILITOT 1.0 03/25/2023        CARDIAC PROFILE - LAST 2  Lab Results   Component Value Date     (H) 03/22/2023    CPK 21 03/25/2023        COAGULATION - LAST 2  Lab Results   Component Value Date    INR 1.0 05/19/2023    INR 1.1 03/21/2023    APTT 29.2 05/19/2023       ENDOCRINE & PSA - LAST 2  Lab Results   Component Value Date    HGBA1C 5.4 10/04/2022    HGBA1C 5.4 03/16/2020    TSH 2.394 10/04/2022    TSH 0.223 (L) 03/16/2020        ECHOCARDIOGRAM RESULTS  Results for orders placed during the hospital encounter of 05/23/23    Transesophageal echo (KIRILL)    Interpretation Summary  · Normal appearing left atrial appendage. No thrombus is present in the appendage. Abnormal appendage velocities. contrast used.  · The estimated ejection fraction is 25%.  · There is severe left ventricular global hypokinesis.  · The left ventricle is normal in size with severely decreased systolic function.  · Normal right ventricular size with mildly reduced right ventricular systolic function.  · Mild mitral regurgitation.      CURRENT/PREVIOUS VISIT EKG  Results for orders placed or performed during the hospital encounter of 05/23/23   EKG 12-lead    Collection Time: 05/23/23 10:43 AM    Narrative    Test Reason : Z98.890,Z86.79,    Vent. Rate : 064 BPM     Atrial Rate : 064 BPM     P-R Int : 168 ms          QRS Dur : 098 ms      QT Int : 426 ms       P-R-T Axes : 021 002 182 degrees     QTc Int : 439 ms    Normal sinus  rhythm  ST and T wave abnormality, consider inferior ischemia  ST and T wave abnormality, consider anterolateral ischemia  Abnormal ECG  When compared with ECG of 23-MAY-2023 06:14,  Sinus rhythm has replaced Atrial fibrillation  The axis Shifted right  Confirmed by Pawan FRAZIER MD ([id number]) on 5/23/2023 1:40:57 PM    Referred By: AILYN MURRIETA           Confirmed By:Pawan FRAZIER MD       PHYSICAL EXAM  CONSTITUTIONAL: Well built, well nourished in no apparent distress  NECK: no carotid bruit, no JVD  LUNGS: CTA  CHEST WALL: no tenderness  HEART: regular rate and rhythm, S1, S2 normal, no murmur, click, rub or gallop   ABDOMEN: soft, non-tender; bowel sounds normal; no masses,  no organomegaly  EXTREMITIES: Extremities normal, no edema, no calf tenderness noted  NEURO: AAO X 3    I HAVE REVIEWED :    The vital signs, nurses notes, and all the pertinent radiology and labs.        Current Outpatient Medications   Medication Instructions    amiodarone (PACERONE) 200 MG Tab Take 2 tablets (400 mg total) by mouth 2 (two) times daily for 14 days, THEN 1 tablet (200 mg total) 2 (two) times daily.    apixaban (ELIQUIS) 5 mg, Oral, 2 times daily    colchicine (COLCRYS) 0.6 mg, Oral, 2 times daily    furosemide (LASIX) 20 mg, Oral, Daily PRN    metoprolol tartrate (LOPRESSOR) 100 mg, Oral, 2 times daily    pantoprazole (PROTONIX) 40 mg, Oral, Daily          Assessment & Plan     PAF (paroxysmal atrial fibrillation)  S/P Ablation- currently NSR  Continue current regimen per Dr. Murrieta's instruction    LV dysfunction  The left ventricle is mildly enlarged with moderately decreased systolic function.  The estimated ejection fraction is 40%.  Left ventricular diastolic dysfunction.  There is mild left ventricular global hypokinesis.  Normal right ventricular size with normal right ventricular systolic function.  Mild left atrial enlargement.  Mild tricuspid regurgitation.  Mild-to-moderate mitral regurgitation.    Nonspecific st  abnormality on EKG  S. Myoview  Normal central venous pressure (3 mmHg).  The estimated PA systolic pressure is 37 mmHg.     -Euvolemic on exam   Continue current regimen  -Patient had allergic reaction to lisinopril    Essential hypertension  BP stable continue the same    JASPAL (obstructive sleep apnea)  Chronic   Continue CPAP    C  Colchicine      Acute gout of left foot  Use colchicine  Now off of digoxin          No follow-ups on file.

## 2023-06-12 NOTE — ASSESSMENT & PLAN NOTE
 The left ventricle is mildly enlarged with moderately decreased systolic function.   The estimated ejection fraction is 40%.   Left ventricular diastolic dysfunction.   There is mild left ventricular global hypokinesis.   Normal right ventricular size with normal right ventricular systolic function.   Mild left atrial enlargement.   Mild tricuspid regurgitation.   Mild-to-moderate mitral regurgitation.    Nonspecific st abnormality on EKG  S. Myoview   Normal central venous pressure (3 mmHg).   The estimated PA systolic pressure is 37 mmHg.     -Euvolemic on exam   Continue current regimen  -Patient had allergic reaction to lisinopril

## 2023-06-26 PROBLEM — J96.01 ACUTE HYPOXEMIC RESPIRATORY FAILURE: Status: RESOLVED | Noted: 2023-03-22 | Resolved: 2023-06-26

## 2023-07-03 ENCOUNTER — PATIENT MESSAGE (OUTPATIENT)
Dept: GASTROENTEROLOGY | Facility: CLINIC | Age: 48
End: 2023-07-03
Payer: COMMERCIAL

## 2023-07-03 ENCOUNTER — OFFICE VISIT (OUTPATIENT)
Dept: FAMILY MEDICINE | Facility: CLINIC | Age: 48
End: 2023-07-03
Payer: COMMERCIAL

## 2023-07-03 VITALS
HEART RATE: 58 BPM | OXYGEN SATURATION: 96 % | TEMPERATURE: 98 F | WEIGHT: 266.31 LBS | DIASTOLIC BLOOD PRESSURE: 78 MMHG | HEIGHT: 69 IN | SYSTOLIC BLOOD PRESSURE: 128 MMHG | BODY MASS INDEX: 39.44 KG/M2

## 2023-07-03 DIAGNOSIS — I10 ESSENTIAL HYPERTENSION: ICD-10-CM

## 2023-07-03 DIAGNOSIS — Z12.11 COLON CANCER SCREENING: Primary | ICD-10-CM

## 2023-07-03 DIAGNOSIS — I48.0 PAROXYSMAL ATRIAL FIBRILLATION: ICD-10-CM

## 2023-07-03 DIAGNOSIS — M10.9 GOUT, UNSPECIFIED CAUSE, UNSPECIFIED CHRONICITY, UNSPECIFIED SITE: ICD-10-CM

## 2023-07-03 PROCEDURE — 1160F RVW MEDS BY RX/DR IN RCRD: CPT | Mod: CPTII,S$GLB,,

## 2023-07-03 PROCEDURE — 4010F PR ACE/ARB THEARPY RXD/TAKEN: ICD-10-PCS | Mod: CPTII,S$GLB,,

## 2023-07-03 PROCEDURE — 3078F PR MOST RECENT DIASTOLIC BLOOD PRESSURE < 80 MM HG: ICD-10-PCS | Mod: CPTII,S$GLB,,

## 2023-07-03 PROCEDURE — 3008F PR BODY MASS INDEX (BMI) DOCUMENTED: ICD-10-PCS | Mod: CPTII,S$GLB,,

## 2023-07-03 PROCEDURE — 99999 PR PBB SHADOW E&M-EST. PATIENT-LVL IV: ICD-10-PCS | Mod: PBBFAC,,,

## 2023-07-03 PROCEDURE — 3008F BODY MASS INDEX DOCD: CPT | Mod: CPTII,S$GLB,,

## 2023-07-03 PROCEDURE — 99214 PR OFFICE/OUTPT VISIT, EST, LEVL IV, 30-39 MIN: ICD-10-PCS | Mod: S$GLB,,,

## 2023-07-03 PROCEDURE — 1159F PR MEDICATION LIST DOCUMENTED IN MEDICAL RECORD: ICD-10-PCS | Mod: CPTII,S$GLB,,

## 2023-07-03 PROCEDURE — 1159F MED LIST DOCD IN RCRD: CPT | Mod: CPTII,S$GLB,,

## 2023-07-03 PROCEDURE — 99214 OFFICE O/P EST MOD 30 MIN: CPT | Mod: S$GLB,,,

## 2023-07-03 PROCEDURE — 4010F ACE/ARB THERAPY RXD/TAKEN: CPT | Mod: CPTII,S$GLB,,

## 2023-07-03 PROCEDURE — 3074F SYST BP LT 130 MM HG: CPT | Mod: CPTII,S$GLB,,

## 2023-07-03 PROCEDURE — 99999 PR PBB SHADOW E&M-EST. PATIENT-LVL IV: CPT | Mod: PBBFAC,,,

## 2023-07-03 PROCEDURE — 1160F PR REVIEW ALL MEDS BY PRESCRIBER/CLIN PHARMACIST DOCUMENTED: ICD-10-PCS | Mod: CPTII,S$GLB,,

## 2023-07-03 PROCEDURE — 3074F PR MOST RECENT SYSTOLIC BLOOD PRESSURE < 130 MM HG: ICD-10-PCS | Mod: CPTII,S$GLB,,

## 2023-07-03 PROCEDURE — 3078F DIAST BP <80 MM HG: CPT | Mod: CPTII,S$GLB,,

## 2023-07-03 RX ORDER — ALLOPURINOL 100 MG/1
TABLET ORAL
Qty: 180 TABLET | Refills: 0 | Status: SHIPPED | OUTPATIENT
Start: 2023-07-03 | End: 2023-09-29

## 2023-07-03 NOTE — PATIENT INSTRUCTIONS
Camilo Salas,     If you are due for any health screening(s) below please notify me so we can arrange them to be ordered and scheduled to maintain your health. Most healthy patients complete it. Don't lose out on improving your health.     Tests to Keep You Healthy    Colon Cancer Screening: ORDERED BUT NOT SCHEDULED  Last Blood Pressure <= 139/89 (7/3/2023): Yes         Colon Cancer Screening    Of cancers affecting both men and women, colorectal cancer is the third leading cancer killer in the [place]. But it doesnt have to be. Screening can prevent colorectal cancer or find it at an early stage when treatment often leads to a cure.    A colonoscopy is the preferred test for detecting colon cancer. It is needed only once every 10 years if results are negative. While sedated, a flexible, lighted tube with a tiny camera is inserted into the rectum and advanced through the colon to look for cancers. An alternative screening test that is used at home and returned to the lab may also be used. It detects hidden blood in bowel movements which could indicate cancer in the colon. If results are positive, you will need a colonoscopy to determine if the blood is a sign of cancer. This type of follow up (diagnostic) colonoscopy usually requires additional copays as required by your insurance provider. Please contact your PCP if you have any questions.

## 2023-07-03 NOTE — PROGRESS NOTES
Subjective:       Patient ID: Josue Mir is a 47 y.o. male.    Chief Complaint: No chief complaint on file.    Mr. Mir is a 47 year old male with PMH of a-fib, HTN, and CVA here for routine visit.. He is due for colonoscopy and labs. His last scheduled colonoscopy was canceled due to new finding of A-fib. He will reschedule colonoscopy. Pt has no health concerns at this time and is overall doing well.       He is having fairly frequent gout flares. Greater than 2 flares a year. Very painful and bothersome symptoms when he has flares. Previously on allopurinol. He's unsure of why it was stopped. Open to restarting. Will check uric acid levels.   Review of Systems   Constitutional:  Negative for activity change, appetite change and fever.   Respiratory:  Negative for cough, chest tightness and shortness of breath.    Cardiovascular:  Negative for chest pain, palpitations and leg swelling.   Gastrointestinal:  Negative for abdominal pain, constipation, diarrhea, nausea and vomiting.   Genitourinary:  Negative for dysuria, frequency, hematuria and urgency.   Musculoskeletal:  Negative for arthralgias.   Skin:  Negative for rash.   Neurological:  Negative for weakness and headaches.   Psychiatric/Behavioral:  Negative for confusion. The patient is not nervous/anxious.      Objective:      Physical Exam  Constitutional:       Appearance: Normal appearance.   HENT:      Head: Normocephalic and atraumatic.      Right Ear: Tympanic membrane normal.      Left Ear: Tympanic membrane normal.      Nose: Nose normal.      Mouth/Throat:      Mouth: Mucous membranes are moist.      Pharynx: Oropharynx is clear.   Cardiovascular:      Rate and Rhythm: Normal rate and regular rhythm.      Pulses: Normal pulses.      Heart sounds: Normal heart sounds.   Pulmonary:      Effort: Pulmonary effort is normal.      Breath sounds: Normal breath sounds.   Skin:     General: Skin is warm and dry.   Neurological:      General: No focal  deficit present.      Mental Status: He is alert and oriented to person, place, and time.   Psychiatric:         Mood and Affect: Mood normal.         Behavior: Behavior normal.       Assessment:       1. Colon cancer screening    2. Essential hypertension    3. Gout, unspecified cause, unspecified chronicity, unspecified site    4. Paroxysmal atrial fibrillation        Plan:       Diagnoses and all orders for this visit:    Colon cancer screening  -     Case Request Endoscopy: COLONOSCOPY    Essential hypertension  -     Lipid Panel; Future        -    Stable. Continue meds. Will continue to monitor.     Gout, unspecified cause, unspecified chronicity, unspecified site  -     allopurinoL (ZYLOPRIM) 100 MG tablet; Take 1 tablet (100 mg total) by mouth once daily for 30 days, THEN 2 tablets (200 mg total) once daily for 30 days, THEN 3 tablets (300 mg total) once daily.  -     URIC ACID; Future    Paroxysmal atrial fibrillation        -    Continue meds. Working with cardiology to prescribe alternative covered by insurance or get it covered. Will continue to monitor.        6 months [employer] Jordan PA-S2  [employer] Physician Assistant Student      SUJATHA Tariq-C  Family Medicine Physician Assistant        I spent a total of 20 minutes on the day of the visit.This includes face to face time and non-face to face time preparing to see the patient (eg, review of tests), obtaining and/or reviewing separately obtained history, documenting clinical information in the electronic or other health record, independently interpreting results and communicating results to the patient/family/caregiver, or care coordinator.

## 2023-07-12 ENCOUNTER — TELEPHONE (OUTPATIENT)
Dept: CARDIOLOGY | Facility: HOSPITAL | Age: 48
End: 2023-07-12

## 2023-07-12 NOTE — TELEPHONE ENCOUNTER
Left message on voicemail.     Patient advised, test will be at [facility] ([address]   Will need to register on the first floor at the main entrance.   Patient advised that arrival time is 8:45am.  Patient advised that he may be here about 2.5-3 hours, and may want to bring something to occupy their time, as there will be periods of waiting.    Patient advised, may take his medications prior to testing if you need to.  Patient should HOLD Metoprolol. Advised if he needs to eat to take his medications, please keep it light, like toast and juice.    Patient advised to avoid all caffeine 12 hours prior to testing.  This includes decaf tea and coffee.    Will provide peanut butter crackers for a snack after stress test.  If patient would prefer something else, please bring a snack from home.    Wear comfortable clothing.   No lotions, oils, or powders to the upper chest area. May wear deodorant.    No metal jewelry, buttons, or zippers to the upper body.  Advised to call the office if any questions.     Will send instructions via [employer]ERT[employer] as well.

## 2023-07-13 ENCOUNTER — HOSPITAL ENCOUNTER (OUTPATIENT)
Dept: CARDIOLOGY | Facility: HOSPITAL | Age: 48
Discharge: HOME | End: 2023-07-13
Attending: NURSE PRACTITIONER
Payer: COMMERCIAL

## 2023-07-13 ENCOUNTER — HOSPITAL ENCOUNTER (OUTPATIENT)
Dept: RADIOLOGY | Facility: HOSPITAL | Age: 48
Discharge: HOME | End: 2023-07-13
Attending: NURSE PRACTITIONER
Payer: COMMERCIAL

## 2023-07-13 VITALS — SYSTOLIC BLOOD PRESSURE: 160 MMHG | DIASTOLIC BLOOD PRESSURE: 110 MMHG

## 2023-07-13 DIAGNOSIS — I48.0 PAF (PAROXYSMAL ATRIAL FIBRILLATION): ICD-10-CM

## 2023-07-13 PROCEDURE — 93016 CV STRESS TEST SUPVJ ONLY: CPT | Mod: ,,, | Performed by: NURSE PRACTITIONER

## 2023-07-13 PROCEDURE — 93018 CV STRESS TEST I&R ONLY: CPT | Mod: ,,, | Performed by: INTERNAL MEDICINE

## 2023-07-13 PROCEDURE — 78452 NUCLEAR STRESS - CARDIOLOGY INTERPRETED (CUPID ONLY): ICD-10-PCS | Mod: 26,,, | Performed by: INTERNAL MEDICINE

## 2023-07-13 PROCEDURE — 93016 NUCLEAR STRESS - CARDIOLOGY INTERPRETED (CUPID ONLY): ICD-10-PCS | Mod: ,,, | Performed by: NURSE PRACTITIONER

## 2023-07-13 PROCEDURE — A9502 TC99M TETROFOSMIN: HCPCS

## 2023-07-13 PROCEDURE — 78452 HT MUSCLE IMAGE SPECT MULT: CPT

## 2023-07-13 PROCEDURE — 93018 NUCLEAR STRESS - CARDIOLOGY INTERPRETED (CUPID ONLY): ICD-10-PCS | Mod: ,,, | Performed by: INTERNAL MEDICINE

## 2023-07-13 PROCEDURE — 25000003 PHARM REV CODE 250: Performed by: NURSE PRACTITIONER

## 2023-07-13 PROCEDURE — 78452 HT MUSCLE IMAGE SPECT MULT: CPT | Mod: 26,,, | Performed by: INTERNAL MEDICINE

## 2023-07-13 RX ORDER — AMLODIPINE BESYLATE 5 MG/1
5 TABLET ORAL ONCE
Status: COMPLETED | OUTPATIENT
Start: 2023-07-13 | End: 2023-07-13

## 2023-07-13 RX ADMIN — AMLODIPINE BESYLATE 5 MG: 5 TABLET ORAL at 09:07

## 2023-07-14 ENCOUNTER — HOSPITAL ENCOUNTER (OUTPATIENT)
Dept: RADIOLOGY | Facility: HOSPITAL | Age: 48
Discharge: HOME | End: 2023-07-14
Attending: NURSE PRACTITIONER
Payer: COMMERCIAL

## 2023-07-14 LAB
CV STRESS BASE HR: 59 BPM
DIASTOLIC BLOOD PRESSURE: 98 MMHG
EJECTION FRACTION- HIGH: 65 %
END DIASTOLIC INDEX-HIGH: 153 ML/M2
END DIASTOLIC INDEX-LOW: 93 ML/M2
END SYSTOLIC INDEX-HIGH: 71 ML/M2
END SYSTOLIC INDEX-LOW: 31 ML/M2
NUC REST DIASTOLIC VOLUME INDEX: 194
NUC REST EJECTION FRACTION: 39
NUC REST SYSTOLIC VOLUME INDEX: 118
NUC STRESS DIASTOLIC VOLUME INDEX: 180
NUC STRESS EJECTION FRACTION: 33 %
NUC STRESS SYSTOLIC VOLUME INDEX: 120
OHS CV CPX 1 MINUTE RECOVERY HEART RATE: 123 BPM
OHS CV CPX 85 PERCENT MAX PREDICTED HEART RATE MALE: 149
OHS CV CPX ESTIMATED METS: 10
OHS CV CPX MAX PREDICTED HEART RATE: 175
OHS CV CPX PATIENT IS FEMALE: 0
OHS CV CPX PATIENT IS MALE: 1
OHS CV CPX PEAK DIASTOLIC BLOOD PRESSURE: 110 MMHG
OHS CV CPX PEAK HEAR RATE: 150 BPM
OHS CV CPX PEAK RATE PRESSURE PRODUCT: NORMAL
OHS CV CPX PEAK SYSTOLIC BLOOD PRESSURE: 194 MMHG
OHS CV CPX PERCENT MAX PREDICTED HEART RATE ACHIEVED: 86
OHS CV CPX RATE PRESSURE PRODUCT PRESENTING: 9086
RETIRED EF AND QEF - SEE NOTES: 53 %
STRESS ECHO POST EXERCISE DUR MIN: 7 MINUTES
STRESS ECHO POST EXERCISE DUR SEC: 40 SECONDS
SYSTOLIC BLOOD PRESSURE: 154 MMHG

## 2023-08-11 ENCOUNTER — OFFICE VISIT (OUTPATIENT)
Dept: PSYCHIATRY | Facility: CLINIC | Age: 48
End: 2023-08-11
Payer: COMMERCIAL

## 2023-08-11 DIAGNOSIS — F19.20 ADDICTION: ICD-10-CM

## 2023-08-11 DIAGNOSIS — F33.1 MODERATE EPISODE OF RECURRENT MAJOR DEPRESSIVE DISORDER: Primary | ICD-10-CM

## 2023-08-11 PROCEDURE — 99999 PR PBB SHADOW E&M-EST. PATIENT-LVL I: ICD-10-PCS | Mod: PBBFAC,,, | Performed by: SOCIAL WORKER

## 2023-08-11 PROCEDURE — 99999 PR PBB SHADOW E&M-EST. PATIENT-LVL I: CPT | Mod: PBBFAC,,, | Performed by: SOCIAL WORKER

## 2023-08-11 PROCEDURE — 90837 PR PSYCHOTHERAPY W/PATIENT, 60 MIN: ICD-10-PCS | Mod: S$GLB,,, | Performed by: SOCIAL WORKER

## 2023-08-11 PROCEDURE — 90837 PSYTX W PT 60 MINUTES: CPT | Mod: S$GLB,,, | Performed by: SOCIAL WORKER

## 2023-08-11 PROCEDURE — 4010F PR ACE/ARB THEARPY RXD/TAKEN: ICD-10-PCS | Mod: CPTII,S$GLB,, | Performed by: SOCIAL WORKER

## 2023-08-11 PROCEDURE — 4010F ACE/ARB THERAPY RXD/TAKEN: CPT | Mod: CPTII,S$GLB,, | Performed by: SOCIAL WORKER

## 2023-08-14 ENCOUNTER — PATIENT MESSAGE (OUTPATIENT)
Dept: ADMINISTRATIVE | Facility: HOSPITAL | Age: 48
End: 2023-08-14
Payer: COMMERCIAL

## 2023-08-14 NOTE — PROGRESS NOTES
Individual Psychotherapy (PhD/LCSW)    8/11/2023    Site:  [place]         Therapeutic Intervention: Met with patient and 2 teenaged daughters .  Outpatient - Insight oriented psychotherapy 60 min - CPT code 10886, Outpatient - Behavior modifying psychotherapy 60 min - CPT code 91134, and Outpatient - Supportive psychotherapy 60 min - CPT Code 07993    Chief complaint/reason for encounter: depression, anxiety, and addiction             Interval history and content of current session:   Ct was referred to treatment by his psychologist for depression and anxiety. He was also encouraged to come to therapy for porn addiction at the request of his wife. Ct arrived to session on time and was fully engaged. Ct brought his 2 daughters to session. Ct shared that not much has changed. Ct's daughters shared that they came to the session to share their perspective. SW provided psycho ed to family regarding depression and addiction. SW provided ct with resources( [facility])  and offered a referral to psych for med mgt. Ct was receptive and stated that he would like to see a prescriber for med mgt. Ct to continue in 1:1 sessions.       Treatment plan:  Target symptoms: depression, anxiety , addiction  Why chosen therapy is appropriate versus another modality: relevant to diagnosis, patient responds to this modality, evidence based practice  Outcome monitoring methods: self-report  Therapeutic intervention type: insight oriented psychotherapy, behavior modifying psychotherapy, supportive psychotherapy    Risk parameters:  Patient reports no suicidal ideation  Patient reports no homicidal ideation  Patient reports no self-injurious behavior  Patient reports no violent behavior    CSSRS was completed: No risk    Mental Status Exam:  General Appearance:  unremarkable, age appropriate   Speech: normal tone, normal rate, normal pitch, normal volume      Level of Cooperation: cooperative      Thought Processes: normal and logical   Mood:  steady      Thought Content: normal, no suicidality, no homicidality, delusions, or paranoia   Affect: congruent and appropriate   Orientation: Oriented x3   Memory: recent >  intact   Attention Span & Concentration: intact   Fund of General Knowledge: intact and appropriate to age and level of education   Abstract Reasoning: interpretation of similarities was abstract   Judgment & Insight: intact     Language intact       Verbal deficits: None    Patient's response to intervention:  The patient's response to intervention is accepting.    Progress toward goals and other mental status changes:  The patient's progress toward goals is fair , limited.    Diagnosis:     ICD-10-CM ICD-9-CM   1. Moderate episode of recurrent major depressive disorder  F33.1 296.32   2. Addiction  F19.20 [id number]       Plan:  individual psychotherapy and ct to follow up with referral for psych med mgt  Pt to go to ED or call 911 if symptoms worsen or if he has thoughts of harming self and/or others. Pt verbalized understanding.    Return to clinic: as scheduled    Length of Service (minutes): 60      A portion of this note was created using [employer] voice recognition software that occasionally misinterprets phrases or words.    Each patient to whom he or she provides medical services by telemedicine is: (1) informed of the relationship between the physician and patient and the respective role of any other health care provider with respect to management of the patient; and (2) notified that he or she may decline to receive medical services by telemedicine and may withdraw from such care at any time.

## 2023-08-15 ENCOUNTER — TELEPHONE (OUTPATIENT)
Dept: PSYCHIATRY | Facility: CLINIC | Age: 48
End: 2023-08-15
Payer: COMMERCIAL

## 2023-08-17 ENCOUNTER — OFFICE VISIT (OUTPATIENT)
Dept: PSYCHIATRY | Facility: CLINIC | Age: 48
End: 2023-08-17
Payer: COMMERCIAL

## 2023-08-17 VITALS
SYSTOLIC BLOOD PRESSURE: 189 MMHG | HEART RATE: 42 BPM | BODY MASS INDEX: 38.52 KG/M2 | HEIGHT: 69 IN | DIASTOLIC BLOOD PRESSURE: 109 MMHG | WEIGHT: 260.06 LBS

## 2023-08-17 DIAGNOSIS — F41.9 ANXIETY DISORDER, UNSPECIFIED TYPE: ICD-10-CM

## 2023-08-17 DIAGNOSIS — F33.0 MAJOR DEPRESSIVE DISORDER, RECURRENT, MILD: Primary | ICD-10-CM

## 2023-08-17 DIAGNOSIS — F19.20 ADDICTION: ICD-10-CM

## 2023-08-17 PROCEDURE — 3008F PR BODY MASS INDEX (BMI) DOCUMENTED: ICD-10-PCS | Mod: CPTII,S$GLB,, | Performed by: PSYCHOLOGIST

## 2023-08-17 PROCEDURE — 3080F PR MOST RECENT DIASTOLIC BLOOD PRESSURE >= 90 MM HG: ICD-10-PCS | Mod: CPTII,S$GLB,, | Performed by: PSYCHOLOGIST

## 2023-08-17 PROCEDURE — 1159F MED LIST DOCD IN RCRD: CPT | Mod: CPTII,S$GLB,, | Performed by: PSYCHOLOGIST

## 2023-08-17 PROCEDURE — 1159F PR MEDICATION LIST DOCUMENTED IN MEDICAL RECORD: ICD-10-PCS | Mod: CPTII,S$GLB,, | Performed by: PSYCHOLOGIST

## 2023-08-17 PROCEDURE — 90792 PR PSYCHIATRIC DIAGNOSTIC EVALUATION W/MEDICAL SERVICES: ICD-10-PCS | Mod: S$GLB,,, | Performed by: PSYCHOLOGIST

## 2023-08-17 PROCEDURE — 99999 PR PBB SHADOW E&M-EST. PATIENT-LVL III: CPT | Mod: PBBFAC,,, | Performed by: PSYCHOLOGIST

## 2023-08-17 PROCEDURE — 3008F BODY MASS INDEX DOCD: CPT | Mod: CPTII,S$GLB,, | Performed by: PSYCHOLOGIST

## 2023-08-17 PROCEDURE — 90792 PSYCH DIAG EVAL W/MED SRVCS: CPT | Mod: S$GLB,,, | Performed by: PSYCHOLOGIST

## 2023-08-17 PROCEDURE — 99999 PR PBB SHADOW E&M-EST. PATIENT-LVL III: ICD-10-PCS | Mod: PBBFAC,,, | Performed by: PSYCHOLOGIST

## 2023-08-17 PROCEDURE — 3077F PR MOST RECENT SYSTOLIC BLOOD PRESSURE >= 140 MM HG: ICD-10-PCS | Mod: CPTII,S$GLB,, | Performed by: PSYCHOLOGIST

## 2023-08-17 PROCEDURE — 3080F DIAST BP >= 90 MM HG: CPT | Mod: CPTII,S$GLB,, | Performed by: PSYCHOLOGIST

## 2023-08-17 PROCEDURE — 4010F PR ACE/ARB THEARPY RXD/TAKEN: ICD-10-PCS | Mod: CPTII,S$GLB,, | Performed by: PSYCHOLOGIST

## 2023-08-17 PROCEDURE — 3077F SYST BP >= 140 MM HG: CPT | Mod: CPTII,S$GLB,, | Performed by: PSYCHOLOGIST

## 2023-08-17 PROCEDURE — 4010F ACE/ARB THERAPY RXD/TAKEN: CPT | Mod: CPTII,S$GLB,, | Performed by: PSYCHOLOGIST

## 2023-08-17 RX ORDER — SERTRALINE HYDROCHLORIDE 25 MG/1
25 TABLET, FILM COATED ORAL DAILY
Qty: 30 TABLET | Refills: 1 | Status: SHIPPED | OUTPATIENT
Start: 2023-08-17 | End: 2023-09-28 | Stop reason: ALTCHOICE

## 2023-08-17 NOTE — PROGRESS NOTES
"Outpatient Psychiatric Initial Visit  08/17/2023     ID:   Patient presents for an initial evaluation.      Reason for encounter: Referral from Dr. Quach and Yola Hinson LCSW     Chief Complaint: mood    History of Presenting Illness:  Pt reported a history of right frontal intracerebral hemorrhage in 2018. Likely due to due to hypertension per chart review. Initially presented with left sided hemiparesis. Was worked up by neuro and evaluated by neuropsychology (see chart). Evaluated suggests impact on memory, mood, and motivation likely causing termination from job. Pt noted that he has not been the same person since then. Stated that he is not as engaged with wife and children. Reported that he all he does is "work, eat, and sleep." Pt noted some difficulty with articulating thought and emotional blunting. Pt also described increased impulsivity with eating and pornography. Pt stated that his consumption of pornography has resulted in significant problems in marriage. Pt admits to hiding porn from wife with wife searching through phone history to "catch him." Pt stated that this occurs about 1x per day. Noted that he denies when asked by wife. Has impacted trust in the relationship. Pt has been in counseling with Yola Hinson LCSW for about 1 year.     Depression symptoms: depressed mood, anhedonia, decreased motivation, lethargy, poor appetite, feeling bad about self     Anxiety symptoms: Nonproductive worry, difficulty relaxing, feeling in edge. Pt denied symptoms consistent with OCD, panic, phobias, or social anxiety.     Lauryn/Hypomania Symptoms: Pt denied current or history of related symptoms.     Psychosis Symptoms: Pt denied current or history of related symptoms.    Attention/Concentration Symptoms: Pt denied current or history of related symptoms.    Disordered Eating/Body Image Concerns: Pt denied current or history of related symptoms.    Suicidal Ideation and Risk: Pt denied current or history of " related symptoms.    Homicidal/Violent Ideation and Risk: Pt denied current or history of related symptoms.    Criminal History: Pt denied.    Prior Psychiatric Treatment/Hospitalizations: Yola Hinson for therapy    Current psychiatric medication: Pt denied    Prior psychiatric medication trials: fluoxetine    Current Medical Conditions Per Chart Review:   Patient Active Problem List   Diagnosis    Acute pancreatitis    PAF (paroxysmal atrial fibrillation)    Essential hypertension    JASPAL (obstructive sleep apnea)    Acute epigastric pain    Left hemiparesis    CVA (cerebral vascular accident)    History of cerebral hemorrhage    Long term (current) use of anticoagulants    Acute gout of left foot    Cognitive complaints    Apathy    Adjustment disorder with depressed mood    Atrial fibrillation with RVR    Acute on chronic combined systolic and diastolic heart failure    LV dysfunction      Family Psychiatric History:  Pt denied    Alcohol Use: Pt reported minimal, infrequent alcohol use and denied a history of problematic drinking.    Tobacco and Drug Use: Pt denied tobacco or drug use.      Trauma history:  Pt denied     Mental Status Exam      Physical Exam  Constitutional:       Appearance: He is obese.   Neurological:      Mental Status: He is alert.   Psychiatric:         Attention and Perception: Perception normal.         Mood and Affect: Mood is depressed. Affect is blunt.         Speech: Speech normal.         Behavior: Behavior normal. Behavior is cooperative.         Thought Content: Thought content normal.         Cognition and Memory: Cognition normal. Memory is impaired.         Judgment: Judgment normal.          Current Evaluation:  Nutritional Screening:  Considering the patient's height and weight, medications, medical history and preferences, should a referral be made to the dietitian? No  Vitals: most recent vitals signs, dated greater than 90 days prior to this appointment, were  reviewed  General: age appropriate, well nourished, casually dressed, neatly groomed  MSK: muscle strength/tone : no tremor or abnormal movements. Gait/Station: no ataxic, steady    Clinical Assessment : Pt is a 46 y/o male presenting with depression, anxiety, and concerns related to pornography. Pt reports that these symptoms started after right frontal intracerebral hemorrhage in 2018. It appears that the CVA impacted much of life and continues to report lingering symptoms. Pt did discuss increased impulsivity as a result, identifying food and pornography as the two examples. However, this seems to be contradicted by other statements indicating that his frequent pornography usage has been consistent since his teenage years. Pt has been engaged in therapy with Yola Hinson and was encouraged to continue exploring this topic in therapy. Will start a trial of sertraline for mood and anxiety concerns and monitor results.     Summary     Diagnosis(es):   1) Major Depressive Disorder, recurrent, mild  2) Anxiety Disorder    Plan      Goal #1: Improve mood  Goal #2: Reduce anxiety    Pt is to start a trial of sertraline 25 mg.    Treatment plan and medication changes will be coordinated and consulted with PCP, Dr Quach on 8/17 for new medication. All general medical concerns will be managed by the PCP.     This author reviewed limits to confidentiality and this author's collaboration with pt's physician. Pt indicated understanding and denied any questions.    Return to Clinic: 6 weeks    -Call to report any worsening of symptoms or problems associated with medication  - Pt instructed to go to ER if thoughts of harming self or others arise     -Supportive therapy and psychoeducation provided  -R/B/SE's of medications discussed with the pt who expresses understanding and chooses to take medications as prescribed.   -Pt instructed to call clinic, 911 or go to nearest emergency room if sxs worsen or pt is in   crisis. The  pt expresses understanding.    Antidepressant/Antianxiety Medication Initiation:  Patient informed of risks, benefits, and potential side effects of medication and accepts informed consent.  Common side effects include nausea, fatigue, headache, insomnia., Specifically discussed the possibility of new or worsening suicidal thoughts/depression.  Patient instructed to stop the medication immediately and seek urgent treatment if this occurs. Patient instructed not to abruptly discontinue medication without physician guidance except in cases of sudden onset or worsening of SI.

## 2023-08-21 ENCOUNTER — PATIENT OUTREACH (OUTPATIENT)
Dept: ADMINISTRATIVE | Facility: HOSPITAL | Age: 48
End: 2023-08-21
Payer: COMMERCIAL

## 2023-08-21 DIAGNOSIS — Z12.11 COLON CANCER SCREENING: Primary | ICD-10-CM

## 2023-08-21 NOTE — PROGRESS NOTES
Campaigns outreach-Portal message sent out regarding pt's overdue Colon Cancer screening.       GI referral placed.

## 2023-08-28 ENCOUNTER — TELEPHONE (OUTPATIENT)
Dept: CARDIOLOGY | Facility: CLINIC | Age: 48
End: 2023-08-28

## 2023-08-28 ENCOUNTER — PATIENT MESSAGE (OUTPATIENT)
Dept: GASTROENTEROLOGY | Facility: CLINIC | Age: 48
End: 2023-08-28
Payer: COMMERCIAL

## 2023-08-28 NOTE — TELEPHONE ENCOUNTER
----- Message from Camryn Quevedo LPN sent at 8/28/2023  3:55 PM CDT -----  Regarding: BLOOD THINNER CLEARANCE  Hi, Mr. Mir is scheduled for a colonoscopy on 11/10 with Dr. Sprague. We are requesting clearance to hold his Eliquis 2 days prior. Please advise.     Sincerely,  ASHANTI Cowan

## 2023-08-28 NOTE — LETTER
2023    Josue Mir  [address]  [place] [address]             [facility] [place]  [address]  [place] [address]  Phone: [phone]  Fax: [phone] Patient: Josue Mir  : 1975  Referring Doctor: Dr. Sprague  Procedure: Colonoscopy    Current Outpatient Medications   Medication Sig    allopurinoL (ZYLOPRIM) 100 MG tablet Take 1 tablet (100 mg total) by mouth once daily for 30 days, THEN 2 tablets (200 mg total) once daily for 30 days, THEN 3 tablets (300 mg total) once daily.    amiodarone (PACERONE) 200 MG Tab Take 2 tablets (400 mg total) by mouth 2 (two) times daily for 14 days, THEN 1 tablet (200 mg total) 2 (two) times daily.    apixaban (ELIQUIS) 5 mg Tab Take 1 tablet (5 mg total) by mouth 2 (two) times daily.    colchicine (COLCRYS) 0.6 mg tablet Take 1 tablet (0.6 mg total) by mouth 2 (two) times daily.    furosemide (LASIX) 20 MG tablet Take 1 tablet (20 mg total) by mouth daily as needed (shortness of breath or leg swelling).    metoprolol tartrate (LOPRESSOR) 100 MG tablet Take 1 tablet (100 mg total) by mouth 2 (two) times daily.    pantoprazole (PROTONIX) 40 MG tablet Take 1 tablet (40 mg total) by mouth once daily.    sertraline (ZOLOFT) 25 MG tablet Take 1 tablet (25 mg total) by mouth once daily.     No current facility-administered medications for this visit.       This patient has been assessed for risk factors for clearance of surgery with the following stipulations:    ___ No contraindications  _X__ Recommendations for Eliquis, hold x _2_ days   ___ Low risk _X_ Moderate risk __ High risk        If you have any questions regarding the above, please contact my office at [phone].    Sincerely,    Kerry Nicole NP           2. The status of comorbities. (See ED/admit documents)

## 2023-08-30 ENCOUNTER — PATIENT MESSAGE (OUTPATIENT)
Dept: GASTROENTEROLOGY | Facility: CLINIC | Age: 48
End: 2023-08-30
Payer: COMMERCIAL

## 2023-09-04 NOTE — PROGRESS NOTES
Subjective:     HPI    I had the pleasure of seeing Josue Mir in follow-up for his history of AF. He is a 47M with HTN, JASPAL on CPAP, previous hemorrhagic CVA (4/2018, manifesting as loss of consciousness, no thrombolytics administered, in medically induced coma for 30 days, ultimately attributed to HTN), who was diagnosed with AF in 10/2018 after giving blood and then passing out while exiting the lab. Hospitalized at [facility], and underwent KIRILL/DCCV in 10/2018. Was well until 3/2023 when he was incidentally noted to be in AF with RVR before a screening colonoscopy. Admitted to [facility], where echo showed EF 40%, mild-mod MR, mild LAE. Rate controlled, and discharged on eliquis, digoxin 250 ug daily, metoprolol 100 mg bid.    PVI performed on 5/23/2023. Discharged on amiodarone and eliquis.    Today in the office Mr. Mir feels well, no complaints.    Pulse regular in the office today.    Review of Systems   Constitutional: Positive for malaise/fatigue. Negative for decreased appetite, weight gain and weight loss.   HENT:  Negative for sore throat.    Eyes:  Negative for blurred vision.   Cardiovascular:  Negative for chest pain, dyspnea on exertion, irregular heartbeat, leg swelling, near-syncope, orthopnea, palpitations, paroxysmal nocturnal dyspnea and syncope.   Respiratory:  Negative for shortness of breath.    Skin:  Negative for rash.   Musculoskeletal:  Negative for arthritis.   Gastrointestinal:  Negative for abdominal pain.   Neurological:  Negative for focal weakness.   Psychiatric/Behavioral:  Negative for altered mental status.        Objective:   Physical Exam  Vitals and nursing note reviewed.   Constitutional:       General: He is not in acute distress.     Appearance: He is well-developed.   HENT:      Head: Normocephalic and atraumatic.   Eyes:      General: No scleral icterus.     Pupils: Pupils are equal, round, and reactive to light.   Neck:      Thyroid: No thyromegaly.   Cardiovascular:       Rate and Rhythm: Rhythm irregular.      Pulses: Normal pulses.      Heart sounds: Normal heart sounds. No murmur heard.     No friction rub. No gallop.   Pulmonary:      Effort: Pulmonary effort is normal.      Breath sounds: Normal breath sounds.   Abdominal:      General: Bowel sounds are normal. There is no distension.      Palpations: Abdomen is soft.      Tenderness: There is no abdominal tenderness.   Musculoskeletal:      Cervical back: Neck supple.   Skin:     General: Skin is warm and dry.      Findings: No rash.   Neurological:      Mental Status: He is alert and oriented to person, place, and time.   Psychiatric:         Behavior: Behavior normal.         Assessment:      1. PAF (paroxysmal atrial fibrillation)    2. Atrial fibrillation with RVR    3. LV dysfunction    4. Long term (current) use of anticoagulants    5. Left hemiparesis        Plan:     In summary, Josue Mir is a 47M with a history of persistent AF. His [id number] Score is 3 (CVA, HTN) so he should remain on anticoagulation.    Mr. Mir is 3 months status-post PVI. Maintaining sinus rhythm on amiodarone.    Plan is to decrease amiodarone to 100 mg daily, follow-up 6 months.    Thank you for allowing me to participate in the care of this patient. Please do not hesitate to call me with any questions or concerns.

## 2023-09-06 ENCOUNTER — OFFICE VISIT (OUTPATIENT)
Dept: CARDIOLOGY | Facility: CLINIC | Age: 48
End: 2023-09-06
Payer: COMMERCIAL

## 2023-09-06 VITALS
RESPIRATION RATE: 16 BRPM | WEIGHT: 258.94 LBS | SYSTOLIC BLOOD PRESSURE: 132 MMHG | OXYGEN SATURATION: 96 % | HEIGHT: 69 IN | BODY MASS INDEX: 38.35 KG/M2 | HEART RATE: 63 BPM | DIASTOLIC BLOOD PRESSURE: 76 MMHG

## 2023-09-06 DIAGNOSIS — I48.0 PAF (PAROXYSMAL ATRIAL FIBRILLATION): Primary | ICD-10-CM

## 2023-09-06 DIAGNOSIS — I48.91 ATRIAL FIBRILLATION WITH RVR: ICD-10-CM

## 2023-09-06 DIAGNOSIS — Z79.01 LONG TERM (CURRENT) USE OF ANTICOAGULANTS: ICD-10-CM

## 2023-09-06 DIAGNOSIS — I51.9 LV DYSFUNCTION: ICD-10-CM

## 2023-09-06 DIAGNOSIS — G81.94 LEFT HEMIPARESIS: ICD-10-CM

## 2023-09-06 PROCEDURE — 93010 ELECTROCARDIOGRAM REPORT: CPT | Mod: S$GLB,,, | Performed by: INTERNAL MEDICINE

## 2023-09-06 PROCEDURE — 4010F PR ACE/ARB THEARPY RXD/TAKEN: ICD-10-PCS | Mod: CPTII,S$GLB,, | Performed by: INTERNAL MEDICINE

## 2023-09-06 PROCEDURE — 1159F PR MEDICATION LIST DOCUMENTED IN MEDICAL RECORD: ICD-10-PCS | Mod: CPTII,S$GLB,, | Performed by: INTERNAL MEDICINE

## 2023-09-06 PROCEDURE — 3078F PR MOST RECENT DIASTOLIC BLOOD PRESSURE < 80 MM HG: ICD-10-PCS | Mod: CPTII,S$GLB,, | Performed by: INTERNAL MEDICINE

## 2023-09-06 PROCEDURE — 3075F PR MOST RECENT SYSTOLIC BLOOD PRESS GE 130-139MM HG: ICD-10-PCS | Mod: CPTII,S$GLB,, | Performed by: INTERNAL MEDICINE

## 2023-09-06 PROCEDURE — 93005 EKG 12-LEAD: ICD-10-PCS | Mod: S$GLB,,, | Performed by: INTERNAL MEDICINE

## 2023-09-06 PROCEDURE — 3075F SYST BP GE 130 - 139MM HG: CPT | Mod: CPTII,S$GLB,, | Performed by: INTERNAL MEDICINE

## 2023-09-06 PROCEDURE — 93005 ELECTROCARDIOGRAM TRACING: CPT | Mod: S$GLB,,, | Performed by: INTERNAL MEDICINE

## 2023-09-06 PROCEDURE — 3008F PR BODY MASS INDEX (BMI) DOCUMENTED: ICD-10-PCS | Mod: CPTII,S$GLB,, | Performed by: INTERNAL MEDICINE

## 2023-09-06 PROCEDURE — 99214 PR OFFICE/OUTPT VISIT, EST, LEVL IV, 30-39 MIN: ICD-10-PCS | Mod: S$GLB,,, | Performed by: INTERNAL MEDICINE

## 2023-09-06 PROCEDURE — 3008F BODY MASS INDEX DOCD: CPT | Mod: CPTII,S$GLB,, | Performed by: INTERNAL MEDICINE

## 2023-09-06 PROCEDURE — 1160F PR REVIEW ALL MEDS BY PRESCRIBER/CLIN PHARMACIST DOCUMENTED: ICD-10-PCS | Mod: CPTII,S$GLB,, | Performed by: INTERNAL MEDICINE

## 2023-09-06 PROCEDURE — 4010F ACE/ARB THERAPY RXD/TAKEN: CPT | Mod: CPTII,S$GLB,, | Performed by: INTERNAL MEDICINE

## 2023-09-06 PROCEDURE — 99214 OFFICE O/P EST MOD 30 MIN: CPT | Mod: S$GLB,,, | Performed by: INTERNAL MEDICINE

## 2023-09-06 PROCEDURE — 1159F MED LIST DOCD IN RCRD: CPT | Mod: CPTII,S$GLB,, | Performed by: INTERNAL MEDICINE

## 2023-09-06 PROCEDURE — 1160F RVW MEDS BY RX/DR IN RCRD: CPT | Mod: CPTII,S$GLB,, | Performed by: INTERNAL MEDICINE

## 2023-09-06 PROCEDURE — 93010 EKG 12-LEAD: ICD-10-PCS | Mod: S$GLB,,, | Performed by: INTERNAL MEDICINE

## 2023-09-06 PROCEDURE — 3078F DIAST BP <80 MM HG: CPT | Mod: CPTII,S$GLB,, | Performed by: INTERNAL MEDICINE

## 2023-09-06 RX ORDER — AMIODARONE HYDROCHLORIDE 200 MG/1
100 TABLET ORAL DAILY
Qty: 90 TABLET | Refills: 3 | Status: SHIPPED | OUTPATIENT
Start: 2023-09-06 | End: 2024-03-20

## 2023-09-27 NOTE — PROGRESS NOTES
Outpatient Psychiatry Follow-Up Visit    Clinical Status of Patient: Outpatient (Ambulatory)  09/28/2023     Chief Complaint: 46 y/o male presenting today for a follow-up.       Interval History and Content of Current Session:  Interim Events/Subjective Report/Content of Current Session:  follow-up appointment.    Pt is a 46 y/o male with past psychiatric hx of depression and anxiety who presents for follow-up treatment. Pt reported that he did start the trial of sertraline. Stated that [facility] declined the refill and pt was unsure why. Took the sertraline daily for the initial 30 day supply ran out and has not taken it for about 2 weeks. Pt reported some difficulty with ejaculation with intercourse with wife as a side effect. Pt noted that he has stopped all pornography use and masturbation. Denied having any thoughts or cravings to use pornography. Denied having reduced libido.     Past Psychiatric hx: fluoxetine    Past Medical hx:   Past Medical History:   Diagnosis Date    Anticoagulant long-term use     Atrial fibrillation     Gout     Gout, joint     Hypertension     Memory deficit following cerebral infarction     Pneumonia 04/2018    Sleep apnea with use of continuous positive airway pressure (CPAP)     Stroke 04/2018    Syncope and collapse         Interim hx:  Medication changes last visit: start a trial of sertraline 25 mg.  Anxiety: mild to moderate  Depression: mild to moderate     Denies suicidal/homicidal ideations.  Denies hopelessness/worthlessness.    Denies auditory/visual hallucinations      Alcohol: Infrequent use  Drug: Pt denies  Tobacco: Pt denies      Review of Systems   PSYCHIATRIC: Pertinent items are noted in the narrative.        CONSTITUTIONAL: weight stable    Past Medical, Family and Social History: The patient's past medical, family and social history have been reviewed and updated as appropriate within the electronic medical record. See encounter notes.     Current Psychiatric  Medication: sertraline 25 mg.     Compliance: yes      Side effects: difficulty with ejactulation     Risk Parameters:  Patient reports no suicidal ideation  Patient reports no homicidal ideation  Patient reports no self-injurious behavior  Patient reports no violent behavior     Exam (detailed: at least 9 elements; comprehensive: all 15 elements)   Constitutional  Vitals:  Most recent vital signs, dated less than 90 days prior to this appointment, were reviewed. BP: ()/()   Arterial Line BP: ()/()       General:  unremarkable, age appropriate, casual attire, good eye contact, good rapport       Musculoskeletal  Muscle Strength/Tone:  no flaccidity, no tremor    Gait & Station:  normal      Psychiatric                       Speech:  normal tone, normal rate, rhythm, and volume   Mood & Affect:   Depressed, anxious         Thought Process:   Goal directed; Linear    Associations:   intact   Thought Content:   No SI/HI, delusions, or paranoia, no AV/VH   Insight & Judgement:   Good, adequate to circumstances   Orientation:   grossly intact; alert and oriented x 4    Memory:  intact for content of interview    Language:  grossly intact, can repeat    Attention Span  : Grossly intact for content of interview   Fund of Knowledge:   intact and appropriate to age and level of education        Assessment and Diagnosis   Status/Progress: Based on the examination today, the patient's problem(s) is/are adequately controlled.  New problems have not been presented today. Co-morbidities are not complicating management of the primary condition. There are no active rule-out diagnoses for this patient at this time.      Impression: Pt appears to have had some difficulty accessing refill for sertraline. Will have staff contact pharmacy to identify barrier. Pt did appear to have ejaculation difficulties as a side effect. As a result, will transition to fluoxetine. Pt's ability to discontinue pornography use is a positive sign and pt  appears to be pleased with this outcome. Encouraged pt to continue in therapy and contact office if any confusion happens in the future re: medication refills.     Diagnosis:   1) Major Depressive Disorder, recurrent, mild  2) Anxiety Disorder  Intervention/Counseling/Treatment Plan   Medication Management:      1. D/C Sertraline 25 mg and start trial of fluoxetine 20 mg     2. Call to report any worsening of symptoms or problems with the medication. Pt instructed to go to ER with thoughts of harming self, others     3. Cont in therapy with Yola Hinson LCSW    Psychotherapy: none  Target symptoms:   Why chosen therapy is appropriate versus another modality: CBT used; relevant to diagnosis, patient responds to this modality  Outcome monitoring methods: self-report, observation  Therapeutic intervention type: Cognitive Behavioral Therapy  Topics discussed/themes: building skills sets for symptom management, symptom recognition, nutrition, exercise  The patient's response to the intervention is   Patient's response to treatment is: good.   The patient's progress toward treatment goals: improving     Return to clinic: 6 weeks     -Cognitive-Behavioral/Supportive therapy and psychoeducation provided  -R/B/SE's of medications discussed with the pt who expresses understanding and chooses to take medications as prescribed.   -Pt instructed to call clinic, 911 or go to nearest emergency room if sxs worsen or pt is in   crisis. The pt expresses understanding.    Umberto Ortiz, PhD, MP     Antidepressant/Antianxiety Medication Initiation:  Patient informed of risks, benefits, and potential side effects of medication and accepts informed consent.  Common side effects include nausea, fatigue, headache, insomnia., Specifically discussed the possibility of new or worsening suicidal thoughts/depression.  Patient instructed to stop the medication immediately and seek urgent treatment if this occurs. Patient instructed not to  abruptly discontinue medication without physician guidance except in cases of sudden onset or worsening of SI.

## 2023-09-28 ENCOUNTER — OFFICE VISIT (OUTPATIENT)
Dept: PSYCHIATRY | Facility: CLINIC | Age: 48
End: 2023-09-28
Payer: COMMERCIAL

## 2023-09-28 VITALS
HEART RATE: 56 BPM | DIASTOLIC BLOOD PRESSURE: 113 MMHG | BODY MASS INDEX: 39.02 KG/M2 | WEIGHT: 263.44 LBS | SYSTOLIC BLOOD PRESSURE: 187 MMHG | HEIGHT: 69 IN

## 2023-09-28 DIAGNOSIS — M10.9 GOUT, UNSPECIFIED CAUSE, UNSPECIFIED CHRONICITY, UNSPECIFIED SITE: ICD-10-CM

## 2023-09-28 DIAGNOSIS — F33.1 MODERATE EPISODE OF RECURRENT MAJOR DEPRESSIVE DISORDER: ICD-10-CM

## 2023-09-28 DIAGNOSIS — F33.0 MAJOR DEPRESSIVE DISORDER, RECURRENT, MILD: Primary | ICD-10-CM

## 2023-09-28 PROCEDURE — 4010F PR ACE/ARB THEARPY RXD/TAKEN: ICD-10-PCS | Mod: CPTII,S$GLB,, | Performed by: PSYCHOLOGIST

## 2023-09-28 PROCEDURE — 3080F PR MOST RECENT DIASTOLIC BLOOD PRESSURE >= 90 MM HG: ICD-10-PCS | Mod: CPTII,S$GLB,, | Performed by: PSYCHOLOGIST

## 2023-09-28 PROCEDURE — 3008F BODY MASS INDEX DOCD: CPT | Mod: CPTII,S$GLB,, | Performed by: PSYCHOLOGIST

## 2023-09-28 PROCEDURE — 1159F MED LIST DOCD IN RCRD: CPT | Mod: CPTII,S$GLB,, | Performed by: PSYCHOLOGIST

## 2023-09-28 PROCEDURE — 99213 OFFICE O/P EST LOW 20 MIN: CPT | Mod: S$GLB,,, | Performed by: PSYCHOLOGIST

## 2023-09-28 PROCEDURE — 1159F PR MEDICATION LIST DOCUMENTED IN MEDICAL RECORD: ICD-10-PCS | Mod: CPTII,S$GLB,, | Performed by: PSYCHOLOGIST

## 2023-09-28 PROCEDURE — 3008F PR BODY MASS INDEX (BMI) DOCUMENTED: ICD-10-PCS | Mod: CPTII,S$GLB,, | Performed by: PSYCHOLOGIST

## 2023-09-28 PROCEDURE — 99213 PR OFFICE/OUTPT VISIT, EST, LEVL III, 20-29 MIN: ICD-10-PCS | Mod: S$GLB,,, | Performed by: PSYCHOLOGIST

## 2023-09-28 PROCEDURE — 3077F PR MOST RECENT SYSTOLIC BLOOD PRESSURE >= 140 MM HG: ICD-10-PCS | Mod: CPTII,S$GLB,, | Performed by: PSYCHOLOGIST

## 2023-09-28 PROCEDURE — 4010F ACE/ARB THERAPY RXD/TAKEN: CPT | Mod: CPTII,S$GLB,, | Performed by: PSYCHOLOGIST

## 2023-09-28 PROCEDURE — 3080F DIAST BP >= 90 MM HG: CPT | Mod: CPTII,S$GLB,, | Performed by: PSYCHOLOGIST

## 2023-09-28 PROCEDURE — 99999 PR PBB SHADOW E&M-EST. PATIENT-LVL III: ICD-10-PCS | Mod: PBBFAC,,, | Performed by: PSYCHOLOGIST

## 2023-09-28 PROCEDURE — 99999 PR PBB SHADOW E&M-EST. PATIENT-LVL III: CPT | Mod: PBBFAC,,, | Performed by: PSYCHOLOGIST

## 2023-09-28 PROCEDURE — 3077F SYST BP >= 140 MM HG: CPT | Mod: CPTII,S$GLB,, | Performed by: PSYCHOLOGIST

## 2023-09-28 RX ORDER — FLUOXETINE 20 MG/1
20 TABLET ORAL DAILY
Qty: 30 TABLET | Refills: 1 | Status: SHIPPED | OUTPATIENT
Start: 2023-09-28 | End: 2023-10-17 | Stop reason: SDUPTHER

## 2023-09-28 NOTE — TELEPHONE ENCOUNTER
No care due was identified.  [facility] Embedded Care Due Messages. Reference number: [id number].   9/28/2023 8:26:37 AM CDT

## 2023-09-29 RX ORDER — ALLOPURINOL 300 MG/1
300 TABLET ORAL DAILY
Qty: 90 TABLET | Refills: 4 | Status: SHIPPED | OUTPATIENT
Start: 2023-09-29 | End: 2024-12-22

## 2023-10-03 ENCOUNTER — LAB VISIT (OUTPATIENT)
Dept: LAB | Facility: HOSPITAL | Age: 48
End: 2023-10-03
Payer: COMMERCIAL

## 2023-10-03 DIAGNOSIS — I10 ESSENTIAL HYPERTENSION: ICD-10-CM

## 2023-10-03 DIAGNOSIS — M10.9 GOUT, UNSPECIFIED CAUSE, UNSPECIFIED CHRONICITY, UNSPECIFIED SITE: ICD-10-CM

## 2023-10-03 LAB
CHOLEST SERPL-MCNC: 216 MG/DL (ref 120–199)
CHOLEST/HDLC SERPL: 4.4 {RATIO} (ref 2–5)
HDLC SERPL-MCNC: 49 MG/DL (ref 40–75)
HDLC SERPL: 22.7 % (ref 20–50)
LDLC SERPL CALC-MCNC: 130.2 MG/DL (ref 63–159)
NONHDLC SERPL-MCNC: 167 MG/DL
TRIGL SERPL-MCNC: 184 MG/DL (ref 30–150)
URATE SERPL-MCNC: 7.3 MG/DL (ref 3.4–7)

## 2023-10-03 PROCEDURE — 36415 COLL VENOUS BLD VENIPUNCTURE: CPT | Mod: PO

## 2023-10-03 PROCEDURE — 80061 LIPID PANEL: CPT

## 2023-10-03 PROCEDURE — 84550 ASSAY OF BLOOD/URIC ACID: CPT

## 2023-10-15 ENCOUNTER — HOSPITAL ENCOUNTER (EMERGENCY)
Facility: HOSPITAL | Age: 48
Discharge: HOME | End: 2023-10-15
Attending: EMERGENCY MEDICINE
Payer: COMMERCIAL

## 2023-10-15 VITALS
OXYGEN SATURATION: 98 % | BODY MASS INDEX: 39.25 KG/M2 | DIASTOLIC BLOOD PRESSURE: 97 MMHG | RESPIRATION RATE: 18 BRPM | HEART RATE: 49 BPM | HEIGHT: 69 IN | WEIGHT: 265 LBS | SYSTOLIC BLOOD PRESSURE: 194 MMHG | TEMPERATURE: 98 F

## 2023-10-15 DIAGNOSIS — I10 HYPERTENSION, UNSPECIFIED TYPE: Primary | ICD-10-CM

## 2023-10-15 DIAGNOSIS — R03.0 ELEVATED BLOOD PRESSURE READING: ICD-10-CM

## 2023-10-15 LAB
ALBUMIN SERPL BCP-MCNC: 4 G/DL (ref 3.5–5.2)
ALP SERPL-CCNC: 72 U/L (ref 55–135)
ALT SERPL W/O P-5'-P-CCNC: 16 U/L (ref 10–44)
ANION GAP SERPL CALC-SCNC: 10 MMOL/L (ref 8–16)
AST SERPL-CCNC: 16 U/L (ref 10–40)
BASOPHILS # BLD AUTO: 0.1 K/UL (ref 0–0.2)
BASOPHILS NFR BLD: 1.4 % (ref 0–1.9)
BILIRUB SERPL-MCNC: 0.8 MG/DL (ref 0.1–1)
BNP SERPL-MCNC: 342 PG/ML (ref 0–99)
BUN SERPL-MCNC: 14 MG/DL (ref 6–20)
CALCIUM SERPL-MCNC: 9.3 MG/DL (ref 8.7–10.5)
CHLORIDE SERPL-SCNC: 105 MMOL/L (ref 95–110)
CO2 SERPL-SCNC: 25 MMOL/L (ref 23–29)
CREAT SERPL-MCNC: 1.1 MG/DL (ref 0.5–1.4)
DIFFERENTIAL METHOD: ABNORMAL
EOSINOPHIL # BLD AUTO: 0.4 K/UL (ref 0–0.5)
EOSINOPHIL NFR BLD: 5.1 % (ref 0–8)
ERYTHROCYTE [DISTWIDTH] IN BLOOD BY AUTOMATED COUNT: 14.5 % (ref 11.5–14.5)
EST. GFR  (NO RACE VARIABLE): >60 ML/MIN/1.73 M^2
GLUCOSE SERPL-MCNC: 86 MG/DL (ref 70–110)
HCT VFR BLD AUTO: 47.8 % (ref 40–54)
HGB BLD-MCNC: 15.6 G/DL (ref 14–18)
IMM GRANULOCYTES # BLD AUTO: 0.04 K/UL (ref 0–0.04)
IMM GRANULOCYTES NFR BLD AUTO: 0.6 % (ref 0–0.5)
LYMPHOCYTES # BLD AUTO: 1.6 K/UL (ref 1–4.8)
LYMPHOCYTES NFR BLD: 22.7 % (ref 18–48)
MCH RBC QN AUTO: 26.7 PG (ref 27–31)
MCHC RBC AUTO-ENTMCNC: 32.6 G/DL (ref 32–36)
MCV RBC AUTO: 82 FL (ref 82–98)
MONOCYTES # BLD AUTO: 0.6 K/UL (ref 0.3–1)
MONOCYTES NFR BLD: 8.6 % (ref 4–15)
NEUTROPHILS # BLD AUTO: 4.4 K/UL (ref 1.8–7.7)
NEUTROPHILS NFR BLD: 61.6 % (ref 38–73)
NRBC BLD-RTO: 0 /100 WBC
PLATELET # BLD AUTO: 308 K/UL (ref 150–450)
PMV BLD AUTO: 9.2 FL (ref 9.2–12.9)
POTASSIUM SERPL-SCNC: 3.7 MMOL/L (ref 3.5–5.1)
PROT SERPL-MCNC: 7.4 G/DL (ref 6–8.4)
RBC # BLD AUTO: 5.85 M/UL (ref 4.6–6.2)
SODIUM SERPL-SCNC: 140 MMOL/L (ref 136–145)
TROPONIN I SERPL HS-MCNC: 14.1 PG/ML (ref 0–14.9)
WBC # BLD AUTO: 7.08 K/UL (ref 3.9–12.7)

## 2023-10-15 PROCEDURE — 84484 ASSAY OF TROPONIN QUANT: CPT | Performed by: EMERGENCY MEDICINE

## 2023-10-15 PROCEDURE — 80053 COMPREHEN METABOLIC PANEL: CPT | Performed by: EMERGENCY MEDICINE

## 2023-10-15 PROCEDURE — 83880 ASSAY OF NATRIURETIC PEPTIDE: CPT | Performed by: EMERGENCY MEDICINE

## 2023-10-15 PROCEDURE — 93010 EKG 12-LEAD: ICD-10-PCS | Mod: ,,, | Performed by: GENERAL PRACTICE

## 2023-10-15 PROCEDURE — 99285 EMERGENCY DEPT VISIT HI MDM: CPT | Mod: 25

## 2023-10-15 PROCEDURE — 85025 COMPLETE CBC W/AUTO DIFF WBC: CPT | Performed by: EMERGENCY MEDICINE

## 2023-10-15 PROCEDURE — 96374 THER/PROPH/DIAG INJ IV PUSH: CPT

## 2023-10-15 PROCEDURE — 63600175 PHARM REV CODE 636 W HCPCS: Performed by: EMERGENCY MEDICINE

## 2023-10-15 PROCEDURE — 93005 ELECTROCARDIOGRAM TRACING: CPT | Performed by: GENERAL PRACTICE

## 2023-10-15 PROCEDURE — 93010 ELECTROCARDIOGRAM REPORT: CPT | Mod: ,,, | Performed by: GENERAL PRACTICE

## 2023-10-15 RX ORDER — HYDRALAZINE HYDROCHLORIDE 20 MG/ML
10 INJECTION INTRAMUSCULAR; INTRAVENOUS
Status: COMPLETED | OUTPATIENT
Start: 2023-10-15 | End: 2023-10-15

## 2023-10-15 RX ADMIN — HYDRALAZINE HYDROCHLORIDE 10 MG: 20 INJECTION, SOLUTION INTRAMUSCULAR; INTRAVENOUS at 11:10

## 2023-10-15 NOTE — ED PROVIDER NOTES
Encounter Date: 10/15/2023       History     Chief Complaint   Patient presents with    Hypertension     BP HIGH LASTNITE, LOW HR 45. NOT SYMPTOMATIC     47-year-old male checked his blood pressure at home while his wife was checking her blood pressure and blood pressure was high so presented here.  Patient otherwise has no complaints and is asymptomatic.  Denies fever chills or nausea vomiting or chest pain or shortness of breath or abdominal pain or weakness or numbness.  Patient has history of atrial fibrillation and had a cardiac ablation in the past.      Review of patient's allergies indicates:  No Known Allergies  Past Medical History:   Diagnosis Date    Anticoagulant long-term use     Atrial fibrillation     Gout     Gout, joint     Hypertension     Memory deficit following cerebral infarction     Pneumonia 04/2018    Sleep apnea with use of continuous positive airway pressure (CPAP)     Stroke 04/2018    Syncope and collapse      Past Surgical History:   Procedure Laterality Date    ABLATION OF ARRHYTHMOGENIC FOCUS FOR ATRIAL FIBRILLATION N/A 5/23/2023    Procedure: Ablation atrial fibrillation;  Surgeon: Abdullahi Daniels MD;  Location: [facility] EP LAB;  Service: Cardiology;  Laterality: N/A;  AF, KIRILL(Cx if SR), PVI, RFA, CARTO, MAC, GP, 3 PREP* MDT ILR in situ*    INSERTION OF IMPLANTABLE LOOP RECORDER  10/25/2018    Procedure: Insertion, Implantable Loop Recorder;  Surgeon: Justin Colby MD;  Location: [facility] [place] CATH;  Service: Cardiology;;    LAPAROSCOPIC CHOLECYSTECTOMY N/A 12/14/2018    Procedure: CHOLECYSTECTOMY, LAPAROSCOPIC;  Surgeon: Beck Love MD;  Location: [facility] OR;  Service: General;  Laterality: N/A;  Dr. Love requested case to start at 1:00pm    NASAL POLYP EXCISION      TRANSESOPHAGEAL ECHOCARDIOGRAPHY N/A 5/23/2023    Procedure: ECHOCARDIOGRAM, TRANSESOPHAGEAL;  Surgeon: Kaylin Cota MD;  Location: [facility] EP LAB;  Service: Cardiology;  Laterality: N/A;  AF, KIRILL(Cx if SR), PVI, RFA,  CARTO, MAC, GP, 3 PREP* MDT ILR in situ*    TREATMENT OF CARDIAC ARRHYTHMIA N/A 10/25/2018    Procedure: CARDIOVERSION OR DEFIBRILLATION;  Surgeon: Justin Colby MD;  Location: [facility] CATH;  Service: Cardiology;  Laterality: N/A;    TREATMENT OF CARDIAC ARRHYTHMIA  5/23/2023    Procedure: Cardioversion or Defibrillation;  Surgeon: Abdullahi Daniels MD;  Location: [facility] EP LAB;  Service: Cardiology;;     Family History   Problem Relation Age of Onset    Sleep apnea Mother     Hypertension Mother     No Known Problems Brother     No Known Problems Brother     No Known Problems Brother      Social History     Tobacco Use    Smoking status: Never    Smokeless tobacco: Never   Substance Use Topics    Alcohol use: Yes     Comment: rarely    Drug use: No     Review of Systems   Constitutional: Negative.    HENT: Negative.     Eyes: Negative.    Respiratory: Negative.     Cardiovascular: Negative.    Gastrointestinal: Negative.    Endocrine: Negative.    Genitourinary: Negative.    Skin: Negative.    Allergic/Immunologic: Negative.    Neurological: Negative.    Hematological: Negative.    Psychiatric/Behavioral: Negative.     All other systems reviewed and are negative.      Physical Exam     Initial Vitals [10/15/23 1035]   BP Pulse Resp Temp SpO2   (!) 206/127 (!) 50 18 98.1 °F (36.7 °C) 95 %      MAP       --         Physical Exam    Nursing note and vitals reviewed.  Constitutional: He appears well-developed and well-nourished.   HENT:   Head: Normocephalic and atraumatic.   Nose: Nose normal.   Eyes: Conjunctivae and EOM are normal.   Neck: No tracheal deviation present.   Normal range of motion.  Cardiovascular:  Normal rate, regular rhythm, normal heart sounds and intact distal pulses.     Exam reveals no friction rub.       No murmur heard.  Pulmonary/Chest: Breath sounds normal. No respiratory distress. He has no wheezes. He has no rales.   Abdominal: Abdomen is soft. He exhibits no distension. There is no abdominal  tenderness.   Musculoskeletal:         General: Normal range of motion.      Cervical back: Normal range of motion.     Neurological: He is alert and oriented to person, place, and time. He has normal strength. No cranial nerve deficit or sensory deficit. GCS score is 15. GCS eye subscore is 4. GCS verbal subscore is 5. GCS motor subscore is 6.   Skin: Skin is warm and dry. Capillary refill takes less than 2 seconds.   Psychiatric: He has a normal mood and affect. Thought content normal.         ED Course   Procedures  Labs Reviewed   CBC W/ AUTO DIFFERENTIAL - Abnormal; Notable for the following components:       Result Value    MCH 26.7 (*)     Immature Granulocytes 0.6 (*)     All other components within normal limits   B-TYPE NATRIURETIC PEPTIDE - Abnormal; Notable for the following components:     (*)     All other components within normal limits   COMPREHENSIVE METABOLIC PANEL   TROPONIN I HIGH SENSITIVITY     EKG Readings: (Independently Interpreted)   Initial Reading: No STEMI. Rhythm: Sinus Bradycardia. Heart Rate: 46. Ectopy: No Ectopy. Conduction: Normal.     ECG Results              EKG 12-lead (In process)  Result time 10/15/23 12:55:12      In process by Interface, Lab In [facility] (10/15/23 12:55:12)                   Narrative:    Test Reason : R06.02,    Vent. Rate : 046 BPM     Atrial Rate : 046 BPM     P-R Int : 176 ms          QRS Dur : 104 ms      QT Int : 484 ms       P-R-T Axes : 024 003 158 degrees     QTc Int : 423 ms    Sinus bradycardia  Septal infarct (cited on or before 06-SEP-2023)  T wave abnormality, consider lateral ischemia  Abnormal ECG  When compared with ECG of 06-SEP-2023 09:26,  Questionable change in initial forces of Septal leads  Nonspecific T wave abnormality has replaced inverted T waves in Inferior  leads  T wave inversion more evident in Lateral leads    Referred By: [email or web address]   SELF           Confirmed By:                                   Imaging Results               X-Ray Chest AP Portable (Final result)  Result time 10/15/23 11:44:57      Final result by Omar Mcgovern Jr., MD (10/15/23 11:44:57)                   Narrative:    HISTORY: CHF    COMPARISON:No previous comparison study.    FINDINGS:Cardiac monitoring device projects over the left heart border. The heart is not enlarged. The lungs are well-expanded and are free of active disease. The pulmonary vascularity is within normal limits. The osseous structures show nothing unusual.    IMPRESSION:. Mild cardiomegaly. No evidence of acute process.    Electronically signed by:  Omar Mcgovern MD  10/15/2023 11:44 AM CDT Workstation: [id number]                                  X-Rays:   Independently Interpreted Readings:   Other Readings:  Chest x-ray does not show any acute changes    Medications   hydrALAZINE injection 10 mg (10 mg Intravenous Given 10/15/23 1110)     Medical Decision Making  47-year-old male presented emergency department with elevated blood pressure.  Patient has incidental finding of high blood pressure when checked at home.  Patient said he has not been taking his blood pressure medication as prescribed.  Patient otherwise has no complaints.  Screening labs and cardiac workup reviewed.  Patient had improvement of blood pressure with treatment and patient said he will take his medication regularly.  Discharged with return precautions and instructions and follow-up.    Amount and/or Complexity of Data Reviewed  Labs: ordered. Decision-making details documented in ED Course.  Radiology: ordered. Decision-making details documented in ED Course.  ECG/medicine tests: ordered and independent interpretation performed. Decision-making details documented in ED Course.    Risk  Prescription drug management.                               Clinical Impression:   Final diagnoses:  [I10] Hypertension, unspecified type (Primary)  [R03.0] Elevated blood pressure reading        ED Disposition Condition     Discharge Stable          ED Prescriptions    None       Follow-up Information       Follow up With Specialties Details Why Contact Info    David Quach MD Family Medicine In 2 days  [address] [facility]  [place] [id number] [phone]               Jie Saavedra MD  10/15/23 [id number]

## 2023-10-15 NOTE — DISCHARGE INSTRUCTIONS
Continue home medication and take medication regularly.  Rest.  Keep track of your blood pressure.  Follow-up with your primary care provider and cardiologist on Tuesday as scheduled.  Return to emergency department for worsening symptoms or any problems

## 2023-10-17 ENCOUNTER — OFFICE VISIT (OUTPATIENT)
Dept: CARDIOLOGY | Facility: CLINIC | Age: 48
End: 2023-10-17
Payer: COMMERCIAL

## 2023-10-17 ENCOUNTER — HOSPITAL ENCOUNTER (EMERGENCY)
Facility: HOSPITAL | Age: 48
Discharge: HOME | End: 2023-10-17
Attending: STUDENT IN AN ORGANIZED HEALTH CARE EDUCATION/TRAINING PROGRAM
Payer: COMMERCIAL

## 2023-10-17 VITALS
HEART RATE: 50 BPM | TEMPERATURE: 98 F | SYSTOLIC BLOOD PRESSURE: 177 MMHG | BODY MASS INDEX: 39.25 KG/M2 | RESPIRATION RATE: 18 BRPM | OXYGEN SATURATION: 96 % | WEIGHT: 265 LBS | DIASTOLIC BLOOD PRESSURE: 113 MMHG | HEIGHT: 69 IN

## 2023-10-17 VITALS
BODY MASS INDEX: 39.18 KG/M2 | SYSTOLIC BLOOD PRESSURE: 200 MMHG | DIASTOLIC BLOOD PRESSURE: 130 MMHG | HEART RATE: 50 BPM | HEIGHT: 69 IN | WEIGHT: 264.56 LBS

## 2023-10-17 DIAGNOSIS — Z79.01 CHRONIC ANTICOAGULATION: ICD-10-CM

## 2023-10-17 DIAGNOSIS — I10 ESSENTIAL HYPERTENSION: Primary | ICD-10-CM

## 2023-10-17 DIAGNOSIS — I51.9 LV DYSFUNCTION: ICD-10-CM

## 2023-10-17 DIAGNOSIS — I10 HYPERTENSION, UNSPECIFIED TYPE: Primary | ICD-10-CM

## 2023-10-17 DIAGNOSIS — I16.0 HYPERTENSIVE URGENCY: ICD-10-CM

## 2023-10-17 DIAGNOSIS — R21 FACIAL RASH: ICD-10-CM

## 2023-10-17 LAB
ALBUMIN SERPL BCP-MCNC: 4 G/DL (ref 3.5–5.2)
ALP SERPL-CCNC: 68 U/L (ref 55–135)
ALT SERPL W/O P-5'-P-CCNC: 16 U/L (ref 10–44)
ANION GAP SERPL CALC-SCNC: 6 MMOL/L (ref 8–16)
AST SERPL-CCNC: 21 U/L (ref 10–40)
BASOPHILS # BLD AUTO: 0.07 K/UL (ref 0–0.2)
BASOPHILS NFR BLD: 1.1 % (ref 0–1.9)
BILIRUB SERPL-MCNC: 0.6 MG/DL (ref 0.1–1)
BNP SERPL-MCNC: 117 PG/ML (ref 0–99)
BUN SERPL-MCNC: 16 MG/DL (ref 6–20)
CALCIUM SERPL-MCNC: 9.5 MG/DL (ref 8.7–10.5)
CHLORIDE SERPL-SCNC: 111 MMOL/L (ref 95–110)
CO2 SERPL-SCNC: 26 MMOL/L (ref 23–29)
CREAT SERPL-MCNC: 1.3 MG/DL (ref 0.5–1.4)
DIFFERENTIAL METHOD: ABNORMAL
EOSINOPHIL # BLD AUTO: 0.3 K/UL (ref 0–0.5)
EOSINOPHIL NFR BLD: 4 % (ref 0–8)
ERYTHROCYTE [DISTWIDTH] IN BLOOD BY AUTOMATED COUNT: 15 % (ref 11.5–14.5)
EST. GFR  (NO RACE VARIABLE): >60 ML/MIN/1.73 M^2
GLUCOSE SERPL-MCNC: 86 MG/DL (ref 70–110)
HCT VFR BLD AUTO: 49.5 % (ref 40–54)
HGB BLD-MCNC: 16.1 G/DL (ref 14–18)
IMM GRANULOCYTES # BLD AUTO: 0.04 K/UL (ref 0–0.04)
IMM GRANULOCYTES NFR BLD AUTO: 0.6 % (ref 0–0.5)
INR PPP: 1 (ref 0.8–1.2)
LYMPHOCYTES # BLD AUTO: 1.2 K/UL (ref 1–4.8)
LYMPHOCYTES NFR BLD: 18.4 % (ref 18–48)
MAGNESIUM SERPL-MCNC: 2.1 MG/DL (ref 1.6–2.6)
MCH RBC QN AUTO: 26.7 PG (ref 27–31)
MCHC RBC AUTO-ENTMCNC: 32.5 G/DL (ref 32–36)
MCV RBC AUTO: 82 FL (ref 82–98)
MONOCYTES # BLD AUTO: 0.5 K/UL (ref 0.3–1)
MONOCYTES NFR BLD: 8.3 % (ref 4–15)
NEUTROPHILS # BLD AUTO: 4.4 K/UL (ref 1.8–7.7)
NEUTROPHILS NFR BLD: 67.6 % (ref 38–73)
NRBC BLD-RTO: 0 /100 WBC
PLATELET # BLD AUTO: 337 K/UL (ref 150–450)
PMV BLD AUTO: 9.7 FL (ref 9.2–12.9)
POTASSIUM SERPL-SCNC: 4.5 MMOL/L (ref 3.5–5.1)
PROT SERPL-MCNC: 7.6 G/DL (ref 6–8.4)
PROTHROMBIN TIME: 11.4 SEC (ref 9–12.5)
RBC # BLD AUTO: 6.04 M/UL (ref 4.6–6.2)
SODIUM SERPL-SCNC: 143 MMOL/L (ref 136–145)
TROPONIN I SERPL HS-MCNC: 12.8 PG/ML (ref 0–14.9)
WBC # BLD AUTO: 6.48 K/UL (ref 3.9–12.7)

## 2023-10-17 PROCEDURE — 93005 ELECTROCARDIOGRAM TRACING: CPT | Performed by: INTERNAL MEDICINE

## 2023-10-17 PROCEDURE — 99284 EMERGENCY DEPT VISIT MOD MDM: CPT | Mod: 25

## 2023-10-17 PROCEDURE — 99999 PR PBB SHADOW E&M-EST. PATIENT-LVL III: ICD-10-PCS | Mod: PBBFAC,,, | Performed by: INTERNAL MEDICINE

## 2023-10-17 PROCEDURE — 3077F SYST BP >= 140 MM HG: CPT | Mod: CPTII,S$GLB,, | Performed by: INTERNAL MEDICINE

## 2023-10-17 PROCEDURE — 83735 ASSAY OF MAGNESIUM: CPT | Performed by: EMERGENCY MEDICINE

## 2023-10-17 PROCEDURE — 63600175 PHARM REV CODE 636 W HCPCS: Performed by: STUDENT IN AN ORGANIZED HEALTH CARE EDUCATION/TRAINING PROGRAM

## 2023-10-17 PROCEDURE — 99999 PR PBB SHADOW E&M-EST. PATIENT-LVL III: CPT | Mod: PBBFAC,,, | Performed by: INTERNAL MEDICINE

## 2023-10-17 PROCEDURE — 4010F ACE/ARB THERAPY RXD/TAKEN: CPT | Mod: CPTII,S$GLB,, | Performed by: INTERNAL MEDICINE

## 2023-10-17 PROCEDURE — 84484 ASSAY OF TROPONIN QUANT: CPT | Performed by: EMERGENCY MEDICINE

## 2023-10-17 PROCEDURE — 96374 THER/PROPH/DIAG INJ IV PUSH: CPT

## 2023-10-17 PROCEDURE — 85025 COMPLETE CBC W/AUTO DIFF WBC: CPT | Performed by: EMERGENCY MEDICINE

## 2023-10-17 PROCEDURE — 3008F PR BODY MASS INDEX (BMI) DOCUMENTED: ICD-10-PCS | Mod: CPTII,S$GLB,, | Performed by: INTERNAL MEDICINE

## 2023-10-17 PROCEDURE — 25000003 PHARM REV CODE 250: Performed by: STUDENT IN AN ORGANIZED HEALTH CARE EDUCATION/TRAINING PROGRAM

## 2023-10-17 PROCEDURE — 80053 COMPREHEN METABOLIC PANEL: CPT | Performed by: EMERGENCY MEDICINE

## 2023-10-17 PROCEDURE — 1159F MED LIST DOCD IN RCRD: CPT | Mod: CPTII,S$GLB,, | Performed by: INTERNAL MEDICINE

## 2023-10-17 PROCEDURE — 83880 ASSAY OF NATRIURETIC PEPTIDE: CPT | Performed by: EMERGENCY MEDICINE

## 2023-10-17 PROCEDURE — 93010 EKG 12-LEAD: ICD-10-PCS | Mod: ,,, | Performed by: INTERNAL MEDICINE

## 2023-10-17 PROCEDURE — 93010 ELECTROCARDIOGRAM REPORT: CPT | Mod: ,,, | Performed by: INTERNAL MEDICINE

## 2023-10-17 PROCEDURE — 99215 OFFICE O/P EST HI 40 MIN: CPT | Mod: S$GLB,,, | Performed by: INTERNAL MEDICINE

## 2023-10-17 PROCEDURE — 85610 PROTHROMBIN TIME: CPT | Performed by: EMERGENCY MEDICINE

## 2023-10-17 PROCEDURE — 3008F BODY MASS INDEX DOCD: CPT | Mod: CPTII,S$GLB,, | Performed by: INTERNAL MEDICINE

## 2023-10-17 PROCEDURE — 3080F PR MOST RECENT DIASTOLIC BLOOD PRESSURE >= 90 MM HG: ICD-10-PCS | Mod: CPTII,S$GLB,, | Performed by: INTERNAL MEDICINE

## 2023-10-17 PROCEDURE — 99215 PR OFFICE/OUTPT VISIT, EST, LEVL V, 40-54 MIN: ICD-10-PCS | Mod: S$GLB,,, | Performed by: INTERNAL MEDICINE

## 2023-10-17 PROCEDURE — 3077F PR MOST RECENT SYSTOLIC BLOOD PRESSURE >= 140 MM HG: ICD-10-PCS | Mod: CPTII,S$GLB,, | Performed by: INTERNAL MEDICINE

## 2023-10-17 PROCEDURE — 4010F PR ACE/ARB THEARPY RXD/TAKEN: ICD-10-PCS | Mod: CPTII,S$GLB,, | Performed by: INTERNAL MEDICINE

## 2023-10-17 PROCEDURE — 1159F PR MEDICATION LIST DOCUMENTED IN MEDICAL RECORD: ICD-10-PCS | Mod: CPTII,S$GLB,, | Performed by: INTERNAL MEDICINE

## 2023-10-17 PROCEDURE — 3080F DIAST BP >= 90 MM HG: CPT | Mod: CPTII,S$GLB,, | Performed by: INTERNAL MEDICINE

## 2023-10-17 RX ORDER — LOSARTAN POTASSIUM 25 MG/1
25 TABLET ORAL DAILY
Qty: 30 TABLET | Refills: 2 | Status: SHIPPED | OUTPATIENT
Start: 2023-10-17 | End: 2023-10-30 | Stop reason: SDUPTHER

## 2023-10-17 RX ORDER — FLUOXETINE HYDROCHLORIDE 20 MG/1
40 CAPSULE ORAL DAILY
COMMUNITY
Start: 2023-09-28 | End: 2023-11-27

## 2023-10-17 RX ORDER — HYDRALAZINE HYDROCHLORIDE 20 MG/ML
10 INJECTION INTRAMUSCULAR; INTRAVENOUS
Status: COMPLETED | OUTPATIENT
Start: 2023-10-17 | End: 2023-10-17

## 2023-10-17 RX ORDER — LISINOPRIL 10 MG/1
10 TABLET ORAL DAILY
COMMUNITY
Start: 2023-10-16 | End: 2023-10-17

## 2023-10-17 RX ORDER — LOSARTAN POTASSIUM 25 MG/1
25 TABLET ORAL ONCE
Status: COMPLETED | OUTPATIENT
Start: 2023-10-17 | End: 2023-10-17

## 2023-10-17 RX ADMIN — LOSARTAN POTASSIUM 25 MG: 25 TABLET, FILM COATED ORAL at 04:10

## 2023-10-17 RX ADMIN — HYDRALAZINE HYDROCHLORIDE 10 MG: 20 INJECTION, SOLUTION INTRAMUSCULAR; INTRAVENOUS at 02:10

## 2023-10-17 NOTE — PROGRESS NOTES
Subjective:    Patient ID:  Josue Mir is a 47 y.o. male patient here for evaluation Results (STRESS TEST), Atrial Fibrillation, Hypertension, and Cerebrovascular Accident      History of Present Illness:    47-year-old male came here for clinic follow for evaluation of abnormal stress test.  This patient is new to me.  He had  ablation of atrial fibrillation this year with Dr. Daniels.  Patient with past medical history of atrial fibrillation, CVA, IC bleed, hypertension, on long-term anticoagulation.  LV dysfunction was seen on echocardiogram this year.  He had a stress test for evaluation of abnormal EKG changes.  He went to the ER 2 days ago with uncontrolled hypertension and was discharged after it was controlled with hydralazine.        Review of patient's allergies indicates:  No Known Allergies    Past Medical History:   Diagnosis Date    Anticoagulant long-term use     Atrial fibrillation     Gout     Gout, joint     Hypertension     Memory deficit following cerebral infarction     Pneumonia 04/2018    Sleep apnea with use of continuous positive airway pressure (CPAP)     Stroke 04/2018    Syncope and collapse      Past Surgical History:   Procedure Laterality Date    ABLATION OF ARRHYTHMOGENIC FOCUS FOR ATRIAL FIBRILLATION N/A 5/23/2023    Procedure: Ablation atrial fibrillation;  Surgeon: Abdullahi Daniels MD;  Location: [facility] EP LAB;  Service: Cardiology;  Laterality: N/A;  AF, KIRILL(Cx if SR), PVI, RFA, CARTO, MAC, GP, 3 PREP* MDT ILR in situ*    INSERTION OF IMPLANTABLE LOOP RECORDER  10/25/2018    Procedure: Insertion, Implantable Loop Recorder;  Surgeon: Justin Colby MD;  Location: [facility] CATH;  Service: Cardiology;;    LAPAROSCOPIC CHOLECYSTECTOMY N/A 12/14/2018    Procedure: CHOLECYSTECTOMY, LAPAROSCOPIC;  Surgeon: Beck Love MD;  Location: [facility] OR;  Service: General;  Laterality: N/A;  Dr. Love requested case to start at 1:00pm    NASAL POLYP EXCISION      TRANSESOPHAGEAL ECHOCARDIOGRAPHY  N/A 5/23/2023    Procedure: ECHOCARDIOGRAM, TRANSESOPHAGEAL;  Surgeon: Kaylin Cota MD;  Location: [facility] EP LAB;  Service: Cardiology;  Laterality: N/A;  AF, KIRILL(Cx if SR), PVI, RFA, CARTO, MAC, GP, 3 PREP* MDT ILR in situ*    TREATMENT OF CARDIAC ARRHYTHMIA N/A 10/25/2018    Procedure: CARDIOVERSION OR DEFIBRILLATION;  Surgeon: Justin Colby MD;  Location: [facility] CATH;  Service: Cardiology;  Laterality: N/A;    TREATMENT OF CARDIAC ARRHYTHMIA  5/23/2023    Procedure: Cardioversion or Defibrillation;  Surgeon: Abdullahi Daniels MD;  Location: [facility] EP LAB;  Service: Cardiology;;     Social History     Tobacco Use    Smoking status: Never    Smokeless tobacco: Never   Substance Use Topics    Alcohol use: Yes     Comment: rarely    Drug use: No        Review of Systems   Negative except as mentioned in HPI         Objective        Vitals:    10/17/23 1026   BP: (!) 200/130   Pulse:        LIPIDS - LAST 2   Lab Results   Component Value Date    CHOL 216 (H) 10/03/2023    CHOL 191 10/04/2022    HDL 49 10/03/2023    HDL 43 10/04/2022    LDLCALC 130.2 10/03/2023    LDLCALC 133.0 10/04/2022    TRIG 184 (H) 10/03/2023    TRIG 75 10/04/2022    CHOLHDL 22.7 10/03/2023    CHOLHDL 22.5 10/04/2022       CBC - LAST 2  Lab Results   Component Value Date    WBC 7.08 10/15/2023    WBC 9.24 05/19/2023    RBC 5.85 10/15/2023    RBC 5.95 05/19/2023    HGB 15.6 10/15/2023    HGB 15.7 05/19/2023    HCT 47.8 10/15/2023    HCT 50.1 05/19/2023    MCV 82 10/15/2023    MCV 84 05/19/2023    MCH 26.7 (L) 10/15/2023    MCH 26.4 (L) 05/19/2023    MCHC 32.6 10/15/2023    MCHC 31.3 (L) 05/19/2023    RDW 14.5 10/15/2023    RDW 15.3 (H) 05/19/2023     10/15/2023     05/19/2023    MPV 9.2 10/15/2023    MPV 10.1 05/19/2023    GRAN 4.4 10/15/2023    GRAN 61.6 10/15/2023    LYMPH 1.6 10/15/2023    LYMPH 22.7 10/15/2023    MONO 0.6 10/15/2023    MONO 8.6 10/15/2023    BASO 0.10 10/15/2023    BASO 0.03 05/19/2023    NRBC 0 10/15/2023    NRBC  0 05/19/2023       CHEMISTRY & LIVER FUNCTION - LAST 2  Lab Results   Component Value Date     10/15/2023     05/19/2023    K 3.7 10/15/2023    K 4.0 05/19/2023     10/15/2023     05/19/2023    CO2 25 10/15/2023    CO2 25 05/19/2023    ANIONGAP 10 10/15/2023    ANIONGAP 9 05/19/2023    BUN 14 10/15/2023    BUN 14 05/19/2023    CREATININE 1.1 10/15/2023    CREATININE 1.1 05/19/2023    GLU 86 10/15/2023    GLU 81 05/19/2023    CALCIUM 9.3 10/15/2023    CALCIUM 9.1 05/19/2023    MG 2.2 03/24/2023    MG 2.1 03/23/2023    ALBUMIN 4.0 10/15/2023    ALBUMIN 3.3 (L) 03/29/2023    PROT 7.4 10/15/2023    PROT 7.4 03/26/2023    ALKPHOS 72 10/15/2023    ALKPHOS 51 (L) 03/26/2023    ALT 16 10/15/2023    ALT 26 03/26/2023    AST 16 10/15/2023    AST 16 03/26/2023    BILITOT 0.8 10/15/2023    BILITOT 0.7 03/26/2023        CARDIAC PROFILE - LAST 2  Lab Results   Component Value Date     (H) 10/15/2023     (H) 03/22/2023    CPK 21 03/25/2023    TROPONINIHS 14.1 10/15/2023        COAGULATION - LAST 2  Lab Results   Component Value Date    INR 1.0 05/19/2023    INR 1.1 03/21/2023    APTT 29.2 05/19/2023       ENDOCRINE & PSA - LAST 2  Lab Results   Component Value Date    HGBA1C 5.4 10/04/2022    HGBA1C 5.4 03/16/2020    TSH 2.394 10/04/2022    TSH 0.223 (L) 03/16/2020        ECHOCARDIOGRAM RESULTS  Results for orders placed during the hospital encounter of 05/23/23    Transesophageal echo (KIRILL)    Interpretation Summary  · Normal appearing left atrial appendage. No thrombus is present in the appendage. Abnormal appendage velocities. contrast used.  · The estimated ejection fraction is 25%.  · There is severe left ventricular global hypokinesis.  · The left ventricle is normal in size with severely decreased systolic function.  · Normal right ventricular size with mildly reduced right ventricular systolic function.  · Mild mitral regurgitation.      CURRENT/PREVIOUS VISIT EKG  Results for orders  placed or performed during the hospital encounter of 10/15/23   EKG 12-lead    Collection Time: 10/15/23 10:55 AM    Narrative    Test Reason : R06.02,    Vent. Rate : 046 BPM     Atrial Rate : 046 BPM     P-R Int : 176 ms          QRS Dur : 104 ms      QT Int : 484 ms       P-R-T Axes : 024 003 158 degrees     QTc Int : 423 ms    Sinus bradycardia  Septal infarct (cited on or before 06-SEP-2023)  T wave abnormality, consider lateral ischemia  Abnormal ECG  When compared with ECG of 06-SEP-2023 09:26,  Questionable change in initial forces of Septal leads  Nonspecific T wave abnormality has replaced inverted T waves in Inferior  leads  T wave inversion more evident in Lateral leads    Referred By: [email or web address]   SELF           Confirmed By:      No valid procedures specified.   Results for orders placed during the hospital encounter of 07/13/23    Nuclear Stress - Cardiology Interpreted    Interpretation Summary    Abnormal myocardial perfusion scan.    A PET stress exam is recommended if clinically indicated.    There are two significant perfusion abnormalities.    Perfusion Abnormality #1 - There is a moderate to severe intensity, moderate sized, equivocal perfusion abnormality that is fixed in the basal to apical inferior and inferolateral wall(s). This finding is equivocal due to diaphragm shadow.    Perfusion Abnormality #2 - There is a small to moderate sized, moderate to severe intensity, fixed perfusion abnormality consistent with scar in the basal to mid lateral wall(s).    There are no other significant perfusion abnormalities.    The gated perfusion images showed an ejection fraction of 39% at rest. The gated perfusion images showed an ejection fraction of 33% post stress. Normal ejection fraction is greater than 53%.    There is moderate global hypokinesis at rest and stress.    LV cavity size is mildly enlarged at rest and mildly enlarged at stress.    The ECG portion of the study is abnormal but not  diagnostic due to resting ST-T abnormalities.    The patient reported no chest pain during the stress test.    During stress, occasional PVCs are noted.    The patient exercised for 7 minutes 40 seconds on a Justin protocol, corresponding to a functional capacity of 10 METS, achieving a peak heart rate of 150 bpm, which is 86 % of the age predicted maximum heart rate.    No valid procedures specified.          PREVIOUS STRESS TEST              PREVIOUS ANGIOGRAM        PHYSICAL EXAM    CONSTITUTIONAL: Well built, well nourished in no apparent distress  HEENT: No pallor  NECK: no JVD  LUNGS: CTA b/l  HEART: regular rate and rhythm, S1, S2 normal, no murmur   ABDOMEN: soft, non-tender; bowel sounds normal  EXTREMITIES: No edema  NEURO: AAO X 3   Psych:  Normal affect    I HAVE REVIEWED :    The vital signs, nurses notes, and all the pertinent radiology and labs.        Current Outpatient Medications   Medication Instructions    allopurinoL (ZYLOPRIM) 300 mg, Oral, Daily    amiodarone (PACERONE) 100 mg, Oral, Daily    apixaban (ELIQUIS) 5 mg, Oral, 2 times daily    colchicine (gout) (COLCRYS) 0.6 mg, Oral, 2 times daily    FLUoxetine 20 mg, Oral, Daily    furosemide (LASIX) 20 mg, Oral, Daily PRN    metoprolol tartrate (LOPRESSOR) 100 mg, Oral, 2 times daily    pantoprazole (PROTONIX) 40 mg, Oral, Daily        Assessment & Plan     47-year-old male came here for clinic follow for evaluation of abnormal stress test.  This patient is new to me.  He had  ablation of atrial fibrillation this year with Dr. Daniels.  Patient with past medical history of atrial fibrillation, CVA, IC bleed, hypertension, on long-term anticoagulation.  LV dysfunction was seen on echocardiogram this year.  He had a stress test for evaluation of abnormal EKG changes.  He went to the ER 2 days ago with uncontrolled hypertension and was discharged after it was controlled with hydralazine.    -lateral wall scar and equivocal inferior fixed defect on  the nuclear stress test.  Patient does not remember if he had angiogram in the past or not.  He benefits from the angiogram to anatomically assess coronary artery disease in view of LV dysfunction on echo and ischemic changes on prior EKGs.  -continue metoprolol and amiodarone.  He was clinically euvolemic today.  -Blood pressure is significantly elevated in the clinic today.   -advised patient to go to the ER given the uncontrolled hypertension and history of intracranial hemorrhage and current use the anticoagulant.  Patient agreed to go to the ER.  -follow up after ER visit.

## 2023-10-17 NOTE — DISCHARGE INSTRUCTIONS

## 2023-10-17 NOTE — PLAN OF CARE
[facility] - Emergency Dept  Initial Discharge Assessment       Primary Care Provider: David Quach MD    Admission Diagnosis: No admission diagnoses are documented for this encounter.    Admission Date: 10/17/2023  Expected Discharge Date:     Pt is a 47-year-old male, who arrived from home with No active principal problem  Chart reviewed and assessment completed at bedside with patient. Pt live with his wife [phone]. Pt does not have a living will or advance directive. PCP last seen a couple months ago. Pt denies Dialysis, DME, HH, and readmission into the hospital within the last thirty days. Pt takes Eliquis and  capable of performing ADLs with assistance. Pt drives to and from medical appointments. He takes all medicines as prescribed daily; [facility]. Pt will drive himself home at discharge. CM will continue to monitor.      Transition of Care Barriers: None    Payor: BLUE [employer] BLUE [employer]IELD / Plan: [employer] ALL OUT OF STATE / Product Type: PPO /     Extended Emergency Contact Information  Primary Emergency Contact: Mallorie Mir  Address: [address] [place] [address]           [place] [address] [facility]  Home Phone: [phone]  Relation: Spouse    Discharge Plan A: Home with family  Discharge Plan B: Home      [id number] [place] - [address] [facility]  [address] [place] [address]  Phone: [phone] Fax: [phone]      Initial Assessment (most recent)       Adult Discharge Assessment - 10/17/23 1456          Discharge Assessment    Assessment Type Discharge Planning Assessment     Confirmed/corrected address, phone number and insurance Yes     Confirmed Demographics Correct on Facesheet     Source of Information patient     When was your last doctors appointment? --   couple months ago    Communicated LUCERO with patient/caregiver Date not available/Unable to determine     Reason For Admission No active principal problem     People in Home spouse      Facility Arrived From: doctor's appointment     Do you expect to return to your current living situation? Yes     Do you have help at home or someone to help you manage your care at home? Yes     Who are your caregiver(s) and their phone number(s)? Mallorie Mir/wife [phone]     Prior to hospitilization cognitive status: Alert/Oriented     Current cognitive status: Alert/Oriented     Equipment Currently Used at Home none     Readmission within 30 days? No     Patient currently being followed by outpatient case management? No     Do you currently have service(s) that help you manage your care at home? No     Do you take prescription medications? Yes     Do you have prescription coverage? Yes     Coverage Payor:  "[employer] - [facility] ALL OUT OF STATE     Do you have any problems affording any of your prescribed medications? No     Is the patient taking medications as prescribed? yes     Who is going to help you get home at discharge? Mallorie Mir/wife [phone]     How do you get to doctors appointments? car, drives self     Are you on dialysis? No     Do you take coumadin? Yes     Who monitors your labs? Eliquis     DME Needed Upon Discharge  none     Discharge Plan discussed with: Patient     Transition of Care Barriers None     Discharge Plan A Home with family     Discharge Plan B Home

## 2023-10-17 NOTE — FIRST PROVIDER EVALUATION
Emergency Department TeleTriage Encounter Note      CHIEF COMPLAINT    Chief Complaint   Patient presents with    Hypertension     Sent from cardiologists office for HTN in office. Seen for same recently without d/c Rx.        VITAL SIGNS   Initial Vitals [10/17/23 1204]   BP Pulse Resp Temp SpO2   (!) 208/132 (!) 52 18 98 °F (36.7 °C) 96 %      MAP       --            ALLERGIES    Review of patient's allergies indicates:  No Known Allergies    PROVIDER TRIAGE NOTE  This is a teletriage evaluation of a 47 y.o. male presenting to the ED complaining of elevated BP. Pmhx of HTN, compliant with medications. Denies CP, SOB, headaches, weakness, dizziness. Referred to ED by cardiology. Pt was seen in the ED two days ago for the same complaint.     Initial orders will be placed and care will be transferred to an alternate provider when patient is roomed for a full evaluation. Any additional orders and the final disposition will be determined by that provider.         ORDERS  Labs Reviewed - No data to display    ED Orders (720h ago, onward)      Start Ordered     Status Ordering Provider    10/17/23 1220 10/17/23 1219  Insert Saline lock IV  Once         Ordered ALEXANDREA NEVILLE    10/17/23 1219 10/17/23 1218  EKG 12-lead  Once         Ordered ALEXANDREA NEVILLE              Virtual Visit Note: The provider triage portion of this emergency department evaluation and documentation was performed via [employer], a HIPAA-compliant telemedicine application, in concert with a tele-presenter in the room. A face to face patient evaluation with one of my colleagues will occur once the patient is placed in an emergency department room.      DISCLAIMER: This note was prepared with [employer] voice recognition transcription software. Garbled syntax, mangled pronouns, and other bizarre constructions may be attributed to that software system.

## 2023-10-17 NOTE — ED PROVIDER NOTES
Encounter Date: 10/17/2023       History     Chief Complaint   Patient presents with    Hypertension     Sent from cardiologists office for HTN in office. Seen for same recently without d/c Rx.      47-year-old male with history of AFib on anticoagulation, stroke, hypertension, gout presents now for elevated blood pressure.  Patient denies any symptoms.  He was seen in an emergency department about a week and a half ago for asymptomatic hypertension, treated acutely with hydralazine, discharged without new medication and followed up with his cardiologist today, who noticed elevated blood pressure and sent him back to the emergency room for management of his elevated blood pressure.  The patient reports he previously took lisinopril, which was discontinued for a new onset facial rash about 2 months ago.  He currently takes metoprolol 100 b.i.d., allopurinol, Pacerone, Eliquis, fluoxetine.      Review of patient's allergies indicates:  No Known Allergies  Past Medical History:   Diagnosis Date    Anticoagulant long-term use     Atrial fibrillation     Gout     Gout, joint     Hypertension     Memory deficit following cerebral infarction     Pneumonia 04/2018    Sleep apnea with use of continuous positive airway pressure (CPAP)     Stroke 04/2018    Syncope and collapse      Past Surgical History:   Procedure Laterality Date    ABLATION OF ARRHYTHMOGENIC FOCUS FOR ATRIAL FIBRILLATION N/A 5/23/2023    Procedure: Ablation atrial fibrillation;  Surgeon: Abdullahi Daniels MD;  Location: [facility] EP LAB;  Service: Cardiology;  Laterality: N/A;  AF, KIRILL(Cx if SR), PVI, RFA, CARTO, MAC, GP, 3 PREP* MDT ILR in situ*    INSERTION OF IMPLANTABLE LOOP RECORDER  10/25/2018    Procedure: Insertion, Implantable Loop Recorder;  Surgeon: Justin Colby MD;  Location: [facility] CATH;  Service: Cardiology;;    LAPAROSCOPIC CHOLECYSTECTOMY N/A 12/14/2018    Procedure: CHOLECYSTECTOMY, LAPAROSCOPIC;  Surgeon: Beck Love MD;  Location: [facility]  OR;  Service: General;  Laterality: N/A;  Dr. Almodovar requested case to start at 1:00pm    NASAL POLYP EXCISION      TRANSESOPHAGEAL ECHOCARDIOGRAPHY N/A 5/23/2023    Procedure: ECHOCARDIOGRAM, TRANSESOPHAGEAL;  Surgeon: Kaylin Cota MD;  Location: [facility] EP LAB;  Service: Cardiology;  Laterality: N/A;  AF, KIRILL(Cx if SR), PVI, RFA, CARTO, MAC, GP, 3 PREP* MDT ILR in situ*    TREATMENT OF CARDIAC ARRHYTHMIA N/A 10/25/2018    Procedure: CARDIOVERSION OR DEFIBRILLATION;  Surgeon: Justin Colby MD;  Location: [facility] CATH;  Service: Cardiology;  Laterality: N/A;    TREATMENT OF CARDIAC ARRHYTHMIA  5/23/2023    Procedure: Cardioversion or Defibrillation;  Surgeon: Abdullahi Daniels MD;  Location: [facility] EP LAB;  Service: Cardiology;;     Family History   Problem Relation Age of Onset    Sleep apnea Mother     Hypertension Mother     No Known Problems Brother     No Known Problems Brother     No Known Problems Brother      Social History     Tobacco Use    Smoking status: Never    Smokeless tobacco: Never   Substance Use Topics    Alcohol use: Yes     Comment: rarely    Drug use: No     Review of Systems   Constitutional:  Negative for activity change and appetite change.   HENT:  Negative for congestion and drooling.    Eyes:  Negative for discharge and itching.   Respiratory:  Negative for cough and chest tightness.    Cardiovascular:  Negative for chest pain and leg swelling.   Gastrointestinal:  Negative for abdominal distention and abdominal pain.   Genitourinary:  Negative for difficulty urinating and dysuria.   Musculoskeletal:  Negative for arthralgias.   Skin:  Positive for rash. Negative for color change and pallor.   Neurological:  Negative for dizziness and facial asymmetry.   Psychiatric/Behavioral:  Negative for agitation and behavioral problems.        Physical Exam     Initial Vitals [10/17/23 1204]   BP Pulse Resp Temp SpO2   (!) 208/132 (!) 52 18 98 °F (36.7 °C) 96 %      MAP       --         Physical  Exam    Nursing note and vitals reviewed.  Constitutional: He appears well-developed and well-nourished.   HENT:   Head: Normocephalic and atraumatic.   Mouth/Throat: Oropharynx is clear and moist.   Eyes: Conjunctivae and EOM are normal. Pupils are equal, round, and reactive to light.   Neck: No thyromegaly present.   Normal range of motion.  Cardiovascular:  Normal rate, regular rhythm and intact distal pulses.           Pulmonary/Chest: Breath sounds normal. No respiratory distress. He has no wheezes.   Abdominal: Abdomen is soft. Bowel sounds are normal. He exhibits no distension. There is no abdominal tenderness.   Musculoskeletal:         General: No tenderness or edema. Normal range of motion.      Cervical back: Normal range of motion.     Neurological: He is alert and oriented to person, place, and time. He has normal strength. No cranial nerve deficit.   Skin: Skin is warm and dry. Rash noted.   Facial rash across the forehead and bilateral cheeks, most consistent acne, concerning also for allergic reaction or malar rash.   Psychiatric: He has a normal mood and affect. His behavior is normal. Thought content normal.         ED Course   Procedures  Labs Reviewed   CBC W/ AUTO DIFFERENTIAL - Abnormal; Notable for the following components:       Result Value    MCH 26.7 (*)     RDW 15.0 (*)     Immature Granulocytes 0.6 (*)     All other components within normal limits   COMPREHENSIVE METABOLIC PANEL - Abnormal; Notable for the following components:    Chloride 111 (*)     Anion Gap 6 (*)     All other components within normal limits   B-TYPE NATRIURETIC PEPTIDE - Abnormal; Notable for the following components:     (*)     All other components within normal limits   TROPONIN I HIGH SENSITIVITY   PROTIME-INR   MAGNESIUM   TROPONIN I HIGH SENSITIVITY        ECG Results              EKG 12-lead (In process)  Result time 10/17/23 12:37:55      In process by Interface, Lab In [facility] (10/17/23 12:37:55)                    Narrative:    Test Reason : R03.0,    Vent. Rate : 043 BPM     Atrial Rate : 043 BPM     P-R Int : 182 ms          QRS Dur : 098 ms      QT Int : 540 ms       P-R-T Axes : 015 000 189 degrees     QTc Int : 456 ms    Marked sinus bradycardia  ST and T wave abnormality, consider inferolateral ischemia  Abnormal ECG  When compared with ECG of 15-OCT-2023 10:55,  Criteria for Septal infarct are no longer Present  T wave inversion more evident in Lateral leads    Referred By: [email or web address]   SELF           Confirmed By:                                   Imaging Results              X-Ray Chest PA And Lateral (Final result)  Result time 10/17/23 13:44:00   Procedure changed from X-Ray Chest AP Portable     Final result by Valente Vora MD (10/17/23 13:44:00)                   Narrative:    Reason: Chest pain.    FINDINGS:    PA and lateral chest comparison chest x-ray October 15, 2023 show normal cardiomediastinal silhouette. Left loop recorder device noted.  Lungs are clear. Pulmonary vasculature is normal. No acute osseous abnormality.    IMPRESSION:    No acute cardiopulmonary abnormality.    Electronically signed by:  Valente Vora DO  10/17/2023 01:44 PM CDT Workstation: [id number]                                     Medications   hydrALAZINE injection 10 mg (10 mg Intravenous Given 10/17/23 5325)   losartan tablet 25 mg (25 mg Oral Given 10/17/23 4344)     Medical Decision Making  Amount and/or Complexity of Data Reviewed  Labs:  Decision-making details documented in ED Course.  Radiology:  Decision-making details documented in ED Course.    Risk  Prescription drug management.               ED Course as of 10/17/23 1632   Tue Oct 17, 2023   1629 Protime-INR [BS]   1629 Comprehensive metabolic panel(!) [BS]   1629 Troponin I High Sensitivity #1 [BS]   1629 Magnesium [BS]   1629 X-Ray Chest PA And Lateral [BS]   1629 EKG shows sinus bradycardia, no acute ST elevations or depressions, normal AL, QRS, QT  intervals, T-wave inversions notable in lateral leads, similar to prior EKGs.  No STEMI [BS]   1630 I reviewed the CXR independently of the radiologist.     Chest x-ray was negative for acute cardiopulmonary abnormalities, infiltrates or bony abnormalities.     [BS]   1630 47-year-old male with history of hypertension, chronic anticoagulation, prior history of stroke presents for elevated blood pressure to systolic of 208, diastolic of 132.  He was sent from his cardiologist's office for blood pressure management given anticoagulation, history of stroke.  Patient has no complaints at this time, no headache, no chest pain, shortness of breath.  Vitals notable for hypertension, borderline bradycardia.  Patient is on a beta-blocker.   Patient has elevated blood pressure, but does not have any medical complaints: no headache, no shortness of breath, no abdominal pain, no difficulty urinating.  EKG without acute ischemic changes, troponin within normal limits.  BNP mildly elevated, patient has no signs or symptoms of heart failure.  CBC and CMP within normal limits.  Coags within normal limits.  Chest x-ray without acute cardiopulmonary abnormality.  Patient needs no further workup at this time.     There is strong data that the acute lowering of asymptomatic hypertension does not medically benefit the patient and, in fact, may do harm.    There is no medical need to acutely lower the patient's blood pressure, nor use telemetry.     [facility] Clinical Policy on Asymptomatic Hypertension  [employer]ece Emerg Med. 2013;62:59-68       Patient's blood pressure improved with 10 mg hydralazine, will discharge with losartan 25 mg p.o. daily, outpatient follow-up with his cardiologist for blood pressure recheck in 1 week and return precautions for headache, shortness breath, chest pain, nausea vomiting or any other concerning symptoms. [BS]      ED Course User Index  [BS] Cipriano Wagner MD                    Clinical Impression:   Final  diagnoses:  [I10] Hypertension, unspecified type (Primary)  [R21] Facial rash  [Z79.01] Chronic anticoagulation        ED Disposition Condition    Discharge Stable          ED Prescriptions       Medication Sig Dispense Start Date End Date Auth. Provider    losartan (COZAAR) 25 MG tablet Take 1 tablet (25 mg total) by mouth once daily. 30 tablet 10/17/2023 1/15/2024 Cipriano Wagner MD          Follow-up Information    None          Cipriano Wagner MD  10/17/23 5989

## 2023-10-18 ENCOUNTER — PATIENT MESSAGE (OUTPATIENT)
Dept: FAMILY MEDICINE | Facility: CLINIC | Age: 48
End: 2023-10-18
Payer: COMMERCIAL

## 2023-10-18 ENCOUNTER — TELEPHONE (OUTPATIENT)
Dept: FAMILY MEDICINE | Facility: CLINIC | Age: 48
End: 2023-10-18
Payer: COMMERCIAL

## 2023-10-19 ENCOUNTER — TELEPHONE (OUTPATIENT)
Dept: FAMILY MEDICINE | Facility: CLINIC | Age: 48
End: 2023-10-19
Payer: COMMERCIAL

## 2023-10-23 ENCOUNTER — TELEPHONE (OUTPATIENT)
Dept: DERMATOLOGY | Facility: CLINIC | Age: 48
End: 2023-10-23
Payer: COMMERCIAL

## 2023-10-23 NOTE — TELEPHONE ENCOUNTER
----- Message from Andreia Hernandez MA sent at 10/23/2023  7:51 AM CDT -----  Regarding: FW: apt  Contact: [phone]  Pt currently scheduled in April and usually has appointment in [place]  Please contact pt if sooner appt is available  Thank you  ----- Message -----  From: Mita Hightower MA  Sent: 10/20/2023   3:40 PM CDT  To: Andreia Hernandez MA  Subject: FW: apt                                            ----- Message -----  From: Leanna Morton  Sent: 10/20/2023  12:05 PM CDT  To: [facility] Derm Clinical Support Triage  Subject: apt                                              Pt calling in as np to schedule  apt for R21 (ICD-10-CM) - Facial rash please call to discuss Further

## 2023-10-24 ENCOUNTER — OFFICE VISIT (OUTPATIENT)
Dept: FAMILY MEDICINE | Facility: CLINIC | Age: 48
End: 2023-10-24
Payer: COMMERCIAL

## 2023-10-24 VITALS
BODY MASS INDEX: 38.86 KG/M2 | WEIGHT: 262.38 LBS | HEIGHT: 69 IN | TEMPERATURE: 99 F | RESPIRATION RATE: 16 BRPM | OXYGEN SATURATION: 95 % | SYSTOLIC BLOOD PRESSURE: 136 MMHG | HEART RATE: 62 BPM | DIASTOLIC BLOOD PRESSURE: 98 MMHG

## 2023-10-24 DIAGNOSIS — G47.33 OSA ON CPAP: Primary | ICD-10-CM

## 2023-10-24 DIAGNOSIS — E66.01 MORBID OBESITY: ICD-10-CM

## 2023-10-24 DIAGNOSIS — I10 ESSENTIAL HYPERTENSION: ICD-10-CM

## 2023-10-24 PROCEDURE — 1159F PR MEDICATION LIST DOCUMENTED IN MEDICAL RECORD: ICD-10-PCS | Mod: CPTII,S$GLB,,

## 2023-10-24 PROCEDURE — 3075F PR MOST RECENT SYSTOLIC BLOOD PRESS GE 130-139MM HG: ICD-10-PCS | Mod: CPTII,S$GLB,,

## 2023-10-24 PROCEDURE — 4010F PR ACE/ARB THEARPY RXD/TAKEN: ICD-10-PCS | Mod: CPTII,S$GLB,,

## 2023-10-24 PROCEDURE — 3080F PR MOST RECENT DIASTOLIC BLOOD PRESSURE >= 90 MM HG: ICD-10-PCS | Mod: CPTII,S$GLB,,

## 2023-10-24 PROCEDURE — 4010F ACE/ARB THERAPY RXD/TAKEN: CPT | Mod: CPTII,S$GLB,,

## 2023-10-24 PROCEDURE — 1159F MED LIST DOCD IN RCRD: CPT | Mod: CPTII,S$GLB,,

## 2023-10-24 PROCEDURE — 3008F PR BODY MASS INDEX (BMI) DOCUMENTED: ICD-10-PCS | Mod: CPTII,S$GLB,,

## 2023-10-24 PROCEDURE — 3075F SYST BP GE 130 - 139MM HG: CPT | Mod: CPTII,S$GLB,,

## 2023-10-24 PROCEDURE — 99999 PR PBB SHADOW E&M-EST. PATIENT-LVL V: CPT | Mod: PBBFAC,,,

## 2023-10-24 PROCEDURE — 99214 OFFICE O/P EST MOD 30 MIN: CPT | Mod: S$GLB,,,

## 2023-10-24 PROCEDURE — 1160F RVW MEDS BY RX/DR IN RCRD: CPT | Mod: CPTII,S$GLB,,

## 2023-10-24 PROCEDURE — 99214 PR OFFICE/OUTPT VISIT, EST, LEVL IV, 30-39 MIN: ICD-10-PCS | Mod: S$GLB,,,

## 2023-10-24 PROCEDURE — 3080F DIAST BP >= 90 MM HG: CPT | Mod: CPTII,S$GLB,,

## 2023-10-24 PROCEDURE — 1160F PR REVIEW ALL MEDS BY PRESCRIBER/CLIN PHARMACIST DOCUMENTED: ICD-10-PCS | Mod: CPTII,S$GLB,,

## 2023-10-24 PROCEDURE — 99999 PR PBB SHADOW E&M-EST. PATIENT-LVL V: ICD-10-PCS | Mod: PBBFAC,,,

## 2023-10-24 PROCEDURE — 3008F BODY MASS INDEX DOCD: CPT | Mod: CPTII,S$GLB,,

## 2023-10-24 NOTE — PROGRESS NOTES
Subjective:       Patient ID: Josue Mir is a 47 y.o. male.    Chief Complaint: Follow-up (ER 10/17/23 Hypertension )    ED follow up on HTN. Doing well on the losartan. No side effects. Pressure are coming down. Has follow up with cardiology scheduled. Denies associated symptoms with elevated pressures.    Wishes to discuss weight loss med options. Injectable therapy is primary interest. Hx of pancreatitis. Not a candidate for any weight loss medication I can provide.     JASPAL. On CPAP. Wishes to get updated sleep study to make sure his CPAP is appropriate. Will refer to pulm for management of JASPAL.         Past Medical History:   Diagnosis Date    Anticoagulant long-term use     Atrial fibrillation     Gout     Gout, joint     Hypertension     Memory deficit following cerebral infarction     Pneumonia 04/2018    Sleep apnea with use of continuous positive airway pressure (CPAP)     Stroke 04/2018    Syncope and collapse        Review of patient's allergies indicates:  No Known Allergies      Current Outpatient Medications:     allopurinoL (ZYLOPRIM) 300 MG tablet, Take 1 tablet (300 mg total) by mouth once daily., Disp: 90 tablet, Rfl: 4    amiodarone (PACERONE) 200 MG Tab, Take 0.5 tablets (100 mg total) by mouth once daily., Disp: 90 tablet, Rfl: 3    apixaban (ELIQUIS) 5 mg Tab, Take 1 tablet (5 mg total) by mouth 2 (two) times daily., Disp: 180 tablet, Rfl: 3    colchicine (COLCRYS) 0.6 mg tablet, Take 1 tablet (0.6 mg total) by mouth 2 (two) times daily., Disp: 14 tablet, Rfl: 3    FLUoxetine 20 MG capsule, Take 20 mg by mouth once daily., Disp: , Rfl:     losartan (COZAAR) 25 MG tablet, Take 1 tablet (25 mg total) by mouth once daily., Disp: 30 tablet, Rfl: 2    metoprolol tartrate (LOPRESSOR) 100 MG tablet, Take 1 tablet (100 mg total) by mouth 2 (two) times daily., Disp: 180 tablet, Rfl: 3    furosemide (LASIX) 20 MG tablet, Take 1 tablet (20 mg total) by mouth daily as needed (shortness of breath or leg  "swelling)., Disp: 10 tablet, Rfl: 0    Review of Systems   Constitutional:  Negative for activity change, appetite change, chills, diaphoresis, fatigue, fever and unexpected weight change.   HENT:  Negative for congestion, ear pain, postnasal drip, rhinorrhea, sinus pressure, sneezing, sore throat and trouble swallowing.    Eyes:  Negative for pain, itching and visual disturbance.   Respiratory:  Negative for cough, chest tightness, shortness of breath and wheezing.    Cardiovascular:  Negative for chest pain, palpitations and leg swelling.   Gastrointestinal:  Negative for abdominal distention, abdominal pain, blood in stool, constipation, diarrhea, nausea and vomiting.   Endocrine: Negative for cold intolerance and heat intolerance.   Genitourinary:  Negative for difficulty urinating, dysuria, frequency, hematuria and urgency.   Musculoskeletal:  Negative for arthralgias, back pain, myalgias and neck pain.   Skin:  Positive for color change and rash (Rash. Face. Getting better). Negative for pallor and wound.   Neurological:  Negative for dizziness, syncope, weakness, numbness and headaches.   Hematological:  Negative for adenopathy.   Psychiatric/Behavioral:  Negative for behavioral problems. The patient is not nervous/anxious.        Objective:      BP (!) 136/98 (BP Location: Right arm, Patient Position: Sitting, BP Method: Large (Manual))   Pulse 62   Temp 98.7 °F (37.1 °C) (Oral)   Resp 16   Ht 5' 9" (1.753 m)   Wt 119 kg (262 lb 5.6 oz)   SpO2 95%   BMI 38.74 kg/m²   Physical Exam  Vitals reviewed.   Constitutional:       General: He is not in acute distress.     Appearance: Normal appearance. He is obese. He is not ill-appearing, toxic-appearing or diaphoretic.   HENT:      Head: Normocephalic.      Right Ear: External ear normal.      Left Ear: External ear normal.      Nose: Nose normal. No congestion or rhinorrhea.      Mouth/Throat:      Mouth: Mucous membranes are moist.      Pharynx: Oropharynx " is clear.   Eyes:      General: No scleral icterus.        Right eye: No discharge.         Left eye: No discharge.      Extraocular Movements: Extraocular movements intact.      Conjunctiva/sclera: Conjunctivae normal.   Cardiovascular:      Rate and Rhythm: Normal rate and regular rhythm.      Pulses: Normal pulses.      Heart sounds: Normal heart sounds. No murmur heard.     No friction rub. No gallop.   Pulmonary:      Effort: Pulmonary effort is normal. No respiratory distress.      Breath sounds: Normal breath sounds. No wheezing, rhonchi or rales.   Chest:      Chest wall: No tenderness.   Musculoskeletal:         General: No swelling, tenderness or deformity. Normal range of motion.      Cervical back: Normal range of motion.      Right lower leg: No edema.      Left lower leg: No edema.   Skin:     General: Skin is warm and dry.      Capillary Refill: Capillary refill takes less than 2 seconds.      Coloration: Skin is not jaundiced.      Findings: Rash present. No bruising, erythema or lesion.   Neurological:      Mental Status: He is alert and oriented to person, place, and time.      Gait: Gait normal.   Psychiatric:         Mood and Affect: Mood normal.         Behavior: Behavior normal.         Thought Content: Thought content normal.         Judgment: Judgment normal.         Assessment:       1. JASPAL on CPAP    2. Essential hypertension    3. Morbid obesity        Plan:       JASPAL on CPAP  -     Ambulatory referral/consult to Pulmonology; Future; Expected date: 10/31/2023    Essential hypertension        -    Stable. Continue meds. Will continue to monitor.     Morbid obesity        -     Patient would benefit from healthy diet, exercise and weight loss. Overall health and wellbeing would likely improve.          -    Recommend he discuss other options with PCP                  Rual Cruz PA-C  Family Medicine Physician Assistant       Future Appointments       Date Provider Specialty Appt Notes     10/30/2023 Kerry Nicole NP Cardiology Follow-up from previous visit that didn't happen due to blood pressure issues.  Review stress test results.    11/2/2023 Angela Greco NP Pulmonology JASPAL on CPAP [G47.33]    11/9/2023 Tamra Mtz MD Dermatology facial rash    11/16/2023 Umberto Ortiz, PhD, MP Psychiatry follow up    1/3/2024 David Quach MD Family Medicine 6 month f/u               I spent a total of 20 minutes on the day of the visit.This includes face to face time and non-face to face time preparing to see the patient (eg, review of tests), obtaining and/or reviewing separately obtained history, documenting clinical information in the electronic or other health record, independently interpreting results and communicating results to the patient/family/caregiver, or care coordinator.      We have addressed [4] Moderate: 1 or more chronic illnesses with exacerbation, progression, or side effects of treatment / 2 or more stable chronic illnesses / 1 undiagnosed new problem with uncertain prognosis / 1 acute illness with systemic symptoms / 1 acute complicated injury  The complexity of the data reviewed and analyzed for this visit was [2] Minimal or None  The risk of complications and/or morbidity or mortality are [4] Moderate risk (I.e. prescription drug management / decision regarding minor surgery with identified pt or procedure risk factors / decision regarding elective major surgery without identified pt or procedure risk factors / diagnosis or treatment significantly limited by social determinants of health)   The level of Medical Decision Making for this visit is [4] Moderate

## 2023-10-24 NOTE — PATIENT INSTRUCTIONS
"Camilo Echeverriak,     If you are due for any health screening(s) below please notify me so we can arrange them to be ordered and scheduled to maintain your health. Most healthy patients complete it. Don't lose out on improving your health.     Tests to Keep You Healthy    Colon Cancer Screening: SCHEDULED  Last Blood Pressure <= 139/89 (10/24/2023): NO         Colon Cancer Screening    Of cancers affecting both men and women, colorectal cancer is the third leading cancer killer in the [place]. But it doesnt have to be. Screening can prevent colorectal cancer or find it at an early stage when treatment often leads to a cure.    A colonoscopy is the preferred test for detecting colon cancer. It is needed only once every 10 years if results are negative. While sedated, a flexible, lighted tube with a tiny camera is inserted into the rectum and advanced through the colon to look for cancers. An alternative screening test that is used at home and returned to the lab may also be used. It detects hidden blood in bowel movements which could indicate cancer in the colon. If results are positive, you will need a colonoscopy to determine if the blood is a sign of cancer. This type of follow up (diagnostic) colonoscopy usually requires additional copays as required by your insurance provider. Please contact your PCP if you have any questions.                  Patient Education       Checking Your Blood Pressure at Home   The Basics   Written by the doctors and editors at [facility]   How is blood pressure measured? -- Blood pressure is usually measured with a device that goes around your upper arm. This is often done in a doctor's office. But some people also check their blood pressure themselves, at home or at work.  Blood pressure is explained with 2 numbers. For instance, your blood pressure might be "140 over 90." The first (top) number is the pressure inside your arteries when your heart is jay. The second (bottom) " "number is the pressure inside your arteries when your heart is relaxed. The table shows how doctors and nurses define high and normal blood pressure (table 1).  If your blood pressure gets too high, it puts you at risk for heart attack, stroke, and kidney disease. High blood pressure does not usually cause symptoms. But it can be serious.  What is a home blood pressure meter? -- A home blood pressure meter (or "monitor") is a device you can use to check your blood pressure yourself. It has a cuff that goes around your upper arm (figure 1). Some devices have a cuff that goes around your wrist instead. But doctors aren't sure if these work as well. The meter also has a small screen, or dial, that shows your blood pressure numbers.  There are also special meters you can wear for a day or 2. These are different because they automatically check your blood pressure throughout the day and night, even while you are sleeping. If your doctor thinks you should use one of these devices, they will talk to you about how to wear it.  Why do I need to check my blood pressure at home? -- If your doctor knows or suspects that you have high blood pressure, they might want you to check it at home. There are a few reasons for this. Your doctor might want to look at:  Whether your blood pressure measures the same at home as it did in the doctor's office  How well your blood pressure medicines are working  Changes in your blood pressure, for example, if it goes up and down  People who check their own blood pressure at home usually do better at keeping it low.  How do I choose a home blood pressure meter? -- When choosing a home blood pressure meter, you will probably want to think about:  Cost - Some devices cost more than others. You should also check to see if your insurance will help pay for your device.  Size - It's important to make sure the cuff fits your arm comfortably. Your doctor or nurse can help you with this.  How easy it is " to use - You should make sure you understand how to use the device. You also need to be able to read the numbers on the screen.  You do not need a prescription to buy a home blood pressure meter. You can buy them at most pharmacies or over the internet. Your doctor or nurse can help you choose the right device for you.  How do I check my blood pressure at home? -- Once you have a home blood pressure meter, your doctor or nurse should check it to make sure it fits you and works correctly.  When it's time to check your blood pressure:  Go to the bathroom and empty your bladder first. Having a full bladder can temporarily increase your blood pressure, making the results inaccurate.  Sit in a chair with your feet flat on the ground.  Try to breathe normally and stay calm.  Attach the cuff to your arm. Place the cuff directly on your skin, not over your clothing. The cuff should be tight enough to not slip down, but not uncomfortably tight.  Sit and relax for about 3 to 5 minutes with the cuff on.  Follow the directions that came with your device to start measuring your blood pressure. This might involve squeezing the bulb at the end of the tube to inflate the cuff (fill it with air). With some monitors, you just need to press a button to inflate the cuff. When the cuff fills with air, it feels like someone is squeezing your arm, but it should not hurt. Then you will slowly deflate the cuff (let the air out of it), or it will deflate by itself. The screen or dial will show your blood pressure numbers.  Stay seated and relax for 1 minute, then measure your blood pressure again.  How often should I check my blood pressure? -- It depends. Different people need to follow different schedules. Your doctor or nurse will tell you how often to check your blood pressure, and when. Some people need to check their blood pressure twice a day, in the morning and evening.  Your doctor or nurse will probably tell you to keep track of  your blood pressure for at least a few days (table 2). Then they will look at the numbers. The reason for this is that it's normal for your blood pressure to change a bit from day to day. For example, the numbers might change depending on whether you recently had caffeine, just exercised, or feel stressed. Checking your blood pressure over several days - or longer - will give your doctor or nurse a better idea of what is average for you.  How should I keep track of my blood pressure? -- Some blood pressure meters will record your numbers for you, or send them to your computer or smartphone. If yours does not do this, you will need to write them down. Your doctor or nurse can help you figure out the best way to keep track of the numbers.  What if my blood pressure is high? -- Your doctor or nurse will tell you what to do if your blood pressure is high when you check it at home. If you get a number that is higher than normal, measure it again to see if it is still high. If it is very high (above a certain number, which your doctor or nurse will tell you to watch out for), you should call your doctor right away.  If your blood pressure is only a little high, your doctor or nurse might tell you to keep checking it for a few more days or weeks, and then call if it does not go back down. Then they can help you decide what to do next.  All topics are updated as new evidence becomes available and our peer review process is complete.  This topic retrieved from [employer] on: Sep 21, 2021.  Topic 075944 Version 4.0  Release: 29.4.2 - C29.263  © 2021 [employer] and/or its affiliates. All rights reserved.  table 1: Definition of normal and high blood pressure  Level  Top number  Bottom number    High 130 or above 80 or above   Elevated 120 to 129 79 or below   Normal 119 or below 79 or below   These definitions are from the American College of Cardiology/American Heart Association. Other expert groups might use slightly  "different definitions.  "Elevated blood pressure" is a term doctor or nurses use as a warning. It means you do not yet have high blood pressure, but your blood pressure is not as low as it should be for good health.  Graphic 60733 Version 6.0  figure 1: Using a home blood pressure meter     This is an example of a person using a home blood pressure meter.  Graphic 452095 Version 1.0    table 2: 7-day diary for checking blood pressure at home  Day 1  Day 2  Day 3  Day 4  Day 5  Day 6  Day 7    Morning  1st read Morning  1st read Morning  1st read Morning  1st read Morning  1st read Morning  1st read Morning  1st read   Systolic: __________ Systolic: __________ Systolic: __________ Systolic: __________ Systolic: __________ Systolic: __________ Systolic: __________   Diastolic: __________ Diastolic: __________ Diastolic: __________ Diastolic: __________ Diastolic: __________ Diastolic: __________ Diastolic: __________   Pulse: __________ Pulse: __________ Pulse: __________ Pulse: __________ Pulse: __________ Pulse: __________ Pulse: __________   Morning  2nd read Morning  2nd read Morning  2nd read Morning  2nd read Morning  2nd read Morning  2nd read Morning  2nd read   Systolic: __________ Systolic: __________ Systolic: __________ Systolic: __________ Systolic: __________ Systolic: __________ Systolic: __________   Diastolic: __________ Diastolic: __________ Diastolic: __________ Diastolic: __________ Diastolic: __________ Diastolic: __________ Diastolic: __________   Pulse: __________ Pulse: __________ Pulse: __________ Pulse: __________ Pulse: __________ Pulse: __________ Pulse: __________   Evening  1st read Evening  1st read Evening  1st read Evening  1st read Evening  1st read Evening  1st read Evening  1st read   Systolic: __________ Systolic: __________ Systolic: __________ Systolic: __________ Systolic: __________ Systolic: __________ Systolic: __________   Diastolic: __________ Diastolic: __________ " Diastolic: __________ Diastolic: __________ Diastolic: __________ Diastolic: __________ Diastolic: __________   Pulse: __________ Pulse: __________ Pulse: __________ Pulse: __________ Pulse: __________ Pulse: __________ Pulse: __________   Evening  2nd read Evening  2nd read Evening  2nd read Evening  2nd read Evening  2nd read Evening  2nd read Evening  2nd read   Systolic: __________ Systolic: __________ Systolic: __________ Systolic: __________ Systolic: __________ Systolic: __________ Systolic: __________   Diastolic: __________ Diastolic: __________ Diastolic: __________ Diastolic: __________ Diastolic: __________ Diastolic: __________ Diastolic: __________   Pulse: __________ Pulse: __________ Pulse: __________ Pulse: __________ Pulse: __________ Pulse: __________ Pulse: __________   Notes    Notes    Notes    Notes    Notes    Notes    Notes      ____________________ ____________________ ____________________ ____________________ ____________________ ____________________ ____________________   ____________________ ____________________ ____________________ ____________________ ____________________ ____________________ ____________________   ____________________ ____________________ ____________________ ____________________ ____________________ ____________________ ____________________   Patient name: ______________________________     Patient ID: ________________________________    Primary care provider: _______________________    Average BP: _______________________________    Graphic [id number] Version 1.0  Consumer Information Use and Disclaimer   This information is not specific medical advice and does not replace information you receive from your health care provider. This is only a brief summary of general information. It does NOT include all information about conditions, illnesses, injuries, tests, procedures, treatments, therapies, discharge instructions or life-style choices that may apply to you. You must  talk with your health care provider for complete information about your health and treatment options. This information should not be used to decide whether or not to accept your health care provider's advice, instructions or recommendations. Only your health care provider has the knowledge and training to provide advice that is right for you. The use of this information is governed by the [employer] End User License Agreement, available at [email or web address] use of [employer] content is governed by the [employer] Terms of Use. ©2021 [employer] All rights reserved.  Copyright   © 2021 [employer], [employer]. and/or its affiliates. All rights reserved.

## 2023-10-30 ENCOUNTER — OFFICE VISIT (OUTPATIENT)
Dept: CARDIOLOGY | Facility: CLINIC | Age: 48
End: 2023-10-30
Payer: COMMERCIAL

## 2023-10-30 VITALS
HEART RATE: 63 BPM | OXYGEN SATURATION: 98 % | SYSTOLIC BLOOD PRESSURE: 160 MMHG | BODY MASS INDEX: 39.25 KG/M2 | HEIGHT: 69 IN | DIASTOLIC BLOOD PRESSURE: 100 MMHG | WEIGHT: 265 LBS

## 2023-10-30 DIAGNOSIS — Z86.79 S/P RF ABLATION OPERATION FOR ARRHYTHMIA: ICD-10-CM

## 2023-10-30 DIAGNOSIS — G47.33 OSA (OBSTRUCTIVE SLEEP APNEA): ICD-10-CM

## 2023-10-30 DIAGNOSIS — I48.0 PAF (PAROXYSMAL ATRIAL FIBRILLATION): ICD-10-CM

## 2023-10-30 DIAGNOSIS — Z98.890 S/P RF ABLATION OPERATION FOR ARRHYTHMIA: ICD-10-CM

## 2023-10-30 DIAGNOSIS — R94.39 ABNORMAL STRESS TEST: Primary | ICD-10-CM

## 2023-10-30 DIAGNOSIS — I51.9 LV DYSFUNCTION: ICD-10-CM

## 2023-10-30 PROCEDURE — 99214 PR OFFICE/OUTPT VISIT, EST, LEVL IV, 30-39 MIN: ICD-10-PCS | Mod: S$GLB,,, | Performed by: NURSE PRACTITIONER

## 2023-10-30 PROCEDURE — 3080F DIAST BP >= 90 MM HG: CPT | Mod: CPTII,S$GLB,, | Performed by: NURSE PRACTITIONER

## 2023-10-30 PROCEDURE — 99999 PR PBB SHADOW E&M-EST. PATIENT-LVL V: CPT | Mod: PBBFAC,,, | Performed by: NURSE PRACTITIONER

## 2023-10-30 PROCEDURE — 3008F BODY MASS INDEX DOCD: CPT | Mod: CPTII,S$GLB,, | Performed by: NURSE PRACTITIONER

## 2023-10-30 PROCEDURE — 1159F MED LIST DOCD IN RCRD: CPT | Mod: CPTII,S$GLB,, | Performed by: NURSE PRACTITIONER

## 2023-10-30 PROCEDURE — 99999 PR PBB SHADOW E&M-EST. PATIENT-LVL V: ICD-10-PCS | Mod: PBBFAC,,, | Performed by: NURSE PRACTITIONER

## 2023-10-30 PROCEDURE — 3080F PR MOST RECENT DIASTOLIC BLOOD PRESSURE >= 90 MM HG: ICD-10-PCS | Mod: CPTII,S$GLB,, | Performed by: NURSE PRACTITIONER

## 2023-10-30 PROCEDURE — 4010F ACE/ARB THERAPY RXD/TAKEN: CPT | Mod: CPTII,S$GLB,, | Performed by: NURSE PRACTITIONER

## 2023-10-30 PROCEDURE — 1159F PR MEDICATION LIST DOCUMENTED IN MEDICAL RECORD: ICD-10-PCS | Mod: CPTII,S$GLB,, | Performed by: NURSE PRACTITIONER

## 2023-10-30 PROCEDURE — 99214 OFFICE O/P EST MOD 30 MIN: CPT | Mod: S$GLB,,, | Performed by: NURSE PRACTITIONER

## 2023-10-30 PROCEDURE — 3077F SYST BP >= 140 MM HG: CPT | Mod: CPTII,S$GLB,, | Performed by: NURSE PRACTITIONER

## 2023-10-30 PROCEDURE — 4010F PR ACE/ARB THEARPY RXD/TAKEN: ICD-10-PCS | Mod: CPTII,S$GLB,, | Performed by: NURSE PRACTITIONER

## 2023-10-30 PROCEDURE — 3077F PR MOST RECENT SYSTOLIC BLOOD PRESSURE >= 140 MM HG: ICD-10-PCS | Mod: CPTII,S$GLB,, | Performed by: NURSE PRACTITIONER

## 2023-10-30 PROCEDURE — 3008F PR BODY MASS INDEX (BMI) DOCUMENTED: ICD-10-PCS | Mod: CPTII,S$GLB,, | Performed by: NURSE PRACTITIONER

## 2023-10-30 RX ORDER — SODIUM CHLORIDE 9 MG/ML
INJECTION, SOLUTION INTRAVENOUS ONCE
Status: CANCELLED | OUTPATIENT
Start: 2023-10-30 | End: 2023-10-30

## 2023-10-30 RX ORDER — DIPHENHYDRAMINE HCL 25 MG
50 CAPSULE ORAL
Status: CANCELLED | OUTPATIENT
Start: 2023-10-30

## 2023-10-30 RX ORDER — LOSARTAN POTASSIUM 50 MG/1
50 TABLET ORAL DAILY
Qty: 30 TABLET | Refills: 2 | Status: SHIPPED | OUTPATIENT
Start: 2023-10-30 | End: 2024-01-30

## 2023-10-30 NOTE — PROGRESS NOTES
[facility]    Subjective:     Patient ID:  Josue Mir is a 47 y.o. male patient here for evaluation Results      HPI    Mr. Mir is here today for a follow-up visit.  Last visit he saw Dr. Johansen and was sent to ER. He has been having High blood pressures. /100 today. He denies CP has sone SOB with exertion. Has H/O PAF S/P Ablation. On Eliquis. Denies recent fever or chills. His stress test shows some abnormality and scarring. He is only 47. He has an LVEF 25% on last KIRILL.       Review of Systems   Respiratory:  Positive for shortness of breath.    Cardiovascular:  Negative for chest pain.   All other systems reviewed and are negative.           Past Medical History:   Diagnosis Date    Anticoagulant long-term use     Atrial fibrillation     Gout     Gout, joint     Hypertension     Memory deficit following cerebral infarction     Pneumonia 04/2018    Sleep apnea with use of continuous positive airway pressure (CPAP)     Stroke 04/2018    Syncope and collapse        Past Surgical History:   Procedure Laterality Date    ABLATION OF ARRHYTHMOGENIC FOCUS FOR ATRIAL FIBRILLATION N/A 5/23/2023    Procedure: Ablation atrial fibrillation;  Surgeon: Abdullahi Daniels MD;  Location: [facility] EP LAB;  Service: Cardiology;  Laterality: N/A;  AF, KIRILL(Cx if SR), PVI, RFA, CARTO, MAC, GP, 3 PREP* MDT ILR in situ*    INSERTION OF IMPLANTABLE LOOP RECORDER  10/25/2018    Procedure: Insertion, Implantable Loop Recorder;  Surgeon: Justin Colby MD;  Location: [facility] CATH;  Service: Cardiology;;    LAPAROSCOPIC CHOLECYSTECTOMY N/A 12/14/2018    Procedure: CHOLECYSTECTOMY, LAPAROSCOPIC;  Surgeon: Beck Love MD;  Location: [facility] OR;  Service: General;  Laterality: N/A;  Dr. Love requested case to start at 1:00pm    NASAL POLYP EXCISION      TRANSESOPHAGEAL ECHOCARDIOGRAPHY N/A 5/23/2023    Procedure: ECHOCARDIOGRAM, TRANSESOPHAGEAL;  Surgeon: Kaylin  MD Cipriano;  Location: [facility] EP LAB;  Service: Cardiology;  Laterality: N/A;  AF, KIRILL(Cx if SR), PVI, RFA, CARTO, MAC, GP, 3 PREP* MDT ILR in situ*    TREATMENT OF CARDIAC ARRHYTHMIA N/A 10/25/2018    Procedure: CARDIOVERSION OR DEFIBRILLATION;  Surgeon: Justin Colby MD;  Location: [facility] CATH;  Service: Cardiology;  Laterality: N/A;    TREATMENT OF CARDIAC ARRHYTHMIA  5/23/2023    Procedure: Cardioversion or Defibrillation;  Surgeon: Abdullahi Daniels MD;  Location: [facility] EP LAB;  Service: Cardiology;;       Family History   Problem Relation Age of Onset    Sleep apnea Mother     Hypertension Mother     No Known Problems Brother     No Known Problems Brother     No Known Problems Brother        Social History     Socioeconomic History    Marital status:    Tobacco Use    Smoking status: Never    Smokeless tobacco: Never   Substance and Sexual Activity    Alcohol use: Yes     Comment: rarely    Drug use: No    Sexual activity: Yes     Partners: Female     Social Determinants of Health     Financial Resource Strain: Low Risk  (3/22/2023)    Overall Financial Resource Strain (CARDIA)     Difficulty of Paying Living Expenses: Not hard at all   Food Insecurity: No Food Insecurity (3/22/2023)    Hunger Vital Sign     Worried About Running Out of Food in the Last Year: Never true     Ran Out of Food in the Last Year: Never true   Transportation Needs: No Transportation Needs (3/22/2023)    PRAPARE - Transportation     Lack of Transportation (Medical): No     Lack of Transportation (Non-Medical): No   Physical Activity: Inactive (3/22/2023)    Exercise Vital Sign     Days of Exercise per Week: 0 days     Minutes of Exercise per Session: 0 min   Stress: No Stress Concern Present (3/22/2023)    [employer] - Occupational Stress Questionnaire     Feeling of Stress : Not at all   Social Connections: Moderately Isolated (3/22/2023)    Social Connection and Isolation Panel [NHANES]      Frequency of Communication with Friends and Family: More than three times a week     Frequency of Social Gatherings with Friends and Family: Once a week     Attends Yazidi Services: Never     Active Member of Clubs or Organizations: No     Attends Club or Organization Meetings: Never     Marital Status:    Housing Stability: High Risk (3/22/2023)    Housing Stability Vital Sign     Unable to Pay for Housing in the Last Year: Yes     Number of Places Lived in the Last Year: 1     Unstable Housing in the Last Year: No       Current Outpatient Medications   Medication Sig Dispense Refill    allopurinoL (ZYLOPRIM) 300 MG tablet Take 1 tablet (300 mg total) by mouth once daily. 90 tablet 4    amiodarone (PACERONE) 200 MG Tab Take 0.5 tablets (100 mg total) by mouth once daily. 90 tablet 3    apixaban (ELIQUIS) 5 mg Tab Take 1 tablet (5 mg total) by mouth 2 (two) times daily. 180 tablet 3    colchicine (COLCRYS) 0.6 mg tablet Take 1 tablet (0.6 mg total) by mouth 2 (two) times daily. 14 tablet 3    FLUoxetine 20 MG capsule Take 20 mg by mouth once daily.      metoprolol tartrate (LOPRESSOR) 100 MG tablet Take 1 tablet (100 mg total) by mouth 2 (two) times daily. 180 tablet 3    furosemide (LASIX) 20 MG tablet Take 1 tablet (20 mg total) by mouth daily as needed (shortness of breath or leg swelling). 10 tablet 0    losartan (COZAAR) 50 MG tablet Take 1 tablet (50 mg total) by mouth once daily. 30 tablet 2     No current facility-administered medications for this visit.       Review of patient's allergies indicates:  No Known Allergies      Objective:        Vitals:    10/30/23 0842   BP: (!) 160/100   Pulse: 63       Physical Exam  Constitutional:       Appearance: He is obese.   HENT:      Head: Normocephalic.      Mouth/Throat:      Mouth: Mucous membranes are dry.   Eyes:      Pupils: Pupils are equal, round, and reactive to light.   Cardiovascular:      Rate and Rhythm: Normal rate and regular rhythm.       Pulses: Normal pulses.      Heart sounds: Normal heart sounds.   Pulmonary:      Effort: Pulmonary effort is normal.      Breath sounds: Normal breath sounds.   Musculoskeletal:         General: Normal range of motion.   Skin:     General: Skin is warm and dry.      Capillary Refill: Capillary refill takes less than 2 seconds.   Neurological:      Mental Status: He is oriented to person, place, and time.         LIPIDS - LAST 2   Lab Results   Component Value Date    CHOL 216 (H) 10/03/2023    CHOL 191 10/04/2022    HDL 49 10/03/2023    HDL 43 10/04/2022    LDLCALC 130.2 10/03/2023    LDLCALC 133.0 10/04/2022    TRIG 184 (H) 10/03/2023    TRIG 75 10/04/2022    CHOLHDL 22.7 10/03/2023    CHOLHDL 22.5 10/04/2022       CBC - LAST 2  Lab Results   Component Value Date    WBC 6.48 10/17/2023    WBC 7.08 10/15/2023    RBC 6.04 10/17/2023    RBC 5.85 10/15/2023    HGB 16.1 10/17/2023    HGB 15.6 10/15/2023    HCT 49.5 10/17/2023    HCT 47.8 10/15/2023    MCV 82 10/17/2023    MCV 82 10/15/2023    MCH 26.7 (L) 10/17/2023    MCH 26.7 (L) 10/15/2023    MCHC 32.5 10/17/2023    MCHC 32.6 10/15/2023    RDW 15.0 (H) 10/17/2023    RDW 14.5 10/15/2023     10/17/2023     10/15/2023    MPV 9.7 10/17/2023    MPV 9.2 10/15/2023    GRAN 4.4 10/17/2023    GRAN 67.6 10/17/2023    LYMPH 1.2 10/17/2023    LYMPH 18.4 10/17/2023    MONO 0.5 10/17/2023    MONO 8.3 10/17/2023    BASO 0.07 10/17/2023    BASO 0.10 10/15/2023    NRBC 0 10/17/2023    NRBC 0 10/15/2023       CHEMISTRY & LIVER FUNCTION - LAST 2  Lab Results   Component Value Date     10/17/2023     10/15/2023    K 4.5 10/17/2023    K 3.7 10/15/2023     (H) 10/17/2023     10/15/2023    CO2 26 10/17/2023    CO2 25 10/15/2023    ANIONGAP 6 (L) 10/17/2023    ANIONGAP 10 10/15/2023    BUN 16 10/17/2023    BUN 14 10/15/2023    CREATININE 1.3 10/17/2023    CREATININE 1.1 10/15/2023    GLU 86 10/17/2023    GLU 86 10/15/2023    CALCIUM 9.5 10/17/2023     CALCIUM 9.3 10/15/2023    MG 2.1 10/17/2023    MG 2.2 03/24/2023    ALBUMIN 4.0 10/17/2023    ALBUMIN 4.0 10/15/2023    PROT 7.6 10/17/2023    PROT 7.4 10/15/2023    ALKPHOS 68 10/17/2023    ALKPHOS 72 10/15/2023    ALT 16 10/17/2023    ALT 16 10/15/2023    AST 21 10/17/2023    AST 16 10/15/2023    BILITOT 0.6 10/17/2023    BILITOT 0.8 10/15/2023        CARDIAC PROFILE - LAST 2  Lab Results   Component Value Date     (H) 10/17/2023     (H) 10/15/2023    CPK 21 03/25/2023        COAGULATION - LAST 2  Lab Results   Component Value Date    INR 1.0 10/17/2023    INR 1.0 05/19/2023    APTT 29.2 05/19/2023       ENDOCRINE & PSA - LAST 2  Lab Results   Component Value Date    HGBA1C 5.4 10/04/2022    HGBA1C 5.4 03/16/2020    TSH 2.394 10/04/2022    TSH 0.223 (L) 03/16/2020        ECHOCARDIOGRAM RESULTS  Results for orders placed during the hospital encounter of 05/23/23    Transesophageal echo (KIRILL)    Interpretation Summary  · Normal appearing left atrial appendage. No thrombus is present in the appendage. Abnormal appendage velocities. contrast used.  · The estimated ejection fraction is 25%.  · There is severe left ventricular global hypokinesis.  · The left ventricle is normal in size with severely decreased systolic function.  · Normal right ventricular size with mildly reduced right ventricular systolic function.  · Mild mitral regurgitation.      CURRENT/PREVIOUS VISIT EKG  Results for orders placed or performed during the hospital encounter of 10/17/23   EKG 12-lead    Collection Time: 10/17/23 12:24 PM    Narrative    Test Reason : R03.0,    Vent. Rate : 043 BPM     Atrial Rate : 043 BPM     P-R Int : 182 ms          QRS Dur : 098 ms      QT Int : 540 ms       P-R-T Axes : 015 000 189 degrees     QTc Int : 456 ms    Marked sinus bradycardia  ST and T wave abnormality, consider inferolateral ischemia  Abnormal ECG  When compared with ECG of 15-OCT-2023 10:55,  Criteria for Septal infarct are no longer  Present  T wave inversion more evident in Lateral leads  Confirmed by Tom Mott MD ([id number]) on 10/23/2023 9:54:18 AM    Referred By: [email or web address]   SELF           Confirmed By:Tom Mott MD     No valid procedures specified.   Results for orders placed during the hospital encounter of 07/13/23    Nuclear Stress - Cardiology Interpreted    Interpretation Summary    Abnormal myocardial perfusion scan.    A PET stress exam is recommended if clinically indicated.    There are two significant perfusion abnormalities.    Perfusion Abnormality #1 - There is a moderate to severe intensity, moderate sized, equivocal perfusion abnormality that is fixed in the basal to apical inferior and inferolateral wall(s). This finding is equivocal due to diaphragm shadow.    Perfusion Abnormality #2 - There is a small to moderate sized, moderate to severe intensity, fixed perfusion abnormality consistent with scar in the basal to mid lateral wall(s).    There are no other significant perfusion abnormalities.    The gated perfusion images showed an ejection fraction of 39% at rest. The gated perfusion images showed an ejection fraction of 33% post stress. Normal ejection fraction is greater than 53%.    There is moderate global hypokinesis at rest and stress.    LV cavity size is mildly enlarged at rest and mildly enlarged at stress.    The ECG portion of the study is abnormal but not diagnostic due to resting ST-T abnormalities.    The patient reported no chest pain during the stress test.    During stress, occasional PVCs are noted.    The patient exercised for 7 minutes 40 seconds on a Justin protocol, corresponding to a functional capacity of 10 METS, achieving a peak heart rate of 150 bpm, which is 86 % of the age predicted maximum heart rate.        Assessment:       1. Abnormal stress test    2. LV dysfunction    3. PAF (paroxysmal atrial fibrillation)    4. JASPAL (obstructive sleep apnea)    5. S/P RF ablation operation for  arrhythmia           Plan:         Recommend to increase Losartan to 50 mg keep BP log  Will probably have to increase this as well  Has never had angiographic assessment  Plan on Diagnostic Angiogram next week with Dr. Haily Hankins 3 days prior  Discussed with patient the risks and benefits of the procedure including, but not limited to, the following 1:1000 risk of Heart attack, stroke and death with 3-5% Risk of bleeding, vessel damage, and the need for emergent CABG Surgery.  All questions have been answered to patient's satisfaction.  Informed consent obtained          Problem List Items Addressed This Visit          Cardiac/Vascular    PAF (paroxysmal atrial fibrillation)    LV dysfunction    S/P RF ablation operation for arrhythmia    Abnormal stress test - Primary       Other    JASPAL (obstructive sleep apnea)          [facility] [place]  [place]

## 2023-10-30 NOTE — H&P (VIEW-ONLY)
[facility]    Subjective:     Patient ID:  Josue Mir is a 47 y.o. male patient here for evaluation Results      HPI    Mr. Mir is here today for a follow-up visit.  Last visit he saw Dr. Johansen and was sent to ER. He has been having High blood pressures. /100 today. He denies CP has sone SOB with exertion. Has H/O PAF S/P Ablation. On Eliquis. Denies recent fever or chills. His stress test shows some abnormality and scarring. He is only 47. He has an LVEF 25% on last KIRILL.       Review of Systems   Respiratory:  Positive for shortness of breath.    Cardiovascular:  Negative for chest pain.   All other systems reviewed and are negative.           Past Medical History:   Diagnosis Date    Anticoagulant long-term use     Atrial fibrillation     Gout     Gout, joint     Hypertension     Memory deficit following cerebral infarction     Pneumonia 04/2018    Sleep apnea with use of continuous positive airway pressure (CPAP)     Stroke 04/2018    Syncope and collapse        Past Surgical History:   Procedure Laterality Date    ABLATION OF ARRHYTHMOGENIC FOCUS FOR ATRIAL FIBRILLATION N/A 5/23/2023    Procedure: Ablation atrial fibrillation;  Surgeon: Abdullahi Daniels MD;  Location: [facility] EP LAB;  Service: Cardiology;  Laterality: N/A;  AF, KIRILL(Cx if SR), PVI, RFA, CARTO, MAC, GP, 3 PREP* MDT ILR in situ*    INSERTION OF IMPLANTABLE LOOP RECORDER  10/25/2018    Procedure: Insertion, Implantable Loop Recorder;  Surgeon: Justin Colby MD;  Location: [facility] CATH;  Service: Cardiology;;    LAPAROSCOPIC CHOLECYSTECTOMY N/A 12/14/2018    Procedure: CHOLECYSTECTOMY, LAPAROSCOPIC;  Surgeon: Beck Love MD;  Location: [facility] OR;  Service: General;  Laterality: N/A;  Dr. Love requested case to start at 1:00pm    NASAL POLYP EXCISION      TRANSESOPHAGEAL ECHOCARDIOGRAPHY N/A 5/23/2023    Procedure: ECHOCARDIOGRAM, TRANSESOPHAGEAL;  Surgeon: Kaylin  MD Cipriano;  Location: [facility] EP LAB;  Service: Cardiology;  Laterality: N/A;  AF, KIRILL(Cx if SR), PVI, RFA, CARTO, MAC, GP, 3 PREP* MDT ILR in situ*    TREATMENT OF CARDIAC ARRHYTHMIA N/A 10/25/2018    Procedure: CARDIOVERSION OR DEFIBRILLATION;  Surgeon: Justin Colby MD;  Location: [facility] CATH;  Service: Cardiology;  Laterality: N/A;    TREATMENT OF CARDIAC ARRHYTHMIA  5/23/2023    Procedure: Cardioversion or Defibrillation;  Surgeon: Abdullahi Daniels MD;  Location: [facility] EP LAB;  Service: Cardiology;;       Family History   Problem Relation Age of Onset    Sleep apnea Mother     Hypertension Mother     No Known Problems Brother     No Known Problems Brother     No Known Problems Brother        Social History     Socioeconomic History    Marital status:    Tobacco Use    Smoking status: Never    Smokeless tobacco: Never   Substance and Sexual Activity    Alcohol use: Yes     Comment: rarely    Drug use: No    Sexual activity: Yes     Partners: Female     Social Determinants of Health     Financial Resource Strain: Low Risk  (3/22/2023)    Overall Financial Resource Strain (CARDIA)     Difficulty of Paying Living Expenses: Not hard at all   Food Insecurity: No Food Insecurity (3/22/2023)    Hunger Vital Sign     Worried About Running Out of Food in the Last Year: Never true     Ran Out of Food in the Last Year: Never true   Transportation Needs: No Transportation Needs (3/22/2023)    PRAPARE - Transportation     Lack of Transportation (Medical): No     Lack of Transportation (Non-Medical): No   Physical Activity: Inactive (3/22/2023)    Exercise Vital Sign     Days of Exercise per Week: 0 days     Minutes of Exercise per Session: 0 min   Stress: No Stress Concern Present (3/22/2023)    [employer] [place] [employer] - Occupational Stress Questionnaire     Feeling of Stress : Not at all   Social Connections: Moderately Isolated (3/22/2023)    Social Connection and Isolation Panel [NHANES]      Frequency of Communication with Friends and Family: More than three times a week     Frequency of Social Gatherings with Friends and Family: Once a week     Attends Hoahaoism Services: Never     Active Member of Clubs or Organizations: No     Attends Club or Organization Meetings: Never     Marital Status:    Housing Stability: High Risk (3/22/2023)    Housing Stability Vital Sign     Unable to Pay for Housing in the Last Year: Yes     Number of Places Lived in the Last Year: 1     Unstable Housing in the Last Year: No       Current Outpatient Medications   Medication Sig Dispense Refill    allopurinoL (ZYLOPRIM) 300 MG tablet Take 1 tablet (300 mg total) by mouth once daily. 90 tablet 4    amiodarone (PACERONE) 200 MG Tab Take 0.5 tablets (100 mg total) by mouth once daily. 90 tablet 3    apixaban (ELIQUIS) 5 mg Tab Take 1 tablet (5 mg total) by mouth 2 (two) times daily. 180 tablet 3    colchicine (COLCRYS) 0.6 mg tablet Take 1 tablet (0.6 mg total) by mouth 2 (two) times daily. 14 tablet 3    FLUoxetine 20 MG capsule Take 20 mg by mouth once daily.      metoprolol tartrate (LOPRESSOR) 100 MG tablet Take 1 tablet (100 mg total) by mouth 2 (two) times daily. 180 tablet 3    furosemide (LASIX) 20 MG tablet Take 1 tablet (20 mg total) by mouth daily as needed (shortness of breath or leg swelling). 10 tablet 0    losartan (COZAAR) 50 MG tablet Take 1 tablet (50 mg total) by mouth once daily. 30 tablet 2     No current facility-administered medications for this visit.       Review of patient's allergies indicates:  No Known Allergies      Objective:        Vitals:    10/30/23 0842   BP: (!) 160/100   Pulse: 63       Physical Exam  Constitutional:       Appearance: He is obese.   HENT:      Head: Normocephalic.      Mouth/Throat:      Mouth: Mucous membranes are dry.   Eyes:      Pupils: Pupils are equal, round, and reactive to light.   Cardiovascular:      Rate and Rhythm: Normal rate and regular rhythm.       Pulses: Normal pulses.      Heart sounds: Normal heart sounds.   Pulmonary:      Effort: Pulmonary effort is normal.      Breath sounds: Normal breath sounds.   Musculoskeletal:         General: Normal range of motion.   Skin:     General: Skin is warm and dry.      Capillary Refill: Capillary refill takes less than 2 seconds.   Neurological:      Mental Status: He is oriented to person, place, and time.         LIPIDS - LAST 2   Lab Results   Component Value Date    CHOL 216 (H) 10/03/2023    CHOL 191 10/04/2022    HDL 49 10/03/2023    HDL 43 10/04/2022    LDLCALC 130.2 10/03/2023    LDLCALC 133.0 10/04/2022    TRIG 184 (H) 10/03/2023    TRIG 75 10/04/2022    CHOLHDL 22.7 10/03/2023    CHOLHDL 22.5 10/04/2022       CBC - LAST 2  Lab Results   Component Value Date    WBC 6.48 10/17/2023    WBC 7.08 10/15/2023    RBC 6.04 10/17/2023    RBC 5.85 10/15/2023    HGB 16.1 10/17/2023    HGB 15.6 10/15/2023    HCT 49.5 10/17/2023    HCT 47.8 10/15/2023    MCV 82 10/17/2023    MCV 82 10/15/2023    MCH 26.7 (L) 10/17/2023    MCH 26.7 (L) 10/15/2023    MCHC 32.5 10/17/2023    MCHC 32.6 10/15/2023    RDW 15.0 (H) 10/17/2023    RDW 14.5 10/15/2023     10/17/2023     10/15/2023    MPV 9.7 10/17/2023    MPV 9.2 10/15/2023    GRAN 4.4 10/17/2023    GRAN 67.6 10/17/2023    LYMPH 1.2 10/17/2023    LYMPH 18.4 10/17/2023    MONO 0.5 10/17/2023    MONO 8.3 10/17/2023    BASO 0.07 10/17/2023    BASO 0.10 10/15/2023    NRBC 0 10/17/2023    NRBC 0 10/15/2023       CHEMISTRY & LIVER FUNCTION - LAST 2  Lab Results   Component Value Date     10/17/2023     10/15/2023    K 4.5 10/17/2023    K 3.7 10/15/2023     (H) 10/17/2023     10/15/2023    CO2 26 10/17/2023    CO2 25 10/15/2023    ANIONGAP 6 (L) 10/17/2023    ANIONGAP 10 10/15/2023    BUN 16 10/17/2023    BUN 14 10/15/2023    CREATININE 1.3 10/17/2023    CREATININE 1.1 10/15/2023    GLU 86 10/17/2023    GLU 86 10/15/2023    CALCIUM 9.5 10/17/2023     CALCIUM 9.3 10/15/2023    MG 2.1 10/17/2023    MG 2.2 03/24/2023    ALBUMIN 4.0 10/17/2023    ALBUMIN 4.0 10/15/2023    PROT 7.6 10/17/2023    PROT 7.4 10/15/2023    ALKPHOS 68 10/17/2023    ALKPHOS 72 10/15/2023    ALT 16 10/17/2023    ALT 16 10/15/2023    AST 21 10/17/2023    AST 16 10/15/2023    BILITOT 0.6 10/17/2023    BILITOT 0.8 10/15/2023        CARDIAC PROFILE - LAST 2  Lab Results   Component Value Date     (H) 10/17/2023     (H) 10/15/2023    CPK 21 03/25/2023        COAGULATION - LAST 2  Lab Results   Component Value Date    INR 1.0 10/17/2023    INR 1.0 05/19/2023    APTT 29.2 05/19/2023       ENDOCRINE & PSA - LAST 2  Lab Results   Component Value Date    HGBA1C 5.4 10/04/2022    HGBA1C 5.4 03/16/2020    TSH 2.394 10/04/2022    TSH 0.223 (L) 03/16/2020        ECHOCARDIOGRAM RESULTS  Results for orders placed during the hospital encounter of 05/23/23    Transesophageal echo (KIRILL)    Interpretation Summary  · Normal appearing left atrial appendage. No thrombus is present in the appendage. Abnormal appendage velocities. contrast used.  · The estimated ejection fraction is 25%.  · There is severe left ventricular global hypokinesis.  · The left ventricle is normal in size with severely decreased systolic function.  · Normal right ventricular size with mildly reduced right ventricular systolic function.  · Mild mitral regurgitation.      CURRENT/PREVIOUS VISIT EKG  Results for orders placed or performed during the hospital encounter of 10/17/23   EKG 12-lead    Collection Time: 10/17/23 12:24 PM    Narrative    Test Reason : R03.0,    Vent. Rate : 043 BPM     Atrial Rate : 043 BPM     P-R Int : 182 ms          QRS Dur : 098 ms      QT Int : 540 ms       P-R-T Axes : 015 000 189 degrees     QTc Int : 456 ms    Marked sinus bradycardia  ST and T wave abnormality, consider inferolateral ischemia  Abnormal ECG  When compared with ECG of 15-OCT-2023 10:55,  Criteria for Septal infarct are no longer  Present  T wave inversion more evident in Lateral leads  Confirmed by Tom Mott MD ([id number]) on 10/23/2023 9:54:18 AM    Referred By: [email or web address]   SELF           Confirmed By:Tom Mott MD     No valid procedures specified.   Results for orders placed during the hospital encounter of 07/13/23    Nuclear Stress - Cardiology Interpreted    Interpretation Summary    Abnormal myocardial perfusion scan.    A PET stress exam is recommended if clinically indicated.    There are two significant perfusion abnormalities.    Perfusion Abnormality #1 - There is a moderate to severe intensity, moderate sized, equivocal perfusion abnormality that is fixed in the basal to apical inferior and inferolateral wall(s). This finding is equivocal due to diaphragm shadow.    Perfusion Abnormality #2 - There is a small to moderate sized, moderate to severe intensity, fixed perfusion abnormality consistent with scar in the basal to mid lateral wall(s).    There are no other significant perfusion abnormalities.    The gated perfusion images showed an ejection fraction of 39% at rest. The gated perfusion images showed an ejection fraction of 33% post stress. Normal ejection fraction is greater than 53%.    There is moderate global hypokinesis at rest and stress.    LV cavity size is mildly enlarged at rest and mildly enlarged at stress.    The ECG portion of the study is abnormal but not diagnostic due to resting ST-T abnormalities.    The patient reported no chest pain during the stress test.    During stress, occasional PVCs are noted.    The patient exercised for 7 minutes 40 seconds on a Justin protocol, corresponding to a functional capacity of 10 METS, achieving a peak heart rate of 150 bpm, which is 86 % of the age predicted maximum heart rate.        Assessment:       1. Abnormal stress test    2. LV dysfunction    3. PAF (paroxysmal atrial fibrillation)    4. JASPAL (obstructive sleep apnea)    5. S/P RF ablation operation for  arrhythmia           Plan:         Recommend to increase Losartan to 50 mg keep BP log  Will probably have to increase this as well  Has never had angiographic assessment  Plan on Diagnostic Angiogram next week with Dr. Haily Hankins 3 days prior  Discussed with patient the risks and benefits of the procedure including, but not limited to, the following 1:1000 risk of Heart attack, stroke and death with 3-5% Risk of bleeding, vessel damage, and the need for emergent CABG Surgery.  All questions have been answered to patient's satisfaction.  Informed consent obtained          Problem List Items Addressed This Visit          Cardiac/Vascular    PAF (paroxysmal atrial fibrillation)    LV dysfunction    S/P RF ablation operation for arrhythmia    Abnormal stress test - Primary       Other    JASPAL (obstructive sleep apnea)          HANNAH [facility] [place]  [place]

## 2023-11-01 ENCOUNTER — TELEPHONE (OUTPATIENT)
Dept: CARDIOLOGY | Facility: CLINIC | Age: 48
End: 2023-11-01
Payer: COMMERCIAL

## 2023-11-01 NOTE — TELEPHONE ENCOUNTER
----- Message from Meir Gipson sent at 11/1/2023  4:01 PM CDT -----  Regarding: Return Call  Contact: patient  Type:  Patient Returning Call    Who Called:patient  Who Left Message for Patient:office nurse  Does the patient know what this is regarding?:upcoming procedure/ questions  Would the patient rather a call back or a response via [facility]? Is there anything patient needs to do before?  Best Call Back Number:[phone]  Additional Information:

## 2023-11-02 ENCOUNTER — OFFICE VISIT (OUTPATIENT)
Dept: PULMONOLOGY | Facility: CLINIC | Age: 48
End: 2023-11-02
Payer: COMMERCIAL

## 2023-11-02 VITALS
BODY MASS INDEX: 39.43 KG/M2 | WEIGHT: 266.19 LBS | HEART RATE: 54 BPM | DIASTOLIC BLOOD PRESSURE: 117 MMHG | SYSTOLIC BLOOD PRESSURE: 201 MMHG | HEIGHT: 69 IN | OXYGEN SATURATION: 98 %

## 2023-11-02 DIAGNOSIS — E66.01 CLASS 2 SEVERE OBESITY DUE TO EXCESS CALORIES WITH SERIOUS COMORBIDITY AND BODY MASS INDEX (BMI) OF 39.0 TO 39.9 IN ADULT: ICD-10-CM

## 2023-11-02 DIAGNOSIS — I48.0 PAF (PAROXYSMAL ATRIAL FIBRILLATION): ICD-10-CM

## 2023-11-02 DIAGNOSIS — Z79.01 LONG TERM (CURRENT) USE OF ANTICOAGULANTS: ICD-10-CM

## 2023-11-02 DIAGNOSIS — Z86.711 HISTORY OF PULMONARY EMBOLUS (PE): ICD-10-CM

## 2023-11-02 DIAGNOSIS — I10 ESSENTIAL HYPERTENSION: ICD-10-CM

## 2023-11-02 DIAGNOSIS — Z86.79 HISTORY OF CEREBRAL HEMORRHAGE: ICD-10-CM

## 2023-11-02 DIAGNOSIS — G47.33 OSA ON CPAP: Primary | ICD-10-CM

## 2023-11-02 PROBLEM — E66.812 CLASS 2 SEVERE OBESITY DUE TO EXCESS CALORIES WITH SERIOUS COMORBIDITY AND BODY MASS INDEX (BMI) OF 39.0 TO 39.9 IN ADULT: Status: ACTIVE | Noted: 2023-11-02

## 2023-11-02 PROCEDURE — 99214 OFFICE O/P EST MOD 30 MIN: CPT | Mod: S$GLB,,, | Performed by: NURSE PRACTITIONER

## 2023-11-02 PROCEDURE — 99999 PR PBB SHADOW E&M-EST. PATIENT-LVL IV: CPT | Mod: PBBFAC,,, | Performed by: NURSE PRACTITIONER

## 2023-11-02 PROCEDURE — 4010F ACE/ARB THERAPY RXD/TAKEN: CPT | Mod: CPTII,S$GLB,, | Performed by: NURSE PRACTITIONER

## 2023-11-02 PROCEDURE — 3077F PR MOST RECENT SYSTOLIC BLOOD PRESSURE >= 140 MM HG: ICD-10-PCS | Mod: CPTII,S$GLB,, | Performed by: NURSE PRACTITIONER

## 2023-11-02 PROCEDURE — 3008F PR BODY MASS INDEX (BMI) DOCUMENTED: ICD-10-PCS | Mod: CPTII,S$GLB,, | Performed by: NURSE PRACTITIONER

## 2023-11-02 PROCEDURE — 1159F PR MEDICATION LIST DOCUMENTED IN MEDICAL RECORD: ICD-10-PCS | Mod: CPTII,S$GLB,, | Performed by: NURSE PRACTITIONER

## 2023-11-02 PROCEDURE — 3080F PR MOST RECENT DIASTOLIC BLOOD PRESSURE >= 90 MM HG: ICD-10-PCS | Mod: CPTII,S$GLB,, | Performed by: NURSE PRACTITIONER

## 2023-11-02 PROCEDURE — 99999 PR PBB SHADOW E&M-EST. PATIENT-LVL IV: ICD-10-PCS | Mod: PBBFAC,,, | Performed by: NURSE PRACTITIONER

## 2023-11-02 PROCEDURE — 1159F MED LIST DOCD IN RCRD: CPT | Mod: CPTII,S$GLB,, | Performed by: NURSE PRACTITIONER

## 2023-11-02 PROCEDURE — 3080F DIAST BP >= 90 MM HG: CPT | Mod: CPTII,S$GLB,, | Performed by: NURSE PRACTITIONER

## 2023-11-02 PROCEDURE — 4010F PR ACE/ARB THEARPY RXD/TAKEN: ICD-10-PCS | Mod: CPTII,S$GLB,, | Performed by: NURSE PRACTITIONER

## 2023-11-02 PROCEDURE — 3008F BODY MASS INDEX DOCD: CPT | Mod: CPTII,S$GLB,, | Performed by: NURSE PRACTITIONER

## 2023-11-02 PROCEDURE — 3077F SYST BP >= 140 MM HG: CPT | Mod: CPTII,S$GLB,, | Performed by: NURSE PRACTITIONER

## 2023-11-02 PROCEDURE — 99214 PR OFFICE/OUTPT VISIT, EST, LEVL IV, 30-39 MIN: ICD-10-PCS | Mod: S$GLB,,, | Performed by: NURSE PRACTITIONER

## 2023-11-02 NOTE — PROGRESS NOTES
11/2/2023    Josue Mir  New Patient Consult    Chief Complaint   Patient presents with    Apnea       HPI:  11/02/2023: Hx:  Hemorrhagic CVA in 2018, PE, AFIB on Eliquis, Uncontrolled HTN, JASPAL on CPAP  In office today alone.  Denies being seen by prior Pulmonologist. Denies history of lung disease. Denies current use of inhaler therapy, supplemental oxygen. Denies personal history of cancer.  Diagnosed with sleep apnea approximately 10 years ago, has been on CPAP therapy ever since.  Received latest machine in September of 2017, currently using a REM star machine per [employer] rep. Patient does not know what current pressures are set at.  Currently using a nasal cushion mask without issue. Endorses persistent daytime fatigue, very mild depression despite CPAP use. Denies morning headaches, nocturnal arousals.  Patient is suffering with uncontrolled hypertension despite antihypertensive medications.  Blood pressure today in clinic 201/117.  Patient reports recent increase in losartan dose per Cardiology nurse practitioner.  He currently follows with Kerry Nicole, cardiology nurse practitioner.  Is scheduled to undergo an angiogram next week per Dr. Johansen  Following with Dr. Quach as primary care.  Patient has suffered with hemorrhagic CVA in 2018, reports no current deficit.  Records reviewed, patient required admission at [facility] at that time in which a PE was found on CTA in addition to right lower lobe pneumonia.  Patient reportedly difficult intubation due to copious secretions, was extubated during hospitalization but required re-intubation due to difficulty securing airway.  Patient currently on Eliquis 5 mg b.i.d..  Patient with several hospitalizations this year alone secondary to AFib with RVR.  Denies significant shortness of breath, wheezing, chest tightness, cough, mucus production.  Denies frequent respiratory illness.  Denies frequent use of antibiotic, steroid.        Social Hx: Lives with family -  one cat in the home. Works [employer] . No Asbestosis exposure, Smoking Hx: Never smoker  Family Hx: No Lung Cancer, No COPD, No Asthma  Medical Hx: Previous pneumonia ; No previous shoulder/chest surgery          The Chief Complaint is: New to me      PFSH:  Past Medical History:   Diagnosis Date    Anticoagulant long-term use     Atrial fibrillation     Gout     Gout, joint     Hypertension     Memory deficit following cerebral infarction     Pneumonia 04/2018    Sleep apnea with use of continuous positive airway pressure (CPAP)     Stroke 04/2018    Syncope and collapse          Past Surgical History:   Procedure Laterality Date    ABLATION OF ARRHYTHMOGENIC FOCUS FOR ATRIAL FIBRILLATION N/A 5/23/2023    Procedure: Ablation atrial fibrillation;  Surgeon: Abdullahi Daniels MD;  Location: [facility] EP LAB;  Service: Cardiology;  Laterality: N/A;  AF, KIRILL(Cx if SR), PVI, RFA, CARTO, MAC, GP, 3 PREP* MDT ILR in situ*    INSERTION OF IMPLANTABLE LOOP RECORDER  10/25/2018    Procedure: Insertion, Implantable Loop Recorder;  Surgeon: Justin Colby MD;  Location: [facility] CATH;  Service: Cardiology;;    LAPAROSCOPIC CHOLECYSTECTOMY N/A 12/14/2018    Procedure: CHOLECYSTECTOMY, LAPAROSCOPIC;  Surgeon: Beck Love MD;  Location: [facility];  Service: General;  Laterality: N/A;  Dr. Love requested case to start at 1:00pm    NASAL POLYP EXCISION      TRANSESOPHAGEAL ECHOCARDIOGRAPHY N/A 5/23/2023    Procedure: ECHOCARDIOGRAM, TRANSESOPHAGEAL;  Surgeon: Kaylin Cota MD;  Location: [facility] EP LAB;  Service: Cardiology;  Laterality: N/A;  AF, KIRILL(Cx if SR), PVI, RFA, CARTO, MAC, GP, 3 PREP* MDT ILR in situ*    TREATMENT OF CARDIAC ARRHYTHMIA N/A 10/25/2018    Procedure: CARDIOVERSION OR DEFIBRILLATION;  Surgeon: Justin Colby MD;  Location: [facility] CATH;  Service: Cardiology;  Laterality: N/A;    TREATMENT OF CARDIAC ARRHYTHMIA  5/23/2023    Procedure: Cardioversion or Defibrillation;  Surgeon: Abdullahi Daniels MD;  Location:  [facility] EP LAB;  Service: Cardiology;;     Social History     Tobacco Use    Smoking status: Never    Smokeless tobacco: Never   Substance Use Topics    Alcohol use: Yes     Comment: rarely    Drug use: No     Family History   Problem Relation Age of Onset    Sleep apnea Mother     Hypertension Mother     No Known Problems Brother     No Known Problems Brother     No Known Problems Brother      Review of patient's allergies indicates:  No Known Allergies      I have reviewed past medical, family, and social history.     Performance Status:The patient's activity level is no limits with regular activity.        Review of Systems   Constitutional:  Positive for fatigue. Negative for activity change, appetite change, chills, diaphoresis, fever and unexpected weight change.   HENT:  Positive for congestion, postnasal drip and rhinorrhea. Negative for sinus pressure, sinus pain, sore throat and trouble swallowing.    Eyes:  Negative for photophobia and visual disturbance.   Respiratory:  Negative for cough, choking, chest tightness, shortness of breath and wheezing.    Cardiovascular:  Negative for chest pain, palpitations and leg swelling.   Gastrointestinal:  Negative for abdominal distention, abdominal pain, blood in stool, constipation, diarrhea, nausea and vomiting.   Genitourinary:  Negative for difficulty urinating, dysuria, flank pain and hematuria.   Musculoskeletal:  Negative for back pain, gait problem, joint swelling and neck pain.   Skin:  Negative for rash and wound.   Allergic/Immunologic: Negative for environmental allergies and food allergies.   Neurological:  Negative for dizziness, seizures, syncope, weakness, light-headedness, numbness and headaches.   Hematological:  Bruises/bleeds easily.   Psychiatric/Behavioral:  Negative for confusion and sleep disturbance. The patient is not nervous/anxious.          Exam:Comprehensive exam done. BP (!) 201/117 (BP Location: Left arm, Patient Position: Sitting, BP  "Method: Large (Automatic))   Pulse (!) 54   Ht 5' 9" (1.753 m)   Wt 120.8 kg (266 lb 3.3 oz)   SpO2 98% Comment: on room air at rest  BMI 39.31 kg/m²   Exam included Vitals as listed  Constitutional: He is oriented to person, place, and time. He appears well-developed. No distress. Obese.  Nose: Nose normal.   Mouth/Throat: Uvula is midline, oropharynx is clear and moist and mucous membranes are normal. No dental caries. No oropharyngeal exudate, posterior oropharyngeal edema, posterior oropharyngeal erythema or tonsillar abscesses.    Eyes: Pupils are equal, round, and reactive to light.   Neck: No JVD present. No thyromegaly present.   Cardiovascular: Normal rate, regular rhythm and normal heart sounds. Exam reveals no gallop and no friction rub.   No murmur heard.  Pulmonary/Chest: Effort normal and breath sounds normal. No accessory muscle usage or stridor. No apnea and no tachypnea. No respiratory distress, decreased breath sounds, wheezes, rhonchi, rales, or tenderness. Clear breath sounds throughout, on room air, in no acute distress.   Abdominal: Soft. He exhibits no mass. There is no tenderness. No hepatosplenomegaly, hernias and normoactive bowel sounds  Musculoskeletal: Normal range of motion. exhibits no edema.   Neurological:  alert and oriented to person, place, and time. not disoriented.   Skin: Skin is warm and dry. Capillary refill takes less 2 sec. No cyanosis or erythema. No pallor. Nails show no clubbing.   Psychiatric: normal mood and affect. behavior is normal. Judgment and thought content normal.       Radiographs (ct chest and cxr) reviewed: was done by direct view  Patient imaging studies were reviewed and interpreted independently. My personal interpretation of most recent images include:  CXR - 10/17/2023 - No acute process   CTA Chest Non-Coronary (PE Studies) 3/22/2023 - cardiomegaly, atelectasis to bilateral lower lobes.      Labs Patient's labs were reviewed including CBC and " CMP    Lab Results   Component Value Date    WBC 6.48 10/17/2023    RBC 6.04 10/17/2023    HGB 16.1 10/17/2023    HCT 49.5 10/17/2023    MCV 82 10/17/2023    MCH 26.7 (L) 10/17/2023    MCHC 32.5 10/17/2023    RDW 15.0 (H) 10/17/2023     10/17/2023    MPV 9.7 10/17/2023    GRAN 4.4 10/17/2023    GRAN 67.6 10/17/2023    LYMPH 1.2 10/17/2023    LYMPH 18.4 10/17/2023    MONO 0.5 10/17/2023    MONO 8.3 10/17/2023    EOS 0.3 10/17/2023    BASO 0.07 10/17/2023    EOSINOPHIL 4.0 10/17/2023    BASOPHIL 1.1 10/17/2023   CMP  Sodium   Date Value Ref Range Status   10/17/2023 143 136 - 145 mmol/L Final     Potassium   Date Value Ref Range Status   10/17/2023 4.5 3.5 - 5.1 mmol/L Final     Chloride   Date Value Ref Range Status   10/17/2023 111 (H) 95 - 110 mmol/L Final     CO2   Date Value Ref Range Status   10/17/2023 26 23 - 29 mmol/L Final     Glucose   Date Value Ref Range Status   10/17/2023 86 70 - 110 mg/dL Final     BUN   Date Value Ref Range Status   10/17/2023 16 6 - 20 mg/dL Final     Creatinine   Date Value Ref Range Status   10/17/2023 1.3 0.5 - 1.4 mg/dL Final     Calcium   Date Value Ref Range Status   10/17/2023 9.5 8.7 - 10.5 mg/dL Final     Total Protein   Date Value Ref Range Status   10/17/2023 7.6 6.0 - 8.4 g/dL Final     Albumin   Date Value Ref Range Status   10/17/2023 4.0 3.5 - 5.2 g/dL Final     Total Bilirubin   Date Value Ref Range Status   10/17/2023 0.6 0.1 - 1.0 mg/dL Final     Comment:     For infants and newborns, interpretation of results should be based  on gestational age, weight and in agreement with clinical  observations.    Premature Infant recommended reference ranges:  Up to 24 hours.............<8.0 mg/dL  Up to 48 hours............<12.0 mg/dL  3-5 days..................<15.0 mg/dL  6-29 days.................<15.0 mg/dL       Alkaline Phosphatase   Date Value Ref Range Status   10/17/2023 68 55 - 135 U/L Final     AST   Date Value Ref Range Status   10/17/2023 21 10 - 40 U/L  Final     ALT   Date Value Ref Range Status   10/17/2023 16 10 - 44 U/L Final     Anion Gap   Date Value Ref Range Status   10/17/2023 6 (L) 8 - 16 mmol/L Final     eGFR   Date Value Ref Range Status   10/17/2023 >60.0 >60 mL/min/1.73 m^2 Final           PFT will be done and results to be reviewed  Pulmonary Functions Testing Results:        Plan:  Clinical impression is resonably certain and repeated evaluation prn +/- follow up will be needed as below.    Josue was seen today for apnea.    Diagnoses and all orders for this visit:    JASPAL on CPAP  -     Ambulatory referral/consult to Pulmonology  -     Polysomnogram (CPAP will be added if patient meets diagnostic criteria.); Future    History of cerebral hemorrhage    PAF (paroxysmal atrial fibrillation)    History of pulmonary embolus (PE)    Essential hypertension    Long term (current) use of anticoagulants    Class 2 severe obesity due to excess calories with serious comorbidity and body mass index (BMI) of 39.0 to 39.9 in adult      Body mass index is 39.31 kg/m². Morbid obesity complicates all aspects of disease management from diagnostic modalities to treatment. Weight loss encouraged and health benefits explained to patient. Nutritional counseling and physical activity encouraged.     Follow up in about 3 months (around 2/2/2024), or if symptoms worsen or fail to improve.    Discussed with patient above for education the following:      Patient Instructions   I personally reached out to Litzy inquiring about compliance report.  [employer] representative states that a respiratory therapist will have to pull the compliance report and that will take 24 hours.    Continue CPAP use in the meantime.    You are due for a new CPAP machine, I am concerned that your current CPAP pressures are not adequately treating her sleep apnea, you may need a BiPAP machine.  I recommend doing an in clinic split night study.    Maintain bleeding precautions while on Eliquis.    Your  blood pressure is significantly high in clinic today reading at 201/117.  Will recheck.  This is not a new issue, even dealing with hypertension for some time now.  This puts you at an increased risk of developing a hemorrhagic stroke again.  Please keep all followups with Cardiology, PCP.  I recommend you keep a daily log of your blood pressure recordings at home and present to your cardiologist.    Will consider checking CT Chest without Contrast and PFT in the future if needed.    Continue current prescription medication regiment. Keep follow up appointment as scheduled. Please call the office if you have any questions or concerns.

## 2023-11-02 NOTE — PATIENT INSTRUCTIONS
I personally reached out to Litzy inquiring about compliance report.  [employer] representative states that a respiratory therapist will have to pull the compliance report and that will take 24 hours.    Continue CPAP use in the meantime.    You are due for a new CPAP machine, I am concerned that your current CPAP pressures are not adequately treating her sleep apnea, you may need a BiPAP machine.  I recommend doing an in clinic split night study.    Maintain bleeding precautions while on Eliquis.    Your blood pressure is significantly high in clinic today reading at 201/117.  Will recheck.  This is not a new issue, even dealing with hypertension for some time now.  This puts you at an increased risk of developing a hemorrhagic stroke again.  Please keep all followups with Cardiology, PCP.  I recommend you keep a daily log of your blood pressure recordings at home and present to your cardiologist.    Will consider checking CT Chest without Contrast and PFT in the future if needed.    Continue current prescription medication regiment. Keep follow up appointment as scheduled. Please call the office if you have any questions or concerns.

## 2023-11-06 ENCOUNTER — HOSPITAL ENCOUNTER (OUTPATIENT)
Dept: PREADMISSION TESTING | Facility: HOSPITAL | Age: 48
Discharge: HOME | End: 2023-11-06
Attending: INTERNAL MEDICINE
Payer: COMMERCIAL

## 2023-11-06 DIAGNOSIS — R07.89 OTHER CHEST PAIN: ICD-10-CM

## 2023-11-06 DIAGNOSIS — I51.9 LV DYSFUNCTION: ICD-10-CM

## 2023-11-06 DIAGNOSIS — R94.39 ABNORMAL STRESS TEST: ICD-10-CM

## 2023-11-06 LAB
ANION GAP SERPL CALC-SCNC: 6 MMOL/L (ref 8–16)
APTT PPP: 27.1 SEC (ref 21–32)
BASOPHILS # BLD AUTO: 0.09 K/UL (ref 0–0.2)
BASOPHILS NFR BLD: 1.2 % (ref 0–1.9)
BUN SERPL-MCNC: 21 MG/DL (ref 6–20)
CALCIUM SERPL-MCNC: 9.3 MG/DL (ref 8.7–10.5)
CHLORIDE SERPL-SCNC: 111 MMOL/L (ref 95–110)
CO2 SERPL-SCNC: 27 MMOL/L (ref 23–29)
CREAT SERPL-MCNC: 1.4 MG/DL (ref 0.5–1.4)
DIFFERENTIAL METHOD: ABNORMAL
EOSINOPHIL # BLD AUTO: 0.3 K/UL (ref 0–0.5)
EOSINOPHIL NFR BLD: 4 % (ref 0–8)
ERYTHROCYTE [DISTWIDTH] IN BLOOD BY AUTOMATED COUNT: 14.8 % (ref 11.5–14.5)
EST. GFR  (NO RACE VARIABLE): >60 ML/MIN/1.73 M^2
GLUCOSE SERPL-MCNC: 79 MG/DL (ref 70–110)
HCT VFR BLD AUTO: 48 % (ref 40–54)
HGB BLD-MCNC: 15.6 G/DL (ref 14–18)
IMM GRANULOCYTES # BLD AUTO: 0.02 K/UL (ref 0–0.04)
IMM GRANULOCYTES NFR BLD AUTO: 0.3 % (ref 0–0.5)
LYMPHOCYTES # BLD AUTO: 1.4 K/UL (ref 1–4.8)
LYMPHOCYTES NFR BLD: 18.5 % (ref 18–48)
MCH RBC QN AUTO: 26.8 PG (ref 27–31)
MCHC RBC AUTO-ENTMCNC: 32.5 G/DL (ref 32–36)
MCV RBC AUTO: 83 FL (ref 82–98)
MONOCYTES # BLD AUTO: 0.6 K/UL (ref 0.3–1)
MONOCYTES NFR BLD: 8 % (ref 4–15)
NEUTROPHILS # BLD AUTO: 5 K/UL (ref 1.8–7.7)
NEUTROPHILS NFR BLD: 68 % (ref 38–73)
NRBC BLD-RTO: 0 /100 WBC
PLATELET # BLD AUTO: 322 K/UL (ref 150–450)
PMV BLD AUTO: 9.5 FL (ref 9.2–12.9)
POTASSIUM SERPL-SCNC: 4.3 MMOL/L (ref 3.5–5.1)
RBC # BLD AUTO: 5.82 M/UL (ref 4.6–6.2)
SODIUM SERPL-SCNC: 144 MMOL/L (ref 136–145)
WBC # BLD AUTO: 7.34 K/UL (ref 3.9–12.7)

## 2023-11-06 PROCEDURE — 93010 ELECTROCARDIOGRAM REPORT: CPT | Mod: ,,, | Performed by: INTERNAL MEDICINE

## 2023-11-06 PROCEDURE — 85730 THROMBOPLASTIN TIME PARTIAL: CPT | Performed by: NURSE PRACTITIONER

## 2023-11-06 PROCEDURE — 85025 COMPLETE CBC W/AUTO DIFF WBC: CPT | Performed by: NURSE PRACTITIONER

## 2023-11-06 PROCEDURE — 93005 ELECTROCARDIOGRAM TRACING: CPT | Performed by: INTERNAL MEDICINE

## 2023-11-06 PROCEDURE — 93010 EKG 12-LEAD: ICD-10-PCS | Mod: ,,, | Performed by: INTERNAL MEDICINE

## 2023-11-06 PROCEDURE — 80048 BASIC METABOLIC PNL TOTAL CA: CPT | Performed by: NURSE PRACTITIONER

## 2023-11-08 ENCOUNTER — HOSPITAL ENCOUNTER (OUTPATIENT)
Facility: HOSPITAL | Age: 48
Discharge: HOME | End: 2023-11-08
Attending: INTERNAL MEDICINE | Admitting: INTERNAL MEDICINE
Payer: COMMERCIAL

## 2023-11-08 VITALS
WEIGHT: 263.31 LBS | BODY MASS INDEX: 39 KG/M2 | RESPIRATION RATE: 16 BRPM | OXYGEN SATURATION: 95 % | SYSTOLIC BLOOD PRESSURE: 140 MMHG | HEART RATE: 52 BPM | HEIGHT: 69 IN | DIASTOLIC BLOOD PRESSURE: 86 MMHG

## 2023-11-08 DIAGNOSIS — R94.39 ABNORMAL STRESS TEST: ICD-10-CM

## 2023-11-08 DIAGNOSIS — I25.10 CAD (CORONARY ARTERY DISEASE): ICD-10-CM

## 2023-11-08 DIAGNOSIS — I51.9 LV DYSFUNCTION: ICD-10-CM

## 2023-11-08 LAB
POC ACTIVATED CLOTTING TIME K: 227 SEC (ref 74–137)
SAMPLE: ABNORMAL

## 2023-11-08 PROCEDURE — 63600175 PHARM REV CODE 636 W HCPCS: Performed by: INTERNAL MEDICINE

## 2023-11-08 PROCEDURE — 93458 L HRT ARTERY/VENTRICLE ANGIO: CPT | Performed by: INTERNAL MEDICINE

## 2023-11-08 PROCEDURE — 25000003 PHARM REV CODE 250: Performed by: NURSE PRACTITIONER

## 2023-11-08 PROCEDURE — C1887 CATHETER, GUIDING: HCPCS | Performed by: INTERNAL MEDICINE

## 2023-11-08 PROCEDURE — 93458 L HRT ARTERY/VENTRICLE ANGIO: CPT | Mod: 26,,, | Performed by: INTERNAL MEDICINE

## 2023-11-08 PROCEDURE — 93571 IV DOP VEL&/PRESS C FLO 1ST: CPT | Mod: 26,52,RC, | Performed by: INTERNAL MEDICINE

## 2023-11-08 PROCEDURE — 27201423 OPTIME MED/SURG SUP & DEVICES STERILE SUPPLY: Performed by: INTERNAL MEDICINE

## 2023-11-08 PROCEDURE — 99153 MOD SED SAME PHYS/QHP EA: CPT | Performed by: INTERNAL MEDICINE

## 2023-11-08 PROCEDURE — 99152 PR MOD CONSCIOUS SEDATION, SAME PHYS, 5+ YRS, FIRST 15 MIN: ICD-10-PCS | Mod: ,,, | Performed by: INTERNAL MEDICINE

## 2023-11-08 PROCEDURE — 93799 UNLISTED CV SVC/PROCEDURE: CPT | Performed by: INTERNAL MEDICINE

## 2023-11-08 PROCEDURE — C1894 INTRO/SHEATH, NON-LASER: HCPCS | Performed by: INTERNAL MEDICINE

## 2023-11-08 PROCEDURE — 93571 PR HEART FLOW RESERV MEASURE,INIT VESSL: ICD-10-PCS | Mod: 26,52,RC, | Performed by: INTERNAL MEDICINE

## 2023-11-08 PROCEDURE — 25000003 PHARM REV CODE 250: Performed by: INTERNAL MEDICINE

## 2023-11-08 PROCEDURE — 93458 PR CATH PLACE/CORON ANGIO, IMG SUPER/INTERP,W LEFT HEART VENTRICULOGRAPHY: ICD-10-PCS | Mod: 26,,, | Performed by: INTERNAL MEDICINE

## 2023-11-08 PROCEDURE — C1769 GUIDE WIRE: HCPCS | Performed by: INTERNAL MEDICINE

## 2023-11-08 PROCEDURE — 99152 MOD SED SAME PHYS/QHP 5/>YRS: CPT | Mod: ,,, | Performed by: INTERNAL MEDICINE

## 2023-11-08 PROCEDURE — 99152 MOD SED SAME PHYS/QHP 5/>YRS: CPT | Performed by: INTERNAL MEDICINE

## 2023-11-08 PROCEDURE — 25000003 PHARM REV CODE 250

## 2023-11-08 RX ORDER — HYDRALAZINE HYDROCHLORIDE 20 MG/ML
10 INJECTION INTRAMUSCULAR; INTRAVENOUS ONCE
Status: COMPLETED | OUTPATIENT
Start: 2023-11-08 | End: 2023-11-08

## 2023-11-08 RX ORDER — NITROGLYCERIN 5 MG/ML
INJECTION, SOLUTION INTRAVENOUS
Status: DISCONTINUED | OUTPATIENT
Start: 2023-11-08 | End: 2023-11-08 | Stop reason: HOSPADM

## 2023-11-08 RX ORDER — HEPARIN SODIUM 1000 [USP'U]/ML
INJECTION, SOLUTION INTRAVENOUS; SUBCUTANEOUS
Status: DISCONTINUED | OUTPATIENT
Start: 2023-11-08 | End: 2023-11-08 | Stop reason: HOSPADM

## 2023-11-08 RX ORDER — DIPHENHYDRAMINE HCL 25 MG
CAPSULE ORAL
Status: COMPLETED
Start: 2023-11-08 | End: 2023-11-08

## 2023-11-08 RX ORDER — ASPIRIN 325 MG
TABLET ORAL
Status: DISCONTINUED
Start: 2023-11-08 | End: 2023-11-08 | Stop reason: HOSPADM

## 2023-11-08 RX ORDER — HYDRALAZINE HYDROCHLORIDE 20 MG/ML
INJECTION INTRAMUSCULAR; INTRAVENOUS
Status: DISCONTINUED
Start: 2023-11-08 | End: 2023-11-08 | Stop reason: HOSPADM

## 2023-11-08 RX ORDER — DIPHENHYDRAMINE HCL 25 MG
50 CAPSULE ORAL
Status: DISCONTINUED | OUTPATIENT
Start: 2023-11-08 | End: 2023-11-08 | Stop reason: HOSPADM

## 2023-11-08 RX ORDER — ACETAMINOPHEN 325 MG/1
650 TABLET ORAL EVERY 4 HOURS PRN
Status: DISCONTINUED | OUTPATIENT
Start: 2023-11-08 | End: 2023-11-08 | Stop reason: HOSPADM

## 2023-11-08 RX ORDER — LIDOCAINE HYDROCHLORIDE 10 MG/ML
INJECTION, SOLUTION EPIDURAL; INFILTRATION; INTRACAUDAL; PERINEURAL
Status: DISCONTINUED | OUTPATIENT
Start: 2023-11-08 | End: 2023-11-08 | Stop reason: HOSPADM

## 2023-11-08 RX ORDER — SODIUM CHLORIDE 9 MG/ML
INJECTION, SOLUTION INTRAVENOUS CONTINUOUS
Status: DISCONTINUED | OUTPATIENT
Start: 2023-11-08 | End: 2023-11-08 | Stop reason: HOSPADM

## 2023-11-08 RX ORDER — SODIUM CHLORIDE 9 MG/ML
INJECTION, SOLUTION INTRAVENOUS ONCE
Status: COMPLETED | OUTPATIENT
Start: 2023-11-08 | End: 2023-11-08

## 2023-11-08 RX ORDER — FENTANYL CITRATE 50 UG/ML
INJECTION, SOLUTION INTRAMUSCULAR; INTRAVENOUS
Status: DISCONTINUED | OUTPATIENT
Start: 2023-11-08 | End: 2023-11-08 | Stop reason: HOSPADM

## 2023-11-08 RX ORDER — ASPIRIN 325 MG
325 TABLET ORAL DAILY
Status: DISCONTINUED | OUTPATIENT
Start: 2023-11-08 | End: 2023-11-08 | Stop reason: HOSPADM

## 2023-11-08 RX ORDER — VERAPAMIL HYDROCHLORIDE 2.5 MG/ML
INJECTION, SOLUTION INTRAVENOUS
Status: DISCONTINUED | OUTPATIENT
Start: 2023-11-08 | End: 2023-11-08 | Stop reason: HOSPADM

## 2023-11-08 RX ORDER — MIDAZOLAM HYDROCHLORIDE 1 MG/ML
INJECTION INTRAMUSCULAR; INTRAVENOUS
Status: DISCONTINUED | OUTPATIENT
Start: 2023-11-08 | End: 2023-11-08 | Stop reason: HOSPADM

## 2023-11-08 RX ORDER — ONDANSETRON 4 MG/1
8 TABLET, ORALLY DISINTEGRATING ORAL EVERY 8 HOURS PRN
Status: DISCONTINUED | OUTPATIENT
Start: 2023-11-08 | End: 2023-11-08 | Stop reason: HOSPADM

## 2023-11-08 RX ORDER — ROSUVASTATIN CALCIUM 10 MG/1
10 TABLET, COATED ORAL NIGHTLY
Qty: 90 TABLET | Refills: 3 | Status: SHIPPED | OUTPATIENT
Start: 2023-11-08 | End: 2024-11-07

## 2023-11-08 RX ORDER — SODIUM CHLORIDE 9 MG/ML
INJECTION, SOLUTION INTRAVENOUS
Status: DISCONTINUED | OUTPATIENT
Start: 2023-11-08 | End: 2023-11-08 | Stop reason: HOSPADM

## 2023-11-08 RX ADMIN — Medication 50 MG: at 07:11

## 2023-11-08 RX ADMIN — ASPIRIN 325 MG ORAL TABLET 325 MG: 325 PILL ORAL at 08:11

## 2023-11-08 RX ADMIN — DIPHENHYDRAMINE HYDROCHLORIDE 50 MG: 25 CAPSULE ORAL at 07:11

## 2023-11-08 RX ADMIN — SODIUM CHLORIDE: 0.9 INJECTION, SOLUTION INTRAVENOUS at 07:11

## 2023-11-08 RX ADMIN — HYDRALAZINE HYDROCHLORIDE 10 MG: 20 INJECTION INTRAMUSCULAR; INTRAVENOUS at 08:11

## 2023-11-08 NOTE — Clinical Note
The catheter was inserted into the and was inserted over the wire into the ostium   right coronary artery. Over glide wire

## 2023-11-08 NOTE — Clinical Note
The catheter insertion attempt was made into the and was inserted over the wire into the ostium   right coronary artery. The catheter was unable to access the area..Over wholey wire

## 2023-11-08 NOTE — Clinical Note
120 ml of contrast were injected throughout the case. 40 mL of contrast was the total wasted during the case. 160 mL was the total amount used during the case.

## 2023-11-09 ENCOUNTER — OFFICE VISIT (OUTPATIENT)
Dept: DERMATOLOGY | Facility: CLINIC | Age: 48
End: 2023-11-09
Payer: COMMERCIAL

## 2023-11-09 VITALS — WEIGHT: 263 LBS | HEIGHT: 69 IN | BODY MASS INDEX: 38.95 KG/M2

## 2023-11-09 DIAGNOSIS — L21.9 SEBORRHEIC DERMATITIS: Primary | ICD-10-CM

## 2023-11-09 DIAGNOSIS — L71.9 ROSACEA: ICD-10-CM

## 2023-11-09 DIAGNOSIS — R21 FACIAL RASH: ICD-10-CM

## 2023-11-09 PROCEDURE — 1159F MED LIST DOCD IN RCRD: CPT | Mod: CPTII,S$GLB,, | Performed by: DERMATOLOGY

## 2023-11-09 PROCEDURE — 1160F RVW MEDS BY RX/DR IN RCRD: CPT | Mod: CPTII,S$GLB,, | Performed by: DERMATOLOGY

## 2023-11-09 PROCEDURE — 99204 OFFICE O/P NEW MOD 45 MIN: CPT | Mod: S$GLB,,, | Performed by: DERMATOLOGY

## 2023-11-09 PROCEDURE — 4010F ACE/ARB THERAPY RXD/TAKEN: CPT | Mod: CPTII,S$GLB,, | Performed by: DERMATOLOGY

## 2023-11-09 PROCEDURE — 3008F BODY MASS INDEX DOCD: CPT | Mod: CPTII,S$GLB,, | Performed by: DERMATOLOGY

## 2023-11-09 PROCEDURE — 4010F PR ACE/ARB THEARPY RXD/TAKEN: ICD-10-PCS | Mod: CPTII,S$GLB,, | Performed by: DERMATOLOGY

## 2023-11-09 PROCEDURE — 3008F PR BODY MASS INDEX (BMI) DOCUMENTED: ICD-10-PCS | Mod: CPTII,S$GLB,, | Performed by: DERMATOLOGY

## 2023-11-09 PROCEDURE — 1159F PR MEDICATION LIST DOCUMENTED IN MEDICAL RECORD: ICD-10-PCS | Mod: CPTII,S$GLB,, | Performed by: DERMATOLOGY

## 2023-11-09 PROCEDURE — 99204 PR OFFICE/OUTPT VISIT, NEW, LEVL IV, 45-59 MIN: ICD-10-PCS | Mod: S$GLB,,, | Performed by: DERMATOLOGY

## 2023-11-09 PROCEDURE — 1160F PR REVIEW ALL MEDS BY PRESCRIBER/CLIN PHARMACIST DOCUMENTED: ICD-10-PCS | Mod: CPTII,S$GLB,, | Performed by: DERMATOLOGY

## 2023-11-09 RX ORDER — SULFACETAMIDE SODIUM, SULFUR 100; 50 MG/G; MG/G
EMULSION TOPICAL
Qty: 170 G | Refills: 5 | Status: SHIPPED | OUTPATIENT
Start: 2023-11-09

## 2023-11-09 RX ORDER — DOXYCYCLINE HYCLATE 100 MG
TABLET ORAL
Qty: 30 TABLET | Refills: 2 | Status: SHIPPED | OUTPATIENT
Start: 2023-11-09 | End: 2024-02-05

## 2023-11-09 NOTE — PROGRESS NOTES
"  Subjective:      Patient ID:  Josue Mir is a 47 y.o. male who presents for   Chief Complaint   Patient presents with    Rash     Face      New Patient    Patient here today for rash to face x couple of months  Pt states rash present with no symptoms.  Used clindamycin 1.2% with tretinoin 0.02%, "not using enough to notice change".  Pt denies any changes in skin care routine.   Face is not itchy  Uses body soap for face    Pt states PCP stopped Lisinopril due to rash, notices some resolution, still present.      Derm Hx:  Denies Phx of NMSC  Denies Fhx of MM    Current Outpatient Medications:   ·  allopurinoL (ZYLOPRIM) 300 MG tablet, Take 1 tablet (300 mg total) by mouth once daily., Disp: 90 tablet, Rfl: 4  ·  amiodarone (PACERONE) 200 MG Tab, Take 0.5 tablets (100 mg total) by mouth once daily., Disp: 90 tablet, Rfl: 3  ·  apixaban (ELIQUIS) 5 mg Tab, Take 1 tablet (5 mg total) by mouth 2 (two) times daily., Disp: 180 tablet, Rfl: 3  ·  colchicine (COLCRYS) 0.6 mg tablet, Take 1 tablet (0.6 mg total) by mouth 2 (two) times daily., Disp: 14 tablet, Rfl: 3  ·  FLUoxetine 20 MG capsule, Take 20 mg by mouth once daily., Disp: , Rfl:   ·  losartan (COZAAR) 50 MG tablet, Take 1 tablet (50 mg total) by mouth once daily., Disp: 30 tablet, Rfl: 2  ·  metoprolol tartrate (LOPRESSOR) 100 MG tablet, Take 1 tablet (100 mg total) by mouth 2 (two) times daily., Disp: 180 tablet, Rfl: 3  ·  rosuvastatin (CRESTOR) 10 MG tablet, Take 1 tablet (10 mg total) by mouth every evening., Disp: 90 tablet, Rfl: 3  ·  furosemide (LASIX) 20 MG tablet, Take 1 tablet (20 mg total) by mouth daily as needed (shortness of breath or leg swelling)., Disp: 10 tablet, Rfl: 0  No current facility-administered medications for this visit.        Review of Systems   Constitutional:  Negative for fever, chills and fatigue.   Skin:  Positive for rash and activity-related sunscreen use. Negative for daily sunscreen use.       Objective:   Physical Exam "   Constitutional: He appears well-developed and well-nourished.   Neurological: He is oriented to person, place, and time.   Psychiatric: He has a normal mood and affect.        Diagram Legend     Erythematous scaling macule/papule c/w actinic keratosis       Vascular papule c/w angioma      Pigmented verrucoid papule/plaque c/w seborrheic keratosis      Yellow umbilicated papule c/w sebaceous hyperplasia      Irregularly shaped tan macule c/w lentigo     1-2 mm smooth white papules consistent with Milia      Movable subcutaneous cyst with punctum c/w epidermal inclusion cyst      Subcutaneous movable cyst c/w pilar cyst      Firm pink to brown papule c/w dermatofibroma      Pedunculated fleshy papule(s) c/w skin tag(s)      Evenly pigmented macule c/w junctional nevus     Mildly variegated pigmented, slightly irregular-bordered macule c/w mildly atypical nevus      Flesh colored to evenly pigmented papule c/w intradermal nevus       Pink pearly papule/plaque c/w basal cell carcinoma      Erythematous hyperkeratotic cursted plaque c/w SCC      Surgical scar with no sign of skin cancer recurrence      Open and closed comedones      Inflammatory papules and pustules      Verrucoid papule consistent consistent with wart     Erythematous eczematous patches and plaques     Dystrophic onycholytic nail with subungual debris c/w onychomycosis     Umbilicated papule    Erythematous-base heme-crusted tan verrucoid plaque consistent with inflamed seborrheic keratosis     Erythematous Silvery Scaling Plaque c/w Psoriasis     See annotation   Latest Reference Range & Units 10/17/23 12:44   WBC 3.90 - 12.70 K/uL 6.48   RBC 4.60 - 6.20 M/uL 6.04   Hemoglobin 14.0 - 18.0 g/dL 16.1   Hematocrit 40.0 - 54.0 % 49.5   MCV 82 - 98 fL 82   MCH 27.0 - 31.0 pg 26.7 (L)   MCHC 32.0 - 36.0 g/dL 32.5   RDW 11.5 - 14.5 % 15.0 (H)   Platelet Count 150 - 450 K/uL 337   MPV 9.2 - 12.9 fL 9.7   Gran % 38.0 - 73.0 % 67.6   Lymph % 18.0 - 48.0 %  18.4   Mono % 4.0 - 15.0 % 8.3   Eosinophil % 0.0 - 8.0 % 4.0   Basophil % 0.0 - 1.9 % 1.1   Immature Granulocytes 0.0 - 0.5 % 0.6 (H)   Gran # (ANC) 1.8 - 7.7 K/uL 4.4   Lymph # 1.0 - 4.8 K/uL 1.2   Mono # 0.3 - 1.0 K/uL 0.5   Eos # 0.0 - 0.5 K/uL 0.3   Baso # 0.00 - 0.20 K/uL 0.07   Immature Grans (Abs) 0.00 - 0.04 K/uL 0.04   nRBC 0 /100 WBC 0   Differential Method  Automated   Protime 9.0 - 12.5 sec 11.4   INR 0.8 - 1.2  1.0   Sodium 136 - 145 mmol/L 143   Potassium 3.5 - 5.1 mmol/L 4.5   Chloride 95 - 110 mmol/L 111 (H)   CO2 23 - 29 mmol/L 26   Anion Gap 8 - 16 mmol/L 6 (L)   BUN 6 - 20 mg/dL 16   Creatinine 0.5 - 1.4 mg/dL 1.3   eGFR >60 mL/min/1.73 m^2 >60.0   Glucose 70 - 110 mg/dL 86   Calcium 8.7 - 10.5 mg/dL 9.5   Magnesium  1.6 - 2.6 mg/dL 2.1   ALP 55 - 135 U/L 68   PROTEIN TOTAL 6.0 - 8.4 g/dL 7.6   Albumin 3.5 - 5.2 g/dL 4.0   BILIRUBIN TOTAL 0.1 - 1.0 mg/dL 0.6   AST 10 - 40 U/L 21   ALT 10 - 44 U/L 16   BNP 0 - 99 pg/mL 117 (H)   Troponin I High Sensitivity 0.0 - 14.9 pg/mL 12.8   (L): Data is abnormally low  (H): Data is abnormally high                Assessment / Plan:        Seborrheic dermatitis  -     sulfacetamide sodium-sulfur 10-5 % (w/w) Clsr; Use to wash face daily  Dispense: 170 g; Refill: 5    Facial rash  -     Ambulatory referral/consult to Dermatology    Rosacea  -     sulfacetamide sodium-sulfur 10-5 % (w/w) Clsr; Use to wash face daily  Dispense: 170 g; Refill: 5  -     doxycycline (VIBRA-TABS) 100 MG tablet; Take one TAB PO daily with food  Dispense: 30 tablet; Refill: 2  -     metroNIDAZOLE (NORITATE) 1 % cream; Compound azelaic acid 15% + ivermectin 1% + metronidazole 1% cream. Apply to face once or twice daily.  Dispense: 30 g; Refill: 5    Overlap seb derm / rosacea  Many demodex mites on oil prep    Gentle skin care with unscented hypoallergenic products and strict sun precautions  Mineral sunscreen for outdoor exposures           Follow up in about 4 weeks (around  12/7/2023).

## 2023-11-10 ENCOUNTER — HOSPITAL ENCOUNTER (OUTPATIENT)
Facility: HOSPITAL | Age: 48
Discharge: HOME | End: 2023-11-10
Attending: INTERNAL MEDICINE | Admitting: STUDENT IN AN ORGANIZED HEALTH CARE EDUCATION/TRAINING PROGRAM
Payer: COMMERCIAL

## 2023-11-10 ENCOUNTER — ANESTHESIA EVENT (OUTPATIENT)
Dept: ENDOSCOPY | Facility: HOSPITAL | Age: 48
End: 2023-11-10
Payer: COMMERCIAL

## 2023-11-10 ENCOUNTER — ANESTHESIA (OUTPATIENT)
Dept: ENDOSCOPY | Facility: HOSPITAL | Age: 48
End: 2023-11-10
Payer: COMMERCIAL

## 2023-11-10 DIAGNOSIS — K64.8 INTERNAL HEMORRHOIDS: Primary | ICD-10-CM

## 2023-11-10 DIAGNOSIS — Z12.11 COLON CANCER SCREENING: ICD-10-CM

## 2023-11-10 DIAGNOSIS — K63.5 POLYP OF COLON, UNSPECIFIED PART OF COLON, UNSPECIFIED TYPE: ICD-10-CM

## 2023-11-10 PROCEDURE — 27201089 HC SNARE, DISP (ANY): Performed by: INTERNAL MEDICINE

## 2023-11-10 PROCEDURE — D9220A PRA ANESTHESIA: Mod: 33,ANES,, | Performed by: ANESTHESIOLOGY

## 2023-11-10 PROCEDURE — 45385 PR COLONOSCOPY,REMV LESN,SNARE: ICD-10-PCS | Mod: 33,,, | Performed by: INTERNAL MEDICINE

## 2023-11-10 PROCEDURE — 37000008 HC ANESTHESIA 1ST 15 MINUTES: Performed by: INTERNAL MEDICINE

## 2023-11-10 PROCEDURE — 25000003 PHARM REV CODE 250: Performed by: INTERNAL MEDICINE

## 2023-11-10 PROCEDURE — 45385 COLONOSCOPY W/LESION REMOVAL: CPT | Mod: 33,,, | Performed by: INTERNAL MEDICINE

## 2023-11-10 PROCEDURE — D9220A PRA ANESTHESIA: ICD-10-PCS | Mod: 33,ANES,, | Performed by: ANESTHESIOLOGY

## 2023-11-10 PROCEDURE — 25000003 PHARM REV CODE 250: Performed by: NURSE ANESTHETIST, CERTIFIED REGISTERED

## 2023-11-10 PROCEDURE — 45380 COLONOSCOPY AND BIOPSY: CPT | Mod: 59,33 | Performed by: INTERNAL MEDICINE

## 2023-11-10 PROCEDURE — 45385 COLONOSCOPY W/LESION REMOVAL: CPT | Mod: 33 | Performed by: INTERNAL MEDICINE

## 2023-11-10 PROCEDURE — 27201012 HC FORCEPS, HOT/COLD, DISP: Performed by: INTERNAL MEDICINE

## 2023-11-10 PROCEDURE — 63600175 PHARM REV CODE 636 W HCPCS: Performed by: NURSE ANESTHETIST, CERTIFIED REGISTERED

## 2023-11-10 PROCEDURE — D9220A PRA ANESTHESIA: ICD-10-PCS | Mod: 33,CRNA,, | Performed by: NURSE ANESTHETIST, CERTIFIED REGISTERED

## 2023-11-10 PROCEDURE — 45380 COLONOSCOPY AND BIOPSY: CPT | Mod: 59,33,, | Performed by: INTERNAL MEDICINE

## 2023-11-10 PROCEDURE — D9220A PRA ANESTHESIA: Mod: 33,CRNA,, | Performed by: NURSE ANESTHETIST, CERTIFIED REGISTERED

## 2023-11-10 PROCEDURE — 37000009 HC ANESTHESIA EA ADD 15 MINS: Performed by: INTERNAL MEDICINE

## 2023-11-10 PROCEDURE — 45380 PR COLONOSCOPY,BIOPSY: ICD-10-PCS | Mod: 59,33,, | Performed by: INTERNAL MEDICINE

## 2023-11-10 RX ORDER — PROPOFOL 10 MG/ML
INJECTION, EMULSION INTRAVENOUS
Status: DISCONTINUED | OUTPATIENT
Start: 2023-11-10 | End: 2023-11-10

## 2023-11-10 RX ORDER — SODIUM CHLORIDE 9 MG/ML
INJECTION, SOLUTION INTRAVENOUS CONTINUOUS
Status: DISCONTINUED | OUTPATIENT
Start: 2023-11-10 | End: 2023-11-10 | Stop reason: HOSPADM

## 2023-11-10 RX ORDER — DEXTROMETHORPHAN/PSEUDOEPHED 2.5-7.5/.8
DROPS ORAL
Status: COMPLETED | OUTPATIENT
Start: 2023-11-10 | End: 2023-11-10

## 2023-11-10 RX ORDER — LIDOCAINE HYDROCHLORIDE 20 MG/ML
INJECTION INTRAVENOUS
Status: DISCONTINUED | OUTPATIENT
Start: 2023-11-10 | End: 2023-11-10

## 2023-11-10 RX ADMIN — SODIUM CHLORIDE: 9 INJECTION, SOLUTION INTRAVENOUS at 08:11

## 2023-11-10 RX ADMIN — PROPOFOL 150 MG: 10 INJECTION, EMULSION INTRAVENOUS at 09:11

## 2023-11-10 RX ADMIN — PROPOFOL 25 MG: 10 INJECTION, EMULSION INTRAVENOUS at 09:11

## 2023-11-10 RX ADMIN — LIDOCAINE HYDROCHLORIDE 100 MG: 20 INJECTION, SOLUTION INTRAVENOUS at 09:11

## 2023-11-10 NOTE — TRANSFER OF CARE
Anesthesia Transfer of Care Note    Patient: Josue Mir    Procedure(s) Performed: Procedure(s) (LRB):  COLONOSCOPY(sent to my chart) (N/A)    Patient location: PACU    Anesthesia Type: general    Transport from OR: Transported from OR on 2-3 L/min O2 by NC with adequate spontaneous ventilation    Post pain: adequate analgesia    Post assessment: no apparent anesthetic complications and tolerated procedure well    Post vital signs: stable    Level of consciousness: awake and responds to stimulation    Nausea/Vomiting: no nausea/vomiting    Complications: none    Transfer of care protocol was followed      Last vitals:   Visit Vitals  BP (!) 179/112 (BP Location: Left arm, Patient Position: Lying)   Pulse (!) 57   Temp 36.8 °C (98.2 °F) (Skin)   Wt 119.3 kg (263 lb)   SpO2 96%   BMI 38.84 kg/m²

## 2023-11-10 NOTE — PROVATION PATIENT INSTRUCTIONS
Discharge Summary/Instructions after an Endoscopic Procedure  Patient Name: Josue Mir  Patient MRN: [id number]  Patient YOB: 1975  Friday, November 10, 2023  Pato Sprague MD  Dear patient,  As a result of recent federal legislation (The Federal Cures Act), you may   receive lab or pathology results from your procedure in your [facility]   account before your physician is able to contact you. Your physician or   their representative will relay the results to you with their   recommendations at their soonest availability.  Thank you,  RESTRICTIONS:  During your procedure today, you received medications for sedation.  These   medications may affect your judgment, balance and coordination.  Therefore,   for 24 hours, you have the following restrictions:   - DO NOT drive a car, operate machinery, make legal/financial decisions,   sign important papers or drink alcohol.    ACTIVITY:  Today: no heavy lifting, straining or running due to procedural   sedation/anesthesia.  The following day: return to full activity including work.  DIET:  Eat and drink normally unless instructed otherwise.     TREATMENT FOR COMMON SIDE EFFECTS:  - Mild abdominal pain, nausea, belching, bloating or excessive gas:  rest,   eat lightly and use a heating pad.  - Sore Throat: treat with throat lozenges and/or gargle with warm salt   water.  - Because air was used during the procedure, expelling large amounts of air   from your rectum or belching is normal.  - If a bowel prep was taken, you may not have a bowel movement for 1-3 days.    This is normal.  SYMPTOMS TO WATCH FOR AND REPORT TO YOUR PHYSICIAN:  1. Abdominal pain or bloating, other than gas cramps.  2. Chest pain.  3. Back pain.  4. Signs of infection such as: chills or fever occurring within 24 hours   after the procedure.  5. Rectal bleeding, which would show as bright red, maroon, or black stools.   (A tablespoon of blood from the rectum is not serious, especially  if   hemorrhoids are present.)  6. Vomiting.  7. Weakness or dizziness.  GO DIRECTLY TO THE NEAREST EMERGENCY ROOM IF YOU HAVE ANY OF THE FOLLOWING:      Difficulty breathing              Chills and/or fever over 101 F   Persistent vomiting and/or vomiting blood   Severe abdominal pain   Severe chest pain   Black, tarry stools   Bleeding- more than one tablespoon   Any other symptom or condition that you feel may need urgent attention  Your doctor recommends these additional instructions:  If any biopsies were taken, your doctors clinic will contact you in 1 to 2   weeks with any results.  - Patient has a contact number available for emergencies.  The signs and   symptoms of potential delayed complications were discussed with the   patient.  Return to normal activities tomorrow.  Written discharge   instructions were provided to the patient.   - High fiber diet.   - Continue present medications.   - Await pathology results.   - Repeat colonoscopy in 3 years for surveillance.   - Resume Eliquis (apixaban) at prior dose today.   - Discharge patient to home (ambulatory).   - Return to GI office PRN.  For questions, problems or results please call your physician - Pato Sprague MD at Work:  [phone].  [facility], EMERGENCY ROOM PHONE NUMBER: [phone]  IF A COMPLICATION OR EMERGENCY SITUATION ARISES AND YOU ARE UNABLE TO REACH   YOUR PHYSICIAN - GO DIRECTLY TO THE EMERGENCY ROOM.  Pato Sprague MD  11/10/2023 9:35:55 AM  This report has been verified and signed electronically.  Dear patient,  As a result of recent federal legislation (The Federal Cures Act), you may   receive lab or pathology results from your procedure in your [facility]   account before your physician is able to contact you. Your physician or   their representative will relay the results to you with their   recommendations at their soonest availability.  Thank you,  PROVATION

## 2023-11-10 NOTE — ANESTHESIA PREPROCEDURE EVALUATION
11/10/2023  Josue Mir is a 47 y.o., male.      Pre-op Assessment    I have reviewed the Patient Summary Reports.     I have reviewed the Nursing Notes. I have reviewed the NPO Status.   I have reviewed the Medications.     Review of Systems  Anesthesia Hx:  No problems with previous Anesthesia    Social:  Non-Smoker, Social Alcohol Use    Cardiovascular:   Hypertension, well controlled Dysrhythmias (Undiagnosed AFib with RVR noted today in preop) atrial fibrillation    Pulmonary:   Pneumonia Sleep Apnea, CPAP    Renal/:  Renal/ Normal     Hepatic/GI:   Bowel Prep.    Neurological:   CVA (memory deficit) Syncope and collapse   Endocrine:  Endocrine Normal Acute pancreatitis Obesity / BMI > 30  Psych:   Psychiatric History depression          Physical Exam  General: Well nourished, Cooperative, Alert and Oriented    Airway:  Mallampati: II   Mouth Opening: Normal  TM Distance: Normal  Neck ROM: Normal ROM    Dental:  Intact    Chest/Lungs:  Normal Respiratory Rate    Heart:  Rate: Normal        Anesthesia Plan  Type of Anesthesia, risks & benefits discussed:    Anesthesia Type: Gen Natural Airway  Intra-op Monitoring Plan: Standard ASA Monitors  Induction:  IV  Informed Consent: Informed consent signed with the Patient and all parties understand the risks and agree with anesthesia plan.  All questions answered.   ASA Score: 3  Anesthesia Plan Notes: Refer to ER for evaluation and Spoke to Dr. Baca who will see him there.    Ready For Surgery From Anesthesia Perspective.     .

## 2023-11-10 NOTE — H&P
CC: Screening for colorectal cancer    47 year old male with above. States that symptoms are absent, no alleviating/exacerbating factors. No family history of colorectal CA. No personal history of polyps. No bleeding or weight loss.     ROS:  No headache, no fever/chills, no chest pain/SOB, no nausea/vomiting/diarrhea/constipation/GI bleeding/abdominal pain, no dysuria/hematuria.    VSSAF   Exam:   Alert and oriented x 3; no apparent distress   PERRLA, sclera anicteric  CV: Regular rate/rhythm, normal PMI   Lungs: Clear bilaterally with no wheeze/rales   Abdomen: Soft, NT/ND, normal bowel sounds   Ext: No cyanosis, clubbing     Impression:   As above    Plan:   Proceed with endoscopy. Further recs to follow.

## 2023-11-10 NOTE — OR NURSING
0940 Assumed care, vss, see assess. Nadn. Will monitor.   1015 Pt tolerating po fluids, pain controlled. All belongings returned to pt. D/c instructions given and explained to pt, pt v/u. D/c'd out to pov via wc per staff with nadn.

## 2023-11-10 NOTE — ANESTHESIA POSTPROCEDURE EVALUATION
Anesthesia Post Evaluation    Patient: Josue Mir    Procedure(s) Performed: Procedure(s) (LRB):  COLONOSCOPY(sent to my chart) (N/A)    Final Anesthesia Type: general      Patient location during evaluation: PACU  Patient participation: Yes- Able to Participate  Level of consciousness: awake and alert  Post-procedure vital signs: reviewed and stable  Pain management: adequate  Airway patency: patent    PONV status at discharge: No PONV  Anesthetic complications: no      Cardiovascular status: hemodynamically stable  Respiratory status: unassisted and room air  Hydration status: euvolemic  Follow-up not needed.          Vitals Value Taken Time   /93 11/10/23 1000   Temp 36.7 °C (98 °F) 11/10/23 1000   Pulse 52 11/10/23 1000   Resp 16 11/10/23 1000   SpO2 96 % 11/10/23 1000         Event Time   Out of Recovery 09:40:00         Pain/Marah Score: Marah Score: 10 (11/10/2023  9:55 AM)

## 2023-11-10 NOTE — ANESTHESIA PREPROCEDURE EVALUATION
11/10/2023  Josue Mir is a 47 y.o., male.      Pre-op Assessment    I have reviewed the Patient Summary Reports.     I have reviewed the Nursing Notes. I have reviewed the NPO Status.   I have reviewed the Medications.     Review of Systems  Anesthesia Hx:  No problems with previous Anesthesia    Social:  Non-Smoker    Cardiovascular:   Hypertension Dysrhythmias atrial fibrillation H/o Afib with RVR s/p ablation    Pulmonary:   Pneumonia Sleep Apnea, CPAP    Hepatic/GI:   Bowel Prep. H/o pancreatitis    Neurological:   CVA H/o syncope and collapse    Endocrine:  Obesity / BMI > 30  Psych:   Psychiatric History          Physical Exam  General: Well nourished, Cooperative, Alert and Oriented    Airway:  Mallampati: II   Mouth Opening: Normal  TM Distance: Normal  Tongue: Normal  Neck ROM: Normal ROM    Dental:  Intact    Chest/Lungs:  Normal Respiratory Rate    Heart:  Rate: Normal  Rhythm: Regular Rhythm        Anesthesia Plan  Type of Anesthesia, risks & benefits discussed:    Anesthesia Type: Gen Natural Airway  Intra-op Monitoring Plan: Standard ASA Monitors  Induction:  IV  Airway Plan: , Post-Induction  Informed Consent: Informed consent signed with the Patient and all parties understand the risks and agree with anesthesia plan.  All questions answered.   ASA Score: 3    Ready For Surgery From Anesthesia Perspective.     .

## 2023-11-13 VITALS
BODY MASS INDEX: 38.84 KG/M2 | SYSTOLIC BLOOD PRESSURE: 160 MMHG | RESPIRATION RATE: 16 BRPM | DIASTOLIC BLOOD PRESSURE: 93 MMHG | OXYGEN SATURATION: 96 % | WEIGHT: 263 LBS | TEMPERATURE: 98 F | HEART RATE: 52 BPM

## 2023-11-14 NOTE — PROGRESS NOTES
Please advise patient that polyp pathology was reviewed and is benign and is the adenomatous/sessile serrated type which is precancerous/risk factor for cancer.  Repeat colonoscopy recommended in 3 years.  Place reminder in system for repeat colonoscopy.

## 2023-11-16 ENCOUNTER — OFFICE VISIT (OUTPATIENT)
Dept: PSYCHIATRY | Facility: CLINIC | Age: 48
End: 2023-11-16
Payer: COMMERCIAL

## 2023-11-16 VITALS
HEART RATE: 52 BPM | WEIGHT: 266.31 LBS | SYSTOLIC BLOOD PRESSURE: 188 MMHG | DIASTOLIC BLOOD PRESSURE: 112 MMHG | BODY MASS INDEX: 39.44 KG/M2 | HEIGHT: 69 IN

## 2023-11-16 DIAGNOSIS — F41.9 ANXIETY DISORDER, UNSPECIFIED TYPE: ICD-10-CM

## 2023-11-16 DIAGNOSIS — F33.0 MAJOR DEPRESSIVE DISORDER, RECURRENT, MILD: Primary | ICD-10-CM

## 2023-11-16 DIAGNOSIS — F19.20 ADDICTION: ICD-10-CM

## 2023-11-16 PROCEDURE — 4010F PR ACE/ARB THEARPY RXD/TAKEN: ICD-10-PCS | Mod: CPTII,S$GLB,, | Performed by: PSYCHOLOGIST

## 2023-11-16 PROCEDURE — 3080F PR MOST RECENT DIASTOLIC BLOOD PRESSURE >= 90 MM HG: ICD-10-PCS | Mod: CPTII,S$GLB,, | Performed by: PSYCHOLOGIST

## 2023-11-16 PROCEDURE — 1159F MED LIST DOCD IN RCRD: CPT | Mod: CPTII,S$GLB,, | Performed by: PSYCHOLOGIST

## 2023-11-16 PROCEDURE — 99999 PR PBB SHADOW E&M-EST. PATIENT-LVL III: ICD-10-PCS | Mod: PBBFAC,,, | Performed by: PSYCHOLOGIST

## 2023-11-16 PROCEDURE — 99999 PR PBB SHADOW E&M-EST. PATIENT-LVL III: CPT | Mod: PBBFAC,,, | Performed by: PSYCHOLOGIST

## 2023-11-16 PROCEDURE — 4010F ACE/ARB THERAPY RXD/TAKEN: CPT | Mod: CPTII,S$GLB,, | Performed by: PSYCHOLOGIST

## 2023-11-16 PROCEDURE — 99213 PR OFFICE/OUTPT VISIT, EST, LEVL III, 20-29 MIN: ICD-10-PCS | Mod: S$GLB,,, | Performed by: PSYCHOLOGIST

## 2023-11-16 PROCEDURE — 90833 PSYTX W PT W E/M 30 MIN: CPT | Mod: S$GLB,,, | Performed by: PSYCHOLOGIST

## 2023-11-16 PROCEDURE — 3077F SYST BP >= 140 MM HG: CPT | Mod: CPTII,S$GLB,, | Performed by: PSYCHOLOGIST

## 2023-11-16 PROCEDURE — 3077F PR MOST RECENT SYSTOLIC BLOOD PRESSURE >= 140 MM HG: ICD-10-PCS | Mod: CPTII,S$GLB,, | Performed by: PSYCHOLOGIST

## 2023-11-16 PROCEDURE — 3080F DIAST BP >= 90 MM HG: CPT | Mod: CPTII,S$GLB,, | Performed by: PSYCHOLOGIST

## 2023-11-16 PROCEDURE — 90833 PR PSYCHOTHERAPY W/PATIENT W/E&M, 30 MIN (ADD ON): ICD-10-PCS | Mod: S$GLB,,, | Performed by: PSYCHOLOGIST

## 2023-11-16 PROCEDURE — 99213 OFFICE O/P EST LOW 20 MIN: CPT | Mod: S$GLB,,, | Performed by: PSYCHOLOGIST

## 2023-11-16 PROCEDURE — 1159F PR MEDICATION LIST DOCUMENTED IN MEDICAL RECORD: ICD-10-PCS | Mod: CPTII,S$GLB,, | Performed by: PSYCHOLOGIST

## 2023-11-16 PROCEDURE — 3008F PR BODY MASS INDEX (BMI) DOCUMENTED: ICD-10-PCS | Mod: CPTII,S$GLB,, | Performed by: PSYCHOLOGIST

## 2023-11-16 PROCEDURE — 3008F BODY MASS INDEX DOCD: CPT | Mod: CPTII,S$GLB,, | Performed by: PSYCHOLOGIST

## 2023-11-16 NOTE — PROGRESS NOTES
Outpatient Psychiatry Follow-Up Visit    Clinical Status of Patient: Outpatient (Ambulatory)  11/15/2023     Chief Complaint: 46 y/o male presenting today for a follow-up.       Interval History and Content of Current Session:  Interim Events/Subjective Report/Content of Current Session:  follow-up appointment.    Pt is a 46 y/o male with past psychiatric hx of depression and anxiety who presents for follow-up treatment. Pt reported improvement in depression symptoms and noted some increase in motivation. Excited about a possible opportunity to have a promotion with work. Has reduced watching pornography and denied any recent masturbation. Pt reported that relationships with family are improving and having less arguments with wife. Pt stated that he continues to work on building back trust with wife. Pt noted a recent angiogram and colonoscopy.     Past Psychiatric hx: fluoxetine, sertraline,     Past Medical hx:   Past Medical History:   Diagnosis Date    Anticoagulant long-term use     Atrial fibrillation     Gout     Gout, joint     Hypertension     Memory deficit following cerebral infarction     Pneumonia 04/2018    Sleep apnea with use of continuous positive airway pressure (CPAP)     Stroke 04/2018    Syncope and collapse         Interim hx:  Medication changes last visit: D/C Sertraline 25 mg and start trial of fluoxetine 20 mg  Anxiety: mild to moderate  Depression: mild to moderate     Denies suicidal/homicidal ideations.  Denies hopelessness/worthlessness.    Denies auditory/visual hallucinations      Alcohol: Infrequent use  Drug: Pt denies  Tobacco: Pt denies      Review of Systems   PSYCHIATRIC: Pertinent items are noted in the narrative.        CONSTITUTIONAL: weight stable    Past Medical, Family and Social History: The patient's past medical, family and social history have been reviewed and updated as appropriate within the electronic medical record. See encounter notes.     Current Psychiatric  Medication: fluoxetine 20 mg     Compliance: yes      Side effects: none     Risk Parameters:  Patient reports no suicidal ideation  Patient reports no homicidal ideation  Patient reports no self-injurious behavior  Patient reports no violent behavior     Exam (detailed: at least 9 elements; comprehensive: all 15 elements)   Constitutional  Vitals:  Most recent vital signs, dated less than 90 days prior to this appointment, were reviewed.        General:  unremarkable, age appropriate, casual attire, good eye contact, good rapport       Musculoskeletal  Muscle Strength/Tone:  no flaccidity, no tremor    Gait & Station:  normal      Psychiatric                       Speech:  normal tone, normal rate, rhythm, and volume   Mood & Affect:   Depressed, anxious         Thought Process:   Goal directed; Linear    Associations:   intact   Thought Content:   No SI/HI, delusions, or paranoia, no AV/VH   Insight & Judgement:   Good, adequate to circumstances   Orientation:   grossly intact; alert and oriented x 4    Memory:  intact for content of interview    Language:  grossly intact, can repeat    Attention Span  : Grossly intact for content of interview   Fund of Knowledge:   intact and appropriate to age and level of education        Assessment and Diagnosis   Status/Progress: Based on the examination today, the patient's problem(s) is/are adequately controlled.  New problems have not been presented today. Co-morbidities are not complicating management of the primary condition. There are no active rule-out diagnoses for this patient at this time.      Impression: Pt appears to be improving with the trial of fluoxetine. Continues to have some challenges with motivation. We discussed behavioral activation techniques and will increase fluoxetine to 40 mg. Pt's ability to discontinue pornography use and masturbation remains positive. Appears to be positively impacting relationships in family.     Diagnosis:   1) Major  Depressive Disorder, recurrent, mild  2) Anxiety Disorder  Intervention/Counseling/Treatment Plan   Medication Management:      1. Increase fluoxetine to 40 mg     2. Call to report any worsening of symptoms or problems with the medication. Pt instructed to go to ER with thoughts of harming self, others     3. Cont in therapy with Yola Hinson LCSW as needed     Psychotherapy: 20 minutes  Target symptoms: motivation  Why chosen therapy is appropriate versus another modality: CBT used; relevant to diagnosis, patient responds to this modality  Outcome monitoring methods: self-report, observation  Therapeutic intervention type: Cognitive Behavioral Therapy, Behavioral activation  Topics discussed/themes: building skills sets for symptom management, symptom recognition, nutrition, exercise  The patient's response to the intervention is   Patient's response to treatment is: good.   The patient's progress toward treatment goals: improving     Return to clinic: 2 months    -Cognitive-Behavioral/Supportive therapy and psychoeducation provided  -R/B/SE's of medications discussed with the pt who expresses understanding and chooses to take medications as prescribed.   -Pt instructed to call clinic, 911 or go to nearest emergency room if sxs worsen or pt is in   crisis. The pt expresses understanding.    Umberto Ortiz, PhD, MP     Antidepressant/Antianxiety Medication Initiation:  Patient informed of risks, benefits, and potential side effects of medication and accepts informed consent.  Common side effects include nausea, fatigue, headache, insomnia., Specifically discussed the possibility of new or worsening suicidal thoughts/depression.  Patient instructed to stop the medication immediately and seek urgent treatment if this occurs. Patient instructed not to abruptly discontinue medication without physician guidance except in cases of sudden onset or worsening of SI.

## 2023-11-22 ENCOUNTER — PROCEDURE VISIT (OUTPATIENT)
Dept: SLEEP MEDICINE | Facility: HOSPITAL | Age: 48
End: 2023-11-22
Attending: NURSE PRACTITIONER
Payer: COMMERCIAL

## 2023-11-22 DIAGNOSIS — G47.33 OSA ON CPAP: ICD-10-CM

## 2023-11-22 PROCEDURE — 95810 POLYSOM 6/> YRS 4/> PARAM: CPT

## 2023-11-24 ENCOUNTER — TELEPHONE (OUTPATIENT)
Dept: PULMONOLOGY | Facility: CLINIC | Age: 48
End: 2023-11-24
Payer: COMMERCIAL

## 2023-11-24 NOTE — TELEPHONE ENCOUNTER
Called [facility] last reading is 5/8/2022. They will call pt to get SD card.   ----- Message from Kristyn Pearson sent at 11/17/2023  4:43 PM CST -----  Regarding: FW: Compliance Report  Still no compliance report.  Angela personally requested it on 11/2  ----- Message -----  From: Angela Greco NP  Sent: 11/10/2023  11:23 AM CST  To: [facility] Staff  Subject: Compliance Report                                I am still waiting on a CPAP compliance report from [facility] for this patient.     I called on 11/2 per my note and they said a respiratory therapist will have to pull it and it would take 24 hours.     Can you please follow up on this?    Thanks

## 2023-11-27 RX ORDER — FLUOXETINE HYDROCHLORIDE 40 MG/1
40 CAPSULE ORAL DAILY
Qty: 30 CAPSULE | Refills: 3 | Status: SHIPPED | OUTPATIENT
Start: 2023-11-27 | End: 2024-11-26

## 2023-11-27 RX ORDER — FLUOXETINE HYDROCHLORIDE 20 MG/1
20 CAPSULE ORAL
Qty: 30 CAPSULE | Refills: 1 | OUTPATIENT
Start: 2023-11-27

## 2023-11-27 NOTE — TELEPHONE ENCOUNTER
Pt called requesting a refill on fluoxetine. He is currently taking 20 mg but states you had discussed increasing it to 40 mg at last visit on 11/16. Please advise.

## 2023-12-01 DIAGNOSIS — G47.33 OSA ON CPAP: Primary | ICD-10-CM

## 2023-12-05 ENCOUNTER — TELEPHONE (OUTPATIENT)
Dept: PULMONOLOGY | Facility: CLINIC | Age: 48
End: 2023-12-05
Payer: COMMERCIAL

## 2023-12-05 DIAGNOSIS — G47.33 OSA (OBSTRUCTIVE SLEEP APNEA): Primary | ICD-10-CM

## 2023-12-05 NOTE — TELEPHONE ENCOUNTER
----- Message from Hayley Pereyra sent at 12/5/2023  1:54 PM CST -----  Regarding: sleep study  Contact: pt  Type:  Needs Medical Advice    Who Called: pt  Would the patient rather a call back or a response via [facility]? Call back  Best Call Back Number: [phone]    Additional Information: sts he needs to speak to someone in order to know how the Dr would like to proceed

## 2023-12-07 ENCOUNTER — HOSPITAL ENCOUNTER (EMERGENCY)
Facility: HOSPITAL | Age: 48
Discharge: HOME | End: 2023-12-07
Attending: EMERGENCY MEDICINE
Payer: COMMERCIAL

## 2023-12-07 VITALS
DIASTOLIC BLOOD PRESSURE: 108 MMHG | SYSTOLIC BLOOD PRESSURE: 198 MMHG | HEART RATE: 68 BPM | RESPIRATION RATE: 20 BRPM | BODY MASS INDEX: 39.1 KG/M2 | TEMPERATURE: 100 F | HEIGHT: 69 IN | WEIGHT: 264 LBS | OXYGEN SATURATION: 94 %

## 2023-12-07 DIAGNOSIS — J02.9 VIRAL PHARYNGITIS: Primary | ICD-10-CM

## 2023-12-07 LAB
GROUP A STREP, MOLECULAR: NEGATIVE
INFLUENZA A, MOLECULAR: NEGATIVE
INFLUENZA B, MOLECULAR: NEGATIVE
SPECIMEN SOURCE: NORMAL

## 2023-12-07 PROCEDURE — 99282 EMERGENCY DEPT VISIT SF MDM: CPT

## 2023-12-07 PROCEDURE — 87502 INFLUENZA DNA AMP PROBE: CPT | Performed by: EMERGENCY MEDICINE

## 2023-12-07 PROCEDURE — 87651 STREP A DNA AMP PROBE: CPT | Performed by: EMERGENCY MEDICINE

## 2023-12-08 NOTE — ED PROVIDER NOTES
Encounter Date: 12/7/2023       History     Chief Complaint   Patient presents with    Sore Throat     Patient is a 47-year-old male with a past medical history atrial fibrillation on chronic anticoagulation prior stroke pneumonia hypertension who presents emergency room for evaluation of a sore throat for the past 24 hours.  He has no cough runny nose congestion fevers rash significant headache or neck stiffness.      Review of patient's allergies indicates:  No Known Allergies  Past Medical History:   Diagnosis Date    Anticoagulant long-term use     Atrial fibrillation     Gout     Gout, joint     Hypertension     Memory deficit following cerebral infarction     Pneumonia 04/2018    Sleep apnea with use of continuous positive airway pressure (CPAP)     Stroke 04/2018    Syncope and collapse      Past Surgical History:   Procedure Laterality Date    ABLATION OF ARRHYTHMOGENIC FOCUS FOR ATRIAL FIBRILLATION N/A 5/23/2023    Procedure: Ablation atrial fibrillation;  Surgeon: Abdullahi Daniels MD;  Location: [facility] EP LAB;  Service: Cardiology;  Laterality: N/A;  AF, KIRILL(Cx if SR), PVI, RFA, CARTO, MAC, GP, 3 PREP* MDT ILR in situ*    ANGIOGRAM, CORONARY, WITH LEFT HEART CATHETERIZATION Left 11/8/2023    Procedure: Angiogram, Coronary, with Left Heart Cath;  Surgeon: Florencio Johansen MD;  Location: [facility] CATH/EP LAB;  Service: Cardiology;  Laterality: Left;    COLONOSCOPY N/A 11/10/2023    Procedure: COLONOSCOPY(sent to my chart);  Surgeon: Pato Cornell MD;  Location: [facility] ENDO;  Service: Endoscopy;  Laterality: N/A;  ppm    INSERTION OF IMPLANTABLE LOOP RECORDER  10/25/2018    Procedure: Insertion, Implantable Loop Recorder;  Surgeon: Justin Colby MD;  Location: [facility] CATH;  Service: Cardiology;;    INSTANTANEOUS WAVE-FREE RATIO (IFR) N/A 11/8/2023    Procedure: Instantaneous Wave-Free Ratio (IFR);  Surgeon: Florencio Johansen MD;  Location: [facility] CATH/EP LAB;  Service: Cardiology;  Laterality: N/A;     LAPAROSCOPIC CHOLECYSTECTOMY N/A 12/14/2018    Procedure: CHOLECYSTECTOMY, LAPAROSCOPIC;  Surgeon: Beck Love MD;  Location: [facility] OR;  Service: General;  Laterality: N/A;  Dr. Love requested case to start at 1:00pm    NASAL POLYP EXCISION      TRANSESOPHAGEAL ECHOCARDIOGRAPHY N/A 5/23/2023    Procedure: ECHOCARDIOGRAM, TRANSESOPHAGEAL;  Surgeon: Kaylin Cota MD;  Location: [facility] EP LAB;  Service: Cardiology;  Laterality: N/A;  AF, KIRILL(Cx if SR), PVI, RFA, CARTO, MAC, GP, 3 PREP* MDT ILR in situ*    TREATMENT OF CARDIAC ARRHYTHMIA N/A 10/25/2018    Procedure: CARDIOVERSION OR DEFIBRILLATION;  Surgeon: Justin Colby MD;  Location: [facility] CATH;  Service: Cardiology;  Laterality: N/A;    TREATMENT OF CARDIAC ARRHYTHMIA  5/23/2023    Procedure: Cardioversion or Defibrillation;  Surgeon: Abdullahi Daniels MD;  Location: [facility] EP LAB;  Service: Cardiology;;     Family History   Problem Relation Age of Onset    Sleep apnea Mother     Hypertension Mother     No Known Problems Brother     No Known Problems Brother     No Known Problems Brother      Social History     Tobacco Use    Smoking status: Never    Smokeless tobacco: Never   Substance Use Topics    Alcohol use: Yes     Comment: rarely    Drug use: No     Review of Systems   Constitutional:  Negative for fever.   HENT:  Positive for sore throat. Negative for ear pain and rhinorrhea.    Eyes:  Negative for pain and visual disturbance.   Respiratory:  Negative for cough and shortness of breath.    Cardiovascular:  Negative for chest pain.   Gastrointestinal:  Negative for abdominal pain, diarrhea, nausea and vomiting.   Genitourinary:  Negative for difficulty urinating.   Musculoskeletal:  Negative for arthralgias.   Skin:  Negative for rash.   Neurological:  Negative for weakness, numbness and headaches.   All other systems reviewed and are negative.      Physical Exam     Initial Vitals [12/07/23 2005]   BP Pulse Resp Temp SpO2   (!) 187/107 71 18 100.2 °F  (37.9 °C) (!) 94 %      MAP       --         Physical Exam    Nursing note and vitals reviewed.  Constitutional: He appears well-developed and well-nourished.   HENT:   Head: Normocephalic and atraumatic.   Mouth/Throat: Oropharynx is clear and moist.   Erythematous posterior oropharynx with no hoarseness stridor meningismus.  No uvular deviation or peritonsillar swelling.   Eyes: EOM are normal. Pupils are equal, round, and reactive to light.   Neck: Neck supple.   Normal range of motion.  Cardiovascular:  Normal rate, regular rhythm, normal heart sounds and intact distal pulses.     Exam reveals no gallop and no friction rub.       No murmur heard.  Pulmonary/Chest: Breath sounds normal. He has no wheezes. He has no rhonchi. He has no rales.   Abdominal: Abdomen is soft. Bowel sounds are normal. There is no abdominal tenderness. There is no rebound.   Musculoskeletal:         General: Normal range of motion.      Cervical back: Normal range of motion and neck supple.     Neurological: He is alert and oriented to person, place, and time. He has normal strength. No sensory deficit. GCS score is 15. GCS eye subscore is 4. GCS verbal subscore is 5. GCS motor subscore is 6.   Skin: Skin is warm and dry.   Psychiatric: He has a normal mood and affect.         ED Course   Procedures  Labs Reviewed   GROUP A STREP, MOLECULAR   INFLUENZA A & B BY MOLECULAR          Imaging Results    None          Medications - No data to display  Medical Decision Making             ED Course as of 12/07/23 2123   Thu Dec 07, 2023   2118 Patient is negative for strep and flu.  He has a sore throat.  He is slightly hypertensive here.  He needs to take his blood pressure medication home.  I do not see any signs of heart failure.  He likely has a viral pharyngitis.  I think he is stable for discharge at this time.  I do not believe has a peritonsillar abscess or retropharyngeal abscess. [JS]      ED Course User Index  [JS] Umberto Lopes,  MD                           Clinical Impression:  Final diagnoses:  [J02.9] Viral pharyngitis (Primary)          ED Disposition Condition    Discharge Stable          ED Prescriptions    None       Follow-up Information       Follow up With Specialties Details Why Contact Info Additional Information    David Quach MD Family Medicine  As needed [address] [facility]  [place] [address]  [phone]       [place] [facility] Emergency Medicine  If symptoms worsen [address] [place] [address] [address] [place] [id number] 1st floor             Umberto Lopes MD  12/07/23 9744

## 2023-12-12 ENCOUNTER — OFFICE VISIT (OUTPATIENT)
Dept: DERMATOLOGY | Facility: CLINIC | Age: 48
End: 2023-12-12
Payer: COMMERCIAL

## 2023-12-12 VITALS — WEIGHT: 264 LBS | BODY MASS INDEX: 39.1 KG/M2 | HEIGHT: 69 IN

## 2023-12-12 DIAGNOSIS — L71.9 ROSACEA: Primary | ICD-10-CM

## 2023-12-12 PROCEDURE — 3008F PR BODY MASS INDEX (BMI) DOCUMENTED: ICD-10-PCS | Mod: CPTII,S$GLB,, | Performed by: DERMATOLOGY

## 2023-12-12 PROCEDURE — 1160F RVW MEDS BY RX/DR IN RCRD: CPT | Mod: CPTII,S$GLB,, | Performed by: DERMATOLOGY

## 2023-12-12 PROCEDURE — 1160F PR REVIEW ALL MEDS BY PRESCRIBER/CLIN PHARMACIST DOCUMENTED: ICD-10-PCS | Mod: CPTII,S$GLB,, | Performed by: DERMATOLOGY

## 2023-12-12 PROCEDURE — 99213 OFFICE O/P EST LOW 20 MIN: CPT | Mod: S$GLB,,, | Performed by: DERMATOLOGY

## 2023-12-12 PROCEDURE — 3008F BODY MASS INDEX DOCD: CPT | Mod: CPTII,S$GLB,, | Performed by: DERMATOLOGY

## 2023-12-12 PROCEDURE — 1159F PR MEDICATION LIST DOCUMENTED IN MEDICAL RECORD: ICD-10-PCS | Mod: CPTII,S$GLB,, | Performed by: DERMATOLOGY

## 2023-12-12 PROCEDURE — 4010F PR ACE/ARB THEARPY RXD/TAKEN: ICD-10-PCS | Mod: CPTII,S$GLB,, | Performed by: DERMATOLOGY

## 2023-12-12 PROCEDURE — 99213 PR OFFICE/OUTPT VISIT, EST, LEVL III, 20-29 MIN: ICD-10-PCS | Mod: S$GLB,,, | Performed by: DERMATOLOGY

## 2023-12-12 PROCEDURE — 1159F MED LIST DOCD IN RCRD: CPT | Mod: CPTII,S$GLB,, | Performed by: DERMATOLOGY

## 2023-12-12 PROCEDURE — 4010F ACE/ARB THERAPY RXD/TAKEN: CPT | Mod: CPTII,S$GLB,, | Performed by: DERMATOLOGY

## 2023-12-12 NOTE — PROGRESS NOTES
Subjective:      Patient ID:  Josue Mir is a 47 y.o. male who presents for   Chief Complaint   Patient presents with    Follow-up     Seborrheic Dermatitis       LOV 11/9/23 Seborrheic Dermatitis    Patient here today for F/U on Seborrheic Dermatitis/rosacea to face  Pt taking Doxycycline and using Sulfa face wash and triple cream (spot treats problem areas), no further concerns.  Took doxy daily x 30 days, refilled    Derm Hx:  Denies Phx of NMSC  Denies Fhx of MM    Current Outpatient Medications:   ·  allopurinoL (ZYLOPRIM) 300 MG tablet, Take 1 tablet (300 mg total) by mouth once daily., Disp: 90 tablet, Rfl: 4  ·  amiodarone (PACERONE) 200 MG Tab, Take 0.5 tablets (100 mg total) by mouth once daily., Disp: 90 tablet, Rfl: 3  ·  apixaban (ELIQUIS) 5 mg Tab, Take 1 tablet (5 mg total) by mouth 2 (two) times daily., Disp: 180 tablet, Rfl: 3  ·  colchicine (COLCRYS) 0.6 mg tablet, Take 1 tablet (0.6 mg total) by mouth 2 (two) times daily., Disp: 14 tablet, Rfl: 3  ·  doxycycline (VIBRA-TABS) 100 MG tablet, Take one TAB PO daily with food, Disp: 30 tablet, Rfl: 2  ·  FLUoxetine 40 MG capsule, Take 1 capsule (40 mg total) by mouth once daily., Disp: 30 capsule, Rfl: 3  ·  losartan (COZAAR) 50 MG tablet, Take 1 tablet (50 mg total) by mouth once daily., Disp: 30 tablet, Rfl: 2  ·  metoprolol tartrate (LOPRESSOR) 100 MG tablet, Take 1 tablet (100 mg total) by mouth 2 (two) times daily., Disp: 180 tablet, Rfl: 3  ·  metroNIDAZOLE (NORITATE) 1 % cream, Compound azelaic acid 15% + ivermectin 1% + metronidazole 1% cream. Apply to face once or twice daily., Disp: 30 g, Rfl: 5  ·  rosuvastatin (CRESTOR) 10 MG tablet, Take 1 tablet (10 mg total) by mouth every evening., Disp: 90 tablet, Rfl: 3  ·  sulfacetamide sodium-sulfur 10-5 % (w/w) Clsr, Use to wash face daily, Disp: 170 g, Rfl: 5        Review of Systems   Constitutional:  Negative for fever, chills and fatigue.   Skin:  Positive for activity-related sunscreen  use. Negative for daily sunscreen use.       Objective:   Physical Exam   Constitutional: He appears well-developed and well-nourished.   Neurological: He is alert and oriented to person, place, and time.   Psychiatric: He has a normal mood and affect.        Diagram Legend     Erythematous scaling macule/papule c/w actinic keratosis       Vascular papule c/w angioma      Pigmented verrucoid papule/plaque c/w seborrheic keratosis      Yellow umbilicated papule c/w sebaceous hyperplasia      Irregularly shaped tan macule c/w lentigo     1-2 mm smooth white papules consistent with Milia      Movable subcutaneous cyst with punctum c/w epidermal inclusion cyst      Subcutaneous movable cyst c/w pilar cyst      Firm pink to brown papule c/w dermatofibroma      Pedunculated fleshy papule(s) c/w skin tag(s)      Evenly pigmented macule c/w junctional nevus     Mildly variegated pigmented, slightly irregular-bordered macule c/w mildly atypical nevus      Flesh colored to evenly pigmented papule c/w intradermal nevus       Pink pearly papule/plaque c/w basal cell carcinoma      Erythematous hyperkeratotic cursted plaque c/w SCC      Surgical scar with no sign of skin cancer recurrence      Open and closed comedones      Inflammatory papules and pustules      Verrucoid papule consistent consistent with wart     Erythematous eczematous patches and plaques     Dystrophic onycholytic nail with subungual debris c/w onychomycosis     Umbilicated papule    Erythematous-base heme-crusted tan verrucoid plaque consistent with inflamed seborrheic keratosis     Erythematous Silvery Scaling Plaque c/w Psoriasis     See annotation      Assessment / Plan:        Rosacea    90% improved after one month  Mostly background erythema at this time    Cut doxy tab in half, trial of 50mg daily  Hope is to get off doxy completely and maintain with :  - triple cream  - sulfa wash  - sun protection (mineral SPF, hat etc)  - gentle skin care  routine  - vanicream moisturizer as needed for dry skin             No follow-ups on file.

## 2023-12-27 ENCOUNTER — HOSPITAL ENCOUNTER (EMERGENCY)
Facility: HOSPITAL | Age: 48
Discharge: HOME | End: 2023-12-27
Attending: EMERGENCY MEDICINE
Payer: COMMERCIAL

## 2023-12-27 VITALS
SYSTOLIC BLOOD PRESSURE: 208 MMHG | HEIGHT: 69 IN | BODY MASS INDEX: 38.51 KG/M2 | OXYGEN SATURATION: 95 % | TEMPERATURE: 98 F | WEIGHT: 260 LBS | RESPIRATION RATE: 20 BRPM | DIASTOLIC BLOOD PRESSURE: 116 MMHG | HEART RATE: 57 BPM

## 2023-12-27 DIAGNOSIS — M10.231: ICD-10-CM

## 2023-12-27 DIAGNOSIS — I10 UNCONTROLLED HYPERTENSION: Primary | ICD-10-CM

## 2023-12-27 PROCEDURE — 63600175 PHARM REV CODE 636 W HCPCS: Performed by: EMERGENCY MEDICINE

## 2023-12-27 PROCEDURE — 99284 EMERGENCY DEPT VISIT MOD MDM: CPT | Mod: 25

## 2023-12-27 PROCEDURE — 96374 THER/PROPH/DIAG INJ IV PUSH: CPT

## 2023-12-27 PROCEDURE — 96375 TX/PRO/DX INJ NEW DRUG ADDON: CPT

## 2023-12-27 PROCEDURE — 25000003 PHARM REV CODE 250: Performed by: EMERGENCY MEDICINE

## 2023-12-27 RX ORDER — HYDRALAZINE HYDROCHLORIDE 20 MG/ML
10 INJECTION INTRAMUSCULAR; INTRAVENOUS
Status: DISCONTINUED | OUTPATIENT
Start: 2023-12-27 | End: 2023-12-27

## 2023-12-27 RX ORDER — DEXAMETHASONE SODIUM PHOSPHATE 4 MG/ML
8 INJECTION, SOLUTION INTRA-ARTICULAR; INTRALESIONAL; INTRAMUSCULAR; INTRAVENOUS; SOFT TISSUE
Status: COMPLETED | OUTPATIENT
Start: 2023-12-27 | End: 2023-12-27

## 2023-12-27 RX ORDER — LOSARTAN POTASSIUM 25 MG/1
50 TABLET ORAL DAILY
Status: DISCONTINUED | OUTPATIENT
Start: 2023-12-27 | End: 2023-12-27 | Stop reason: HOSPADM

## 2023-12-27 RX ORDER — LOSARTAN POTASSIUM 25 MG/1
50 TABLET ORAL
Status: DISCONTINUED | OUTPATIENT
Start: 2023-12-27 | End: 2023-12-27

## 2023-12-27 RX ORDER — KETOROLAC TROMETHAMINE 30 MG/ML
30 INJECTION, SOLUTION INTRAMUSCULAR; INTRAVENOUS
Status: COMPLETED | OUTPATIENT
Start: 2023-12-27 | End: 2023-12-27

## 2023-12-27 RX ORDER — METOPROLOL TARTRATE 50 MG/1
100 TABLET ORAL
Status: COMPLETED | OUTPATIENT
Start: 2023-12-27 | End: 2023-12-27

## 2023-12-27 RX ORDER — METOPROLOL TARTRATE 50 MG/1
100 TABLET ORAL
Status: DISCONTINUED | OUTPATIENT
Start: 2023-12-27 | End: 2023-12-27

## 2023-12-27 RX ORDER — DEXAMETHASONE SODIUM PHOSPHATE 4 MG/ML
8 INJECTION, SOLUTION INTRA-ARTICULAR; INTRALESIONAL; INTRAMUSCULAR; INTRAVENOUS; SOFT TISSUE
Status: DISCONTINUED | OUTPATIENT
Start: 2023-12-27 | End: 2023-12-27

## 2023-12-27 RX ORDER — INDOMETHACIN 25 MG/1
50 CAPSULE ORAL
Status: DISCONTINUED | OUTPATIENT
Start: 2023-12-27 | End: 2023-12-27

## 2023-12-27 RX ORDER — COLCHICINE 0.6 MG/1
1.2 TABLET, FILM COATED ORAL
Status: COMPLETED | OUTPATIENT
Start: 2023-12-27 | End: 2023-12-27

## 2023-12-27 RX ADMIN — LOSARTAN POTASSIUM 50 MG: 25 TABLET, FILM COATED ORAL at 07:12

## 2023-12-27 RX ADMIN — COLCHICINE 1.2 MG: 0.6 TABLET ORAL at 07:12

## 2023-12-27 RX ADMIN — KETOROLAC TROMETHAMINE 30 MG: 30 INJECTION, SOLUTION INTRAMUSCULAR; INTRAVENOUS at 07:12

## 2023-12-27 RX ADMIN — DEXAMETHASONE SODIUM PHOSPHATE 8 MG: 4 INJECTION, SOLUTION INTRA-ARTICULAR; INTRALESIONAL; INTRAMUSCULAR; INTRAVENOUS; SOFT TISSUE at 07:12

## 2023-12-27 RX ADMIN — METOPROLOL TARTRATE 100 MG: 50 TABLET, FILM COATED ORAL at 07:12

## 2023-12-27 NOTE — ED PROVIDER NOTES
Encounter Date: 12/27/2023       History     Chief Complaint   Patient presents with    R hand swelling     X 3 days painful and red, warm to touch. No hx of trauma.     Patient is a 48-year-old male with a past medical history of gout atrial fibrillation on long-term anticoagulant use hypertension prior stroke nonobstructive coronary artery disease on cardiac catheterization proximally 2 months ago done through the right radial artery who presents the emergency room for evaluation of erythema swelling and pain in the dorsal surface of the right wrist.  He has gout but has not had gout in his wrist before.  He denies any trauma fevers IV drug use puncture wounds.  He takes allopurinol daily but missed it for the past day or 2 because he was not able to open up his pill bottle.  He also did not take his antihypertensives this morning or all day yesterday because he was having pain trying to open his pill bottles.  He denies any chest pain shortness breath.  Patient states he typically has hypertension that is uncontrolled.      Review of patient's allergies indicates:  No Known Allergies  Past Medical History:   Diagnosis Date    Anticoagulant long-term use     Atrial fibrillation     Gout     Gout, joint     Hypertension     Memory deficit following cerebral infarction     Pneumonia 04/2018    Sleep apnea with use of continuous positive airway pressure (CPAP)     Stroke 04/2018    Syncope and collapse      Past Surgical History:   Procedure Laterality Date    ABLATION OF ARRHYTHMOGENIC FOCUS FOR ATRIAL FIBRILLATION N/A 5/23/2023    Procedure: Ablation atrial fibrillation;  Surgeon: Abdullahi Daniels MD;  Location: [facility] EP LAB;  Service: Cardiology;  Laterality: N/A;  AF, KIRILL(Cx if SR), PVI, RFA, CARTO, MAC, GP, 3 PREP* MDT ILR in situ*    ANGIOGRAM, CORONARY, WITH LEFT HEART CATHETERIZATION Left 11/8/2023    Procedure: Angiogram, Coronary, with Left Heart Cath;  Surgeon: Florencio Johansen MD;  Location: [facility]  CATH/EP LAB;  Service: Cardiology;  Laterality: Left;    COLONOSCOPY N/A 11/10/2023    Procedure: COLONOSCOPY(sent to my chart);  Surgeon: Pato Cornell MD;  Location: [facility] ENDO;  Service: Endoscopy;  Laterality: N/A;  ppm    INSERTION OF IMPLANTABLE LOOP RECORDER  10/25/2018    Procedure: Insertion, Implantable Loop Recorder;  Surgeon: Justin Colby MD;  Location: [facility] CATH;  Service: Cardiology;;    INSTANTANEOUS WAVE-FREE RATIO (IFR) N/A 11/8/2023    Procedure: Instantaneous Wave-Free Ratio (IFR);  Surgeon: Florencio Johansen MD;  Location: [facility] CATH/EP LAB;  Service: Cardiology;  Laterality: N/A;    LAPAROSCOPIC CHOLECYSTECTOMY N/A 12/14/2018    Procedure: CHOLECYSTECTOMY, LAPAROSCOPIC;  Surgeon: Beck Love MD;  Location: [facility] OR;  Service: General;  Laterality: N/A;  Dr. Love requested case to start at 1:00pm    NASAL POLYP EXCISION      TRANSESOPHAGEAL ECHOCARDIOGRAPHY N/A 5/23/2023    Procedure: ECHOCARDIOGRAM, TRANSESOPHAGEAL;  Surgeon: Kaylin Cota MD;  Location: [facility] EP LAB;  Service: Cardiology;  Laterality: N/A;  AF, KIRILL(Cx if SR), PVI, RFA, CARTO, MAC, GP, 3 PREP* MDT ILR in situ*    TREATMENT OF CARDIAC ARRHYTHMIA N/A 10/25/2018    Procedure: CARDIOVERSION OR DEFIBRILLATION;  Surgeon: Justin Colby MD;  Location: [facility] CATH;  Service: Cardiology;  Laterality: N/A;    TREATMENT OF CARDIAC ARRHYTHMIA  5/23/2023    Procedure: Cardioversion or Defibrillation;  Surgeon: Abdullahi Daniels MD;  Location: [facility] EP LAB;  Service: Cardiology;;     Family History   Problem Relation Age of Onset    Sleep apnea Mother     Hypertension Mother     No Known Problems Brother     No Known Problems Brother     No Known Problems Brother      Social History     Tobacco Use    Smoking status: Never    Smokeless tobacco: Never   Substance Use Topics    Alcohol use: Yes     Comment: rarely    Drug use: No     Review of Systems   Constitutional:  Negative for fever.   HENT:  Negative for ear pain,  rhinorrhea and sore throat.    Eyes:  Negative for pain and visual disturbance.   Respiratory:  Negative for cough and shortness of breath.    Cardiovascular:  Negative for chest pain.   Gastrointestinal:  Negative for abdominal pain, diarrhea, nausea and vomiting.   Genitourinary:  Negative for difficulty urinating.   Musculoskeletal:  Positive for arthralgias (right wrist).   Skin:  Positive for color change. Negative for rash.   Neurological:  Negative for weakness, numbness and headaches.   All other systems reviewed and are negative.      Physical Exam     Initial Vitals [12/27/23 0626]   BP Pulse Resp Temp SpO2   (!) 224/136 74 20 97.8 °F (36.6 °C) (!) 94 %      MAP       --         Physical Exam    Nursing note and vitals reviewed.  Constitutional: He appears well-developed and well-nourished. No distress.   HENT:   Head: Normocephalic and atraumatic.   Eyes: EOM are normal. Pupils are equal, round, and reactive to light.   Neck: Neck supple.   Cardiovascular:  Normal rate, regular rhythm, normal heart sounds and intact distal pulses.           2+ radial pulse.  Scar near the puncture site for his coronary angiogram over the right wrist.  No significant tenderness in that region.   Musculoskeletal:         General: Normal range of motion.      Cervical back: Neck supple.      Comments: Edema erythema warmth and tenderness over the dorsal surface of the right wrist.  Limited flexion and extension.  Mild edema over the dorsal surface of the hand.  No puncture wounds.  No edema of the forearm or tenderness along the deep venous system of the right upper extremity.     Neurological: He is alert and oriented to person, place, and time. He has normal strength. No sensory deficit.   Skin: Skin is warm and dry. No rash noted.   Psychiatric: He has a normal mood and affect.         ED Course   Procedures  Labs Reviewed - No data to display       Imaging Results    None          Medications   colchicine capsule/tablet  1.2 mg (1.2 mg Oral Given 12/27/23 0723)   ketorolac injection 30 mg (30 mg Intravenous Given 12/27/23 0723)   dexAMETHasone injection 8 mg (8 mg Intravenous Given 12/27/23 0723)   metoprolol tartrate (LOPRESSOR) tablet 100 mg (100 mg Oral Given 12/27/23 0723)     Medical Decision Making  Right wrist pain and swelling differential:   Gout sprain fracture dislocation subluxation puncture wound septic joint.      Given the patient's history gout and slow insidious onset over the past 2 days this suspect this is gout.  I will treat the patient with colchicine indomethacin Decadron and reassess.  He does have significantly uncontrolled hypertension but did not take his medications in the past day and a half.  I will give him oral dose of his medicines here and reassess.    Risk  Prescription drug management.               ED Course as of 12/28/23 0523   Wed Dec 27, 2023   0936 The patient was handed over to me at the change of shift by Dr. Lopes pending improvement in his blood pressure.  The patient has had his home blood pressure medications here in the emergency department.  His blood pressure has improved.  The etiology of his right hand/wrist pain swelling is likely his gout.  The patient has colchicine at home.  The patient is stable for discharge home and does not require further care workup at this time.  He is referred to primary care for follow-up. [PE]      ED Course User Index  [PE] Galindo Leiva MD                           Clinical Impression:  Final diagnoses:  [I10] Uncontrolled hypertension (Primary)  [M10.231] Gout of right wrist due to drug, unspecified chronicity          ED Disposition Condition    Discharge Stable          ED Prescriptions    None       Follow-up Information       Follow up With Specialties Details Why Contact Info    David Quach MD Family Medicine Schedule an appointment as soon as possible for a visit   [address] [facility] [id number] [phone]                Umberto Lopes MD  12/28/23 0593

## 2024-01-03 ENCOUNTER — LAB VISIT (OUTPATIENT)
Dept: LAB | Facility: HOSPITAL | Age: 49
End: 2024-01-03
Attending: STUDENT IN AN ORGANIZED HEALTH CARE EDUCATION/TRAINING PROGRAM
Payer: COMMERCIAL

## 2024-01-03 ENCOUNTER — OFFICE VISIT (OUTPATIENT)
Dept: FAMILY MEDICINE | Facility: CLINIC | Age: 49
End: 2024-01-03
Payer: COMMERCIAL

## 2024-01-03 VITALS
HEART RATE: 57 BPM | RESPIRATION RATE: 16 BRPM | OXYGEN SATURATION: 94 % | DIASTOLIC BLOOD PRESSURE: 118 MMHG | HEIGHT: 69 IN | SYSTOLIC BLOOD PRESSURE: 188 MMHG | BODY MASS INDEX: 39.31 KG/M2 | WEIGHT: 265.44 LBS

## 2024-01-03 DIAGNOSIS — I10 ESSENTIAL HYPERTENSION: ICD-10-CM

## 2024-01-03 DIAGNOSIS — I16.0 HYPERTENSIVE URGENCY: Primary | ICD-10-CM

## 2024-01-03 DIAGNOSIS — M10.9 GOUT, UNSPECIFIED CAUSE, UNSPECIFIED CHRONICITY, UNSPECIFIED SITE: ICD-10-CM

## 2024-01-03 DIAGNOSIS — E66.01 CLASS 2 SEVERE OBESITY WITH SERIOUS COMORBIDITY IN ADULT, UNSPECIFIED BMI, UNSPECIFIED OBESITY TYPE: ICD-10-CM

## 2024-01-03 LAB
ALBUMIN SERPL BCP-MCNC: 3.4 G/DL (ref 3.5–5.2)
ALP SERPL-CCNC: 69 U/L (ref 55–135)
ALT SERPL W/O P-5'-P-CCNC: 17 U/L (ref 10–44)
ANION GAP SERPL CALC-SCNC: 11 MMOL/L (ref 8–16)
ANION GAP SERPL CALC-SCNC: 11 MMOL/L (ref 8–16)
AST SERPL-CCNC: 14 U/L (ref 10–40)
BASOPHILS # BLD AUTO: 0.06 K/UL (ref 0–0.2)
BASOPHILS NFR BLD: 0.7 % (ref 0–1.9)
BILIRUB SERPL-MCNC: 0.5 MG/DL (ref 0.1–1)
BUN SERPL-MCNC: 15 MG/DL (ref 6–20)
BUN SERPL-MCNC: 15 MG/DL (ref 6–20)
CALCIUM SERPL-MCNC: 9.2 MG/DL (ref 8.7–10.5)
CALCIUM SERPL-MCNC: 9.2 MG/DL (ref 8.7–10.5)
CHLORIDE SERPL-SCNC: 109 MMOL/L (ref 95–110)
CHLORIDE SERPL-SCNC: 109 MMOL/L (ref 95–110)
CO2 SERPL-SCNC: 26 MMOL/L (ref 23–29)
CO2 SERPL-SCNC: 26 MMOL/L (ref 23–29)
CREAT SERPL-MCNC: 1.2 MG/DL (ref 0.5–1.4)
CREAT SERPL-MCNC: 1.2 MG/DL (ref 0.5–1.4)
DIFFERENTIAL METHOD BLD: ABNORMAL
EOSINOPHIL # BLD AUTO: 0.5 K/UL (ref 0–0.5)
EOSINOPHIL NFR BLD: 5.2 % (ref 0–8)
ERYTHROCYTE [DISTWIDTH] IN BLOOD BY AUTOMATED COUNT: 14.8 % (ref 11.5–14.5)
EST. GFR  (NO RACE VARIABLE): >60 ML/MIN/1.73 M^2
EST. GFR  (NO RACE VARIABLE): >60 ML/MIN/1.73 M^2
GLUCOSE SERPL-MCNC: 85 MG/DL (ref 70–110)
GLUCOSE SERPL-MCNC: 85 MG/DL (ref 70–110)
HCT VFR BLD AUTO: 46.1 % (ref 40–54)
HGB BLD-MCNC: 15 G/DL (ref 14–18)
IMM GRANULOCYTES # BLD AUTO: 0.05 K/UL (ref 0–0.04)
IMM GRANULOCYTES NFR BLD AUTO: 0.6 % (ref 0–0.5)
LYMPHOCYTES # BLD AUTO: 1.9 K/UL (ref 1–4.8)
LYMPHOCYTES NFR BLD: 22.2 % (ref 18–48)
MCH RBC QN AUTO: 26.5 PG (ref 27–31)
MCHC RBC AUTO-ENTMCNC: 32.5 G/DL (ref 32–36)
MCV RBC AUTO: 81 FL (ref 82–98)
MONOCYTES # BLD AUTO: 0.8 K/UL (ref 0.3–1)
MONOCYTES NFR BLD: 8.8 % (ref 4–15)
NEUTROPHILS # BLD AUTO: 5.4 K/UL (ref 1.8–7.7)
NEUTROPHILS NFR BLD: 62.5 % (ref 38–73)
NRBC BLD-RTO: 0 /100 WBC
PLATELET # BLD AUTO: 350 K/UL (ref 150–450)
PMV BLD AUTO: 9.8 FL (ref 9.2–12.9)
POTASSIUM SERPL-SCNC: 4.1 MMOL/L (ref 3.5–5.1)
POTASSIUM SERPL-SCNC: 4.1 MMOL/L (ref 3.5–5.1)
PROT SERPL-MCNC: 7.4 G/DL (ref 6–8.4)
RBC # BLD AUTO: 5.67 M/UL (ref 4.6–6.2)
SODIUM SERPL-SCNC: 146 MMOL/L (ref 136–145)
SODIUM SERPL-SCNC: 146 MMOL/L (ref 136–145)
TROPONIN I SERPL DL<=0.01 NG/ML-MCNC: 0.02 NG/ML (ref 0–0.03)
WBC # BLD AUTO: 8.66 K/UL (ref 3.9–12.7)

## 2024-01-03 PROCEDURE — 84484 ASSAY OF TROPONIN QUANT: CPT | Performed by: STUDENT IN AN ORGANIZED HEALTH CARE EDUCATION/TRAINING PROGRAM

## 2024-01-03 PROCEDURE — 99214 OFFICE O/P EST MOD 30 MIN: CPT | Mod: S$GLB,,, | Performed by: STUDENT IN AN ORGANIZED HEALTH CARE EDUCATION/TRAINING PROGRAM

## 2024-01-03 PROCEDURE — 85025 COMPLETE CBC W/AUTO DIFF WBC: CPT | Performed by: STUDENT IN AN ORGANIZED HEALTH CARE EDUCATION/TRAINING PROGRAM

## 2024-01-03 PROCEDURE — 99999 PR PBB SHADOW E&M-EST. PATIENT-LVL V: CPT | Mod: PBBFAC,,, | Performed by: STUDENT IN AN ORGANIZED HEALTH CARE EDUCATION/TRAINING PROGRAM

## 2024-01-03 PROCEDURE — 36415 COLL VENOUS BLD VENIPUNCTURE: CPT | Mod: PO | Performed by: STUDENT IN AN ORGANIZED HEALTH CARE EDUCATION/TRAINING PROGRAM

## 2024-01-03 PROCEDURE — 80053 COMPREHEN METABOLIC PANEL: CPT | Performed by: STUDENT IN AN ORGANIZED HEALTH CARE EDUCATION/TRAINING PROGRAM

## 2024-01-03 PROCEDURE — 3008F BODY MASS INDEX DOCD: CPT | Mod: CPTII,S$GLB,, | Performed by: STUDENT IN AN ORGANIZED HEALTH CARE EDUCATION/TRAINING PROGRAM

## 2024-01-03 PROCEDURE — 3077F SYST BP >= 140 MM HG: CPT | Mod: CPTII,S$GLB,, | Performed by: STUDENT IN AN ORGANIZED HEALTH CARE EDUCATION/TRAINING PROGRAM

## 2024-01-03 PROCEDURE — 1159F MED LIST DOCD IN RCRD: CPT | Mod: CPTII,S$GLB,, | Performed by: STUDENT IN AN ORGANIZED HEALTH CARE EDUCATION/TRAINING PROGRAM

## 2024-01-03 PROCEDURE — 1160F RVW MEDS BY RX/DR IN RCRD: CPT | Mod: CPTII,S$GLB,, | Performed by: STUDENT IN AN ORGANIZED HEALTH CARE EDUCATION/TRAINING PROGRAM

## 2024-01-03 PROCEDURE — 3080F DIAST BP >= 90 MM HG: CPT | Mod: CPTII,S$GLB,, | Performed by: STUDENT IN AN ORGANIZED HEALTH CARE EDUCATION/TRAINING PROGRAM

## 2024-01-03 RX ORDER — CLONIDINE HYDROCHLORIDE 0.1 MG/1
0.1 TABLET ORAL 2 TIMES DAILY
Qty: 60 TABLET | Refills: 11 | Status: SHIPPED | OUTPATIENT
Start: 2024-01-03 | End: 2025-01-02

## 2024-01-03 RX ORDER — CHLORTHALIDONE 25 MG/1
25 TABLET ORAL DAILY
Qty: 30 TABLET | Refills: 11 | Status: SHIPPED | OUTPATIENT
Start: 2024-01-03 | End: 2025-01-02

## 2024-01-03 RX ORDER — ALLOPURINOL 100 MG/1
100 TABLET ORAL DAILY
Qty: 90 TABLET | Refills: 2 | Status: SHIPPED | OUTPATIENT
Start: 2024-01-03

## 2024-01-03 NOTE — PATIENT INSTRUCTIONS
Approved weight loss medications:  Wegovy (in family of ozempic, mounjaro)- This is a diabetes medicine which has been shown to help people lose weight regardless of whether you have diabetes. If you have ever had pancreatitis or a history of cancers in the family we should discuss in more details, but this medicine is generally very safe.     For you I think the pancreatitis risk is unacceptable.     Contrave- This is a medication which has an antidepressant mixed with an anti-opiate medicine. It has been shown to provide modest weight loss benefits. If you have ever had seizures or are on any opiate pain medicines it is not recommended but otherwise there are no significant side effects.     Reasonable choice    Qsymia- This is another combination medicine which has a mild stimulant mixed with an old anti-seizure medicine. I usually don't recommend this one for people who have high blood pressure.     Bad idea for you with blood pressure.     Orlistat- This medicine works by preventing the breakdown of fats in your gut. It can cause some diarrhea, abdominal cramping and bloating but is fairly safe. None of my patients have had positive experiences with this.     Reasonable, but lots of gastrointestinal side effects.                 For blood pressure- add chlorthalidone in the morning.   Clonidine twice daily. Make sure not to miss any clonidine doses.   We will keep a very close eye on this, recheck next week.       For the weight, add contrave. Instructions will be on bottle.  Start logging everyting you eat. Can use [employer] or other calorie tracker francia, most are free.   Check in with the bariatric surgeon, see if this might be a good fit for you.     Increase the allopurinol to 400mg (add a 100mg).

## 2024-01-03 NOTE — PROGRESS NOTES
"[facility] MEDICINE CLINIC NOTE    Patient Name: Josue Mir  YOB: 1975    PRESENTING HISTORY     History of Present Illness:  Mr. Josue Mir is a 48 y.o. male here for routine f/u.     Recent ED presentation for gout flare.   On allopurinol 300mg, last Uric acid 7.3. Some dietary indiscretions with meat.     He is concerned about his weight.   Pertinent comorbidities- HTN, JASPAL.     Feels can't work out due to his heart state. We discussed. Once we get his BP down, he is cleared for activity.   Diet: Eating too much. No modifications attempted yet.   Goal under 200 lbs.       Very high blood pressures. Tooks meds today and yesterday.   Untreated JASPAL, in process of getting CPAP.     ROS      OBJECTIVE:   Vital Signs:  Vitals:    01/03/24 0808 01/03/24 0832   BP: (!) 198/110 (!) 190/120   Pulse: (!) 57    Resp: 16    SpO2: (!) 94%    Weight: 120.4 kg (265 lb 6.9 oz)    Height: 5' 9" (1.753 m)          Physical Exam  Constitutional:       Appearance: He is obese. He is not ill-appearing, toxic-appearing or diaphoretic.   Cardiovascular:      Rate and Rhythm: Normal rate and regular rhythm.   Pulmonary:      Effort: Pulmonary effort is normal. No respiratory distress.      Breath sounds: Normal breath sounds.   Musculoskeletal:      Right lower leg: No edema.      Left lower leg: No edema.         ASSESSMENT & PLAN:     Hypertensive urgency  Check for organ damage.   Start on clonidine and diuretic.   Recheck 1 week.   F/u with me in 1 month.     Essential hypertension  -     cloNIDine (CATAPRES) 0.1 MG tablet; Take 1 tablet (0.1 mg total) by mouth 2 (two) times daily.  Dispense: 60 tablet; Refill: 11  -     chlorthalidone (HYGROTEN) 25 MG Tab; Take 1 tablet (25 mg total) by mouth once daily.  Dispense: 30 tablet; Refill: 11  -     BASIC METABOLIC PANEL; Future; Expected date: 01/03/2024  -     CBC W/ AUTO DIFFERENTIAL; Future; Expected date: 01/03/2024  -     COMPREHENSIVE METABOLIC PANEL; " Future; Expected date: 01/03/2024  -     Troponin I; Future; Expected date: 01/03/2024    Class 2 severe obesity with serious comorbidity in adult, unspecified BMI, unspecified obesity type  -     Ambulatory referral/consult to Bariatric Surgery; Future; Expected date: 01/10/2024  -     naltrexone-bupropion (CONTRAVE) 8-90 mg TbSR; Take 1 tablet by mouth once daily for 7 days, THEN 1 tablet 2 (two) times a day.  Dispense: 187 tablet; Refill: 0  For the weight, add contrave. Instructions will be on bottle.  Start logging everyting you eat. Can use [employer] or other calorie tracker francia, most are free.   Check in with the bariatric surgeon, see if this might be a good fit for you.     Gout, unspecified cause, unspecified chronicity, unspecified site  -     allopurinoL (ZYLOPRIM) 100 MG tablet; Take 1 tablet (100 mg total) by mouth once daily.  Dispense: 90 tablet; Refill: 2  Up the allopurinol.            David Quach  Internal Medicine

## 2024-01-10 ENCOUNTER — TELEPHONE (OUTPATIENT)
Dept: FAMILY MEDICINE | Facility: CLINIC | Age: 49
End: 2024-01-10

## 2024-01-10 ENCOUNTER — CLINICAL SUPPORT (OUTPATIENT)
Dept: FAMILY MEDICINE | Facility: CLINIC | Age: 49
End: 2024-01-10
Payer: COMMERCIAL

## 2024-01-10 VITALS — DIASTOLIC BLOOD PRESSURE: 90 MMHG | HEART RATE: 46 BPM | SYSTOLIC BLOOD PRESSURE: 120 MMHG

## 2024-01-10 DIAGNOSIS — I10 ESSENTIAL HYPERTENSION: Primary | ICD-10-CM

## 2024-01-10 PROCEDURE — 99499 UNLISTED E&M SERVICE: CPT | Mod: S$GLB,,, | Performed by: STUDENT IN AN ORGANIZED HEALTH CARE EDUCATION/TRAINING PROGRAM

## 2024-01-10 PROCEDURE — 99999 PR PBB SHADOW E&M-EST. PATIENT-LVL III: CPT | Mod: PBBFAC,,,

## 2024-01-10 NOTE — TELEPHONE ENCOUNTER
Patient verifies taking blood pressure medications on a regular basis at the same time of the day.     Current Outpatient Medications:     allopurinoL (ZYLOPRIM) 100 MG tablet, Take 1 tablet (100 mg total) by mouth once daily., Disp: 90 tablet, Rfl: 2    allopurinoL (ZYLOPRIM) 300 MG tablet, Take 1 tablet (300 mg total) by mouth once daily., Disp: 90 tablet, Rfl: 4    amiodarone (PACERONE) 200 MG Tab, Take 0.5 tablets (100 mg total) by mouth once daily., Disp: 90 tablet, Rfl: 3    apixaban (ELIQUIS) 5 mg Tab, Take 1 tablet (5 mg total) by mouth 2 (two) times daily., Disp: 180 tablet, Rfl: 3    chlorthalidone (HYGROTEN) 25 MG Tab, Take 1 tablet (25 mg total) by mouth once daily., Disp: 30 tablet, Rfl: 11    cloNIDine (CATAPRES) 0.1 MG tablet, Take 1 tablet (0.1 mg total) by mouth 2 (two) times daily., Disp: 60 tablet, Rfl: 11    colchicine (COLCRYS) 0.6 mg tablet, Take 1 tablet (0.6 mg total) by mouth 2 (two) times daily., Disp: 14 tablet, Rfl: 3    doxycycline (VIBRA-TABS) 100 MG tablet, Take one TAB PO daily with food, Disp: 30 tablet, Rfl: 2    FLUoxetine 40 MG capsule, Take 1 capsule (40 mg total) by mouth once daily., Disp: 30 capsule, Rfl: 3    losartan (COZAAR) 50 MG tablet, Take 1 tablet (50 mg total) by mouth once daily., Disp: 30 tablet, Rfl: 2    metoprolol tartrate (LOPRESSOR) 100 MG tablet, Take 1 tablet (100 mg total) by mouth 2 (two) times daily., Disp: 180 tablet, Rfl: 3    metroNIDAZOLE (NORITATE) 1 % cream, Compound azelaic acid 15% + ivermectin 1% + metronidazole 1% cream. Apply to face once or twice daily., Disp: 30 g, Rfl: 5    naltrexone-bupropion (CONTRAVE) 8-90 mg TbSR, Take 1 tablet by mouth once daily for 7 days, THEN 1 tablet 2 (two) times a day., Disp: 187 tablet, Rfl: 0    rosuvastatin (CRESTOR) 10 MG tablet, Take 1 tablet (10 mg total) by mouth every evening., Disp: 90 tablet, Rfl: 3    sulfacetamide sodium-sulfur 10-5 % (w/w) Clsr, Use to wash face daily, Disp: 170 g, Rfl: 5    Does  patient have record of home blood pressure readings no.     Readings have been averaging N/A.     Last dose of blood pressure medication was taken at 1/10/24.    Patient is asymptomatic.     Complains of N/A.    120/88  , 46  .    Blood pressure reading after 15 minutes was 120/90, Pulse 48.

## 2024-01-10 NOTE — PROGRESS NOTES
Josue Mir 48 y.o. male is here today for Blood Pressure check.   History of HTN yes.    Review of patient's allergies indicates:  No Known Allergies  Creatinine   Date Value Ref Range Status   01/03/2024 1.2 0.5 - 1.4 mg/dL Final   01/03/2024 1.2 0.5 - 1.4 mg/dL Final     Sodium   Date Value Ref Range Status   01/03/2024 146 (H) 136 - 145 mmol/L Final   01/03/2024 146 (H) 136 - 145 mmol/L Final     Potassium   Date Value Ref Range Status   01/03/2024 4.1 3.5 - 5.1 mmol/L Final   01/03/2024 4.1 3.5 - 5.1 mmol/L Final   ]  Patient verifies taking blood pressure medications on a regular basis at the same time of the day.     Current Outpatient Medications:     allopurinoL (ZYLOPRIM) 100 MG tablet, Take 1 tablet (100 mg total) by mouth once daily., Disp: 90 tablet, Rfl: 2    allopurinoL (ZYLOPRIM) 300 MG tablet, Take 1 tablet (300 mg total) by mouth once daily., Disp: 90 tablet, Rfl: 4    amiodarone (PACERONE) 200 MG Tab, Take 0.5 tablets (100 mg total) by mouth once daily., Disp: 90 tablet, Rfl: 3    apixaban (ELIQUIS) 5 mg Tab, Take 1 tablet (5 mg total) by mouth 2 (two) times daily., Disp: 180 tablet, Rfl: 3    chlorthalidone (HYGROTEN) 25 MG Tab, Take 1 tablet (25 mg total) by mouth once daily., Disp: 30 tablet, Rfl: 11    cloNIDine (CATAPRES) 0.1 MG tablet, Take 1 tablet (0.1 mg total) by mouth 2 (two) times daily., Disp: 60 tablet, Rfl: 11    colchicine (COLCRYS) 0.6 mg tablet, Take 1 tablet (0.6 mg total) by mouth 2 (two) times daily., Disp: 14 tablet, Rfl: 3    doxycycline (VIBRA-TABS) 100 MG tablet, Take one TAB PO daily with food, Disp: 30 tablet, Rfl: 2    FLUoxetine 40 MG capsule, Take 1 capsule (40 mg total) by mouth once daily., Disp: 30 capsule, Rfl: 3    losartan (COZAAR) 50 MG tablet, Take 1 tablet (50 mg total) by mouth once daily., Disp: 30 tablet, Rfl: 2    metoprolol tartrate (LOPRESSOR) 100 MG tablet, Take 1 tablet (100 mg total) by mouth 2 (two) times daily., Disp: 180 tablet, Rfl: 3     metroNIDAZOLE (NORITATE) 1 % cream, Compound azelaic acid 15% + ivermectin 1% + metronidazole 1% cream. Apply to face once or twice daily., Disp: 30 g, Rfl: 5    naltrexone-bupropion (CONTRAVE) 8-90 mg TbSR, Take 1 tablet by mouth once daily for 7 days, THEN 1 tablet 2 (two) times a day., Disp: 187 tablet, Rfl: 0    rosuvastatin (CRESTOR) 10 MG tablet, Take 1 tablet (10 mg total) by mouth every evening., Disp: 90 tablet, Rfl: 3    sulfacetamide sodium-sulfur 10-5 % (w/w) Clsr, Use to wash face daily, Disp: 170 g, Rfl: 5  Does patient have record of home blood pressure readings no. Readings have been averaging N/A.   Last dose of blood pressure medication was taken at 1/10/24.  Patient is asymptomatic.   Complains of N/A.    120 88  , 46  .    Blood pressure reading after 15 minutes was 120/90, Pulse 48.  Dr. Quach notified.

## 2024-01-30 RX ORDER — LOSARTAN POTASSIUM 50 MG/1
50 TABLET ORAL
Qty: 90 TABLET | Refills: 3 | Status: SHIPPED | OUTPATIENT
Start: 2024-01-30 | End: 2024-05-17

## 2024-02-02 ENCOUNTER — OFFICE VISIT (OUTPATIENT)
Dept: PULMONOLOGY | Facility: CLINIC | Age: 49
End: 2024-02-02
Payer: COMMERCIAL

## 2024-02-02 VITALS
SYSTOLIC BLOOD PRESSURE: 156 MMHG | OXYGEN SATURATION: 97 % | WEIGHT: 265.75 LBS | DIASTOLIC BLOOD PRESSURE: 92 MMHG | BODY MASS INDEX: 39.36 KG/M2 | HEIGHT: 69 IN | HEART RATE: 56 BPM

## 2024-02-02 DIAGNOSIS — G47.33 OSA (OBSTRUCTIVE SLEEP APNEA): Primary | ICD-10-CM

## 2024-02-02 PROCEDURE — 99999 PR PBB SHADOW E&M-EST. PATIENT-LVL III: CPT | Mod: PBBFAC,,, | Performed by: NURSE PRACTITIONER

## 2024-02-05 ENCOUNTER — LAB VISIT (OUTPATIENT)
Dept: LAB | Facility: HOSPITAL | Age: 49
End: 2024-02-05
Attending: STUDENT IN AN ORGANIZED HEALTH CARE EDUCATION/TRAINING PROGRAM
Payer: COMMERCIAL

## 2024-02-05 ENCOUNTER — OFFICE VISIT (OUTPATIENT)
Dept: FAMILY MEDICINE | Facility: CLINIC | Age: 49
End: 2024-02-05
Payer: COMMERCIAL

## 2024-02-05 VITALS
DIASTOLIC BLOOD PRESSURE: 80 MMHG | BODY MASS INDEX: 39.74 KG/M2 | HEART RATE: 51 BPM | SYSTOLIC BLOOD PRESSURE: 122 MMHG | HEIGHT: 69 IN | OXYGEN SATURATION: 99 % | WEIGHT: 268.31 LBS | RESPIRATION RATE: 16 BRPM

## 2024-02-05 DIAGNOSIS — E66.01 CLASS 2 SEVERE OBESITY WITH SERIOUS COMORBIDITY IN ADULT, UNSPECIFIED BMI, UNSPECIFIED OBESITY TYPE: ICD-10-CM

## 2024-02-05 DIAGNOSIS — I42.9 CARDIOMYOPATHY, UNSPECIFIED TYPE: ICD-10-CM

## 2024-02-05 DIAGNOSIS — I10 ESSENTIAL HYPERTENSION: Primary | ICD-10-CM

## 2024-02-05 LAB
ANION GAP SERPL CALC-SCNC: 8 MMOL/L (ref 8–16)
BUN SERPL-MCNC: 18 MG/DL (ref 6–20)
CALCIUM SERPL-MCNC: 9.2 MG/DL (ref 8.7–10.5)
CHLORIDE SERPL-SCNC: 107 MMOL/L (ref 95–110)
CO2 SERPL-SCNC: 26 MMOL/L (ref 23–29)
CREAT SERPL-MCNC: 1.1 MG/DL (ref 0.5–1.4)
EST. GFR  (NO RACE VARIABLE): >60 ML/MIN/1.73 M^2
GLUCOSE SERPL-MCNC: 92 MG/DL (ref 70–110)
POTASSIUM SERPL-SCNC: 3.5 MMOL/L (ref 3.5–5.1)
SODIUM SERPL-SCNC: 141 MMOL/L (ref 136–145)

## 2024-02-05 PROCEDURE — 3008F BODY MASS INDEX DOCD: CPT | Mod: CPTII,S$GLB,, | Performed by: STUDENT IN AN ORGANIZED HEALTH CARE EDUCATION/TRAINING PROGRAM

## 2024-02-05 PROCEDURE — 99999 PR PBB SHADOW E&M-EST. PATIENT-LVL V: CPT | Mod: PBBFAC,,, | Performed by: STUDENT IN AN ORGANIZED HEALTH CARE EDUCATION/TRAINING PROGRAM

## 2024-02-05 PROCEDURE — 80048 BASIC METABOLIC PNL TOTAL CA: CPT | Performed by: STUDENT IN AN ORGANIZED HEALTH CARE EDUCATION/TRAINING PROGRAM

## 2024-02-05 PROCEDURE — 3074F SYST BP LT 130 MM HG: CPT | Mod: CPTII,S$GLB,, | Performed by: STUDENT IN AN ORGANIZED HEALTH CARE EDUCATION/TRAINING PROGRAM

## 2024-02-05 PROCEDURE — 99214 OFFICE O/P EST MOD 30 MIN: CPT | Mod: S$GLB,,, | Performed by: STUDENT IN AN ORGANIZED HEALTH CARE EDUCATION/TRAINING PROGRAM

## 2024-02-05 PROCEDURE — 1160F RVW MEDS BY RX/DR IN RCRD: CPT | Mod: CPTII,S$GLB,, | Performed by: STUDENT IN AN ORGANIZED HEALTH CARE EDUCATION/TRAINING PROGRAM

## 2024-02-05 PROCEDURE — 1159F MED LIST DOCD IN RCRD: CPT | Mod: CPTII,S$GLB,, | Performed by: STUDENT IN AN ORGANIZED HEALTH CARE EDUCATION/TRAINING PROGRAM

## 2024-02-05 PROCEDURE — 4010F ACE/ARB THERAPY RXD/TAKEN: CPT | Mod: CPTII,S$GLB,, | Performed by: STUDENT IN AN ORGANIZED HEALTH CARE EDUCATION/TRAINING PROGRAM

## 2024-02-05 PROCEDURE — 36415 COLL VENOUS BLD VENIPUNCTURE: CPT | Mod: PO | Performed by: STUDENT IN AN ORGANIZED HEALTH CARE EDUCATION/TRAINING PROGRAM

## 2024-02-05 PROCEDURE — 3079F DIAST BP 80-89 MM HG: CPT | Mod: CPTII,S$GLB,, | Performed by: STUDENT IN AN ORGANIZED HEALTH CARE EDUCATION/TRAINING PROGRAM

## 2024-02-05 RX ORDER — BUPROPION HYDROCHLORIDE 150 MG/1
150 TABLET ORAL DAILY
Qty: 30 TABLET | Refills: 11 | Status: SHIPPED | OUTPATIENT
Start: 2024-02-05 | End: 2025-02-04

## 2024-02-05 RX ORDER — NALTREXONE HYDROCHLORIDE 50 MG/1
50 TABLET, FILM COATED ORAL DAILY
Qty: 30 TABLET | Refills: 11 | Status: SHIPPED | OUTPATIENT
Start: 2024-02-05 | End: 2025-01-30

## 2024-02-12 NOTE — PROGRESS NOTES
"[facility] MEDICINE CLINIC NOTE    Patient Name: Josue Mir  YOB: 1975    PRESENTING HISTORY     History of Present Illness:  Mr. Josue Mir is a 48 y.o. male here for short f/u.     Markedly elevated BP at last OV.   I started him on chlorthalidone and clonidine.   Since then BP much improved.   Never had any symptoms.     We had also discussed weight loss medications at last OV.   Avoiding stimulants.   GLP1RA difficult to acquire.   He was interested in COntrave. Insurance did not approve. We will try generic components.     ROS      OBJECTIVE:   Vital Signs:  Vitals:    02/05/24 1057   BP: 122/80   Pulse: (!) 51   Resp: 16   SpO2: 99%   Weight: 121.7 kg (268 lb 4.8 oz)   Height: 5' 9" (1.753 m)          Physical Exam: Normal, no change.     Physical Exam    ASSESSMENT & PLAN:     Essential hypertension  Well controlled, no changes.     Class 2 severe obesity with serious comorbidity in adult, unspecified BMI, unspecified obesity type  -     BASIC METABOLIC PANEL; Future; Expected date: 02/05/2024  -     naltrexone (DEPADE) 50 mg tablet; Take 50 mg by mouth once daily.  Dispense: 30 tablet; Refill: 11  -     buPROPion (WELLBUTRIN XL) 150 MG TB24 tablet; Take 1 tablet (150 mg total) by mouth once daily.  Dispense: 30 tablet; Refill: 11    Cardiomyopathy, unspecified type  -     Echo; Future  No recent Echo. Would like to see if improving.   Has Cardiology f/u.   Need tight BP control.           David Quach  Internal Medicine            "

## 2024-02-22 ENCOUNTER — HOSPITAL ENCOUNTER (OUTPATIENT)
Dept: CARDIOLOGY | Facility: HOSPITAL | Age: 49
Discharge: HOME | End: 2024-02-22
Attending: STUDENT IN AN ORGANIZED HEALTH CARE EDUCATION/TRAINING PROGRAM
Payer: COMMERCIAL

## 2024-02-22 VITALS — WEIGHT: 268.31 LBS | HEIGHT: 69 IN | BODY MASS INDEX: 39.74 KG/M2

## 2024-02-22 DIAGNOSIS — I42.9 CARDIOMYOPATHY, UNSPECIFIED TYPE: ICD-10-CM

## 2024-02-22 PROCEDURE — 93306 TTE W/DOPPLER COMPLETE: CPT

## 2024-02-22 PROCEDURE — 93306 TTE W/DOPPLER COMPLETE: CPT | Mod: 26,,, | Performed by: INTERNAL MEDICINE

## 2024-02-22 RX ORDER — COLCHICINE 0.6 MG/1
0.6 TABLET ORAL 2 TIMES DAILY
Qty: 14 TABLET | Refills: 3 | Status: SHIPPED | OUTPATIENT
Start: 2024-02-22

## 2024-02-27 LAB
AORTIC ROOT ANNULUS: 3.3 CM
AORTIC VALVE CUSP SEPERATION: 2.3 CM
AV INDEX (PROSTH): 0.58
AV MEAN GRADIENT: 3 MMHG
AV PEAK GRADIENT: 6 MMHG
AV VALVE AREA BY VELOCITY RATIO: 2.78 CM²
AV VALVE AREA: 2.84 CM²
AV VELOCITY RATIO: 0.57
BSA FOR ECHO PROCEDURE: 2.43 M2
CV ECHO LV RWT: 0.41 CM
DOP CALC AO PEAK VEL: 1.27 M/S
DOP CALC AO VTI: 27.6 CM
DOP CALC LVOT AREA: 4.9 CM2
DOP CALC LVOT DIAMETER: 2.5 CM
DOP CALC LVOT PEAK VEL: 0.72 M/S
DOP CALC LVOT STROKE VOLUME: 78.5 CM3
DOP CALC MV VTI: 27.6 CM
DOP CALCLVOT PEAK VEL VTI: 16 CM
E WAVE DECELERATION TIME: 253 MSEC
E/A RATIO: 1
E/E' RATIO: 9.86 M/S
ECHO LV POSTERIOR WALL: 1.12 CM (ref 0.6–1.1)
FRACTIONAL SHORTENING: 26 % (ref 28–44)
INTERVENTRICULAR SEPTUM: 1.12 CM (ref 0.6–1.1)
IVC DIAMETER: 1.61 CM
IVRT: 111 MSEC
LEFT ATRIUM SIZE: 4.4 CM
LEFT ATRIUM VOLUME INDEX MOD: 33.4 ML/M2
LEFT ATRIUM VOLUME MOD: 78.2 CM3
LEFT INTERNAL DIMENSION IN SYSTOLE: 4.1 CM (ref 2.1–4)
LEFT VENTRICLE DIASTOLIC VOLUME INDEX: 63.68 ML/M2
LEFT VENTRICLE DIASTOLIC VOLUME: 149 ML
LEFT VENTRICLE MASS INDEX: 107 G/M2
LEFT VENTRICLE SYSTOLIC VOLUME INDEX: 31.7 ML/M2
LEFT VENTRICLE SYSTOLIC VOLUME: 74.2 ML
LEFT VENTRICULAR INTERNAL DIMENSION IN DIASTOLE: 5.53 CM (ref 3.5–6)
LEFT VENTRICULAR MASS: 250.19 G
LV LATERAL E/E' RATIO: 7.67 M/S
LV SEPTAL E/E' RATIO: 13.8 M/S
LVOT MG: 1 MMHG
LVOT MV: 0.44 CM/S
MV MEAN GRADIENT: 1 MMHG
MV PEAK A VEL: 0.69 M/S
MV PEAK E VEL: 0.69 M/S
MV PEAK GRADIENT: 2 MMHG
MV VALVE AREA BY CONTINUITY EQUATION: 2.84 CM2
PISA TR MAX VEL: 2.1 M/S
PV MV: 0.66 M/S
PV PEAK GRADIENT: 4 MMHG
PV PEAK VELOCITY: 0.98 M/S
RA PRESSURE ESTIMATED: 3 MMHG
RIGHT VENTRICULAR END-DIASTOLIC DIMENSION: 2.55 CM
RV TB RVSP: 5 MMHG
RV TISSUE DOPPLER FREE WALL SYSTOLIC VELOCITY 1 (APICAL 4 CHAMBER VIEW): 11.2 CM/S
TDI LATERAL: 0.09 M/S
TDI SEPTAL: 0.05 M/S
TDI: 0.07 M/S
TR MAX PG: 18 MMHG
TRICUSPID ANNULAR PLANE SYSTOLIC EXCURSION: 2.09 CM
TV REST PULMONARY ARTERY PRESSURE: 21 MMHG
Z-SCORE OF LEFT VENTRICULAR DIMENSION IN END DIASTOLE: -5.55
Z-SCORE OF LEFT VENTRICULAR DIMENSION IN END SYSTOLE: -2.73

## 2024-03-18 NOTE — PROGRESS NOTES
Subjective:     HPI    I had the pleasure of seeing Josue Mir in follow-up for his history of AF. Last seen in 9/2023. He is a 48M with HTN, JASPAL on CPAP, previous hemorrhagic CVA (4/2018, manifesting as loss of consciousness, no thrombolytics administered, in medically induced coma for 30 days, ultimately attributed to HTN), who was diagnosed with AF in 10/2018 after giving blood and then passing out while exiting the lab. Hospitalized at [place], and underwent KIRILL/DCCV in 10/2018. Was well until 3/2023 when he was incidentally noted to be in AF with RVR before a screening colonoscopy. Admitted to [facility], formerly [facility], where echo showed EF 40%, mild-mod MR, mild LAE. Rate controlled, and discharged on eliquis, digoxin 250 ug daily, metoprolol 100 mg bid.    PVI performed on 5/23/2023. Discharged on amiodarone and eliquis.    At 9/2023 visit Mr. Mir felt well, no complaints. Amiodarone dropped to 100 mg daily.    LHC in 11/2023 showed nonobstructive CAD. Echo in 2/2024 showed EF 50-55%.    Today in the office Mr. Mir denies recurrent arrhythmias. Pulse regular in the office today.    Review of Systems   Constitutional: Negative for decreased appetite, malaise/fatigue, weight gain and weight loss.   HENT:  Negative for sore throat.    Eyes:  Negative for blurred vision.   Cardiovascular:  Negative for chest pain, dyspnea on exertion, irregular heartbeat, leg swelling, near-syncope, orthopnea, palpitations, paroxysmal nocturnal dyspnea and syncope.   Respiratory:  Negative for shortness of breath.    Skin:  Negative for rash.   Musculoskeletal:  Negative for arthritis.   Gastrointestinal:  Negative for abdominal pain.   Neurological:  Negative for focal weakness.   Psychiatric/Behavioral:  Negative for altered mental status.        Objective:   Physical Exam  Vitals and nursing note reviewed.   Constitutional:       General: He is not in acute distress.     Appearance: He is well-developed.   HENT:      Head: Normocephalic  and atraumatic.   Eyes:      General: No scleral icterus.     Pupils: Pupils are equal, round, and reactive to light.   Neck:      Thyroid: No thyromegaly.   Cardiovascular:      Rate and Rhythm: Regular rhythm. Bradycardia present.      Pulses: Normal pulses.      Heart sounds: Normal heart sounds. No murmur heard.     No friction rub. No gallop.   Pulmonary:      Effort: Pulmonary effort is normal.      Breath sounds: Normal breath sounds.   Abdominal:      General: Bowel sounds are normal. There is no distension.      Palpations: Abdomen is soft.      Tenderness: There is no abdominal tenderness.   Musculoskeletal:      Cervical back: Neck supple.   Skin:     General: Skin is warm and dry.      Findings: No rash.   Neurological:      Mental Status: He is alert and oriented to person, place, and time.   Psychiatric:         Behavior: Behavior normal.         Assessment:      1. Essential hypertension    2. Acute on chronic combined systolic (congestive) and diastolic (congestive) heart failure    3. PAF (paroxysmal atrial fibrillation)    4. LV dysfunction    5. S/P RF ablation operation for arrhythmia    6. Long term (current) use of anticoagulants    7. History of cerebral hemorrhage        Plan:     In summary, Josue Mir is a 48M with a history of persistent AF. His [id number] Score is 3 (CVA, HTN) so he should remain on anticoagulation.    Mr. Mir is 10 months status-post PVI. Maintaining sinus rhythm on low-dose amiodarone.    Plan is to stop amiodarone and follow-up 12 months.    Thank you for allowing me to participate in the care of this patient. Please do not hesitate to call me with any questions or concerns.

## 2024-03-20 ENCOUNTER — OFFICE VISIT (OUTPATIENT)
Dept: CARDIOLOGY | Facility: CLINIC | Age: 49
End: 2024-03-20
Payer: COMMERCIAL

## 2024-03-20 VITALS
HEIGHT: 69 IN | WEIGHT: 262.88 LBS | OXYGEN SATURATION: 98 % | HEART RATE: 50 BPM | SYSTOLIC BLOOD PRESSURE: 122 MMHG | RESPIRATION RATE: 16 BRPM | DIASTOLIC BLOOD PRESSURE: 76 MMHG | BODY MASS INDEX: 38.94 KG/M2

## 2024-03-20 DIAGNOSIS — Z98.890 S/P RF ABLATION OPERATION FOR ARRHYTHMIA: ICD-10-CM

## 2024-03-20 DIAGNOSIS — I10 ESSENTIAL HYPERTENSION: Primary | ICD-10-CM

## 2024-03-20 DIAGNOSIS — I51.9 LV DYSFUNCTION: ICD-10-CM

## 2024-03-20 DIAGNOSIS — I48.0 PAF (PAROXYSMAL ATRIAL FIBRILLATION): ICD-10-CM

## 2024-03-20 DIAGNOSIS — Z79.01 LONG TERM (CURRENT) USE OF ANTICOAGULANTS: ICD-10-CM

## 2024-03-20 DIAGNOSIS — I50.43 ACUTE ON CHRONIC COMBINED SYSTOLIC (CONGESTIVE) AND DIASTOLIC (CONGESTIVE) HEART FAILURE: ICD-10-CM

## 2024-03-20 DIAGNOSIS — Z86.79 HISTORY OF CEREBRAL HEMORRHAGE: ICD-10-CM

## 2024-03-20 DIAGNOSIS — Z86.79 S/P RF ABLATION OPERATION FOR ARRHYTHMIA: ICD-10-CM

## 2024-03-20 PROCEDURE — 4010F ACE/ARB THERAPY RXD/TAKEN: CPT | Mod: CPTII,S$GLB,, | Performed by: INTERNAL MEDICINE

## 2024-03-20 PROCEDURE — 3074F SYST BP LT 130 MM HG: CPT | Mod: CPTII,S$GLB,, | Performed by: INTERNAL MEDICINE

## 2024-03-20 PROCEDURE — 99214 OFFICE O/P EST MOD 30 MIN: CPT | Mod: S$GLB,,, | Performed by: INTERNAL MEDICINE

## 2024-03-20 PROCEDURE — 3078F DIAST BP <80 MM HG: CPT | Mod: CPTII,S$GLB,, | Performed by: INTERNAL MEDICINE

## 2024-03-20 PROCEDURE — 3008F BODY MASS INDEX DOCD: CPT | Mod: CPTII,S$GLB,, | Performed by: INTERNAL MEDICINE

## 2024-03-20 PROCEDURE — 99999 PR PBB SHADOW E&M-EST. PATIENT-LVL IV: CPT | Mod: PBBFAC,,, | Performed by: INTERNAL MEDICINE

## 2024-03-20 PROCEDURE — 1159F MED LIST DOCD IN RCRD: CPT | Mod: CPTII,S$GLB,, | Performed by: INTERNAL MEDICINE

## 2024-04-11 RX ORDER — METOPROLOL TARTRATE 100 MG/1
100 TABLET ORAL 2 TIMES DAILY
Qty: 180 TABLET | Refills: 3 | Status: SHIPPED | OUTPATIENT
Start: 2024-04-11 | End: 2024-05-17

## 2024-05-17 ENCOUNTER — OFFICE VISIT (OUTPATIENT)
Dept: PULMONOLOGY | Facility: CLINIC | Age: 49
End: 2024-05-17
Payer: COMMERCIAL

## 2024-05-17 VITALS
WEIGHT: 268.75 LBS | BODY MASS INDEX: 39.8 KG/M2 | SYSTOLIC BLOOD PRESSURE: 138 MMHG | HEART RATE: 84 BPM | HEIGHT: 69 IN | DIASTOLIC BLOOD PRESSURE: 95 MMHG | OXYGEN SATURATION: 94 %

## 2024-05-17 DIAGNOSIS — Z86.711 HISTORY OF PULMONARY EMBOLISM: ICD-10-CM

## 2024-05-17 DIAGNOSIS — G47.33 OSA (OBSTRUCTIVE SLEEP APNEA): Primary | ICD-10-CM

## 2024-05-17 PROCEDURE — 3008F BODY MASS INDEX DOCD: CPT | Mod: CPTII,S$GLB,, | Performed by: INTERNAL MEDICINE

## 2024-05-17 PROCEDURE — 99999 PR PBB SHADOW E&M-EST. PATIENT-LVL III: CPT | Mod: PBBFAC,,, | Performed by: INTERNAL MEDICINE

## 2024-05-17 PROCEDURE — 99214 OFFICE O/P EST MOD 30 MIN: CPT | Mod: S$GLB,,, | Performed by: INTERNAL MEDICINE

## 2024-05-17 PROCEDURE — 3080F DIAST BP >= 90 MM HG: CPT | Mod: CPTII,S$GLB,, | Performed by: INTERNAL MEDICINE

## 2024-05-17 PROCEDURE — 4010F ACE/ARB THERAPY RXD/TAKEN: CPT | Mod: CPTII,S$GLB,, | Performed by: INTERNAL MEDICINE

## 2024-05-17 PROCEDURE — 3075F SYST BP GE 130 - 139MM HG: CPT | Mod: CPTII,S$GLB,, | Performed by: INTERNAL MEDICINE

## 2024-05-17 RX ORDER — AMIODARONE HYDROCHLORIDE 200 MG/1
100 TABLET ORAL
COMMUNITY
Start: 2024-03-29

## 2024-05-17 RX ORDER — NEBIVOLOL 10 MG/1
1 TABLET ORAL DAILY
COMMUNITY

## 2024-05-17 RX ORDER — ROSUVASTATIN CALCIUM 10 MG/1
10 TABLET, COATED ORAL DAILY
COMMUNITY

## 2024-05-17 RX ORDER — AMLODIPINE BESYLATE 10 MG/1
1 TABLET ORAL DAILY
COMMUNITY
End: 2024-05-17

## 2024-05-17 RX ORDER — LOSARTAN POTASSIUM 50 MG/1
50 TABLET ORAL DAILY
COMMUNITY

## 2024-05-17 RX ORDER — LISINOPRIL 40 MG/1
1 TABLET ORAL DAILY
COMMUNITY

## 2024-05-17 RX ORDER — ATORVASTATIN CALCIUM 40 MG/1
TABLET, FILM COATED ORAL
COMMUNITY
End: 2024-05-17

## 2024-05-17 RX ORDER — AMANTADINE HYDROCHLORIDE 100 MG/1
TABLET ORAL
COMMUNITY
End: 2024-05-17

## 2024-05-17 NOTE — PATIENT INSTRUCTIONS
Compliance report viewed -  compliance looks excellent- episodes hourly slightly over 6 occasionally (up to 30 on compliance report on most severe night)- untreated ahi not available.    Will increase autotitrate pressure from 5 -20,  to 10 to 20.      Would recommend positional therapy -- sock and ball with safety pin..      Ct chest 2023 was good-- viewed, not clear if initial clot massive but you had a fib and  stroke -- need anti coagg for life    Echo this yr was now normal (sl low)    Continue anticoagg

## 2024-05-17 NOTE — PROGRESS NOTES
5/17/2024    Josue Mir  New Patient Consult    Chief Complaint   Patient presents with    Apnea       HPI:  5/17/2024 pt uses cpap over 8 hr average nighty, wife relates snores- pt relates snores on  back .  Not clear if pe massive.           Below from np lucia:  11/02/2023: Hx:  Hemorrhagic CVA in 2018, PE, AFIB on Eliquis, Uncontrolled HTN, JASPAL on CPAP  In office today alone.  Denies being seen by prior Pulmonologist. Denies history of lung disease. Denies current use of inhaler therapy, supplemental oxygen. Denies personal history of cancer.  Diagnosed with sleep apnea approximately 10 years ago, has been on CPAP therapy ever since.  Received latest machine in September of 2017, currently using a REM star machine per [employer] rep. Patient does not know what current pressures are set at.  Currently using a nasal cushion mask without issue. Endorses persistent daytime fatigue, very mild depression despite CPAP use. Denies morning headaches, nocturnal arousals.  Patient is suffering with uncontrolled hypertension despite antihypertensive medications.  Blood pressure today in clinic 201/117.  Patient reports recent increase in losartan dose per Cardiology nurse practitioner.  He currently follows with Kerry Nicole, cardiology nurse practitioner.  Is scheduled to undergo an angiogram next week per Dr. Johansen  Following with Dr. Quach as primary care.  Patient has suffered with hemorrhagic CVA in 2018, reports no current deficit.  Records reviewed, patient required admission at [facility] at that time in which a PE was found on CTA in addition to right lower lobe pneumonia.  Patient reportedly difficult intubation due to copious secretions, was extubated during hospitalization but required re-intubation due to difficulty securing airway.  Patient currently on Eliquis 5 mg b.i.d..  Patient with several hospitalizations this year alone secondary to AFib with RVR.  Denies significant shortness of breath, wheezing, chest  tightness, cough, mucus production.  Denies frequent respiratory illness.  Denies frequent use of antibiotic, steroid.    Patient Instructions   I personally reached out to Litzy inquiring about compliance report.  [employer] representative states that a respiratory therapist will have to pull the compliance report and that will take 24 hours.    Continue CPAP use in the meantime.    You are due for a new CPAP machine, I am concerned that your current CPAP pressures are not adequately treating her sleep apnea, you may need a BiPAP machine.  I recommend doing an in clinic split night study.    Maintain bleeding precautions while on Eliquis.    Your blood pressure is significantly high in clinic today reading at 201/117.  Will recheck.  This is not a new issue, even dealing with hypertension for some time now.  This puts you at an increased risk of developing a hemorrhagic stroke again.  Please keep all followups with Cardiology, PCP.  I recommend you keep a daily log of your blood pressure recordings at home and present to your cardiologist.    Will consider checking CT Chest without Contrast and PFT in the future if needed.    Continue current prescription medication regiment. Keep follow up appointment as scheduled. Please call the office if you have any questions or concerns.       Social Hx: Lives with family - one cat in the home. Works [employer] . No Asbestosis exposure, Smoking Hx: Never smoker  Family Hx: No Lung Cancer, No COPD, No Asthma  Medical Hx: Previous pneumonia ; No previous shoulder/chest surgery          The Chief Complaint is: New to me      PFSH:  Past Medical History:   Diagnosis Date    Anticoagulant long-term use     Atrial fibrillation     Gout     Gout, joint     Hypertension     Memory deficit following cerebral infarction     Pneumonia 04/2018    Sleep apnea with use of continuous positive airway pressure (CPAP)     Stroke 04/2018    Syncope and collapse          Past Surgical History:    Procedure Laterality Date    ABLATION OF ARRHYTHMOGENIC FOCUS FOR ATRIAL FIBRILLATION N/A 5/23/2023    Procedure: Ablation atrial fibrillation;  Surgeon: Abdullahi Daniels MD;  Location: [facility] EP LAB;  Service: Cardiology;  Laterality: N/A;  AF, KIRILL(Cx if SR), PVI, RFA, CARTO, MAC, GP, 3 PREP* MDT ILR in situ*    ANGIOGRAM, CORONARY, WITH LEFT HEART CATHETERIZATION Left 11/8/2023    Procedure: Angiogram, Coronary, with Left Heart Cath;  Surgeon: Florencio Johansen MD;  Location: [facility] CATH/EP LAB;  Service: Cardiology;  Laterality: Left;    COLONOSCOPY N/A 11/10/2023    Procedure: COLONOSCOPY(sent to my chart);  Surgeon: Pato Cornell MD;  Location: [facility] ENDO;  Service: Endoscopy;  Laterality: N/A;  ppm    INSERTION OF IMPLANTABLE LOOP RECORDER  10/25/2018    Procedure: Insertion, Implantable Loop Recorder;  Surgeon: Justin Colby MD;  Location: [facility] CATH;  Service: Cardiology;;    INSTANTANEOUS WAVE-FREE RATIO (IFR) N/A 11/8/2023    Procedure: Instantaneous Wave-Free Ratio (IFR);  Surgeon: Florencio Johansen MD;  Location: [facility] CATH/EP LAB;  Service: Cardiology;  Laterality: N/A;    LAPAROSCOPIC CHOLECYSTECTOMY N/A 12/14/2018    Procedure: CHOLECYSTECTOMY, LAPAROSCOPIC;  Surgeon: Beck Love MD;  Location: [facility] OR;  Service: General;  Laterality: N/A;  Dr. Love requested case to start at 1:00pm    NASAL POLYP EXCISION      TRANSESOPHAGEAL ECHOCARDIOGRAPHY N/A 5/23/2023    Procedure: ECHOCARDIOGRAM, TRANSESOPHAGEAL;  Surgeon: Kaylin Cota MD;  Location: [facility] EP LAB;  Service: Cardiology;  Laterality: N/A;  AF, KIRILL(Cx if SR), PVI, RFA, CARTO, MAC, GP, 3 PREP* MDT ILR in situ*    TREATMENT OF CARDIAC ARRHYTHMIA N/A 10/25/2018    Procedure: CARDIOVERSION OR DEFIBRILLATION;  Surgeon: Justin Colby MD;  Location: [facility] CATH;  Service: Cardiology;  Laterality: N/A;    TREATMENT OF CARDIAC ARRHYTHMIA  5/23/2023    Procedure: Cardioversion or Defibrillation;  Surgeon: Abdullahi Daniels MD;   "Location: [facility] EP LAB;  Service: Cardiology;;     Social History     Tobacco Use    Smoking status: Never    Smokeless tobacco: Never   Substance Use Topics    Alcohol use: Yes     Comment: rarely    Drug use: No     Family History   Problem Relation Name Age of Onset    Sleep apnea Mother      Hypertension Mother      No Known Problems Brother      No Known Problems Brother      No Known Problems Brother       Review of patient's allergies indicates:  No Known Allergies      I have reviewed past medical, family, and social history.     Performance Status:The patient's activity level is no limits with regular activity.       Ros negative 1 2 systems      Exam:Comprehensive exam done. BP (!) 138/95 (BP Location: Left arm, Patient Position: Sitting, BP Method: Large (Automatic))   Pulse 84   Ht 5' 9" (1.753 m)   Wt 121.9 kg (268 lb 11.9 oz)   SpO2 (!) 94% Comment: on room air at rest  BMI 39.69 kg/m²   Exam included Vitals as listed   5/17/2024       Exam:Comprehensive exam done. BP (!) 138/95 (BP Location: Left arm, Patient Position: Sitting, BP Method: Large (Automatic))   Pulse 84   Ht 5' 9" (1.753 m)   Wt 121.9 kg (268 lb 11.9 oz)   SpO2 (!) 94% Comment: on room air at rest  BMI 39.69 kg/m²   Exam included Vitals as listed, and patient's appearance and affect and alertness and mood, oral exam for yeast and hygiene and pharynx lesions and Mallapatti (M) score, neck with inspection for jvd and masses and thyroid abnormalities and lymph nodes (supraclavicular and infraclavicular nodes and axillary also examined and noted if abn), chest exam included symmetry and effort and fremitus and percussion and auscultation, cardiac exam included rhythm and gallops and murmur and rubs and jvd and edema, abdominal exam for mass and hepatosplenomegaly and tenderness and hernias and bowel sounds, Musculoskeletal exam with muscle tone and posture and mobility/gait and  strength, and skin for rashes and cyanosis and " pallor and turgor, extremity for clubbing.  Findings were normal except for pertinent findings listed below:M4,  chest is symmetric, no distress, normal percussion, normal fremitus and good normal breath sounds           Radiographs (ct chest and cxr) reviewed: was done by direct view  Patient imaging studies were reviewed and interpreted independently. My personal interpretation of most recent images include:  CXR - 10/17/2023 - No acute process   CTA Chest Non-Coronary (PE Studies) 3/22/2023 - cardiomegaly, atelectasis to bilateral lower lobes.      Labs Patient's labs were reviewed including CBC and CMP    Lab Results   Component Value Date    WBC 8.66 01/03/2024    RBC 5.67 01/03/2024    HGB 15.0 01/03/2024    HCT 46.1 01/03/2024    MCV 81 (L) 01/03/2024    MCH 26.5 (L) 01/03/2024    MCHC 32.5 01/03/2024    RDW 14.8 (H) 01/03/2024     01/03/2024    MPV 9.8 01/03/2024    GRAN 5.4 01/03/2024    GRAN 62.5 01/03/2024    LYMPH 1.9 01/03/2024    LYMPH 22.2 01/03/2024    MONO 0.8 01/03/2024    MONO 8.8 01/03/2024    EOS 0.5 01/03/2024    BASO 0.06 01/03/2024    EOSINOPHIL 5.2 01/03/2024    BASOPHIL 0.7 01/03/2024   CMP  Sodium   Date Value Ref Range Status   02/05/2024 141 136 - 145 mmol/L Final     Potassium   Date Value Ref Range Status   02/05/2024 3.5 3.5 - 5.1 mmol/L Final     Chloride   Date Value Ref Range Status   02/05/2024 107 95 - 110 mmol/L Final     CO2   Date Value Ref Range Status   02/05/2024 26 23 - 29 mmol/L Final     Glucose   Date Value Ref Range Status   02/05/2024 92 70 - 110 mg/dL Final     BUN   Date Value Ref Range Status   02/05/2024 18 6 - 20 mg/dL Final     Creatinine   Date Value Ref Range Status   02/05/2024 1.1 0.5 - 1.4 mg/dL Final     Calcium   Date Value Ref Range Status   02/05/2024 9.2 8.7 - 10.5 mg/dL Final     Total Protein   Date Value Ref Range Status   01/03/2024 7.4 6.0 - 8.4 g/dL Final     Albumin   Date Value Ref Range Status   01/03/2024 3.4 (L) 3.5 - 5.2 g/dL Final      Total Bilirubin   Date Value Ref Range Status   01/03/2024 0.5 0.1 - 1.0 mg/dL Final     Comment:     For infants and newborns, interpretation of results should be based  on gestational age, weight and in agreement with clinical  observations.    Premature Infant recommended reference ranges:  Up to 24 hours.............<8.0 mg/dL  Up to 48 hours............<12.0 mg/dL  3-5 days..................<15.0 mg/dL  6-29 days.................<15.0 mg/dL       Alkaline Phosphatase   Date Value Ref Range Status   01/03/2024 69 55 - 135 U/L Final     AST   Date Value Ref Range Status   01/03/2024 14 10 - 40 U/L Final     ALT   Date Value Ref Range Status   01/03/2024 17 10 - 44 U/L Final     Anion Gap   Date Value Ref Range Status   02/05/2024 8 8 - 16 mmol/L Final     eGFR   Date Value Ref Range Status   02/05/2024 >60.0 >60 mL/min/1.73 m^2 Final           PFT will be done and results to be reviewed  Pulmonary Functions Testing Results:        Plan:  Clinical impression is resonably certain and repeated evaluation prn +/- follow up will be needed as below.    Josue was seen today for apnea.    Diagnoses and all orders for this visit:    JASPAL (obstructive sleep apnea)  -     CPAP FOR HOME USE    History of pulmonary embolism        Body mass index is 39.69 kg/m². Morbid obesity complicates all aspects of disease management from diagnostic modalities to treatment. Weight loss encouraged and health benefits explained to patient. Nutritional counseling and physical activity encouraged.     Follow up in about 1 year (around 5/17/2025), or if symptoms worsen or fail to improve.    Discussed with patient above for education the following:      Patient Instructions   Compliance report viewed -  compliance looks excellent- episodes hourly slightly over 6 occasionally (up to 30 on compliance report on most severe night)- untreated ahi not available.    Will increase autotitrate pressure from 5 -20,  to 10 to 20.      Would recommend  positional therapy -- sock and ball with safety pin..      Ct chest 2023 was good-- viewed, not clear if initial clot massive but you had a fib and  stroke -- need anti coagg for life    Echo this yr was now normal (sl low)    Continue anticoagg

## 2024-06-25 RX ORDER — APIXABAN 5 MG/1
5 TABLET, FILM COATED ORAL 2 TIMES DAILY
Qty: 180 TABLET | Refills: 3 | Status: SHIPPED | OUTPATIENT
Start: 2024-06-25

## 2024-08-05 ENCOUNTER — OFFICE VISIT (OUTPATIENT)
Dept: FAMILY MEDICINE | Facility: CLINIC | Age: 49
End: 2024-08-05
Payer: COMMERCIAL

## 2024-08-05 VITALS
RESPIRATION RATE: 16 BRPM | HEIGHT: 69 IN | WEIGHT: 284.63 LBS | HEART RATE: 62 BPM | SYSTOLIC BLOOD PRESSURE: 122 MMHG | OXYGEN SATURATION: 95 % | DIASTOLIC BLOOD PRESSURE: 80 MMHG | BODY MASS INDEX: 42.16 KG/M2

## 2024-08-05 DIAGNOSIS — E66.01 CLASS 2 SEVERE OBESITY WITH SERIOUS COMORBIDITY IN ADULT, UNSPECIFIED BMI, UNSPECIFIED OBESITY TYPE: ICD-10-CM

## 2024-08-05 DIAGNOSIS — I10 ESSENTIAL HYPERTENSION: ICD-10-CM

## 2024-08-05 DIAGNOSIS — Z00.00 ROUTINE GENERAL MEDICAL EXAMINATION AT A HEALTH CARE FACILITY: Primary | ICD-10-CM

## 2024-08-05 PROBLEM — G81.94 LEFT HEMIPARESIS: Status: RESOLVED | Noted: 2020-03-16 | Resolved: 2024-08-05

## 2024-08-05 PROCEDURE — 4010F ACE/ARB THERAPY RXD/TAKEN: CPT | Mod: CPTII,S$GLB,, | Performed by: STUDENT IN AN ORGANIZED HEALTH CARE EDUCATION/TRAINING PROGRAM

## 2024-08-05 PROCEDURE — 3074F SYST BP LT 130 MM HG: CPT | Mod: CPTII,S$GLB,, | Performed by: STUDENT IN AN ORGANIZED HEALTH CARE EDUCATION/TRAINING PROGRAM

## 2024-08-05 PROCEDURE — 1160F RVW MEDS BY RX/DR IN RCRD: CPT | Mod: CPTII,S$GLB,, | Performed by: STUDENT IN AN ORGANIZED HEALTH CARE EDUCATION/TRAINING PROGRAM

## 2024-08-05 PROCEDURE — 3008F BODY MASS INDEX DOCD: CPT | Mod: CPTII,S$GLB,, | Performed by: STUDENT IN AN ORGANIZED HEALTH CARE EDUCATION/TRAINING PROGRAM

## 2024-08-05 PROCEDURE — 3079F DIAST BP 80-89 MM HG: CPT | Mod: CPTII,S$GLB,, | Performed by: STUDENT IN AN ORGANIZED HEALTH CARE EDUCATION/TRAINING PROGRAM

## 2024-08-05 PROCEDURE — 99214 OFFICE O/P EST MOD 30 MIN: CPT | Mod: S$GLB,,, | Performed by: STUDENT IN AN ORGANIZED HEALTH CARE EDUCATION/TRAINING PROGRAM

## 2024-08-05 PROCEDURE — 99999 PR PBB SHADOW E&M-EST. PATIENT-LVL V: CPT | Mod: PBBFAC,,, | Performed by: STUDENT IN AN ORGANIZED HEALTH CARE EDUCATION/TRAINING PROGRAM

## 2024-08-05 PROCEDURE — 1159F MED LIST DOCD IN RCRD: CPT | Mod: CPTII,S$GLB,, | Performed by: STUDENT IN AN ORGANIZED HEALTH CARE EDUCATION/TRAINING PROGRAM

## 2024-08-05 PROCEDURE — G2211 COMPLEX E/M VISIT ADD ON: HCPCS | Mod: S$GLB,,, | Performed by: STUDENT IN AN ORGANIZED HEALTH CARE EDUCATION/TRAINING PROGRAM

## 2024-08-05 RX ORDER — METOPROLOL TARTRATE 100 MG/1
100 TABLET ORAL 2 TIMES DAILY
COMMUNITY
Start: 2024-07-07

## 2024-08-05 RX ORDER — SEMAGLUTIDE 0.25 MG/.5ML
0.25 INJECTION, SOLUTION SUBCUTANEOUS
Qty: 2 ML | Refills: 0 | Status: SHIPPED | OUTPATIENT
Start: 2024-08-05 | End: 2024-09-04

## 2024-08-20 ENCOUNTER — E-VISIT (OUTPATIENT)
Dept: FAMILY MEDICINE | Facility: CLINIC | Age: 49
End: 2024-08-20
Payer: COMMERCIAL

## 2024-08-20 DIAGNOSIS — J11.1 INFLUENZA: Primary | ICD-10-CM

## 2024-08-20 RX ORDER — BENZONATATE 100 MG/1
100 CAPSULE ORAL 3 TIMES DAILY PRN
Qty: 60 CAPSULE | Refills: 0 | Status: SHIPPED | OUTPATIENT
Start: 2024-08-20 | End: 2024-09-09

## 2024-08-20 RX ORDER — PHENOL 1.4 %
AEROSOL, SPRAY (ML) MUCOUS MEMBRANE
Qty: 177 ML | Refills: 0 | Status: SHIPPED | OUTPATIENT
Start: 2024-08-20

## 2024-08-20 RX ORDER — OSELTAMIVIR PHOSPHATE 75 MG/1
75 CAPSULE ORAL 2 TIMES DAILY
Qty: 10 CAPSULE | Refills: 0 | Status: SHIPPED | OUTPATIENT
Start: 2024-08-20 | End: 2024-08-25

## 2024-08-20 NOTE — PROGRESS NOTES
Patient ID: Josue Mir is a 48 y.o. male.    Chief Complaint: General Illness (Entered automatically based on patient selection in [employer].)    The patient initiated a request through [employer] on 8/20/2024 for evaluation and management with a chief complaint of General Illness (Entered automatically based on patient selection in [employer].)     I evaluated the questionnaire submission on 8/20/2024.    [facility] Evisit [facility]-Cough And Cold    8/20/2024 11:56 AM CDT - Filed by Patient   What do you need help with? Cough, Cold, Sore Throat   Do you agree to participate in an E-Visit? Yes   If you have any of the following symptoms, go to your local emergency room or call 911: I acknowledge   What is the main issue you would like addressed today? Sore throat, hard to swallow   Do you think you might have COVID or the Flu? Yes Flu   Have you tested positive for COVID or Flu? No   What symptoms do you currently have?  Cough;  Fatigue;  Nasal Congestion;  Runny nose;  Sore throat   Describe your cough: Bothersome (interferes with daily activities)   Have you had any of the following? Trouble swallowing   Please enter a few details about your swallowing, breathing, or visual problems. Very difficult swallowing   Have you ever smoked? I have never smoked   Have you had a fever? No   When did your symptoms first appear? 8/18/2024   In the last two weeks, have you been in close contact with someone who has COVID-19 or the Flu? Yes, Flu   List what you have done or taken to help your symptoms. Otc cough/cold meds   How severe are your symptoms? Moderate   Have your symptoms gotten better or worse since they started?  Worse   Do you have transportation to get testing if it is needed and ordered for you at an [facility] location? No   Provide any additional information you feel is important.    Please attach any relevant images or files    Are you able to take your vital signs? No         Encounter Diagnosis   Name Primary?     Influenza Yes        No orders of the defined types were placed in this encounter.     Medications Ordered This Encounter   Medications    benzonatate (TESSALON) 100 MG capsule     Sig: Take 1 capsule (100 mg total) by mouth 3 (three) times daily as needed for Cough.     Dispense:  60 capsule     Refill:  0    oseltamivir (TAMIFLU) 75 MG capsule     Sig: Take 1 capsule (75 mg total) by mouth 2 (two) times daily. for 5 days     Dispense:  10 capsule     Refill:  0    phenoL (CHLORASEPTIC THROAT SPRAY) 1.4 % SprA     Sig: by Mucous Membrane route every 2 (two) hours.     Dispense:  177 mL     Refill:  0        No follow-ups on file.      E-Visit Time Tracking:    Day 1 Time (in minutes): 12    Total Time (in minutes): 12

## 2024-08-29 DIAGNOSIS — E66.01 CLASS 2 SEVERE OBESITY WITH SERIOUS COMORBIDITY IN ADULT, UNSPECIFIED BMI, UNSPECIFIED OBESITY TYPE: ICD-10-CM

## 2024-08-29 RX ORDER — SEMAGLUTIDE 0.25 MG/.5ML
0.25 INJECTION, SOLUTION SUBCUTANEOUS
Qty: 2 ML | Refills: 0 | Status: CANCELLED | OUTPATIENT
Start: 2024-08-29 | End: 2024-09-28

## 2024-08-29 NOTE — TELEPHONE ENCOUNTER
Care Due:                  Date            Visit Type   Department     Provider  --------------------------------------------------------------------------------                                EP -                              PRIMARY      CHRIS Delgado  Last Visit: 08-      CARE ([facility])   MEDICINE       Naccari                              EP -                              PRIMARY      [facility]    David Delgado  Next Visit: 12-      CARE ([facility])   MEDICINE       Naccari                                                            Last  Test          Frequency    Reason                     Performed    Due Date  --------------------------------------------------------------------------------    HBA1C.......  6 months...  semaglutide,.............  10-   04-    Uric Acid...  12 months..  allopurinoL..............  10-   09-    [facility] Embedded Care Due Messages. Reference number: [id number].   8/29/2024 2:10:50 PM CDT

## 2024-08-30 RX ORDER — SEMAGLUTIDE 0.5 MG/.5ML
0.5 INJECTION, SOLUTION SUBCUTANEOUS
Qty: 2 ML | Refills: 0 | Status: SHIPPED | OUTPATIENT
Start: 2024-08-30 | End: 2024-09-29

## 2024-09-18 DIAGNOSIS — M10.9 GOUT, UNSPECIFIED CAUSE, UNSPECIFIED CHRONICITY, UNSPECIFIED SITE: ICD-10-CM

## 2024-09-18 RX ORDER — ALLOPURINOL 100 MG/1
100 TABLET ORAL DAILY
Qty: 90 TABLET | Refills: 0 | Status: SHIPPED | OUTPATIENT
Start: 2024-09-18

## 2024-09-18 NOTE — TELEPHONE ENCOUNTER
No care due was identified.  [facility] Embedded Care Due Messages. Reference number: [id number].   9/18/2024 12:09:03 AM CDT

## 2024-09-18 NOTE — TELEPHONE ENCOUNTER
Refill Decision Note   Josue Mir  is requesting a refill authorization.  Brief Assessment and Rationale for Refill:  Approve     Medication Therapy Plan:         Alert overridden per protocol: Yes   Comments:     Note composed:2:36 PM 09/18/2024

## 2024-09-29 DIAGNOSIS — E66.01 CLASS 2 SEVERE OBESITY WITH SERIOUS COMORBIDITY IN ADULT, UNSPECIFIED BMI, UNSPECIFIED OBESITY TYPE: ICD-10-CM

## 2024-09-29 DIAGNOSIS — E66.812 CLASS 2 SEVERE OBESITY WITH SERIOUS COMORBIDITY IN ADULT, UNSPECIFIED BMI, UNSPECIFIED OBESITY TYPE: ICD-10-CM

## 2024-09-29 NOTE — TELEPHONE ENCOUNTER
No care due was identified.  [facility] Embedded Care Due Messages. Reference number: [id number].   9/29/2024 11:41:56 AM CDT

## 2024-09-30 ENCOUNTER — PATIENT MESSAGE (OUTPATIENT)
Dept: FAMILY MEDICINE | Facility: CLINIC | Age: 49
End: 2024-09-30
Payer: COMMERCIAL

## 2024-09-30 DIAGNOSIS — E66.812 CLASS 2 SEVERE OBESITY WITH SERIOUS COMORBIDITY IN ADULT, UNSPECIFIED BMI, UNSPECIFIED OBESITY TYPE: Primary | ICD-10-CM

## 2024-09-30 DIAGNOSIS — E66.01 CLASS 2 SEVERE OBESITY WITH SERIOUS COMORBIDITY IN ADULT, UNSPECIFIED BMI, UNSPECIFIED OBESITY TYPE: Primary | ICD-10-CM

## 2024-09-30 RX ORDER — SEMAGLUTIDE 1 MG/.5ML
1 INJECTION, SOLUTION SUBCUTANEOUS
Qty: 6 ML | Refills: 4 | Status: SHIPPED | OUTPATIENT
Start: 2024-09-30 | End: 2024-09-30 | Stop reason: DRUGHIGH

## 2024-09-30 RX ORDER — SEMAGLUTIDE 0.5 MG/.5ML
0.5 INJECTION, SOLUTION SUBCUTANEOUS
Qty: 2 ML | Refills: 0 | OUTPATIENT
Start: 2024-09-30 | End: 2024-10-30

## 2024-10-08 ENCOUNTER — TELEPHONE (OUTPATIENT)
Dept: BARIATRICS | Facility: CLINIC | Age: 49
End: 2024-10-08
Payer: COMMERCIAL

## 2024-11-28 DIAGNOSIS — I10 ESSENTIAL HYPERTENSION: ICD-10-CM

## 2024-11-28 NOTE — TELEPHONE ENCOUNTER
Care Due:                  Date            Visit Type   Department     Provider  --------------------------------------------------------------------------------                                EP -                              PRIMARY      CHRIS Delgado  Last Visit: 08-      CARE ([facility])   MEDICINE       Naccari                              EP -                              PRIMARY      CHRIS Delgado  Next Visit: 12-      CARE ([facility])   MEDICINE       Naccari                                                            Last  Test          Frequency    Reason                     Performed    Due Date  --------------------------------------------------------------------------------    CBC.........  12 months..  allopurinoL..............  01- 12-    CMP.........  12 months..  allopurinoL,               01- 12-                             chlorthalidone...........    Uric Acid...  12 months..  allopurinoL..............  10-   09-    [facility] Embedded Care Due Messages. Reference number: [id number].   11/28/2024 2:52:27 PM CST

## 2024-11-29 RX ORDER — CLONIDINE HYDROCHLORIDE 0.1 MG/1
0.1 TABLET ORAL 2 TIMES DAILY
Qty: 60 TABLET | Refills: 11 | Status: SHIPPED | OUTPATIENT
Start: 2024-11-29 | End: 2025-11-29

## 2024-11-29 NOTE — TELEPHONE ENCOUNTER
Refill Routing Note   Medication(s) are not appropriate for processing by [facility] for the following reason(s):        Outside of protocol    [facility] action(s):  Route   Requires labs : Yes             Appointments  past 12m or future 3m with PCP    Date Provider   Last Visit   8/20/2024 David Quach MD   Next Visit   12/2/2024 David Quach MD   ED visits in past 90 days: 0        Note composed:6:57 AM 11/29/2024

## 2024-12-02 ENCOUNTER — OFFICE VISIT (OUTPATIENT)
Dept: FAMILY MEDICINE | Facility: CLINIC | Age: 49
End: 2024-12-02
Payer: COMMERCIAL

## 2024-12-02 ENCOUNTER — LAB VISIT (OUTPATIENT)
Dept: LAB | Facility: HOSPITAL | Age: 49
End: 2024-12-02
Attending: STUDENT IN AN ORGANIZED HEALTH CARE EDUCATION/TRAINING PROGRAM
Payer: COMMERCIAL

## 2024-12-02 VITALS
BODY MASS INDEX: 41.11 KG/M2 | WEIGHT: 277.56 LBS | OXYGEN SATURATION: 98 % | SYSTOLIC BLOOD PRESSURE: 122 MMHG | HEART RATE: 80 BPM | HEIGHT: 69 IN | DIASTOLIC BLOOD PRESSURE: 98 MMHG

## 2024-12-02 DIAGNOSIS — Z91.89 CANDIDATE FOR STATIN THERAPY DUE TO RISK OF FUTURE CARDIOVASCULAR EVENT: ICD-10-CM

## 2024-12-02 DIAGNOSIS — E66.01 CLASS 2 SEVERE OBESITY WITH SERIOUS COMORBIDITY IN ADULT, UNSPECIFIED BMI, UNSPECIFIED OBESITY TYPE: ICD-10-CM

## 2024-12-02 DIAGNOSIS — Z00.00 ROUTINE GENERAL MEDICAL EXAMINATION AT A HEALTH CARE FACILITY: Primary | ICD-10-CM

## 2024-12-02 DIAGNOSIS — E66.812 CLASS 2 SEVERE OBESITY WITH SERIOUS COMORBIDITY IN ADULT, UNSPECIFIED BMI, UNSPECIFIED OBESITY TYPE: ICD-10-CM

## 2024-12-02 DIAGNOSIS — M10.9 GOUT, UNSPECIFIED CAUSE, UNSPECIFIED CHRONICITY, UNSPECIFIED SITE: ICD-10-CM

## 2024-12-02 DIAGNOSIS — I10 ESSENTIAL HYPERTENSION: ICD-10-CM

## 2024-12-02 LAB
ALBUMIN SERPL BCP-MCNC: 3.5 G/DL (ref 3.5–5.2)
ALP SERPL-CCNC: 68 U/L (ref 40–150)
ALT SERPL W/O P-5'-P-CCNC: 27 U/L (ref 10–44)
ANION GAP SERPL CALC-SCNC: 10 MMOL/L (ref 8–16)
AST SERPL-CCNC: 20 U/L (ref 10–40)
BASOPHILS # BLD AUTO: 0.09 K/UL (ref 0–0.2)
BASOPHILS NFR BLD: 1.1 % (ref 0–1.9)
BILIRUB SERPL-MCNC: 0.5 MG/DL (ref 0.1–1)
BUN SERPL-MCNC: 13 MG/DL (ref 6–20)
CALCIUM SERPL-MCNC: 9.4 MG/DL (ref 8.7–10.5)
CHLORIDE SERPL-SCNC: 103 MMOL/L (ref 95–110)
CO2 SERPL-SCNC: 28 MMOL/L (ref 23–29)
CREAT SERPL-MCNC: 1.3 MG/DL (ref 0.5–1.4)
DIFFERENTIAL METHOD BLD: ABNORMAL
EOSINOPHIL # BLD AUTO: 0.4 K/UL (ref 0–0.5)
EOSINOPHIL NFR BLD: 5.2 % (ref 0–8)
ERYTHROCYTE [DISTWIDTH] IN BLOOD BY AUTOMATED COUNT: 14.6 % (ref 11.5–14.5)
EST. GFR  (NO RACE VARIABLE): >60 ML/MIN/1.73 M^2
ESTIMATED AVG GLUCOSE: 108 MG/DL (ref 68–131)
GLUCOSE SERPL-MCNC: 90 MG/DL (ref 70–110)
HBA1C MFR BLD: 5.4 % (ref 4–5.6)
HCT VFR BLD AUTO: 46.7 % (ref 40–54)
HGB BLD-MCNC: 14.8 G/DL (ref 14–18)
IMM GRANULOCYTES # BLD AUTO: 0.02 K/UL (ref 0–0.04)
IMM GRANULOCYTES NFR BLD AUTO: 0.2 % (ref 0–0.5)
LYMPHOCYTES # BLD AUTO: 2.3 K/UL (ref 1–4.8)
LYMPHOCYTES NFR BLD: 28.2 % (ref 18–48)
MCH RBC QN AUTO: 26.7 PG (ref 27–31)
MCHC RBC AUTO-ENTMCNC: 31.7 G/DL (ref 32–36)
MCV RBC AUTO: 84 FL (ref 82–98)
MONOCYTES # BLD AUTO: 0.7 K/UL (ref 0.3–1)
MONOCYTES NFR BLD: 8.2 % (ref 4–15)
NEUTROPHILS # BLD AUTO: 4.7 K/UL (ref 1.8–7.7)
NEUTROPHILS NFR BLD: 57.1 % (ref 38–73)
NRBC BLD-RTO: 0 /100 WBC
PLATELET # BLD AUTO: 338 K/UL (ref 150–450)
PMV BLD AUTO: 10.5 FL (ref 9.2–12.9)
POTASSIUM SERPL-SCNC: 3.2 MMOL/L (ref 3.5–5.1)
PROT SERPL-MCNC: 7.4 G/DL (ref 6–8.4)
RBC # BLD AUTO: 5.55 M/UL (ref 4.6–6.2)
SODIUM SERPL-SCNC: 141 MMOL/L (ref 136–145)
URATE SERPL-MCNC: 7 MG/DL (ref 3.4–7)
WBC # BLD AUTO: 8.27 K/UL (ref 3.9–12.7)

## 2024-12-02 PROCEDURE — 1159F MED LIST DOCD IN RCRD: CPT | Mod: CPTII,S$GLB,, | Performed by: STUDENT IN AN ORGANIZED HEALTH CARE EDUCATION/TRAINING PROGRAM

## 2024-12-02 PROCEDURE — 85025 COMPLETE CBC W/AUTO DIFF WBC: CPT | Performed by: STUDENT IN AN ORGANIZED HEALTH CARE EDUCATION/TRAINING PROGRAM

## 2024-12-02 PROCEDURE — 1160F RVW MEDS BY RX/DR IN RCRD: CPT | Mod: CPTII,S$GLB,, | Performed by: STUDENT IN AN ORGANIZED HEALTH CARE EDUCATION/TRAINING PROGRAM

## 2024-12-02 PROCEDURE — 99999 PR PBB SHADOW E&M-EST. PATIENT-LVL IV: CPT | Mod: PBBFAC,,, | Performed by: STUDENT IN AN ORGANIZED HEALTH CARE EDUCATION/TRAINING PROGRAM

## 2024-12-02 PROCEDURE — 3008F BODY MASS INDEX DOCD: CPT | Mod: CPTII,S$GLB,, | Performed by: STUDENT IN AN ORGANIZED HEALTH CARE EDUCATION/TRAINING PROGRAM

## 2024-12-02 PROCEDURE — 99396 PREV VISIT EST AGE 40-64: CPT | Mod: S$GLB,,, | Performed by: STUDENT IN AN ORGANIZED HEALTH CARE EDUCATION/TRAINING PROGRAM

## 2024-12-02 PROCEDURE — 3074F SYST BP LT 130 MM HG: CPT | Mod: CPTII,S$GLB,, | Performed by: STUDENT IN AN ORGANIZED HEALTH CARE EDUCATION/TRAINING PROGRAM

## 2024-12-02 PROCEDURE — 83036 HEMOGLOBIN GLYCOSYLATED A1C: CPT | Performed by: STUDENT IN AN ORGANIZED HEALTH CARE EDUCATION/TRAINING PROGRAM

## 2024-12-02 PROCEDURE — 84550 ASSAY OF BLOOD/URIC ACID: CPT | Performed by: STUDENT IN AN ORGANIZED HEALTH CARE EDUCATION/TRAINING PROGRAM

## 2024-12-02 PROCEDURE — 80053 COMPREHEN METABOLIC PANEL: CPT | Performed by: STUDENT IN AN ORGANIZED HEALTH CARE EDUCATION/TRAINING PROGRAM

## 2024-12-02 PROCEDURE — 3080F DIAST BP >= 90 MM HG: CPT | Mod: CPTII,S$GLB,, | Performed by: STUDENT IN AN ORGANIZED HEALTH CARE EDUCATION/TRAINING PROGRAM

## 2024-12-02 PROCEDURE — 4010F ACE/ARB THERAPY RXD/TAKEN: CPT | Mod: CPTII,S$GLB,, | Performed by: STUDENT IN AN ORGANIZED HEALTH CARE EDUCATION/TRAINING PROGRAM

## 2024-12-02 RX ORDER — ROSUVASTATIN CALCIUM 10 MG/1
10 TABLET, COATED ORAL DAILY
Start: 2024-12-02 | End: 2024-12-02

## 2024-12-02 RX ORDER — LOSARTAN POTASSIUM 50 MG/1
50 TABLET ORAL DAILY
Qty: 90 TABLET | Refills: 4 | Status: SHIPPED | OUTPATIENT
Start: 2024-12-02

## 2024-12-02 RX ORDER — SEMAGLUTIDE 1 MG/.5ML
1 INJECTION, SOLUTION SUBCUTANEOUS
COMMUNITY
Start: 2024-10-01 | End: 2024-12-02 | Stop reason: DRUGHIGH

## 2024-12-02 RX ORDER — ROSUVASTATIN CALCIUM 10 MG/1
10 TABLET, COATED ORAL DAILY
Qty: 90 TABLET | Refills: 4 | Status: SHIPPED | OUTPATIENT
Start: 2024-12-02

## 2024-12-02 NOTE — PROGRESS NOTES
"[facility] CLINIC NOTE    Patient Name: Josue Mir  YOB: 1975    PRESENTING HISTORY     History of Present Illness:  Mr. Josue Mir is a 48 y.o. male here for f/u.     Has been taking Wegovy, uptitrated to 1mg.   Has lost about 7 lbs in months.   No AEs.   He has a hx of pancreatitis which was gallbladder related.   In light of this, I recommend cessation of GLP1RA which he is agreeable to.   Has failed alternative weight los medications.     HTN- out of ARB. Cou,ldn't refill. Previously well controlled.     Off statin. No AEs. In light of hx of longstanding poor HTN control, think should resume for CV risk reduction.     UTD on colon screening.     No fhx of prostate cancer.       ROS      OBJECTIVE:   Vital Signs:  Vitals:    12/02/24 1041   BP: (!) 122/98   Pulse: 80   SpO2: 98%   Weight: 125.9 kg (277 lb 9 oz)   Height: 5' 9" (1.753 m)          Physical Exam: Normal, no change.     Physical Exam    ASSESSMENT & PLAN:     Routine general medical examination at a health care facility    Essential hypertension  -     losartan (COZAAR) 50 MG tablet; Take 1 tablet (50 mg total) by mouth once daily.  Dispense: 90 tablet; Refill: 4  -     Microalbumin/Creatinine Ratio, Urine; Future; Expected date: 12/02/2024  -     CBC W/ AUTO DIFFERENTIAL; Future; Expected date: 12/02/2024  -     COMPREHENSIVE METABOLIC PANEL; Future; Expected date: 12/02/2024    Class 2 severe obesity with serious comorbidity in adult, unspecified BMI, unspecified obesity type  -     HEMOGLOBIN A1C; Future; Expected date: 12/02/2024    Candidate for statin therapy due to risk of future cardiovascular event  -     rosuvastatin (CRESTOR) 10 MG tablet; Take 1 tablet (10 mg total) by mouth once daily.  Dispense: 90 tablet; Refill: 4    Gout, unspecified cause, unspecified chronicity, unspecified site  -     URIC ACID; Future; Expected date: 12/02/2024    Other orders  -     Discontinue: rosuvastatin (CRESTOR) 10 MG " tablet; Take 1 tablet (10 mg total) by mouth once daily.        David Quach  Internal Medicine

## 2024-12-12 DIAGNOSIS — I10 ESSENTIAL HYPERTENSION: ICD-10-CM

## 2024-12-12 DIAGNOSIS — M10.9 GOUT, UNSPECIFIED CAUSE, UNSPECIFIED CHRONICITY, UNSPECIFIED SITE: ICD-10-CM

## 2024-12-12 RX ORDER — CHLORTHALIDONE 25 MG/1
25 TABLET ORAL
Qty: 90 TABLET | Refills: 3 | Status: SHIPPED | OUTPATIENT
Start: 2024-12-12

## 2024-12-12 RX ORDER — ALLOPURINOL 300 MG/1
300 TABLET ORAL
Qty: 90 TABLET | Refills: 3 | Status: SHIPPED | OUTPATIENT
Start: 2024-12-12

## 2024-12-12 NOTE — TELEPHONE ENCOUNTER
Care Due:                  Date            Visit Type   Department     Provider  --------------------------------------------------------------------------------                                EP -                              PRIMARY      CHRIS Delgado  Last Visit: 12-      CARE ([facility])   MEDICINE       Naccari                              EP -                              PRIMARY      [facility]    David Delgado  Next Visit: 12-      CARE ([facility])   MEDICINE       Naccari                                                            Last  Test          Frequency    Reason                     Performed    Due Date  --------------------------------------------------------------------------------    Lipid Panel.  12 months..  rosuvastatin.............  10-   09-    [employer] Embedded Care Due Messages. Reference number: [id number].   12/12/2024 12:13:58 AM CST

## 2024-12-14 DIAGNOSIS — M10.9 GOUT, UNSPECIFIED CAUSE, UNSPECIFIED CHRONICITY, UNSPECIFIED SITE: ICD-10-CM

## 2024-12-14 NOTE — TELEPHONE ENCOUNTER
No care due was identified.  [facility] Embedded Care Due Messages. Reference number: [id number].   12/14/2024 7:11:26 AM CST

## 2024-12-15 RX ORDER — ALLOPURINOL 100 MG/1
100 TABLET ORAL
Qty: 90 TABLET | Refills: 3 | Status: SHIPPED | OUTPATIENT
Start: 2024-12-15

## 2024-12-15 NOTE — TELEPHONE ENCOUNTER
Refill Decision Note   Josue Mir  is requesting a refill authorization.  Brief Assessment and Rationale for Refill:  Approve     Medication Therapy Plan:         Comments:     Note composed:2:46 PM 12/15/2024

## 2024-12-16 ENCOUNTER — TELEPHONE (OUTPATIENT)
Dept: FAMILY MEDICINE | Facility: CLINIC | Age: 49
End: 2024-12-16
Payer: COMMERCIAL

## 2024-12-16 NOTE — TELEPHONE ENCOUNTER
----- Message from Med Assistant Melinda sent at 12/2/2024 11:06 AM CST -----  Regarding: BP chcek otp  Call pt to have him check BP

## 2025-03-17 RX ORDER — METOPROLOL TARTRATE 100 MG/1
100 TABLET ORAL 2 TIMES DAILY
Qty: 60 TABLET | Refills: 0 | Status: SHIPPED | OUTPATIENT
Start: 2025-03-17

## 2025-03-20 ENCOUNTER — OFFICE VISIT (OUTPATIENT)
Dept: CARDIOLOGY | Facility: CLINIC | Age: 50
End: 2025-03-20
Payer: COMMERCIAL

## 2025-03-20 VITALS
WEIGHT: 271.19 LBS | OXYGEN SATURATION: 97 % | HEART RATE: 51 BPM | SYSTOLIC BLOOD PRESSURE: 112 MMHG | BODY MASS INDEX: 40.04 KG/M2 | DIASTOLIC BLOOD PRESSURE: 75 MMHG

## 2025-03-20 DIAGNOSIS — I48.91 ATRIAL FIBRILLATION WITH RVR: Primary | ICD-10-CM

## 2025-03-20 DIAGNOSIS — Z86.79 S/P RF ABLATION OPERATION FOR ARRHYTHMIA: ICD-10-CM

## 2025-03-20 DIAGNOSIS — E66.01 CLASS 2 SEVERE OBESITY WITH SERIOUS COMORBIDITY IN ADULT, UNSPECIFIED BMI, UNSPECIFIED OBESITY TYPE: ICD-10-CM

## 2025-03-20 DIAGNOSIS — Z98.890 S/P RF ABLATION OPERATION FOR ARRHYTHMIA: ICD-10-CM

## 2025-03-20 DIAGNOSIS — I10 ESSENTIAL HYPERTENSION: ICD-10-CM

## 2025-03-20 DIAGNOSIS — E66.812 CLASS 2 SEVERE OBESITY WITH SERIOUS COMORBIDITY IN ADULT, UNSPECIFIED BMI, UNSPECIFIED OBESITY TYPE: ICD-10-CM

## 2025-03-20 DIAGNOSIS — I48.0 PAF (PAROXYSMAL ATRIAL FIBRILLATION): ICD-10-CM

## 2025-03-20 PROCEDURE — 99999 PR PBB SHADOW E&M-EST. PATIENT-LVL III: CPT | Mod: PBBFAC,,, | Performed by: NURSE PRACTITIONER

## 2025-03-20 PROCEDURE — 99214 OFFICE O/P EST MOD 30 MIN: CPT | Mod: S$GLB,,, | Performed by: NURSE PRACTITIONER

## 2025-03-20 PROCEDURE — 1159F MED LIST DOCD IN RCRD: CPT | Mod: CPTII,S$GLB,, | Performed by: NURSE PRACTITIONER

## 2025-03-20 PROCEDURE — 93005 ELECTROCARDIOGRAM TRACING: CPT | Mod: S$GLB,,, | Performed by: NURSE PRACTITIONER

## 2025-03-20 PROCEDURE — 3074F SYST BP LT 130 MM HG: CPT | Mod: CPTII,S$GLB,, | Performed by: NURSE PRACTITIONER

## 2025-03-20 PROCEDURE — 3008F BODY MASS INDEX DOCD: CPT | Mod: CPTII,S$GLB,, | Performed by: NURSE PRACTITIONER

## 2025-03-20 PROCEDURE — 3078F DIAST BP <80 MM HG: CPT | Mod: CPTII,S$GLB,, | Performed by: NURSE PRACTITIONER

## 2025-03-20 PROCEDURE — 4010F ACE/ARB THERAPY RXD/TAKEN: CPT | Mod: CPTII,S$GLB,, | Performed by: NURSE PRACTITIONER

## 2025-03-20 PROCEDURE — 93010 ELECTROCARDIOGRAM REPORT: CPT | Mod: S$GLB,,, | Performed by: INTERNAL MEDICINE

## 2025-03-20 NOTE — ASSESSMENT & PLAN NOTE
BP Stable  Hypertension Medications              chlorthalidone (HYGROTEN) 25 MG Tab TAKE 1 TABLET BY MOUTH EVERY DAY    cloNIDine (CATAPRES) 0.1 MG tablet Take 1 tablet (0.1 mg total) by mouth 2 (two) times daily.    losartan (COZAAR) 50 MG tablet Take 1 tablet (50 mg total) by mouth once daily.    metoprolol tartrate (LOPRESSOR) 100 MG tablet TAKE 1 TABLET BY MOUTH TWICE A DAY

## 2025-03-20 NOTE — PROGRESS NOTES
Subjective:    Patient ID:  Josue Mir is a 49 y.o. male who presents for follow-up   Chief Complaint   Patient presents with    Follow-up    Atrial Fibrillation    Hypertension       HPI:      Josue Mir is here for follow-up visit. Denies chest pain or shortness of breath. Denies recent fever cough chills or congestion. Denies blood in the urine or blood in the stool.  Denies myalgias. Denies orthopnea or peripheral edema. Denies nausea vomiting or dyspepsia. No recent arm neck or jaw pain. No lightheadedness or dizziness. His dad passed away in [place] by CJD.Rare brain condition. Today he is SB. No symptoms of increase fatigue or tiredness.         Review of patient's allergies indicates:  No Known Allergies    Past Medical History:   Diagnosis Date    Anticoagulant long-term use     Atrial fibrillation     Gout     Gout, joint     Hypertension     Memory deficit following cerebral infarction     Pneumonia 04/2018    Sleep apnea with use of continuous positive airway pressure (CPAP)     Stroke 04/2018    Syncope and collapse      Past Surgical History:   Procedure Laterality Date    ABLATION OF ARRHYTHMOGENIC FOCUS FOR ATRIAL FIBRILLATION N/A 5/23/2023    Procedure: Ablation atrial fibrillation;  Surgeon: Abdullahi Daniels MD;  Location: [facility] EP LAB;  Service: Cardiology;  Laterality: N/A;  AF, KIRILL(Cx if SR), PVI, RFA, CARTO, MAC, GP, 3 PREP* MDT ILR in situ*    ANGIOGRAM, CORONARY, WITH LEFT HEART CATHETERIZATION Left 11/8/2023    Procedure: Angiogram, Coronary, with Left Heart Cath;  Surgeon: Florencio Johansen MD;  Location: [facility] CATH/EP LAB;  Service: Cardiology;  Laterality: Left;    COLONOSCOPY N/A 11/10/2023    Procedure: COLONOSCOPY(sent to my chart);  Surgeon: Pato Cornell MD;  Location: [facility] ENDO;  Service: Endoscopy;  Laterality: N/A;  ppm    INSERTION OF IMPLANTABLE LOOP RECORDER  10/25/2018    Procedure: Insertion, Implantable Loop Recorder;  Surgeon: Justin Colby MD;  Location: [facility]  CATH;  Service: Cardiology;;    INSTANTANEOUS WAVE-FREE RATIO (IFR) N/A 11/8/2023    Procedure: Instantaneous Wave-Free Ratio (IFR);  Surgeon: Florencio Johansen MD;  Location: [facility] CATH/EP LAB;  Service: Cardiology;  Laterality: N/A;    LAPAROSCOPIC CHOLECYSTECTOMY N/A 12/14/2018    Procedure: CHOLECYSTECTOMY, LAPAROSCOPIC;  Surgeon: Beck Love MD;  Location: [facility];  Service: General;  Laterality: N/A;  Dr. Love requested case to start at 1:00pm    NASAL POLYP EXCISION      TRANSESOPHAGEAL ECHOCARDIOGRAPHY N/A 5/23/2023    Procedure: ECHOCARDIOGRAM, TRANSESOPHAGEAL;  Surgeon: aKylin Cota MD;  Location: [facility] EP LAB;  Service: Cardiology;  Laterality: N/A;  AF, KIRILL(Cx if SR), PVI, RFA, CARTO, MAC, GP, 3 PREP* MDT ILR in situ*    TREATMENT OF CARDIAC ARRHYTHMIA N/A 10/25/2018    Procedure: CARDIOVERSION OR DEFIBRILLATION;  Surgeon: Justin Colby MD;  Location: [facility] CATH;  Service: Cardiology;  Laterality: N/A;    TREATMENT OF CARDIAC ARRHYTHMIA  5/23/2023    Procedure: Cardioversion or Defibrillation;  Surgeon: Abdullahi Daniels MD;  Location: [facility] EP LAB;  Service: Cardiology;;     Social History[1]  Family History   Problem Relation Name Age of Onset    Sleep apnea Mother      Hypertension Mother      No Known Problems Brother      No Known Problems Brother      No Known Problems Brother          Review of Systems:   Per HPI       Objective:        Vitals:    03/20/25 0908   BP: 112/75   Pulse: (!) 51       Lab Results   Component Value Date    WBC 8.27 12/02/2024    HGB 14.8 12/02/2024    HCT 46.7 12/02/2024     12/02/2024    CHOL 216 (H) 10/03/2023    TRIG 184 (H) 10/03/2023    HDL 49 10/03/2023    ALT 27 12/02/2024    AST 20 12/02/2024     12/02/2024    K 3.2 (L) 12/02/2024     12/02/2024    CREATININE 1.3 12/02/2024    BUN 13 12/02/2024    CO2 28 12/02/2024    TSH 2.394 10/04/2022    INR 1.0 10/17/2023    HGBA1C 5.4 12/02/2024        ECHOCARDIOGRAM RESULTS  Results for  orders placed during the hospital encounter of 02/22/24    Echo    Interpretation Summary    Left Ventricle: The left ventricle is normal in size. Mildly increased wall thickness. There is mild concentric hypertrophy. Mild global hypokinesis present. There is low normal systolic function with a visually estimated ejection fraction of 50 - 55%. There is normal diastolic function.    Right Ventricle: Normal right ventricular cavity size. Wall thickness is normal. Right ventricle wall motion  is normal. Systolic function is normal.    IVC/SVC: Normal venous pressure at 3 mmHg.        CURRENT/PREVIOUS VISIT EKG  Results for orders placed or performed during the hospital encounter of 11/06/23   EKG 12-lead    Collection Time: 11/06/23  2:07 PM    Narrative    Test Reason : R07.89,    Vent. Rate : 055 BPM     Atrial Rate : 055 BPM     P-R Int : 174 ms          QRS Dur : 100 ms      QT Int : 450 ms       P-R-T Axes : 018 -07 174 degrees     QTc Int : 430 ms    Sinus bradycardia  Minimal voltage criteria for LVH, may be normal variant  T wave abnormality, consider inferolateral ischemia  Abnormal ECG  When compared with ECG of 17-OCT-2023 12:24,  No significant change was found  Confirmed by Tom Mott MD ([id number]) on 11/10/2023 4:56:47 PM    Referred By:             Confirmed By:Tom Mott MD     No valid procedures specified.   Results for orders placed during the hospital encounter of 07/13/23    Nuclear Stress - Cardiology Interpreted    Interpretation Summary    Abnormal myocardial perfusion scan.    A PET stress exam is recommended if clinically indicated.    There are two significant perfusion abnormalities.    Perfusion Abnormality #1 - There is a moderate to severe intensity, moderate sized, equivocal perfusion abnormality that is fixed in the basal to apical inferior and inferolateral wall(s). This finding is equivocal due to diaphragm shadow.    Perfusion Abnormality #2 - There is a small to moderate sized,  moderate to severe intensity, fixed perfusion abnormality consistent with scar in the basal to mid lateral wall(s).    There are no other significant perfusion abnormalities.    The gated perfusion images showed an ejection fraction of 39% at rest. The gated perfusion images showed an ejection fraction of 33% post stress. Normal ejection fraction is greater than 53%.    There is moderate global hypokinesis at rest and stress.    LV cavity size is mildly enlarged at rest and mildly enlarged at stress.    The ECG portion of the study is abnormal but not diagnostic due to resting ST-T abnormalities.    The patient reported no chest pain during the stress test.    During stress, occasional PVCs are noted.    The patient exercised for 7 minutes 40 seconds on a Justin protocol, corresponding to a functional capacity of 10 METS, achieving a peak heart rate of 150 bpm, which is 86 % of the age predicted maximum heart rate.      Physical Exam:  CONSTITUTIONAL: No fever, no chills  HEENT: Normocephalic, atraumatic,pupils reactive to light                 NECK:  No JVD no carotid bruit  CVS: S1S2+, RRR, no murmurs,   LUNGS: Clear  ABDOMEN: Soft, NT, BS+  EXTREMITIES: No cyanosis, edema  : No barclay catheter  NEURO: AAO X 3  PSY: Normal affect      Medication List with Changes/Refills   Current Medications    ALLOPURINOL (ZYLOPRIM) 100 MG TABLET    TAKE 1 TABLET BY MOUTH EVERY DAY    ALLOPURINOL (ZYLOPRIM) 300 MG TABLET    TAKE 1 TABLET BY MOUTH EVERY DAY    CHLORTHALIDONE (HYGROTEN) 25 MG TAB    TAKE 1 TABLET BY MOUTH EVERY DAY    CLONIDINE (CATAPRES) 0.1 MG TABLET    Take 1 tablet (0.1 mg total) by mouth 2 (two) times daily.    COLCHICINE (COLCRYS) 0.6 MG TABLET    TAKE 1 TABLET BY MOUTH 2 TIMES DAILY.    ELIQUIS 5 MG TAB    TAKE 1 TABLET BY MOUTH TWICE A DAY    FLUOXETINE 40 MG CAPSULE    Take 1 capsule (40 mg total) by mouth once daily.    LOSARTAN (COZAAR) 50 MG TABLET    Take 1 tablet (50 mg total) by mouth once daily.     METOPROLOL TARTRATE (LOPRESSOR) 100 MG TABLET    TAKE 1 TABLET BY MOUTH TWICE A DAY    METRONIDAZOLE (NORITATE) 1 % CREAM    Compound azelaic acid 15% + ivermectin 1% + metronidazole 1% cream. Apply to face once or twice daily.    PHENOL (CHLORASEPTIC THROAT SPRAY) 1.4 % SPRA    by Mucous Membrane route every 2 (two) hours.    ROSUVASTATIN (CRESTOR) 10 MG TABLET    Take 1 tablet (10 mg total) by mouth once daily.    SULFACETAMIDE SODIUM-SULFUR 10-5 % (W/W) CLSR    Use to wash face daily   Discontinued Medications    AMIODARONE (PACERONE) 200 MG TAB    Take 100 mg by mouth.             Assessment:       1. Atrial fibrillation with RVR    2. Class 2 severe obesity with serious comorbidity in adult, unspecified BMI, unspecified obesity type    3. S/P RF ablation operation for arrhythmia    4. PAF (paroxysmal atrial fibrillation)    5. Essential hypertension         Plan:     Problem List Items Addressed This Visit       PAF (paroxysmal atrial fibrillation)    Essential hypertension    Current Assessment & Plan   BP Stable  Hypertension Medications              chlorthalidone (HYGROTEN) 25 MG Tab TAKE 1 TABLET BY MOUTH EVERY DAY    cloNIDine (CATAPRES) 0.1 MG tablet Take 1 tablet (0.1 mg total) by mouth 2 (two) times daily.    losartan (COZAAR) 50 MG tablet Take 1 tablet (50 mg total) by mouth once daily.    metoprolol tartrate (LOPRESSOR) 100 MG tablet TAKE 1 TABLET BY MOUTH TWICE A DAY                  Atrial fibrillation with RVR - Primary    Current Assessment & Plan   H/O  S/P ABLATION  Currently SB  Continue Eliquis, Metoprolol at present dosing.          S/P RF ablation operation for arrhythmia    Current Assessment & Plan   Currently SB  Asymptomatic         Class 2 severe obesity with serious comorbidity in adult    Current Assessment & Plan   BMI 40  Recommend diet/exercise              No follow-ups on file.       Kerry Nicole, MANDEEP-ACNP, CVNP-BC                 [1]   Social History  Tobacco Use    Smoking  status: Never    Smokeless tobacco: Never   Substance Use Topics    Alcohol use: Yes     Comment: rarely    Drug use: No

## 2025-03-22 LAB
OHS QRS DURATION: 102 MS
OHS QTC CALCULATION: 393 MS

## 2025-04-11 RX ORDER — METOPROLOL TARTRATE 100 MG/1
100 TABLET ORAL 2 TIMES DAILY
Qty: 180 TABLET | Refills: 3 | Status: SHIPPED | OUTPATIENT
Start: 2025-04-11

## 2025-06-10 RX ORDER — APIXABAN 5 MG/1
5 TABLET, FILM COATED ORAL 2 TIMES DAILY
Qty: 180 TABLET | Refills: 2 | Status: SHIPPED | OUTPATIENT
Start: 2025-06-10

## 2025-07-28 ENCOUNTER — PATIENT MESSAGE (OUTPATIENT)
Dept: FAMILY MEDICINE | Facility: CLINIC | Age: 50
End: 2025-07-28
Payer: COMMERCIAL

## 2025-07-29 ENCOUNTER — HOSPITAL ENCOUNTER (EMERGENCY)
Facility: HOSPITAL | Age: 50
Discharge: HOME | End: 2025-07-29
Attending: EMERGENCY MEDICINE
Payer: COMMERCIAL

## 2025-07-29 VITALS
TEMPERATURE: 97 F | SYSTOLIC BLOOD PRESSURE: 133 MMHG | WEIGHT: 271 LBS | DIASTOLIC BLOOD PRESSURE: 82 MMHG | BODY MASS INDEX: 40.14 KG/M2 | OXYGEN SATURATION: 95 % | HEIGHT: 69 IN | HEART RATE: 70 BPM | RESPIRATION RATE: 18 BRPM

## 2025-07-29 DIAGNOSIS — R52 PAIN: ICD-10-CM

## 2025-07-29 DIAGNOSIS — M10.061 ACUTE IDIOPATHIC GOUT OF RIGHT KNEE: Primary | ICD-10-CM

## 2025-07-29 LAB
HCV AB SERPL QL IA: NORMAL
HIV 1+2 AB+HIV1 P24 AG SERPL QL IA: NORMAL

## 2025-07-29 PROCEDURE — 87389 HIV-1 AG W/HIV-1&-2 AB AG IA: CPT | Performed by: EMERGENCY MEDICINE

## 2025-07-29 PROCEDURE — 63600175 PHARM REV CODE 636 W HCPCS: Performed by: NURSE PRACTITIONER

## 2025-07-29 PROCEDURE — 99284 EMERGENCY DEPT VISIT MOD MDM: CPT | Mod: 25

## 2025-07-29 PROCEDURE — 25000003 PHARM REV CODE 250: Performed by: NURSE PRACTITIONER

## 2025-07-29 PROCEDURE — 36415 COLL VENOUS BLD VENIPUNCTURE: CPT | Performed by: EMERGENCY MEDICINE

## 2025-07-29 PROCEDURE — 86803 HEPATITIS C AB TEST: CPT | Performed by: EMERGENCY MEDICINE

## 2025-07-29 RX ORDER — PREDNISONE 20 MG/1
40 TABLET ORAL
Status: COMPLETED | OUTPATIENT
Start: 2025-07-29 | End: 2025-07-29

## 2025-07-29 RX ORDER — COLCHICINE 0.6 MG/1
0.6 CAPSULE ORAL ONCE
Qty: 1 CAPSULE | Refills: 0 | Status: SHIPPED | OUTPATIENT
Start: 2025-07-29 | End: 2025-07-29

## 2025-07-29 RX ORDER — SEMAGLUTIDE 1 MG/.5ML
1 INJECTION, SOLUTION SUBCUTANEOUS
COMMUNITY
Start: 2025-06-18 | End: 2025-07-29

## 2025-07-29 RX ORDER — PREDNISONE 20 MG/1
40 TABLET ORAL DAILY
Qty: 4 TABLET | Refills: 0 | Status: SHIPPED | OUTPATIENT
Start: 2025-07-30 | End: 2025-08-01

## 2025-07-29 RX ORDER — COLCHICINE 0.6 MG/1
1.2 TABLET, FILM COATED ORAL
Status: COMPLETED | OUTPATIENT
Start: 2025-07-29 | End: 2025-07-29

## 2025-07-29 RX ADMIN — PREDNISONE 40 MG: 20 TABLET ORAL at 01:07

## 2025-07-29 RX ADMIN — COLCHICINE 1.2 MG: 0.6 TABLET, FILM COATED ORAL at 01:07

## 2025-07-30 NOTE — ED PROVIDER NOTES
Encounter Date: 7/29/2025       History     Chief Complaint   Patient presents with    Gout     Right knee gout flare      Patient is a 49 y.o. male who presents to the ED 07/29/2025 with a chief complaint of Right knee pain.  He denies injury or trauma.  He states he has gout and feels similar to previous gout exacerbations.  He states he is not drinking alcohol or eaten anything that he knows of that what triggered the gout.  He states he gets it pretty frequently and takes allopurinol which he has been compliant with.  He states generally he takes colchicine and that is what really helps him but he is out of it.  He states he would like a dose of his colchicine.  Other history noted below.             Review of patient's allergies indicates:  No Known Allergies  Past Medical History:   Diagnosis Date    Anticoagulant long-term use     Atrial fibrillation     Gout     Gout, joint     Hypertension     Memory deficit following cerebral infarction     Pneumonia 04/2018    Sleep apnea with use of continuous positive airway pressure (CPAP)     Stroke 04/2018    Syncope and collapse      Past Surgical History:   Procedure Laterality Date    ABLATION OF ARRHYTHMOGENIC FOCUS FOR ATRIAL FIBRILLATION N/A 5/23/2023    Procedure: Ablation atrial fibrillation;  Surgeon: Abdullahi Daniels MD;  Location: [facility] EP LAB;  Service: Cardiology;  Laterality: N/A;  AF, KIRILL(Cx if SR), PVI, RFA, CARTO, MAC, GP, 3 PREP* MDT ILR in situ*    ANGIOGRAM, CORONARY, WITH LEFT HEART CATHETERIZATION Left 11/8/2023    Procedure: Angiogram, Coronary, with Left Heart Cath;  Surgeon: Florencio Johansen MD;  Location: [facility] CATH/EP LAB;  Service: Cardiology;  Laterality: Left;    COLONOSCOPY N/A 11/10/2023    Procedure: COLONOSCOPY(sent to my chart);  Surgeon: Pato Cornell MD;  Location: [facility] ENDO;  Service: Endoscopy;  Laterality: N/A;  ppm    INSERTION OF IMPLANTABLE LOOP RECORDER  10/25/2018    Procedure: Insertion, Implantable Loop Recorder;   Surgeon: Justin Colby MD;  Location: [facility] CATH;  Service: Cardiology;;    INSTANTANEOUS WAVE-FREE RATIO (IFR) N/A 11/8/2023    Procedure: Instantaneous Wave-Free Ratio (IFR);  Surgeon: Florencio Johansen MD;  Location: [facility] CATH/EP LAB;  Service: Cardiology;  Laterality: N/A;    LAPAROSCOPIC CHOLECYSTECTOMY N/A 12/14/2018    Procedure: CHOLECYSTECTOMY, LAPAROSCOPIC;  Surgeon: Beck Love MD;  Location: [facility] OR;  Service: General;  Laterality: N/A;  Dr. Love requested case to start at 1:00pm    NASAL POLYP EXCISION      TRANSESOPHAGEAL ECHOCARDIOGRAPHY N/A 5/23/2023    Procedure: ECHOCARDIOGRAM, TRANSESOPHAGEAL;  Surgeon: Kaylin Cota MD;  Location: [facility] EP LAB;  Service: Cardiology;  Laterality: N/A;  AF, KIRILL(Cx if SR), PVI, RFA, CARTO, MAC, GP, 3 PREP* MDT ILR in situ*    TREATMENT OF CARDIAC ARRHYTHMIA N/A 10/25/2018    Procedure: CARDIOVERSION OR DEFIBRILLATION;  Surgeon: Justin Colby MD;  Location: [facility] CATH;  Service: Cardiology;  Laterality: N/A;    TREATMENT OF CARDIAC ARRHYTHMIA  5/23/2023    Procedure: Cardioversion or Defibrillation;  Surgeon: Abdullahi Daniels MD;  Location: [facility] EP LAB;  Service: Cardiology;;     Family History   Problem Relation Name Age of Onset    Sleep apnea Mother      Hypertension Mother      No Known Problems Brother      No Known Problems Brother      No Known Problems Brother       Social History[1]  Review of Systems   Constitutional:  Negative for chills and fever.   Respiratory:  Negative for chest tightness and shortness of breath.    Cardiovascular:  Negative for chest pain.   Gastrointestinal:  Negative for abdominal pain.   Genitourinary:  Negative for dysuria.   Musculoskeletal:  Positive for arthralgias. Negative for myalgias.   Skin:  Negative for rash and wound.   Neurological:  Negative for syncope, weakness and numbness.   Hematological:  Does not bruise/bleed easily.       Physical Exam     Initial Vitals [07/29/25 1200]   BP Pulse Resp Temp  SpO2   (!) 145/93 90 18 98.5 °F (36.9 °C) 97 %      MAP       --         Physical Exam    Nursing note and vitals reviewed.  Constitutional: He appears well-developed and well-nourished.   HENT:   Head: Normocephalic and atraumatic.   Eyes: Conjunctivae are normal. Pupils are equal, round, and reactive to light. Right eye exhibits no discharge. Left eye exhibits no discharge.   Neck: Neck supple.   Normal range of motion.  Cardiovascular:  Normal rate, regular rhythm, normal heart sounds and intact distal pulses.           Pulses:       Dorsalis pedis pulses are 2+ on the right side.        Posterior tibial pulses are 2+ on the right side.   Pulmonary/Chest: Breath sounds normal.   Musculoskeletal:      Cervical back: Normal range of motion and neck supple.      Right knee: Swelling present. No deformity, effusion, erythema, ecchymosis, lacerations or bony tenderness. Decreased range of motion. Tenderness present over the medial joint line and lateral joint line. Normal alignment.      Right lower leg: Normal.      Comments: Right knee: Mild swelling without erythema to right knee with normal extension of the right knee and proximally 90° of flexion with generalized tenderness over the anterior and posterior knee.  No laxity or deformity.     Neurological: He is alert and oriented to person, place, and time. He has normal strength. No sensory deficit.   Skin: Skin is warm and dry.   Psychiatric: He has a normal mood and affect.         ED Course   Procedures  Labs Reviewed   HEPATITIS C ANTIBODY - Normal       Result Value    Hep C Ab Interp Non-Reactive     HIV 1 / 2 ANTIBODY - Normal    HIV 1/2 Ag/Ab Non-Reactive     HEP C VIRUS HOLD SPECIMEN   HIV VIRUS CONFIRMATION HOLD SPECIMEN          Imaging Results              X-Ray Knee 3 View Right (Final result)  Result time 07/29/25 13:22:36      Final result by Beck Arellano MD (07/29/25 13:22:36)                   Impression:      No acute osseous  abnormality.      Electronically signed by: Beck Arellano MD  Date:    07/29/2025  Time:    13:22               Narrative:    EXAMINATION:  XR KNEE 3 VIEW RIGHT    CLINICAL HISTORY:  Pain, unspecified    TECHNIQUE:  AP, lateral, and Merchant views of the right knee were performed.    COMPARISON:  09/20/2015    FINDINGS:  No fracture, dislocation, or joint effusion.  There is chronic fragmentation of the tibial tuberosity without significant change.  Mild degenerative changes are present.                                       Medications   colchicine capsule/tablet 1.2 mg (1.2 mg Oral Given 7/29/25 1328)   predniSONE tablet 40 mg (40 mg Oral Given 7/29/25 1328)     Medical Decision Making  Amount and/or Complexity of Data Reviewed  Radiology: ordered.    Risk  Prescription drug management.         APC / Resident Notes:   Patient is a 49 y.o. male who presents to the ED 07/29/2025 who underwent emergent evaluation for right knee pain consistent with previous episodes gout.  No erythema or warmth to the joint.  I do not think septic joint.  X-ray without acute findings.  Denies any injury or trauma.  I doubt any her picture dislocation or other ligament injury in the setting of no trauma.  Plus two DP and PT pulses to right lower extremity with no signs of distal neurovascular compromise.  He has no calf tenderness.  He is given colchicine as he has tolerated this well in the past as well as short course of steroids for inflammation as he is on blood thinners and unable to take NSAIDs. Based on my clinical evaluation, I do not appreciate any immediate, emergent, or life threatening condition or etiology that warrants additional workup today and feel that the patient can be discharged with close follow up care. Case discussed with Dr. Sena who is agreeable to plan of care. Follow up and return precautions discussed; patient verbalized understanding and is agreeable to plan of care. Patient discharged home in  stable condition.                      Medical Decision Making:   Differential Diagnosis:   Gout  Arthritis  Septic joint                Clinical Impression:  Final diagnoses:  [R52] Pain  [M10.061] Acute idiopathic gout of right knee (Primary)          ED Disposition Condition    Discharge Stable          ED Prescriptions       Medication Sig Dispense Start Date End Date Auth. Provider    colchicine (MITIGARE) 0.6 mg Cap (Expires today) Take 1 capsule (0.6 mg total) by mouth once. for 1 dose 1 capsule 7/29/2025 7/29/2025 Christine Mann NP          Follow-up Information       Follow up With Specialties Details Why Contact Info Additional Information    David Quach MD Family Medicine In 1 week  [address] [facility] [id number] [phone]       [facility] Emergency Medicine  As needed, If symptoms worsen [address] [place] [address] [address] [facility] [id number] 1st floor                   [1]   Social History  Tobacco Use    Smoking status: Never    Smokeless tobacco: Never   Substance Use Topics    Alcohol use: Yes     Comment: rarely    Drug use: No        Christine Mann NP  07/29/25 2000

## 2025-08-01 LAB
HOLD SPECIMEN: NORMAL
HOLD SPECIMEN: NORMAL

## 2025-08-18 ENCOUNTER — HOSPITAL ENCOUNTER (EMERGENCY)
Facility: HOSPITAL | Age: 50
Discharge: HOME | End: 2025-08-18
Attending: EMERGENCY MEDICINE
Payer: COMMERCIAL

## 2025-08-18 VITALS
TEMPERATURE: 98 F | HEIGHT: 69 IN | BODY MASS INDEX: 38.51 KG/M2 | WEIGHT: 260 LBS | OXYGEN SATURATION: 95 % | DIASTOLIC BLOOD PRESSURE: 83 MMHG | RESPIRATION RATE: 17 BRPM | SYSTOLIC BLOOD PRESSURE: 133 MMHG | HEART RATE: 65 BPM

## 2025-08-18 DIAGNOSIS — M10.9 ACUTE GOUTY ARTHRITIS: Primary | ICD-10-CM

## 2025-08-18 PROCEDURE — 63600175 PHARM REV CODE 636 W HCPCS: Performed by: EMERGENCY MEDICINE

## 2025-08-18 PROCEDURE — 99284 EMERGENCY DEPT VISIT MOD MDM: CPT

## 2025-08-18 PROCEDURE — 25000003 PHARM REV CODE 250: Performed by: EMERGENCY MEDICINE

## 2025-08-18 RX ORDER — PREDNISONE 20 MG/1
20 TABLET ORAL 2 TIMES DAILY
Qty: 10 TABLET | Refills: 0 | Status: SHIPPED | OUTPATIENT
Start: 2025-08-18 | End: 2025-08-23

## 2025-08-18 RX ORDER — INDOMETHACIN 25 MG/1
50 CAPSULE ORAL
Status: COMPLETED | OUTPATIENT
Start: 2025-08-18 | End: 2025-08-18

## 2025-08-18 RX ORDER — PREDNISONE 20 MG/1
60 TABLET ORAL
Status: COMPLETED | OUTPATIENT
Start: 2025-08-18 | End: 2025-08-18

## 2025-08-18 RX ORDER — HYDROCODONE BITARTRATE AND ACETAMINOPHEN 7.5; 325 MG/1; MG/1
1 TABLET ORAL EVERY 6 HOURS PRN
Qty: 12 TABLET | Refills: 0 | Status: SHIPPED | OUTPATIENT
Start: 2025-08-18

## 2025-08-18 RX ADMIN — PREDNISONE 60 MG: 20 TABLET ORAL at 07:08

## 2025-08-18 RX ADMIN — INDOMETHACIN 50 MG: 25 CAPSULE ORAL at 07:08

## 2025-08-19 ENCOUNTER — HOSPITAL ENCOUNTER (OUTPATIENT)
Dept: RADIOLOGY | Facility: HOSPITAL | Age: 50
Discharge: HOME | End: 2025-08-19
Attending: ORTHOPAEDIC SURGERY
Payer: COMMERCIAL

## 2025-08-19 ENCOUNTER — OFFICE VISIT (OUTPATIENT)
Dept: ORTHOPEDICS | Facility: CLINIC | Age: 50
End: 2025-08-19
Payer: COMMERCIAL

## 2025-08-19 VITALS — BODY MASS INDEX: 38.5 KG/M2 | HEIGHT: 69 IN | WEIGHT: 259.94 LBS

## 2025-08-19 DIAGNOSIS — M10.061 ACUTE IDIOPATHIC GOUT OF RIGHT KNEE: ICD-10-CM

## 2025-08-19 DIAGNOSIS — M23.203 DEGENERATIVE TEAR OF MEDIAL MENISCUS OF RIGHT KNEE: Primary | ICD-10-CM

## 2025-08-19 DIAGNOSIS — M10.062 ACUTE IDIOPATHIC GOUT OF LEFT KNEE: ICD-10-CM

## 2025-08-19 PROCEDURE — 1160F RVW MEDS BY RX/DR IN RCRD: CPT | Mod: CPTII,S$GLB,, | Performed by: ORTHOPAEDIC SURGERY

## 2025-08-19 PROCEDURE — 99999 PR PBB SHADOW E&M-EST. PATIENT-LVL IV: CPT | Mod: PBBFAC,,, | Performed by: ORTHOPAEDIC SURGERY

## 2025-08-19 PROCEDURE — 99203 OFFICE O/P NEW LOW 30 MIN: CPT | Mod: S$GLB,,, | Performed by: ORTHOPAEDIC SURGERY

## 2025-08-19 PROCEDURE — 73562 X-RAY EXAM OF KNEE 3: CPT | Mod: TC,PN,LT

## 2025-08-19 PROCEDURE — 3008F BODY MASS INDEX DOCD: CPT | Mod: CPTII,S$GLB,, | Performed by: ORTHOPAEDIC SURGERY

## 2025-08-19 PROCEDURE — 1159F MED LIST DOCD IN RCRD: CPT | Mod: CPTII,S$GLB,, | Performed by: ORTHOPAEDIC SURGERY

## 2025-08-19 PROCEDURE — 4010F ACE/ARB THERAPY RXD/TAKEN: CPT | Mod: CPTII,S$GLB,, | Performed by: ORTHOPAEDIC SURGERY

## 2025-08-19 PROCEDURE — 73562 X-RAY EXAM OF KNEE 3: CPT | Mod: 26,LT,, | Performed by: RADIOLOGY

## 2025-08-28 ENCOUNTER — OFFICE VISIT (OUTPATIENT)
Dept: PULMONOLOGY | Facility: CLINIC | Age: 50
End: 2025-08-28
Payer: COMMERCIAL

## 2025-08-28 VITALS
HEIGHT: 69 IN | SYSTOLIC BLOOD PRESSURE: 142 MMHG | HEART RATE: 60 BPM | BODY MASS INDEX: 40.64 KG/M2 | DIASTOLIC BLOOD PRESSURE: 100 MMHG | OXYGEN SATURATION: 97 % | WEIGHT: 274.38 LBS

## 2025-08-28 DIAGNOSIS — Z86.79 HISTORY OF CEREBRAL HEMORRHAGE: ICD-10-CM

## 2025-08-28 DIAGNOSIS — I48.0 PAF (PAROXYSMAL ATRIAL FIBRILLATION): ICD-10-CM

## 2025-08-28 DIAGNOSIS — Z86.711 HISTORY OF PULMONARY EMBOLISM: ICD-10-CM

## 2025-08-28 DIAGNOSIS — Z79.01 LONG TERM (CURRENT) USE OF ANTICOAGULANTS: ICD-10-CM

## 2025-08-28 DIAGNOSIS — G47.33 OSA ON CPAP: Primary | ICD-10-CM

## 2025-08-28 PROCEDURE — 99999 PR PBB SHADOW E&M-EST. PATIENT-LVL III: CPT | Mod: PBBFAC,,, | Performed by: NURSE PRACTITIONER

## (undated) DEVICE — COVER DRAPE ACUSON STERILE

## (undated) DEVICE — TROCAR KII BLLN 12MM 10CM

## (undated) DEVICE — INTRODUCER HEMOSTASIS 7.5F

## (undated) DEVICE — Device

## (undated) DEVICE — STRAP OR TABLE 5IN X 72IN

## (undated) DEVICE — TUBING PNEUMO

## (undated) DEVICE — KIT ANTIFOG

## (undated) DEVICE — SHEATH INTRODUCER SLENDER 6FX10CM

## (undated) DEVICE — TROCAR KII FIOS 5MM X 100MM

## (undated) DEVICE — BOWL FLUID - BACK STOP

## (undated) DEVICE — TR BAND

## (undated) DEVICE — SEE MEDLINE ITEM 157117

## (undated) DEVICE — DISSECTOR 5MM ENDOPATH

## (undated) DEVICE — CATH THERMOCOOL SMTCH SF D F

## (undated) DEVICE — IRRIGATOR SUCTION W/TIP

## (undated) DEVICE — NDL SAFETY 21G X 1 1/2 ECLPSE

## (undated) DEVICE — GUIDEWIRE J TIP EXCHANGE FIXED CORE 260

## (undated) DEVICE — SHEATH INTRODUCER 9FR 11CM

## (undated) DEVICE — SEE MEDLINE ITEM 152680

## (undated) DEVICE — PATCH CARTO REFERENCE

## (undated) DEVICE — SLEEVE SCD EXPRESS CALF MEDIUM

## (undated) DEVICE — R CATH BIDIRECTIONL DF CRV 7FR

## (undated) DEVICE — APPLIER CLIP EPIX UNIV 5X34

## (undated) DEVICE — R CATH ACUSON ACUNAV 8FR

## (undated) DEVICE — DISSECTOR CURVED 5DCD

## (undated) DEVICE — ELECTRODE REM PLYHSV RETURN 9

## (undated) DEVICE — NDL TRNSSPTL BRK-1 18GA 98CM

## (undated) DEVICE — CLOSURE SKIN STERI STRIP 1/2X4

## (undated) DEVICE — PAD RADI FEMORAL

## (undated) DEVICE — LINE PRESSURE MONITORING 96IN

## (undated) DEVICE — INTRO AGILIS MED CRL 8.5F 71CM

## (undated) DEVICE — NDL TRNSSPTL BRK-1 18GA 71CM

## (undated) DEVICE — BAG TISS RETRV MONARCH 10MM

## (undated) DEVICE — SHEATH HEMOSTASIS 8.5FR

## (undated) DEVICE — SCISSOR 5MMX35CM DIRECT DRIVE

## (undated) DEVICE — TRAY SUTURE REM WIRE LITTAUER

## (undated) DEVICE — APPLICATOR CHLORAPREP ORN 26ML

## (undated) DEVICE — SUT MONOCRYL 4-0 PS-2

## (undated) DEVICE — KIT PROBE COVER WITH GEL

## (undated) DEVICE — GUIDEWIRE REG. ANGLED .035X260 LAUREATE

## (undated) DEVICE — SOL WATER STRL IRR 1000ML

## (undated) DEVICE — PACK CUSTOM ENDO CHOLO SLI

## (undated) DEVICE — CATHETER DIAGNOSTIC DXTERITY 5FR JL3.5

## (undated) DEVICE — PACK EP DRAPE OMC

## (undated) DEVICE — PAD DEFIB CADENCE ADULT R2

## (undated) DEVICE — TROCAR ENDOPATH XCEL 5X100MM

## (undated) DEVICE — GLOVE PROTEXIS PI SYN SURG 7

## (undated) DEVICE — TOWEL OR DISP STRL BLUE 4/PK

## (undated) DEVICE — LINER SUCTION 3000CC

## (undated) DEVICE — CATH PENTARY F 2-6-2MM 115CM

## (undated) DEVICE — INTRO FAST-CATH SL1 8.5FR 63CM

## (undated) DEVICE — TRAY SUT REM SCISSOR FORCEP

## (undated) DEVICE — SUT 0 VICRYL / UR6 (J603)

## (undated) DEVICE — HEMOSTAT SURGICEL 4X8IN

## (undated) DEVICE — SET SMARTABLATE IRR TUBE

## (undated) DEVICE — SOL IRR NACL .9% 3000ML

## (undated) DEVICE — BANDAGE ADHESIVE

## (undated) DEVICE — GUIDEWIRE WHOLEY 260CMX0.035IN